# Patient Record
Sex: FEMALE | Race: WHITE | NOT HISPANIC OR LATINO | Employment: OTHER | URBAN - METROPOLITAN AREA
[De-identification: names, ages, dates, MRNs, and addresses within clinical notes are randomized per-mention and may not be internally consistent; named-entity substitution may affect disease eponyms.]

---

## 2019-11-16 ENCOUNTER — HOSPITAL ENCOUNTER (INPATIENT)
Facility: HOSPITAL | Age: 84
LOS: 9 days | Discharge: NURSING, REHAB OR LONG-TERM CARE | DRG: 871 | End: 2019-11-25
Attending: EMERGENCY MEDICINE | Admitting: INTERNAL MEDICINE
Payer: MEDICARE

## 2019-11-16 DIAGNOSIS — I21.4 NSTEMI (NON-ST ELEVATED MYOCARDIAL INFARCTION): ICD-10-CM

## 2019-11-16 DIAGNOSIS — I48.0 PAF (PAROXYSMAL ATRIAL FIBRILLATION): ICD-10-CM

## 2019-11-16 DIAGNOSIS — J96.01 ACUTE RESPIRATORY FAILURE WITH HYPOXIA AND HYPERCAPNIA: Primary | ICD-10-CM

## 2019-11-16 DIAGNOSIS — J96.02 ACUTE RESPIRATORY FAILURE WITH HYPOXIA AND HYPERCAPNIA: Primary | ICD-10-CM

## 2019-11-16 DIAGNOSIS — J45.41 MODERATE PERSISTENT ASTHMA WITH EXACERBATION: ICD-10-CM

## 2019-11-16 DIAGNOSIS — I48.91 A-FIB: ICD-10-CM

## 2019-11-16 DIAGNOSIS — J18.9 PNEUMONIA OF RIGHT LOWER LOBE DUE TO INFECTIOUS ORGANISM: ICD-10-CM

## 2019-11-16 DIAGNOSIS — R65.20 SEVERE SEPSIS: ICD-10-CM

## 2019-11-16 DIAGNOSIS — A41.9 SEVERE SEPSIS: ICD-10-CM

## 2019-11-16 DIAGNOSIS — N17.9 AKI (ACUTE KIDNEY INJURY): ICD-10-CM

## 2019-11-16 DIAGNOSIS — E66.01 MORBID OBESITY: ICD-10-CM

## 2019-11-16 DIAGNOSIS — R00.0 TACHYCARDIA: ICD-10-CM

## 2019-11-16 DIAGNOSIS — I10 ESSENTIAL HYPERTENSION: Chronic | ICD-10-CM

## 2019-11-16 DIAGNOSIS — E87.8 ELECTROLYTE IMBALANCE: ICD-10-CM

## 2019-11-16 DIAGNOSIS — J96.01 ACUTE RESPIRATORY FAILURE WITH HYPOXIA: ICD-10-CM

## 2019-11-16 PROBLEM — E87.20 LACTIC ACIDOSIS: Status: ACTIVE | Noted: 2019-11-16

## 2019-11-16 PROBLEM — J20.9 BRONCHITIS, ACUTE: Status: ACTIVE | Noted: 2019-11-13

## 2019-11-16 PROBLEM — J45.909 ASTHMA: Status: ACTIVE | Noted: 2019-11-16

## 2019-11-16 PROBLEM — F32.A DEPRESSION: Status: ACTIVE | Noted: 2019-11-16

## 2019-11-16 PROBLEM — R79.89 ELEVATED TROPONIN: Status: ACTIVE | Noted: 2019-11-16

## 2019-11-16 PROBLEM — G62.9 POLYNEUROPATHY: Status: ACTIVE | Noted: 2019-11-16

## 2019-11-16 PROBLEM — I63.9 CEREBROVASCULAR ACCIDENT (CVA): Status: ACTIVE | Noted: 2019-11-16

## 2019-11-16 PROBLEM — F41.9 ANXIETY: Status: ACTIVE | Noted: 2019-11-16

## 2019-11-16 PROBLEM — E78.5 HYPERLIPIDEMIA: Status: ACTIVE | Noted: 2019-11-16

## 2019-11-16 PROBLEM — M10.9 GOUT: Status: ACTIVE | Noted: 2019-11-16

## 2019-11-16 PROBLEM — E03.1 CONGENITAL HYPOTHYROIDISM: Status: ACTIVE | Noted: 2019-11-16

## 2019-11-16 LAB
ALBUMIN SERPL BCP-MCNC: 3.1 G/DL (ref 3.5–5.2)
ALBUMIN SERPL BCP-MCNC: 3.3 G/DL (ref 3.5–5.2)
ALLENS TEST: ABNORMAL
ALLENS TEST: ABNORMAL
ALP SERPL-CCNC: 104 U/L (ref 55–135)
ALP SERPL-CCNC: 122 U/L (ref 55–135)
ALT SERPL W/O P-5'-P-CCNC: 15 U/L (ref 10–44)
ALT SERPL W/O P-5'-P-CCNC: 19 U/L (ref 10–44)
ANION GAP SERPL CALC-SCNC: 11 MMOL/L (ref 8–16)
ANION GAP SERPL CALC-SCNC: 13 MMOL/L (ref 8–16)
APTT BLDCRRT: 35.8 SEC (ref 21–32)
AST SERPL-CCNC: 47 U/L (ref 10–40)
AST SERPL-CCNC: 48 U/L (ref 10–40)
BACTERIA #/AREA URNS HPF: ABNORMAL /HPF
BASO STIPL BLD QL SMEAR: ABNORMAL
BASOPHILS # BLD AUTO: 0.02 K/UL (ref 0–0.2)
BASOPHILS # BLD AUTO: 0.07 K/UL (ref 0–0.2)
BASOPHILS NFR BLD: 0.2 % (ref 0–1.9)
BASOPHILS NFR BLD: 0.5 % (ref 0–1.9)
BILIRUB SERPL-MCNC: 0.6 MG/DL (ref 0.1–1)
BILIRUB SERPL-MCNC: 0.7 MG/DL (ref 0.1–1)
BILIRUB UR QL STRIP: NEGATIVE
BNP SERPL-MCNC: 121 PG/ML (ref 0–99)
BUN SERPL-MCNC: 25 MG/DL (ref 8–23)
BUN SERPL-MCNC: 28 MG/DL (ref 8–23)
CALCIUM SERPL-MCNC: 8.3 MG/DL (ref 8.7–10.5)
CALCIUM SERPL-MCNC: 8.7 MG/DL (ref 8.7–10.5)
CHLORIDE SERPL-SCNC: 102 MMOL/L (ref 95–110)
CHLORIDE SERPL-SCNC: 104 MMOL/L (ref 95–110)
CK MB SERPL-MCNC: 5.6 NG/ML (ref 0.1–6.5)
CK MB SERPL-RTO: 2.4 % (ref 0–5)
CK SERPL-CCNC: 238 U/L (ref 20–180)
CLARITY UR: CLEAR
CO2 SERPL-SCNC: 22 MMOL/L (ref 23–29)
CO2 SERPL-SCNC: 23 MMOL/L (ref 23–29)
COLOR UR: YELLOW
CREAT SERPL-MCNC: 1.4 MG/DL (ref 0.5–1.4)
CREAT SERPL-MCNC: 1.4 MG/DL (ref 0.5–1.4)
DELSYS: ABNORMAL
DELSYS: ABNORMAL
DIFFERENTIAL METHOD: ABNORMAL
DIFFERENTIAL METHOD: ABNORMAL
EOSINOPHIL # BLD AUTO: 0 K/UL (ref 0–0.5)
EOSINOPHIL # BLD AUTO: 0 K/UL (ref 0–0.5)
EOSINOPHIL NFR BLD: 0 % (ref 0–8)
EOSINOPHIL NFR BLD: 0.3 % (ref 0–8)
EP: 6
ERYTHROCYTE [DISTWIDTH] IN BLOOD BY AUTOMATED COUNT: 14.6 % (ref 11.5–14.5)
ERYTHROCYTE [DISTWIDTH] IN BLOOD BY AUTOMATED COUNT: 14.7 % (ref 11.5–14.5)
ERYTHROCYTE [SEDIMENTATION RATE] IN BLOOD BY WESTERGREN METHOD: 14 MM/H
EST. GFR  (AFRICAN AMERICAN): 40 ML/MIN/1.73 M^2
EST. GFR  (AFRICAN AMERICAN): 40 ML/MIN/1.73 M^2
EST. GFR  (NON AFRICAN AMERICAN): 35 ML/MIN/1.73 M^2
EST. GFR  (NON AFRICAN AMERICAN): 35 ML/MIN/1.73 M^2
FIO2: 28
FIO2: 50
FLOW: 2
GLUCOSE SERPL-MCNC: 124 MG/DL (ref 70–110)
GLUCOSE SERPL-MCNC: 286 MG/DL (ref 70–110)
GLUCOSE UR QL STRIP: NEGATIVE
HCO3 UR-SCNC: 22.1 MMOL/L (ref 24–28)
HCO3 UR-SCNC: 27.2 MMOL/L (ref 24–28)
HCT VFR BLD AUTO: 34.7 % (ref 37–48.5)
HCT VFR BLD AUTO: 37.2 % (ref 37–48.5)
HGB BLD-MCNC: 10.6 G/DL (ref 12–16)
HGB BLD-MCNC: 11.6 G/DL (ref 12–16)
HGB UR QL STRIP: ABNORMAL
HYALINE CASTS #/AREA URNS LPF: 15 /LPF
HYPOCHROMIA BLD QL SMEAR: ABNORMAL
IMM GRANULOCYTES # BLD AUTO: 0.05 K/UL (ref 0–0.04)
IMM GRANULOCYTES # BLD AUTO: 0.08 K/UL (ref 0–0.04)
IMM GRANULOCYTES NFR BLD AUTO: 0.5 % (ref 0–0.5)
IMM GRANULOCYTES NFR BLD AUTO: 0.6 % (ref 0–0.5)
INFLUENZA A, MOLECULAR: NEGATIVE
INFLUENZA B, MOLECULAR: NEGATIVE
INR PPP: 1.2 (ref 0.8–1.2)
IP: 12
KETONES UR QL STRIP: NEGATIVE
LACTATE SERPL-SCNC: 2.5 MMOL/L (ref 0.5–2.2)
LACTATE SERPL-SCNC: 3.1 MMOL/L (ref 0.5–2.2)
LEUKOCYTE ESTERASE UR QL STRIP: NEGATIVE
LYMPHOCYTES # BLD AUTO: 1 K/UL (ref 1–4.8)
LYMPHOCYTES # BLD AUTO: 4.4 K/UL (ref 1–4.8)
LYMPHOCYTES NFR BLD: 10 % (ref 18–48)
LYMPHOCYTES NFR BLD: 33.1 % (ref 18–48)
MAGNESIUM SERPL-MCNC: 1.8 MG/DL (ref 1.6–2.6)
MAGNESIUM SERPL-MCNC: 2.1 MG/DL (ref 1.6–2.6)
MCH RBC QN AUTO: 31.9 PG (ref 27–31)
MCH RBC QN AUTO: 32.6 PG (ref 27–31)
MCHC RBC AUTO-ENTMCNC: 30.5 G/DL (ref 32–36)
MCHC RBC AUTO-ENTMCNC: 31.2 G/DL (ref 32–36)
MCV RBC AUTO: 105 FL (ref 82–98)
MCV RBC AUTO: 105 FL (ref 82–98)
MICROSCOPIC COMMENT: ABNORMAL
MIN VOL: 12.5
MODE: ABNORMAL
MODE: ABNORMAL
MONOCYTES # BLD AUTO: 0.6 K/UL (ref 0.3–1)
MONOCYTES # BLD AUTO: 1.4 K/UL (ref 0.3–1)
MONOCYTES NFR BLD: 10.4 % (ref 4–15)
MONOCYTES NFR BLD: 6.4 % (ref 4–15)
NEUTROPHILS # BLD AUTO: 7.2 K/UL (ref 1.8–7.7)
NEUTROPHILS # BLD AUTO: 7.8 K/UL (ref 1.8–7.7)
NEUTROPHILS NFR BLD: 55.1 % (ref 38–73)
NEUTROPHILS NFR BLD: 82.9 % (ref 38–73)
NITRITE UR QL STRIP: NEGATIVE
NRBC BLD-RTO: 0 /100 WBC
NRBC BLD-RTO: 0 /100 WBC
PCO2 BLDA: 34.4 MMHG (ref 35–45)
PCO2 BLDA: 66 MMHG (ref 35–45)
PH SMN: 7.22 [PH] (ref 7.35–7.45)
PH SMN: 7.42 [PH] (ref 7.35–7.45)
PH UR STRIP: 6 [PH] (ref 5–8)
PHOSPHATE SERPL-MCNC: 3.5 MG/DL (ref 2.7–4.5)
PLATELET # BLD AUTO: 256 K/UL (ref 150–350)
PLATELET # BLD AUTO: 316 K/UL (ref 150–350)
PLATELET BLD QL SMEAR: ABNORMAL
PMV BLD AUTO: 10 FL (ref 9.2–12.9)
PMV BLD AUTO: 9.6 FL (ref 9.2–12.9)
PO2 BLDA: 241 MMHG (ref 80–100)
PO2 BLDA: 80 MMHG (ref 80–100)
POC BE: -2 MMOL/L
POC BE: 0 MMOL/L
POC SATURATED O2: 100 % (ref 95–100)
POC SATURATED O2: 96 % (ref 95–100)
POCT GLUCOSE: 145 MG/DL (ref 70–110)
POCT GLUCOSE: 167 MG/DL (ref 70–110)
POCT GLUCOSE: 216 MG/DL (ref 70–110)
POCT GLUCOSE: 220 MG/DL (ref 70–110)
POLYCHROMASIA BLD QL SMEAR: ABNORMAL
POTASSIUM SERPL-SCNC: 4.2 MMOL/L (ref 3.5–5.1)
POTASSIUM SERPL-SCNC: 4.6 MMOL/L (ref 3.5–5.1)
PROCALCITONIN SERPL IA-MCNC: 0.15 NG/ML
PROT SERPL-MCNC: 6.8 G/DL (ref 6–8.4)
PROT SERPL-MCNC: 7.4 G/DL (ref 6–8.4)
PROT UR QL STRIP: ABNORMAL
PROTHROMBIN TIME: 13.3 SEC (ref 9–12.5)
RBC # BLD AUTO: 3.32 M/UL (ref 4–5.4)
RBC # BLD AUTO: 3.56 M/UL (ref 4–5.4)
RBC #/AREA URNS HPF: 6 /HPF (ref 0–4)
SAMPLE: ABNORMAL
SAMPLE: ABNORMAL
SITE: ABNORMAL
SITE: ABNORMAL
SODIUM SERPL-SCNC: 137 MMOL/L (ref 136–145)
SODIUM SERPL-SCNC: 138 MMOL/L (ref 136–145)
SP GR UR STRIP: >=1.03 (ref 1–1.03)
SP02: 98
SPECIMEN SOURCE: NORMAL
SPONT RATE: 33
STOMATOCYTES BLD QL SMEAR: PRESENT
TROPONIN I SERPL DL<=0.01 NG/ML-MCNC: 0.1 NG/ML (ref 0–0.03)
TROPONIN I SERPL DL<=0.01 NG/ML-MCNC: 0.99 NG/ML (ref 0–0.03)
TROPONIN I SERPL DL<=0.01 NG/ML-MCNC: 1.98 NG/ML (ref 0–0.03)
TSH SERPL DL<=0.005 MIU/L-ACNC: 0.82 UIU/ML (ref 0.4–4)
URN SPEC COLLECT METH UR: ABNORMAL
UROBILINOGEN UR STRIP-ACNC: NEGATIVE EU/DL
WBC # BLD AUTO: 13.14 K/UL (ref 3.9–12.7)
WBC # BLD AUTO: 9.47 K/UL (ref 3.9–12.7)
WBC #/AREA URNS HPF: 1 /HPF (ref 0–5)

## 2019-11-16 PROCEDURE — 84484 ASSAY OF TROPONIN QUANT: CPT | Mod: 91

## 2019-11-16 PROCEDURE — 84443 ASSAY THYROID STIM HORMONE: CPT

## 2019-11-16 PROCEDURE — 94660 CPAP INITIATION&MGMT: CPT

## 2019-11-16 PROCEDURE — 83880 ASSAY OF NATRIURETIC PEPTIDE: CPT

## 2019-11-16 PROCEDURE — 85610 PROTHROMBIN TIME: CPT

## 2019-11-16 PROCEDURE — 94640 AIRWAY INHALATION TREATMENT: CPT

## 2019-11-16 PROCEDURE — 80053 COMPREHEN METABOLIC PANEL: CPT

## 2019-11-16 PROCEDURE — 96374 THER/PROPH/DIAG INJ IV PUSH: CPT

## 2019-11-16 PROCEDURE — 25000003 PHARM REV CODE 250: Performed by: INTERNAL MEDICINE

## 2019-11-16 PROCEDURE — 20000000 HC ICU ROOM

## 2019-11-16 PROCEDURE — 63600175 PHARM REV CODE 636 W HCPCS: Performed by: NURSE PRACTITIONER

## 2019-11-16 PROCEDURE — 36600 WITHDRAWAL OF ARTERIAL BLOOD: CPT

## 2019-11-16 PROCEDURE — 27000221 HC OXYGEN, UP TO 24 HOURS

## 2019-11-16 PROCEDURE — 25000242 PHARM REV CODE 250 ALT 637 W/ HCPCS: Performed by: EMERGENCY MEDICINE

## 2019-11-16 PROCEDURE — 84145 PROCALCITONIN (PCT): CPT

## 2019-11-16 PROCEDURE — 82550 ASSAY OF CK (CPK): CPT

## 2019-11-16 PROCEDURE — 25000242 PHARM REV CODE 250 ALT 637 W/ HCPCS: Performed by: NURSE PRACTITIONER

## 2019-11-16 PROCEDURE — 99291 CRITICAL CARE FIRST HOUR: CPT | Mod: 25

## 2019-11-16 PROCEDURE — 93005 ELECTROCARDIOGRAM TRACING: CPT

## 2019-11-16 PROCEDURE — 63600175 PHARM REV CODE 636 W HCPCS: Performed by: INTERNAL MEDICINE

## 2019-11-16 PROCEDURE — 25000003 PHARM REV CODE 250: Performed by: NURSE PRACTITIONER

## 2019-11-16 PROCEDURE — 84484 ASSAY OF TROPONIN QUANT: CPT

## 2019-11-16 PROCEDURE — 25000003 PHARM REV CODE 250: Performed by: EMERGENCY MEDICINE

## 2019-11-16 PROCEDURE — 96365 THER/PROPH/DIAG IV INF INIT: CPT | Mod: 59

## 2019-11-16 PROCEDURE — 36415 COLL VENOUS BLD VENIPUNCTURE: CPT

## 2019-11-16 PROCEDURE — 85730 THROMBOPLASTIN TIME PARTIAL: CPT

## 2019-11-16 PROCEDURE — 82553 CREATINE MB FRACTION: CPT

## 2019-11-16 PROCEDURE — 93010 ELECTROCARDIOGRAM REPORT: CPT | Mod: ,,, | Performed by: INTERNAL MEDICINE

## 2019-11-16 PROCEDURE — 99223 PR INITIAL HOSPITAL CARE,LEVL III: ICD-10-PCS | Mod: ,,, | Performed by: INTERNAL MEDICINE

## 2019-11-16 PROCEDURE — 83605 ASSAY OF LACTIC ACID: CPT

## 2019-11-16 PROCEDURE — 84100 ASSAY OF PHOSPHORUS: CPT

## 2019-11-16 PROCEDURE — 80053 COMPREHEN METABOLIC PANEL: CPT | Mod: 91

## 2019-11-16 PROCEDURE — 93010 EKG 12-LEAD: ICD-10-PCS | Mod: ,,, | Performed by: INTERNAL MEDICINE

## 2019-11-16 PROCEDURE — 99900035 HC TECH TIME PER 15 MIN (STAT)

## 2019-11-16 PROCEDURE — 96375 TX/PRO/DX INJ NEW DRUG ADDON: CPT

## 2019-11-16 PROCEDURE — 63600175 PHARM REV CODE 636 W HCPCS: Performed by: EMERGENCY MEDICINE

## 2019-11-16 PROCEDURE — 87040 BLOOD CULTURE FOR BACTERIA: CPT

## 2019-11-16 PROCEDURE — 85025 COMPLETE CBC W/AUTO DIFF WBC: CPT | Mod: 91

## 2019-11-16 PROCEDURE — 81000 URINALYSIS NONAUTO W/SCOPE: CPT

## 2019-11-16 PROCEDURE — 87502 INFLUENZA DNA AMP PROBE: CPT

## 2019-11-16 PROCEDURE — 83735 ASSAY OF MAGNESIUM: CPT | Mod: 91

## 2019-11-16 PROCEDURE — 27000190 HC CPAP FULL FACE MASK W/VALVE

## 2019-11-16 PROCEDURE — 99223 1ST HOSP IP/OBS HIGH 75: CPT | Mod: ,,, | Performed by: INTERNAL MEDICINE

## 2019-11-16 PROCEDURE — 82803 BLOOD GASES ANY COMBINATION: CPT

## 2019-11-16 PROCEDURE — 83735 ASSAY OF MAGNESIUM: CPT

## 2019-11-16 RX ORDER — IPRATROPIUM BROMIDE AND ALBUTEROL SULFATE 2.5; .5 MG/3ML; MG/3ML
3 SOLUTION RESPIRATORY (INHALATION) EVERY 4 HOURS PRN
Status: DISCONTINUED | OUTPATIENT
Start: 2019-11-16 | End: 2019-11-22

## 2019-11-16 RX ORDER — ASPIRIN 325 MG
325 TABLET ORAL ONCE
Status: COMPLETED | OUTPATIENT
Start: 2019-11-16 | End: 2019-11-16

## 2019-11-16 RX ORDER — ASPIRIN 81 MG/1
81 TABLET ORAL DAILY
Status: ON HOLD | COMMUNITY
End: 2019-11-25 | Stop reason: HOSPADM

## 2019-11-16 RX ORDER — GLUCAGON 1 MG
1 KIT INJECTION
Status: DISCONTINUED | OUTPATIENT
Start: 2019-11-16 | End: 2019-11-22

## 2019-11-16 RX ORDER — ESCITALOPRAM OXALATE 10 MG/1
10 TABLET ORAL DAILY
Status: DISCONTINUED | OUTPATIENT
Start: 2019-11-16 | End: 2019-11-18

## 2019-11-16 RX ORDER — FUROSEMIDE 10 MG/ML
40 INJECTION INTRAMUSCULAR; INTRAVENOUS
Status: COMPLETED | OUTPATIENT
Start: 2019-11-16 | End: 2019-11-16

## 2019-11-16 RX ORDER — ONDANSETRON 2 MG/ML
4 INJECTION INTRAMUSCULAR; INTRAVENOUS EVERY 6 HOURS PRN
Status: DISCONTINUED | OUTPATIENT
Start: 2019-11-16 | End: 2019-11-25 | Stop reason: HOSPADM

## 2019-11-16 RX ORDER — INSULIN ASPART 100 [IU]/ML
1-10 INJECTION, SOLUTION INTRAVENOUS; SUBCUTANEOUS EVERY 4 HOURS PRN
Status: DISCONTINUED | OUTPATIENT
Start: 2019-11-16 | End: 2019-11-18

## 2019-11-16 RX ORDER — CEFDINIR 300 MG/1
2 CAPSULE ORAL
Status: ON HOLD | COMMUNITY
Start: 2019-11-13 | End: 2019-11-25 | Stop reason: HOSPADM

## 2019-11-16 RX ORDER — METOPROLOL TARTRATE 1 MG/ML
5 INJECTION, SOLUTION INTRAVENOUS
Status: COMPLETED | OUTPATIENT
Start: 2019-11-16 | End: 2019-11-16

## 2019-11-16 RX ORDER — OSELTAMIVIR PHOSPHATE 30 MG/1
30 CAPSULE ORAL 2 TIMES DAILY
Status: DISCONTINUED | OUTPATIENT
Start: 2019-11-16 | End: 2019-11-16

## 2019-11-16 RX ORDER — METHYLPREDNISOLONE SOD SUCC 125 MG
125 VIAL (EA) INJECTION
Status: COMPLETED | OUTPATIENT
Start: 2019-11-16 | End: 2019-11-16

## 2019-11-16 RX ORDER — PHENYLEPHRINE HCL IN 0.9% NACL 20MG/250ML
0.5 PLASTIC BAG, INJECTION (ML) INTRAVENOUS CONTINUOUS
Status: DISCONTINUED | OUTPATIENT
Start: 2019-11-16 | End: 2019-11-16

## 2019-11-16 RX ORDER — ACETAMINOPHEN 650 MG/1
650 SUPPOSITORY RECTAL EVERY 6 HOURS PRN
Status: DISCONTINUED | OUTPATIENT
Start: 2019-11-16 | End: 2019-11-25 | Stop reason: HOSPADM

## 2019-11-16 RX ORDER — OSELTAMIVIR PHOSPHATE 75 MG/1
75 CAPSULE ORAL 2 TIMES DAILY
Status: DISCONTINUED | OUTPATIENT
Start: 2019-11-16 | End: 2019-11-16 | Stop reason: DRUGHIGH

## 2019-11-16 RX ORDER — GABAPENTIN 100 MG/1
100 CAPSULE ORAL 3 TIMES DAILY
Status: ON HOLD | COMMUNITY
End: 2022-04-06 | Stop reason: HOSPADM

## 2019-11-16 RX ORDER — ATORVASTATIN CALCIUM 10 MG/1
20 TABLET, FILM COATED ORAL DAILY
Status: DISCONTINUED | OUTPATIENT
Start: 2019-11-16 | End: 2019-11-25 | Stop reason: HOSPADM

## 2019-11-16 RX ORDER — METHYLPREDNISOLONE SOD SUCC 125 MG
60 VIAL (EA) INJECTION EVERY 8 HOURS
Status: DISCONTINUED | OUTPATIENT
Start: 2019-11-16 | End: 2019-11-17

## 2019-11-16 RX ORDER — SODIUM CHLORIDE 9 MG/ML
INJECTION, SOLUTION INTRAVENOUS CONTINUOUS
Status: DISCONTINUED | OUTPATIENT
Start: 2019-11-16 | End: 2019-11-17

## 2019-11-16 RX ORDER — DILTIAZEM HYDROCHLORIDE 60 MG/1
60 TABLET, FILM COATED ORAL EVERY 8 HOURS
Status: DISCONTINUED | OUTPATIENT
Start: 2019-11-16 | End: 2019-11-17

## 2019-11-16 RX ORDER — ATORVASTATIN CALCIUM 20 MG/1
20 TABLET, FILM COATED ORAL DAILY
COMMUNITY
End: 2022-03-31

## 2019-11-16 RX ORDER — ASPIRIN 81 MG/1
81 TABLET ORAL DAILY
Status: DISCONTINUED | OUTPATIENT
Start: 2019-11-16 | End: 2019-11-25 | Stop reason: HOSPADM

## 2019-11-16 RX ORDER — ESCITALOPRAM OXALATE 10 MG/1
10 TABLET ORAL DAILY
Status: ON HOLD | COMMUNITY
End: 2019-11-25 | Stop reason: HOSPADM

## 2019-11-16 RX ORDER — PANTOPRAZOLE SODIUM 40 MG/1
40 TABLET, DELAYED RELEASE ORAL DAILY
Status: ON HOLD | COMMUNITY
End: 2019-11-25 | Stop reason: HOSPADM

## 2019-11-16 RX ORDER — CODEINE PHOSPHATE AND GUAIFENESIN 10; 100 MG/5ML; MG/5ML
10 SOLUTION ORAL
Status: ON HOLD | COMMUNITY
Start: 2019-11-13 | End: 2019-11-25 | Stop reason: HOSPADM

## 2019-11-16 RX ORDER — DILTIAZEM HYDROCHLORIDE 5 MG/ML
10 INJECTION INTRAVENOUS ONCE
Status: COMPLETED | OUTPATIENT
Start: 2019-11-16 | End: 2019-11-16

## 2019-11-16 RX ORDER — PANTOPRAZOLE SODIUM 40 MG/1
40 TABLET, DELAYED RELEASE ORAL DAILY
Status: DISCONTINUED | OUTPATIENT
Start: 2019-11-16 | End: 2019-11-25 | Stop reason: HOSPADM

## 2019-11-16 RX ORDER — METOPROLOL TARTRATE 1 MG/ML
5 INJECTION, SOLUTION INTRAVENOUS ONCE
Status: COMPLETED | OUTPATIENT
Start: 2019-11-16 | End: 2019-11-16

## 2019-11-16 RX ORDER — IPRATROPIUM BROMIDE AND ALBUTEROL SULFATE 2.5; .5 MG/3ML; MG/3ML
3 SOLUTION RESPIRATORY (INHALATION) EVERY 6 HOURS
Status: DISCONTINUED | OUTPATIENT
Start: 2019-11-16 | End: 2019-11-18

## 2019-11-16 RX ORDER — LEVOTHYROXINE SODIUM 112 UG/1
112 TABLET ORAL DAILY
Status: DISCONTINUED | OUTPATIENT
Start: 2019-11-16 | End: 2019-11-25 | Stop reason: HOSPADM

## 2019-11-16 RX ORDER — METOPROLOL TARTRATE 25 MG/1
12.5 TABLET, FILM COATED ORAL 2 TIMES DAILY
Status: ON HOLD | COMMUNITY
End: 2019-11-25 | Stop reason: HOSPADM

## 2019-11-16 RX ORDER — IPRATROPIUM BROMIDE AND ALBUTEROL SULFATE 2.5; .5 MG/3ML; MG/3ML
3 SOLUTION RESPIRATORY (INHALATION)
Status: COMPLETED | OUTPATIENT
Start: 2019-11-16 | End: 2019-11-16

## 2019-11-16 RX ADMIN — ACETAMINOPHEN 650 MG: 650 SUPPOSITORY RECTAL at 03:11

## 2019-11-16 RX ADMIN — Medication 0.5 MCG/KG/MIN: at 04:11

## 2019-11-16 RX ADMIN — DILTIAZEM HYDROCHLORIDE 10 MG: 5 INJECTION INTRAVENOUS at 09:11

## 2019-11-16 RX ADMIN — SODIUM CHLORIDE 1500 ML: 0.9 INJECTION, SOLUTION INTRAVENOUS at 02:11

## 2019-11-16 RX ADMIN — DILTIAZEM HYDROCHLORIDE 60 MG: 60 TABLET, FILM COATED ORAL at 09:11

## 2019-11-16 RX ADMIN — FUROSEMIDE 40 MG: 10 INJECTION, SOLUTION INTRAMUSCULAR; INTRAVENOUS at 01:11

## 2019-11-16 RX ADMIN — IPRATROPIUM BROMIDE AND ALBUTEROL SULFATE 3 ML: .5; 3 SOLUTION RESPIRATORY (INHALATION) at 01:11

## 2019-11-16 RX ADMIN — METHYLPREDNISOLONE SODIUM SUCCINATE 60 MG: 125 INJECTION, POWDER, FOR SOLUTION INTRAMUSCULAR; INTRAVENOUS at 03:11

## 2019-11-16 RX ADMIN — METHYLPREDNISOLONE SODIUM SUCCINATE 125 MG: 125 INJECTION, POWDER, FOR SOLUTION INTRAMUSCULAR; INTRAVENOUS at 01:11

## 2019-11-16 RX ADMIN — DILTIAZEM HYDROCHLORIDE 60 MG: 60 TABLET, FILM COATED ORAL at 10:11

## 2019-11-16 RX ADMIN — LEVOTHYROXINE SODIUM 112 MCG: 112 TABLET ORAL at 05:11

## 2019-11-16 RX ADMIN — SODIUM CHLORIDE 1000 ML: 0.9 INJECTION, SOLUTION INTRAVENOUS at 02:11

## 2019-11-16 RX ADMIN — METHYLPREDNISOLONE SODIUM SUCCINATE 80 MG: 40 INJECTION, POWDER, FOR SOLUTION INTRAMUSCULAR; INTRAVENOUS at 05:11

## 2019-11-16 RX ADMIN — INSULIN ASPART 4 UNITS: 100 INJECTION, SOLUTION INTRAVENOUS; SUBCUTANEOUS at 08:11

## 2019-11-16 RX ADMIN — ASPIRIN 81 MG: 81 TABLET, COATED ORAL at 10:11

## 2019-11-16 RX ADMIN — PANTOPRAZOLE SODIUM 40 MG: 40 TABLET, DELAYED RELEASE ORAL at 10:11

## 2019-11-16 RX ADMIN — METOPROLOL TARTRATE 5 MG: 5 INJECTION INTRAVENOUS at 05:11

## 2019-11-16 RX ADMIN — PIPERACILLIN AND TAZOBACTAM 4.5 G: 4; .5 INJECTION, POWDER, FOR SOLUTION INTRAVENOUS at 10:11

## 2019-11-16 RX ADMIN — METHYLPREDNISOLONE SODIUM SUCCINATE 60 MG: 125 INJECTION, POWDER, FOR SOLUTION INTRAMUSCULAR; INTRAVENOUS at 09:11

## 2019-11-16 RX ADMIN — ASPIRIN 325 MG ORAL TABLET 325 MG: 325 PILL ORAL at 05:11

## 2019-11-16 RX ADMIN — PIPERACILLIN AND TAZOBACTAM 4.5 G: 4; .5 INJECTION, POWDER, FOR SOLUTION INTRAVENOUS at 01:11

## 2019-11-16 RX ADMIN — IPRATROPIUM BROMIDE AND ALBUTEROL SULFATE 3 ML: .5; 3 SOLUTION RESPIRATORY (INHALATION) at 07:11

## 2019-11-16 RX ADMIN — INSULIN ASPART 4 UNITS: 100 INJECTION, SOLUTION INTRAVENOUS; SUBCUTANEOUS at 07:11

## 2019-11-16 RX ADMIN — METOPROLOL TARTRATE 5 MG: 5 INJECTION INTRAVENOUS at 07:11

## 2019-11-16 RX ADMIN — PIPERACILLIN AND TAZOBACTAM 4.5 G: 4; .5 INJECTION, POWDER, FOR SOLUTION INTRAVENOUS at 06:11

## 2019-11-16 RX ADMIN — ESCITALOPRAM OXALATE 10 MG: 10 TABLET, FILM COATED ORAL at 10:11

## 2019-11-16 RX ADMIN — METOPROLOL TARTRATE 5 MG: 5 INJECTION INTRAVENOUS at 01:11

## 2019-11-16 RX ADMIN — RIVAROXABAN 20 MG: 20 TABLET, FILM COATED ORAL at 05:11

## 2019-11-16 RX ADMIN — SODIUM CHLORIDE 1000 ML: 0.9 INJECTION, SOLUTION INTRAVENOUS at 06:11

## 2019-11-16 RX ADMIN — ATORVASTATIN CALCIUM 20 MG: 10 TABLET, FILM COATED ORAL at 10:11

## 2019-11-16 RX ADMIN — SODIUM CHLORIDE: 0.9 INJECTION, SOLUTION INTRAVENOUS at 04:11

## 2019-11-16 RX ADMIN — VANCOMYCIN HYDROCHLORIDE 2500 MG: 1 INJECTION, POWDER, LYOPHILIZED, FOR SOLUTION INTRAVENOUS at 02:11

## 2019-11-16 NOTE — PROGRESS NOTES
"Patient arrived to ICU room 6 via stretcher from ER accompanied by nurse Emily, GONZALO. Transferred to bed, heart monitor in place, vital signs taken.   Pt on BiPAP, abd mus use noted, bbs w/exp wheezes, but pt in no acute distress. Assessment as charted. Patient orientated to room, call light, fall precautions, and board.    Pt  brought to bs to update on POC. Pt converted at this time from SR to afib 120s-130s. Noted from chart pt was prescribed xaralto by cardiologist. Pt's  unable to recall why pt was prescribed xaralto, if she had ever been dx w/an arrhythmia, or the reason why she saw a cardiologist, but stated that "flutter sounds familiar."  eICU Dr Coker made aware.  "

## 2019-11-16 NOTE — ASSESSMENT & PLAN NOTE
Resolving:    Microbiology: Panculture for temp > 101.   Oxygenation: Supplemental oxygen by nasal canula. Keep SAO2 > = 92%. BIPAP PRN.  Hemodynamics: Weaned off pressors.  IV crystalloids. Keep MAP > = 65mmHg  Source control: IV Zosyn, IV Vancomycin.

## 2019-11-16 NOTE — ASSESSMENT & PLAN NOTE
- Secondary to asthma exacerbation + PNA.    - O2 sat stable on 50% FiO2 BiPAP, wean as tolerated.  Repeat ABG in AM.  - DuoNebs Q 6.  - IV Solu-Medrol 80 mg Q8.  - Empiric antibiotics.  - Will obtain V/Q scan to r/o PE (unable to perform CTA due to TARA).  - Pulmonology consult.    - Improving, came off BIPAP- Oxygenation improving

## 2019-11-16 NOTE — ASSESSMENT & PLAN NOTE
Likely demand ischemia. Cardiology consulted for assistance.   Monitor and trend serial enzymes.       Continue aspirin and statin.         Beta-blocker on hold due to hypotension

## 2019-11-16 NOTE — SIGNIFICANT EVENT
Tissue Perfusion Assessment        Vital signs reviewed. A focused perfusion assessment was completed.      Isa Mahoney

## 2019-11-16 NOTE — ED NOTES
Pt changed and placed on clean, dry pad. Secure chat sent to hospital medicine regarding 101.2 rectal temperature. Awaiting response

## 2019-11-16 NOTE — H&P
"[facility] Hospital Medicine  History & Physical    Patient Name: Ana Castañeda  MRN: [id number]  Admission Date: 11/16/2019  Attending Physician: Rivas Hester MD   Primary Care Provider: Stephen Tarango MD         Patient information was obtained from patient, spouse/SO, relative(s), EMS personnel, past medical records and ER records.     Subjective:     Principal Problem:Acute respiratory failure with hypoxia and hypercapnia    Chief Complaint:   Chief Complaint   Patient presents with    Shortness of Breath     hx of chf and recently dx with bronchitis and on cefdinir        HPI: Ana Castañeda is a 84 y.o. female patient with a PMHx asthma, HTN, h/o CVA with residual right-sided weakness, hypothyroidism, gout, debility (wheelchair bound), and chronic anticoagulation therapy (reason unknown).  She presented to the emergency department with complaints of shortness of breath that progressively worsened shortly before arrival.  No aggravating or alleviating factors.  Associated cough producing mucus, wheezing and congestion x 3 days.  Denies any chest pain, palpitations, orthopnea, PND, edema, abdominal pain, nausea, vomiting, diarrhea, dysuria, hematuria, back pain, AMS, lightheadedness or dizziness, syncope, rhinorrhea, sore throat, weakness, fever or chills.  Family at bedside reports patient was seen at [facility] on 11/13 and diagnosed with acute bronchitis.  She was given a "steroid shot" and prescribed Cefdinir, which she has taken x 1 dose on 11/15.  Patient is on chronic Xarelto, but unsure of reason.  She denies any history of AF/FL, DVT or PE.  Initial workup in the ED resulted WBC of 13.14, creatinine 1.4, BUN 25, troponin 0.095, , lactic 3.1, procalcitonin 0.15, UA unremarkable, EKG unrevealing.  ABG with pH of 7.224, pCO2 66, pO2 241, SaO2 100 on BiPAP.  CXR showed right lower lobe infiltrate.  Patient's O2 sat remained stable in the ED, but BiPAP placed due to " increased respiratory distress. Upon arrival to the ED, patient was tachycardic with heart rate of 136 bpm, /78.  She was given IV Lopressor 5 mg x 2 doses and 40 mg IV Lasix, which improved her HR into the 80s but also decreased blood pressure to 98/70.  ED also administered 125 mg IV Solu-Medrol, DuoNeb treatments x 3, 2.5 L IV fluid resuscitation, IV vanc and Zosyn.  Patient was admitted to ICU under Hospital Medicine services.  Currently, patient appears comfortable in no acute distress.  BiPAP remains in place with stable O2 sat.  Patient is a full code.   is surrogate decision maker.      Past Medical History:   Diagnosis Date    Anticoagulant long-term use     Asthma     Debility     Gait apraxia     Gout     Hypertension     NPH (normal pressure hydrocephalus)     Stroke     Thyroid disease        Past Surgical History:   Procedure Laterality Date    CAROTID ENDARTERECTOMY         Review of patient's allergies indicates:   Allergen Reactions    Food color red [red food color (bulk)] Nausea And Vomiting    Pcn [penicillins] Rash       No current facility-administered medications on file prior to encounter.      Current Outpatient Medications on File Prior to Encounter   Medication Sig    allopurinol (ZYLOPRIM) 300 MG tablet Take 300 mg by mouth once daily.    aspirin (ECOTRIN) 81 MG EC tablet Take 81 mg by mouth once daily.    atorvastatin (LIPITOR) 20 MG tablet Take 20 mg by mouth once daily.    calcium carbonate (CALCIUM 600 ORAL) Take 1,200 mg by mouth once daily.    cefdinir (OMNICEF) 300 MG capsule 2 capsules.    escitalopram oxalate (LEXAPRO) 10 MG tablet Take 10 mg by mouth once daily.    gabapentin (NEURONTIN) 100 MG capsule Take 100 mg by mouth 3 (three) times daily.    guaifenesin-codeine 100-10 mg/5 ml (TUSSI-ORGANIDIN NR)  mg/5 mL syrup 10 mLs.    levothyroxine (SYNTHROID) 100 MCG tablet Take 112 mcg by mouth once daily.     metoprolol tartrate (LOPRESSOR)  25 MG tablet Take 12.5 mg by mouth 2 (two) times daily.    multivit,calc,mins/iron/folic (ONE DAILY WOMEN'S HEALTH ORAL) Take by mouth.    pantoprazole (PROTONIX) 40 MG tablet Take 40 mg by mouth once daily.    rivaroxaban (XARELTO) 20 mg Tab Take 20 mg by mouth daily with dinner or evening meal.    [DISCONTINUED] estradiol (ESTRACE) 1 MG tablet Take 1 mg by mouth once daily.     Family History     Reviewed and not pertinent.        Tobacco Use    Smoking status: Never Smoker   Substance and Sexual Activity    Alcohol use: No    Drug use: No    Sexual activity: Not on file     Review of Systems   Unable to perform ROS: Acuity of condition   Constitutional: Negative for chills and fever.   HENT: Positive for congestion. Negative for postnasal drip, rhinorrhea, sinus pressure and sore throat.    Respiratory: Positive for cough, shortness of breath and wheezing.    Cardiovascular: Negative for chest pain, palpitations and leg swelling.   Gastrointestinal: Negative for abdominal distention, abdominal pain, blood in stool, constipation, diarrhea, nausea and vomiting.   Genitourinary: Negative for difficulty urinating, dysuria, hematuria and urgency.   Musculoskeletal: Positive for gait problem (chronic). Negative for arthralgias, back pain, myalgias, neck pain and neck stiffness.   Skin: Negative for pallor, rash and wound.   Neurological: Negative for dizziness, syncope, facial asymmetry, speech difficulty, light-headedness and headaches.   Psychiatric/Behavioral: Negative for confusion. The patient is not nervous/anxious.    All other systems reviewed and are negative.    Objective:     Vital Signs (Most Recent):  Temp: (!) 100.4 °F (38 °C) (11/16/19 0400)  Pulse: 81 (11/16/19 0400)  Resp: (!) 22 (11/16/19 0400)  BP: 98/70 (11/16/19 0400)  SpO2: 98 % (11/16/19 0400) Vital Signs (24h Range):  Temp:  [98.7 °F (37.1 °C)-101.2 °F (38.4 °C)] 100.4 °F (38 °C)  Pulse:  [] 81  Resp:  [22-35] 22  SpO2:  [92 %-98  %] 98 %  BP: ()/(57-78) 98/70     Weight: 94 kg (207 lb 4.8 oz)  Body mass index is 32.47 kg/m².    Physical Exam   Constitutional: She is oriented to person, place, and time. She appears well-developed and well-nourished. No distress.   Elderly.  Obese.   HENT:   Head: Normocephalic and atraumatic.   Eyes: Conjunctivae are normal.   PERRL; EOM intact.   Neck: Normal range of motion. Neck supple.   Cardiovascular: Normal rate, regular rhythm, S1 normal, S2 normal and intact distal pulses.  No extrasystoles are present. Exam reveals no gallop and no friction rub.   No murmur heard.  Pulses:       Radial pulses are 2+ on the right side, and 2+ on the left side.        Dorsalis pedis pulses are 2+ on the right side, and 2+ on the left side.        Posterior tibial pulses are 2+ on the right side, and 2+ on the left side.   Pulmonary/Chest: No accessory muscle usage. Tachypnea noted. She is in respiratory distress. She has no decreased breath sounds. She has wheezes (diffuse inspiratory and expiratory). She has no rhonchi. She has rales in the right lower field and the left lower field.   BiPAP in place.   Abdominal: Soft. Bowel sounds are normal. She exhibits no distension. There is no tenderness. There is no rebound, no guarding and no CVA tenderness.   Musculoskeletal: Normal range of motion. She exhibits no edema, tenderness or deformity.   Neurological: She is alert and oriented to person, place, and time. No sensory deficit. GCS eye subscore is 4. GCS verbal subscore is 5. GCS motor subscore is 6.   No acute focal deficits appreciated.   Skin: Skin is warm, dry and intact. Capillary refill takes less than 2 seconds. No rash noted. She is not diaphoretic. No cyanosis or erythema.   Psychiatric: She has a normal mood and affect. Her speech is normal and behavior is normal. Cognition and memory are normal.   Nursing note and vitals reviewed.          Significant Labs:  Results for orders placed or performed  during the hospital encounter of 11/16/19   CBC auto differential   Result Value Ref Range    WBC 13.14 (H) 3.90 - 12.70 K/uL    RBC 3.56 (L) 4.00 - 5.40 M/uL    Hemoglobin 11.6 (L) 12.0 - 16.0 g/dL    Hematocrit 37.2 37.0 - 48.5 %    Mean Corpuscular Volume 105 (H) 82 - 98 fL    Mean Corpuscular Hemoglobin 32.6 (H) 27.0 - 31.0 pg    Mean Corpuscular Hemoglobin Conc 31.2 (L) 32.0 - 36.0 g/dL    RDW 14.6 (H) 11.5 - 14.5 %    Platelets 316 150 - 350 K/uL    MPV 10.0 9.2 - 12.9 fL    Immature Granulocytes 0.6 (H) 0.0 - 0.5 %    Gran # (ANC) 7.2 1.8 - 7.7 K/uL    Immature Grans (Abs) 0.08 (H) 0.00 - 0.04 K/uL    Lymph # 4.4 1.0 - 4.8 K/uL    Mono # 1.4 (H) 0.3 - 1.0 K/uL    Eos # 0.0 0.0 - 0.5 K/uL    Baso # 0.07 0.00 - 0.20 K/uL    nRBC 0 0 /100 WBC    Gran% 55.1 38.0 - 73.0 %    Lymph% 33.1 18.0 - 48.0 %    Mono% 10.4 4.0 - 15.0 %    Eosinophil% 0.3 0.0 - 8.0 %    Basophil% 0.5 0.0 - 1.9 %    Differential Method Automated    Comprehensive metabolic panel   Result Value Ref Range    Sodium 138 136 - 145 mmol/L    Potassium 4.6 3.5 - 5.1 mmol/L    Chloride 102 95 - 110 mmol/L    CO2 23 23 - 29 mmol/L    Glucose 286 (H) 70 - 110 mg/dL    BUN, Bld 25 (H) 8 - 23 mg/dL    Creatinine 1.4 0.5 - 1.4 mg/dL    Calcium 8.7 8.7 - 10.5 mg/dL    Total Protein 7.4 6.0 - 8.4 g/dL    Albumin 3.3 (L) 3.5 - 5.2 g/dL    Total Bilirubin 0.6 0.1 - 1.0 mg/dL    Alkaline Phosphatase 122 55 - 135 U/L    AST 48 (H) 10 - 40 U/L    ALT 15 10 - 44 U/L    Anion Gap 13 8 - 16 mmol/L    eGFR if African American 40 (A) >60 mL/min/1.73 m^2    eGFR if non African American 35 (A) >60 mL/min/1.73 m^2   Lactic acid, plasma #1   Result Value Ref Range    Lactate (Lactic Acid) 3.1 (H) 0.5 - 2.2 mmol/L   Magnesium   Result Value Ref Range    Magnesium 2.1 1.6 - 2.6 mg/dL   Troponin I   Result Value Ref Range    Troponin I 0.095 (H) 0.000 - 0.026 ng/mL   Procalcitonin   Result Value Ref Range    Procalcitonin 0.15 <0.25 ng/mL   Brain natriuretic peptide    Result Value Ref Range     (H) 0 - 99 pg/mL   Protime-INR   Result Value Ref Range    Prothrombin Time 13.3 (H) 9.0 - 12.5 sec    INR 1.2 0.8 - 1.2   APTT   Result Value Ref Range    aPTT 35.8 (H) 21.0 - 32.0 sec   Urinalysis, Reflex to Urine Culture Urine, Catheterized   Result Value Ref Range    Specimen UA Urine, Catheterized     Color, UA Yellow Yellow, Straw, Jennifer    Appearance, UA Clear Clear    pH, UA 6.0 5.0 - 8.0    Specific Gravity, UA >=1.030 (A) 1.005 - 1.030    Protein, UA 2+ (A) Negative    Glucose, UA Negative Negative    Ketones, UA Negative Negative    Bilirubin (UA) Negative Negative    Occult Blood UA 2+ (A) Negative    Nitrite, UA Negative Negative    Urobilinogen, UA Negative <2.0 EU/dL    Leukocytes, UA Negative Negative   Urinalysis Microscopic   Result Value Ref Range    RBC, UA 6 (H) 0 - 4 /hpf    WBC, UA 1 0 - 5 /hpf    Bacteria None None-Occ /hpf    Hyaline Casts, UA 15 (A) 0-1/lpf /lpf    Microscopic Comment SEE COMMENT    ISTAT PROCEDURE   Result Value Ref Range    POC PH 7.224 (LL) 7.35 - 7.45    POC PCO2 66.0 (HH) 35 - 45 mmHg    POC PO2 241 (H) 80 - 100 mmHg    POC HCO3 27.2 24 - 28 mmol/L    POC BE 0 -2 to 2 mmol/L    POC SATURATED O2 100 95 - 100 %    Rate 14     Sample ARTERIAL     Site LR     Allens Test Pass     DelSys CPAP/BiPAP     Mode BiPAP     FiO2 50     Spont Rate 33     Min Vol 12.5     Sp02 98     IP 12     EP 6       All pertinent labs within the past 24 hours have been reviewed.    Significant Imaging:  Imaging Results          X-Ray Chest AP Portable (In process)    Right lower lobe infiltrate.            I have reviewed all pertinent imaging results/findings within the past 24 hours.     EKG: (personally reviewed)  Sinus tachycardia, possible LAE, no acute ischemic ST-T changes.  No significant change from previous tracings.            Assessment/Plan:     * Acute respiratory failure with hypoxia and hypercapnia  - Secondary to asthma exacerbation + PNA.     - O2 sat stable on 50% FiO2 BiPAP, wean as tolerated.  Repeat ABG in AM.  - DuoNebs Q 6.  - IV Solu-Medrol 80 mg Q8.  - Empiric antibiotics.  - Will obtain V/Q scan to r/o PE (unable to perform CTA due to TARA).  - Pulmonology consult.    Severe sepsis secondary to pneumonia of right lower lobe due to infectious organism  - WBC 13, TMax 101.2°F, HR >90, RR >20, lactic 3.1.  Patient became hypotensive in the ED after receiving 5 mg IV Lopressor for tachycardia.  Blood pressure responded well to IV fluid resuscitation, and remains stable.  - Blood cultures obtained in the ED.  Continue empiric IV vanc and Zosyn.  - 30 mL/kg IV fluids given in the ED.  Continue IV hydration.  - Continue bronchodilators.  - Wean BiPAP as tolerated.  - Check for influenza.  - Follow labs.    Moderate persistent asthma with exacerbation  - Wean BiPAP as tolerated.  - Continue bronchodilators.  - Continue IV steroids.  - Empiric antibiotics as above.    TARA (acute kidney injury)  - Initial creatinine of 1.4, likely secondary to above.  - Avoid nephrotoxic agents.  - Follow labs.    Elevated troponin  - Initial troponin of 0.095, likely demand secondary to above.  EKG unrevealing.  Chest pain-free.  - Trend serial cardiac enzymes.  - Continue aspirin and statin.  Beta-blocker on hold to prevent hypotension, resume as BP tolerates.  - Further recs pending clinical course.      VTE Risk Mitigation (From admission, onward)         Ordered     rivaroxaban tablet 20 mg  With dinner      11/16/19 0436     IP VTE HIGH RISK PATIENT  Once      11/16/19 0430     Reason for No Pharmacological VTE Prophylaxis  Once     Question:  Reasons:  Answer:  Already adequately anticoagulated on oral Anticoagulants    11/16/19 0430     Place sequential compression device  Until discontinued      11/16/19 0430              Critical care time spent on the evaluation and treatment of severe organ dysfunction, review of pertinent labs and imaging studies,  discussions with consulting providers and discussions with patient/family: 45 minutes.     Isa Mahoney NP  Department of Hospital Medicine   [facility]

## 2019-11-16 NOTE — ASSESSMENT & PLAN NOTE
- Secondary to asthma exacerbation + PNA.    - O2 sat stable on 50% FiO2 BiPAP, wean as tolerated.  Repeat ABG in AM.  - DuoNebs Q 6.  - IV Solu-Medrol 80 mg Q8.  - Empiric antibiotics.  - Will obtain V/Q scan to r/o PE (unable to perform CTA due to TARA).  - Pulmonology consult.

## 2019-11-16 NOTE — ASSESSMENT & PLAN NOTE
- WBC 13, TMax 101.2°F, HR >90, RR >20, lactic 3.1.  Patient became hypotensive in the ED after receiving 5 mg IV Lopressor for tachycardia.  Blood pressure responded well to IV fluid resuscitation, and remains stable.  - Blood cultures obtained in the ED.  Continue empiric IV vanc and Zosyn.  - 30 mL/kg IV fluids given in the ED.  Continue IV hydration.  - Continue bronchodilators.  - Wean BiPAP as tolerated.  - Follow labs.

## 2019-11-16 NOTE — PROGRESS NOTES
Placed a call to lab to see why lactic acid and morning labs have not resulted yet.   states phlebotomist is on way to draw labs.

## 2019-11-16 NOTE — PLAN OF CARE
Pt admitted to ICU from ER on continuous BiPAP. Pt went into afib shortly after admit, on xarelto at home. 5mg metoprolol IVP given per eICU for rates >140s; rates now 110s-120s. Pt w/ some abd mus use, exp wheezes and crackles noted on auscultation. Charbel gtt started peripherally per eICU for hypotension; only requiring 0.5mg/kg/min. 2.5L of fluid b/t ER and admit given, as well as vancomycin and zosyn. Pt tested negative for flu. POC discussed w/patient & , verbalized understanding. Fall precautions in place, bed alarm on.

## 2019-11-16 NOTE — ASSESSMENT & PLAN NOTE
- Initial creatinine of 1.4, likely secondary to above.  - Avoid nephrotoxic agents.  - Follow labs.

## 2019-11-16 NOTE — SUBJECTIVE & OBJECTIVE
Interval History: This am she was given a dose of Cardizem 10 mg IVP which slowed down her HR and also converted to NSR and her BP improved. She was started on Cardizem oral tabs and was able to come off Charbel gtt as well as Bipap to NC and feels much better.  Urine output is good. She is AAOx3, speech is clear, family at bedside.       Review of Systems   Unable to perform ROS: Acuity of condition   Constitutional: Negative for chills and fever.   HENT: Negative for postnasal drip, rhinorrhea, sinus pressure and sore throat.    Respiratory: Positive for cough and wheezing. Negative for shortness of breath.    Cardiovascular: Negative for chest pain, palpitations and leg swelling.   Gastrointestinal: Negative for abdominal distention, abdominal pain, blood in stool, constipation, diarrhea, nausea and vomiting.   Genitourinary: Negative for difficulty urinating, dysuria, hematuria and urgency.   Musculoskeletal: Positive for gait problem (chronic). Negative for arthralgias, back pain, myalgias, neck pain and neck stiffness.   Skin: Negative for pallor, rash and wound.   Neurological: Negative for dizziness, syncope, facial asymmetry, speech difficulty, light-headedness and headaches.   Psychiatric/Behavioral: Negative for confusion. The patient is not nervous/anxious.    All other systems reviewed and are negative.    Objective:     Vital Signs (Most Recent):  Temp: 99.9 °F (37.7 °C) (11/16/19 1115)  Pulse: 93 (11/16/19 1200)  Resp: 19 (11/16/19 1200)  BP: (!) 94/57 (11/16/19 1200)  SpO2: 95 % (11/16/19 1200) Vital Signs (24h Range):  Temp:  [98.7 °F (37.1 °C)-101.2 °F (38.4 °C)] 99.9 °F (37.7 °C)  Pulse:  [] 93  Resp:  [16-35] 19  SpO2:  [91 %-100 %] 95 %  BP: ()/() 94/57     Weight: 92.8 kg (204 lb 9.4 oz)  Body mass index is 32.04 kg/m².    Intake/Output Summary (Last 24 hours) at 11/16/2019 1225  Last data filed at 11/16/2019 1200  Gross per 24 hour   Intake 4408.08 ml   Output 1695 ml   Net  2713.08 ml      Physical Exam   Constitutional: She is oriented to person, place, and time. She appears well-developed and well-nourished. No distress.   Elderly.  Obese.   HENT:   Head: Normocephalic and atraumatic.   Eyes: Conjunctivae are normal.   PERRL; EOM intact.   Neck: Normal range of motion. Neck supple.   Cardiovascular: Normal rate, regular rhythm, S1 normal, S2 normal and intact distal pulses.  No extrasystoles are present. Exam reveals no gallop and no friction rub.   No murmur heard.  Pulses:       Radial pulses are 2+ on the right side, and 2+ on the left side.        Dorsalis pedis pulses are 2+ on the right side, and 2+ on the left side.        Posterior tibial pulses are 2+ on the right side, and 2+ on the left side.   Pulmonary/Chest: No accessory muscle usage. Tachypnea noted. No respiratory distress. She has no decreased breath sounds. She has no wheezes (diffuse inspiratory and expiratory). She has no rhonchi. She has rales in the right lower field and the left lower field.   BiPAP in place.   Abdominal: Soft. Bowel sounds are normal. She exhibits no distension. There is no tenderness. There is no rebound, no guarding and no CVA tenderness.   Musculoskeletal: Normal range of motion. She exhibits no edema, tenderness or deformity.   Neurological: She is alert and oriented to person, place, and time. No sensory deficit. GCS eye subscore is 4. GCS verbal subscore is 5. GCS motor subscore is 6.   No acute focal deficits appreciated.   Skin: Skin is warm, dry and intact. Capillary refill takes less than 2 seconds. No rash noted. She is not diaphoretic. No cyanosis or erythema.   Psychiatric: She has a normal mood and affect. Her speech is normal and behavior is normal. Cognition and memory are normal.   Nursing note and vitals reviewed.      Significant Labs:   ABGs:   Recent Labs   Lab 11/16/19  0826   PH 7.416   PCO2 34.4*   HCO3 22.1*   POCSATURATED 96   BE -2     BMP:   Recent Labs   Lab  11/16/19  0739   *      K 4.2      CO2 22*   BUN 28*   CREATININE 1.4   CALCIUM 8.3*   MG 1.8     CBC:   Recent Labs   Lab 11/16/19 0103 11/16/19  0739   WBC 13.14* 9.47   HGB 11.6* 10.6*   HCT 37.2 34.7*    256     CMP:   Recent Labs   Lab 11/16/19 0103 11/16/19  0739    137   K 4.6 4.2    104   CO2 23 22*   * 124*   BUN 25* 28*   CREATININE 1.4 1.4   CALCIUM 8.7 8.3*   PROT 7.4 6.8   ALBUMIN 3.3* 3.1*   BILITOT 0.6 0.7   ALKPHOS 122 104   AST 48* 47*   ALT 15 19   ANIONGAP 13 11   EGFRNONAA 35* 35*     Coagulation:   Recent Labs   Lab 11/16/19 0103   INR 1.2   APTT 35.8*     Lactic Acid:   Recent Labs   Lab 11/16/19 0103 11/16/19  0739   LACTATE 3.1* 2.5*     Magnesium:   Recent Labs   Lab 11/16/19 0103 11/16/19  0739   MG 2.1 1.8     POCT Glucose:   Recent Labs   Lab 11/16/19  0918   POCTGLUCOSE 145*     Troponin:   Recent Labs   Lab 11/16/19 0103 11/16/19  0739   TROPONINI 0.095* 1.983*     TSH:   Recent Labs   Lab 11/16/19  0738   TSH 0.818     All pertinent labs within the past 24 hours have been reviewed.    Significant Imaging: I have reviewed all pertinent imaging results/findings within the past 24 hours.

## 2019-11-16 NOTE — SUBJECTIVE & OBJECTIVE
Past Medical History:   Diagnosis Date    Anticoagulant long-term use     Asthma     Debility     Gait apraxia     Gout     Hypertension     NPH (normal pressure hydrocephalus)     Stroke     Thyroid disease        Past Surgical History:   Procedure Laterality Date    CAROTID ENDARTERECTOMY         Review of patient's allergies indicates:   Allergen Reactions    Food color red [red food color (bulk)] Nausea And Vomiting    Pcn [penicillins] Rash       No current facility-administered medications on file prior to encounter.      Current Outpatient Medications on File Prior to Encounter   Medication Sig    allopurinol (ZYLOPRIM) 300 MG tablet Take 300 mg by mouth once daily.    aspirin (ECOTRIN) 81 MG EC tablet Take 81 mg by mouth once daily.    atorvastatin (LIPITOR) 20 MG tablet Take 20 mg by mouth once daily.    calcium carbonate (CALCIUM 600 ORAL) Take 1,200 mg by mouth once daily.    cefdinir (OMNICEF) 300 MG capsule 2 capsules.    escitalopram oxalate (LEXAPRO) 10 MG tablet Take 10 mg by mouth once daily.    gabapentin (NEURONTIN) 100 MG capsule Take 100 mg by mouth 3 (three) times daily.    guaifenesin-codeine 100-10 mg/5 ml (TUSSI-ORGANIDIN NR)  mg/5 mL syrup 10 mLs.    levothyroxine (SYNTHROID) 100 MCG tablet Take 112 mcg by mouth once daily.     metoprolol tartrate (LOPRESSOR) 25 MG tablet Take 12.5 mg by mouth 2 (two) times daily.    multivit,calc,mins/iron/folic (ONE DAILY WOMEN'S HEALTH ORAL) Take by mouth.    pantoprazole (PROTONIX) 40 MG tablet Take 40 mg by mouth once daily.    rivaroxaban (XARELTO) 20 mg Tab Take 20 mg by mouth daily with dinner or evening meal.    [DISCONTINUED] estradiol (ESTRACE) 1 MG tablet Take 1 mg by mouth once daily.     Family History     Reviewed and not pertinent.        Tobacco Use    Smoking status: Never Smoker   Substance and Sexual Activity    Alcohol use: No    Drug use: No    Sexual activity: Not on file     Review of Systems    Unable to perform ROS: Acuity of condition   Constitutional: Negative for chills and fever.   HENT: Positive for congestion. Negative for postnasal drip, rhinorrhea, sinus pressure and sore throat.    Respiratory: Positive for cough, shortness of breath and wheezing.    Cardiovascular: Negative for chest pain, palpitations and leg swelling.   Gastrointestinal: Negative for abdominal distention, abdominal pain, blood in stool, constipation, diarrhea, nausea and vomiting.   Genitourinary: Negative for difficulty urinating, dysuria, hematuria and urgency.   Musculoskeletal: Positive for gait problem (chronic). Negative for arthralgias, back pain, myalgias, neck pain and neck stiffness.   Skin: Negative for pallor, rash and wound.   Neurological: Negative for dizziness, syncope, facial asymmetry, speech difficulty, light-headedness and headaches.   Psychiatric/Behavioral: Negative for confusion. The patient is not nervous/anxious.    All other systems reviewed and are negative.    Objective:     Vital Signs (Most Recent):  Temp: (!) 100.4 °F (38 °C) (11/16/19 0400)  Pulse: 81 (11/16/19 0400)  Resp: (!) 22 (11/16/19 0400)  BP: 98/70 (11/16/19 0400)  SpO2: 98 % (11/16/19 0400) Vital Signs (24h Range):  Temp:  [98.7 °F (37.1 °C)-101.2 °F (38.4 °C)] 100.4 °F (38 °C)  Pulse:  [] 81  Resp:  [22-35] 22  SpO2:  [92 %-98 %] 98 %  BP: ()/(57-78) 98/70     Weight: 94 kg (207 lb 4.8 oz)  Body mass index is 32.47 kg/m².    Physical Exam   Constitutional: She is oriented to person, place, and time. She appears well-developed and well-nourished. No distress.   Elderly.  Obese.   HENT:   Head: Normocephalic and atraumatic.   Eyes: Conjunctivae are normal.   PERRL; EOM intact.   Neck: Normal range of motion. Neck supple.   Cardiovascular: Normal rate, regular rhythm, S1 normal, S2 normal and intact distal pulses.  No extrasystoles are present. Exam reveals no gallop and no friction rub.   No murmur heard.  Pulses:        Radial pulses are 2+ on the right side, and 2+ on the left side.        Dorsalis pedis pulses are 2+ on the right side, and 2+ on the left side.        Posterior tibial pulses are 2+ on the right side, and 2+ on the left side.   Pulmonary/Chest: No accessory muscle usage. Tachypnea noted. She is in respiratory distress. She has no decreased breath sounds. She has wheezes (diffuse inspiratory and expiratory). She has no rhonchi. She has rales in the right lower field and the left lower field.   BiPAP in place.   Abdominal: Soft. Bowel sounds are normal. She exhibits no distension. There is no tenderness. There is no rebound, no guarding and no CVA tenderness.   Musculoskeletal: Normal range of motion. She exhibits no edema, tenderness or deformity.   Neurological: She is alert and oriented to person, place, and time. No sensory deficit. GCS eye subscore is 4. GCS verbal subscore is 5. GCS motor subscore is 6.   No acute focal deficits appreciated.   Skin: Skin is warm, dry and intact. Capillary refill takes less than 2 seconds. No rash noted. She is not diaphoretic. No cyanosis or erythema.   Psychiatric: She has a normal mood and affect. Her speech is normal and behavior is normal. Cognition and memory are normal.   Nursing note and vitals reviewed.          Significant Labs:  Results for orders placed or performed during the hospital encounter of 11/16/19   CBC auto differential   Result Value Ref Range    WBC 13.14 (H) 3.90 - 12.70 K/uL    RBC 3.56 (L) 4.00 - 5.40 M/uL    Hemoglobin 11.6 (L) 12.0 - 16.0 g/dL    Hematocrit 37.2 37.0 - 48.5 %    Mean Corpuscular Volume 105 (H) 82 - 98 fL    Mean Corpuscular Hemoglobin 32.6 (H) 27.0 - 31.0 pg    Mean Corpuscular Hemoglobin Conc 31.2 (L) 32.0 - 36.0 g/dL    RDW 14.6 (H) 11.5 - 14.5 %    Platelets 316 150 - 350 K/uL    MPV 10.0 9.2 - 12.9 fL    Immature Granulocytes 0.6 (H) 0.0 - 0.5 %    Gran # (ANC) 7.2 1.8 - 7.7 K/uL    Immature Grans (Abs) 0.08 (H) 0.00 - 0.04 K/uL     Lymph # 4.4 1.0 - 4.8 K/uL    Mono # 1.4 (H) 0.3 - 1.0 K/uL    Eos # 0.0 0.0 - 0.5 K/uL    Baso # 0.07 0.00 - 0.20 K/uL    nRBC 0 0 /100 WBC    Gran% 55.1 38.0 - 73.0 %    Lymph% 33.1 18.0 - 48.0 %    Mono% 10.4 4.0 - 15.0 %    Eosinophil% 0.3 0.0 - 8.0 %    Basophil% 0.5 0.0 - 1.9 %    Differential Method Automated    Comprehensive metabolic panel   Result Value Ref Range    Sodium 138 136 - 145 mmol/L    Potassium 4.6 3.5 - 5.1 mmol/L    Chloride 102 95 - 110 mmol/L    CO2 23 23 - 29 mmol/L    Glucose 286 (H) 70 - 110 mg/dL    BUN, Bld 25 (H) 8 - 23 mg/dL    Creatinine 1.4 0.5 - 1.4 mg/dL    Calcium 8.7 8.7 - 10.5 mg/dL    Total Protein 7.4 6.0 - 8.4 g/dL    Albumin 3.3 (L) 3.5 - 5.2 g/dL    Total Bilirubin 0.6 0.1 - 1.0 mg/dL    Alkaline Phosphatase 122 55 - 135 U/L    AST 48 (H) 10 - 40 U/L    ALT 15 10 - 44 U/L    Anion Gap 13 8 - 16 mmol/L    eGFR if African American 40 (A) >60 mL/min/1.73 m^2    eGFR if non African American 35 (A) >60 mL/min/1.73 m^2   Lactic acid, plasma #1   Result Value Ref Range    Lactate (Lactic Acid) 3.1 (H) 0.5 - 2.2 mmol/L   Magnesium   Result Value Ref Range    Magnesium 2.1 1.6 - 2.6 mg/dL   Troponin I   Result Value Ref Range    Troponin I 0.095 (H) 0.000 - 0.026 ng/mL   Procalcitonin   Result Value Ref Range    Procalcitonin 0.15 <0.25 ng/mL   Brain natriuretic peptide   Result Value Ref Range     (H) 0 - 99 pg/mL   Protime-INR   Result Value Ref Range    Prothrombin Time 13.3 (H) 9.0 - 12.5 sec    INR 1.2 0.8 - 1.2   APTT   Result Value Ref Range    aPTT 35.8 (H) 21.0 - 32.0 sec   Urinalysis, Reflex to Urine Culture Urine, Catheterized   Result Value Ref Range    Specimen UA Urine, Catheterized     Color, UA Yellow Yellow, Straw, Jennifer    Appearance, UA Clear Clear    pH, UA 6.0 5.0 - 8.0    Specific Gravity, UA >=1.030 (A) 1.005 - 1.030    Protein, UA 2+ (A) Negative    Glucose, UA Negative Negative    Ketones, UA Negative Negative    Bilirubin (UA) Negative Negative     Occult Blood UA 2+ (A) Negative    Nitrite, UA Negative Negative    Urobilinogen, UA Negative <2.0 EU/dL    Leukocytes, UA Negative Negative   Urinalysis Microscopic   Result Value Ref Range    RBC, UA 6 (H) 0 - 4 /hpf    WBC, UA 1 0 - 5 /hpf    Bacteria None None-Occ /hpf    Hyaline Casts, UA 15 (A) 0-1/lpf /lpf    Microscopic Comment SEE COMMENT    ISTAT PROCEDURE   Result Value Ref Range    POC PH 7.224 (LL) 7.35 - 7.45    POC PCO2 66.0 (HH) 35 - 45 mmHg    POC PO2 241 (H) 80 - 100 mmHg    POC HCO3 27.2 24 - 28 mmol/L    POC BE 0 -2 to 2 mmol/L    POC SATURATED O2 100 95 - 100 %    Rate 14     Sample ARTERIAL     Site LR     Allens Test Pass     DelSys CPAP/BiPAP     Mode BiPAP     FiO2 50     Spont Rate 33     Min Vol 12.5     Sp02 98     IP 12     EP 6       All pertinent labs within the past 24 hours have been reviewed.    Significant Imaging:  Imaging Results          X-Ray Chest AP Portable (In process)    Right lower lobe infiltrate.            I have reviewed all pertinent imaging results/findings within the past 24 hours.     EKG: (personally reviewed)  Sinus tachycardia, possible LAE, no acute ischemic ST-T changes.  No significant change from previous tracings.

## 2019-11-16 NOTE — ASSESSMENT & PLAN NOTE
- Wean BiPAP as tolerated.  - Continue bronchodilators.  - Continue IV steroids.  - Empiric antibiotics as above.

## 2019-11-16 NOTE — HPI
"Ana Castañeda is a 84 y.o. female patient with a PMHx asthma, HTN, h/o CVA with residual right-sided weakness, hypothyroidism, gout, debility (wheelchair bound), and chronic anticoagulation therapy (reason unknown).  She presented to the emergency department with complaints of shortness of breath that progressively worsened shortly before arrival.  No aggravating or alleviating factors.  Associated cough producing mucus, wheezing and congestion x 3 days.  Denies any chest pain, palpitations, orthopnea, PND, edema, abdominal pain, nausea, vomiting, diarrhea, dysuria, hematuria, back pain, AMS, lightheadedness or dizziness, syncope, rhinorrhea, sore throat, weakness, fever or chills.  Family at bedside reports patient was seen at [facility] on 11/13 and diagnosed with acute bronchitis.  She was given a "steroid shot" and prescribed Cefdinir, which she has taken x 1 dose on 11/15.  Patient is on chronic Xarelto, but unsure of reason.  She denies any history of AF/FL, DVT or PE.  Initial workup in the ED resulted WBC of 13.14, creatinine 1.4, BUN 25, troponin 0.095, , lactic 3.1, procalcitonin 0.15, UA unremarkable, EKG unrevealing.  ABG with pH of 7.224, pCO2 66, pO2 241, SaO2 100 on BiPAP.  CXR showed right lower lobe infiltrate.  Patient's O2 sat remained stable in the ED, but BiPAP placed due to increased respiratory distress. Upon arrival to the ED, patient was tachycardic with heart rate of 136 bpm, /78.  She was given IV Lopressor 5 mg x 2 doses and 40 mg IV Lasix, which improved her HR into the 80s but also decreased blood pressure to 98/70.  ED also administered 125 mg IV Solu-Medrol, DuoNeb treatments x 3, 2.5 L IV fluid resuscitation, IV vanc and Zosyn.  Patient was admitted to ICU under Hospital Medicine services.  Currently, patient appears comfortable in no acute distress.  BiPAP remains in place with stable O2 sat.  Patient is a full code.   is surrogate decision maker.  "

## 2019-11-16 NOTE — PROGRESS NOTES
Bedside report received from GONZALO Urena.  Skin assessment performed with GONZALO Urena.  Pt does have a small amount of non blanching redness to perianal region.  Pt states the skin does not hurt when assessed.  Skin cleansed with soap, barrier cream applied.  Bipap noted, fi02 50%, sats 98%.  pts , appears to be AFIB.  IV metoprolol ordered.  Neosynephrine running to R wrist peripheral IV stopped due to titration orders.  Will continue to monitor b/p.  NS bolus infusing, almost complete.

## 2019-11-16 NOTE — ASSESSMENT & PLAN NOTE
Volume resuscitation.  Optimize hemodynamics.  Monitor BUN / Cr. Montor urine output.  Avoid Nephrotoxic Medications.

## 2019-11-16 NOTE — PROGRESS NOTES
Pharmacokinetic Initial Assessment: IV Vancomycin    Assessment/Plan:    We will make adjustments in the plan based on SCr improvement or decline.    Initiate intravenous vancomycin with loading dose of 2500 mg once followed by a maintenance dose of vancomycin 1250 mg IV every 24 hours  Desired empiric serum trough concentration is 15 to 20 mcg/mL  Draw vancomycin trough level 30 min prior to third dose on 11/18/19 at approximately 0230   Pharmacy will continue to follow and monitor vancomycin.      Please contact pharmacy at extension [phone] with any questions regarding this assessment.     Thank you for the consult,   Meng Vernon       Patient brief summary:  Ana Castañeda is a 84 y.o. female initiated on antimicrobial therapy with IV Vancomycin for treatment of suspected lower respiratory infection    Drug Allergies:   Review of patient's allergies indicates:   Allergen Reactions    Food color red [red food color (bulk)] Nausea And Vomiting    Pcn [penicillins] Rash       Actual Body Weight:   94 kg    Renal Function:   Estimated Creatinine Clearance: 36.5 mL/min (based on SCr of 1.4 mg/dL).,     Dialysis Method (if applicable):  N/A    CBC (last 72 hours):  Recent Labs   Lab Result Units 11/16/19  0103   WBC K/uL 13.14*   Hemoglobin g/dL 11.6*   Hematocrit % 37.2   Platelets K/uL 316   Gran% % 55.1   Lymph% % 33.1   Mono% % 10.4   Eosinophil% % 0.3   Basophil% % 0.5   Differential Method  Automated       Metabolic Panel (last 72 hours):  Recent Labs   Lab Result Units 11/16/19  0103 11/16/19  0130   Sodium mmol/L 138  --    Potassium mmol/L 4.6  --    Chloride mmol/L 102  --    CO2 mmol/L 23  --    Glucose mg/dL 286*  --    Glucose, UA   --  Negative   BUN, Bld mg/dL 25*  --    Creatinine mg/dL 1.4  --    Albumin g/dL 3.3*  --    Total Bilirubin mg/dL 0.6  --    Alkaline Phosphatase U/L 122  --    AST U/L 48*  --    ALT U/L 15  --    Magnesium mg/dL 2.1  --        Drug levels (last 3 results):  No results for  input(s): VANCOMYCINRA, VANCOMYCINPE, VANCOMYCINTR in the last 72 hours.    Microbiologic Results:  Microbiology Results (last 7 days)       Procedure Component Value Units Date/Time    Blood culture x two cultures. Draw prior to antibiotics. [[id number]] Collected:  11/16/19 0155    Order Status:  Sent Specimen:  Blood from Peripheral, Hand, Right Updated:  11/16/19 0207    Culture, Respiratory with Gram Stain [[id number]]     Order Status:  No result Specimen:  Respiratory     Blood culture x two cultures. Draw prior to antibiotics. [[id number]] Collected:  11/16/19 0102    Order Status:  Sent Specimen:  Blood from Peripheral, Lower Arm, Right Updated:  11/16/19 0124    Blood culture x two cultures. Draw prior to antibiotics [[id number]]     Order Status:  Canceled Specimen:  Blood

## 2019-11-16 NOTE — HOSPITAL COURSE
Ana Castañeda is a 84 y.o. female patient with a PMHx asthma, HTN, h/o CVA with residual right-sided weakness, hypothyroidism, gout, debility (wheelchair bound), and chronic anticoagulation therapy (reason unknown) admitted to ICU on Bipap, with acute hypoxemic resp failure sec to LLL Pneumonia and Afib w RVR and Lactic Acidosis. She was aggressively rehydrated with NS and started on IV Vanc and Zosyn as well as IV Solumedrol. Her Afib RVR was treated initially with IV Lopressor 5 mg IVP x 2 which  which did not affect her HR but dropped BP to 98/70 -- necessitating Charbel-senephrine gtt by eICU this am. This am she was given a dose of Cardizem 10 mg IVP which slowed down her HR and also converted to NSR and her BP improved. She was started on Cardizem oral tabs and was able to come off Charbel gtt as well as Bipap to NC and feels much better.  Urine output is good. She is AAOx3, speech is clear, family at bedside. Troponin increased to 1.98-- cardiology consulted.  11/17- doing better, comfortable, pleasant. Appears mildly fluid overloaded, she is about 5 L fluid positive. Given IV lasix, all Cx NGTD. Cardiology thinks its demand ischemia from sepsis and recommended out pt NMP scan when stable. Await transfer to tele. PT/OT ordered. NSR remains.  Ana Castañeda is a 84 y.o. female patient with a PMHx asthma, HTN, h/o CVA with residual right-sided weakness, hypothyroidism, gout, debility (wheelchair bound), and chronic anticoagulation therapy (reason unknown) admitted to ICU on Bipap, with acute hypoxemic resp failure sec to LLL Pneumonia and Afib w RVR and Lactic Acidosis. She was aggressively rehydrated with NS and started on IV Vanc and Zosyn as well as IV Solumedrol. Her Afib RVR was treated initially with IV Lopressor 5 mg IVP x 2 which  which did not affect her HR but dropped BP to 98/70 -- necessitating Charbel-senephrine gtt by eICU this am. This am she was given a dose of Cardizem 10 mg IVP which slowed down her HR and  also converted to NSR and her BP improved. She was started on Cardizem oral tabs and was able to come off Charbel gtt as well as Bipap to NC and feels much better.  Urine output is good. She is AAOx3, speech is clear, family at bedside. Troponin increased to 1.98-- cardiology consulted.  11/17- doing better, comfortable, pleasant. Appears mildly fluid overloaded, she is about 5 L fluid positive. Given IV lasix, all Cx NGTD. Cardiology thinks its demand ischemia from sepsis and recommended out pt NMP scan when stable. Await transfer to tele. PT/OT ordered. NSR remains.  11/18- went into Afib RVR when PT evaluated her yesterday- has remained in it since- no response to Dig, Cardizem. Eventually cardiology had to start her on Cardizem and Amiodarone gtt with some slowing. Otherwise feels good. Repeat CXR shows clear lungs, no Pneumonia. Bun/Cr elevated at 34/1.5- hence Lasix d/hira and started on gentle rehydration. Zosyn also d/hira.  11/19- looks much better, sitting up in chair, eating lunch well, appears in good spirits. No dysphagia, doing IS. Bun improved to 34. Doing a little PT. Has great family support.  11/20- looks better, sitting up in chair, but needed lot more support from PT/OT then at home-- hence family requests SNF at [facility]. Continue present care.  11/21- looks better, went into Afib w RVR last night-- had to resume Amiodarone and Cardizem gtt again this am, also had received a dose of IV Dig but no improvement-- this am received IV Lopressor 5 mg and she converted to NSR &2, also had some wheezing-- likely was in fluid overload-- so IVF d/hira and given a dose of IV Lasix, she improved, CXR clear, RLL infiltrate from admit resolved.  11/22- looks better, relaxed, eating lunch. Remained in NSR since yesterday, had bradycardia last night-- hence Cardizem gtt d/hira. Now on oral Amiodarone and Cardizem. Doing PT/OT gently. She keeps going into afib RVR with minimal exertion, any PT etc. She  has been accepted at [facility] on Monday. Will continue present care.  Performed ambulatory oxygen evaluation and did not meet criteria for supplemental oxygen at rest or with activity.  Accepted for skilled nursing placement at [facility].  Discharge plan to transfer to SNF.

## 2019-11-16 NOTE — ASSESSMENT & PLAN NOTE
Rate Control: Converted to NSR with IV Cardiazem.    Anticoagulation: XARELTO      Bleeding signs/symptoms: None

## 2019-11-16 NOTE — EICU
Called to evaluate Mrs. Castañeda, hospitalized with respiratory failure, now on NIV (50%, 12/6, total rate 22, O2 sat 98%.  CXR with right lower lobe infiltrate.   Currently receiving broad spectrum abx and initial fluid resuscitation.  Paroxysmal atrial fibrillation with ventricular rate of 130-140 has developed.  BP is labile, currently MAP 57.  Lactic acidosis on admission (3.1).    Assessment:  Clinical course and CXR most consistent with influenza, likely complicated by bacterial pneumonia. Will add tamiflu  Hypotension - will add phenylephrine peripherally at this time and re-assess  Atrial fibrillation with modest RVR - observe with phenylephrine.    Partha Coker MD    Metoprolol IV added for RVR > 120

## 2019-11-16 NOTE — ASSESSMENT & PLAN NOTE
Continue with LABA, SEAN and ICS.        Continue IV glucocorticoids        Continue nighttime NIPPV - BiPAP PRN

## 2019-11-16 NOTE — PROGRESS NOTES
Pharmacist Renal Dose Adjustment Note    Ana Castañeda is a 84 y.o. female being treated with the medication Oseltamivir    Patient Data:    Vital Signs (Most Recent):  Temp: (!) 100.4 °F (38 °C) (11/16/19 0400)  Pulse: 81 (11/16/19 0400)  Resp: (!) 22 (11/16/19 0400)  BP: 98/70 (11/16/19 0400)  SpO2: 98 % (11/16/19 0400)   Vital Signs (72h Range):  Temp:  [98.7 °F (37.1 °C)-101.2 °F (38.4 °C)]   Pulse:  []   Resp:  [22-35]   BP: ()/(57-78)   SpO2:  [92 %-98 %]      Recent Labs   Lab 11/16/19 0103   CREATININE 1.4     Serum creatinine: 1.4 mg/dL 11/16/19 0103  Estimated creatinine clearance: 36.5 mL/min    Medication:Oseltamivir dose: 75 mg frequency q 12 hr will be changed to medication:Oseltamivir dose:30 mg frequency:q 12 hr    Pharmacist's Name: Meng Vernon  Pharmacist's Extension: [phone]

## 2019-11-16 NOTE — PLAN OF CARE
11/16/19 1019   Discharge Assessment   Assessment Type Discharge Planning Assessment   Confirmed/corrected address and phone number on facesheet? Yes   Assessment information obtained from? Patient;Caregiver   Prior to hospitilization cognitive status: Alert/Oriented   Prior to hospitalization functional status: Independent;Assistive Equipment   Current cognitive status: Alert/Oriented   Current Functional Status: Needs Assistance   Lives With spouse   Able to Return to Prior Arrangements yes   Is patient able to care for self after discharge? Yes   Who are your caregiver(s) and their phone number(s)? Pancho Castañeda () [phone], Ruddy Castañeda (son) [phone]   Patient's perception of discharge disposition home health   Readmission Within the Last 30 Days no previous admission in last 30 days   Patient currently being followed by outpatient case management? No   Patient currently receives any other outside agency services? No   Equipment Currently Used at Home walker, rolling;wheelchair;grab bar;raised toilet;bedside commode   Do you have any problems affording any of your prescribed medications? No   Is the patient taking medications as prescribed? yes   Does the patient have transportation home? Yes   Transportation Anticipated family or friend will provide   Does the patient receive services at the [facility]? No   Discharge Plan A Home Health   DME Needed Upon Discharge    ( TBD)   Patient/Family in Agreement with Plan yes     Met with pt, her son, and her  at bedside for DC assessment. Pt lives at home with her  and uses the DME listed above. Pt reported that she was recently being seen by a HH agency but ran out of days. Given her current hospitalization and her home bound status, pt requested to restart HH with that same agency. Neither pt nor her  recall the name of the HH agency that they used so pt's  has returned home to find information on their  previous  agency. Obtained signed preference form from pt's son. Will FU PRN to complete referral. No other CM needs identified at this time. SWer provided a transitional care folder, information on advanced directives, information on pharmacy bedside delivery, and discharge planning begins on admission with contact information for any needs/questions. Pt opted for bedside medication delivery. Pt's typical pharmacy is [facility] [address] [facility]. Information below.    [address] , [place] [address]  [phone]  Romeo Chavira LMSW 11/16/2019 10:28 AM

## 2019-11-16 NOTE — ASSESSMENT & PLAN NOTE
- Initial creatinine of 1.4, likely secondary to above.  - Avoid nephrotoxic agents.  - Follow labs.    Improving with IVF

## 2019-11-16 NOTE — ASSESSMENT & PLAN NOTE
- WBC 13, TMax 101.2°F, HR >90, RR >20, lactic 3.1.  Patient became hypotensive in the ED after receiving 5 mg IV Lopressor for tachycardia.  Blood pressure responded well to IV fluid resuscitation, and remains stable.  - Blood cultures obtained in the ED.  Continue empiric IV vanc and Zosyn.  - 30 mL/kg IV fluids given in the ED.  Continue IV hydration.  - Continue bronchodilators.  - Wean BiPAP as tolerated.  - Check for influenza.  - Follow labs.    Improving,  Leukocytosis better  Continue IV abx, steroids

## 2019-11-16 NOTE — ASSESSMENT & PLAN NOTE
- Wean BiPAP as tolerated.  - Continue bronchodilators.  - Continue IV steroids.  - Empiric antibiotics as above.    Sec to Pneumonia- improving

## 2019-11-16 NOTE — ED PROVIDER NOTES
SCRIBE #1 NOTE: I, Mary Ellen Hill, am scribing for, and in the presence of, Zeina Emerson MD. I have scribed the entire note.       History     Chief Complaint   Patient presents with    Shortness of Breath     hx of chf and recently dx with bronchitis and on cefdinir     Review of patient's allergies indicates:   Allergen Reactions    Food color red [red food color (bulk)] Nausea And Vomiting    Pcn [penicillins] Rash         History of Present Illness     HPI    11/16/2019, 2:16 AM  History obtained from the  and daughter   HPI & ROS limited secondary to CPAP      History of Present Illness: Ana Castaeñda is a 84 y.o. female patient with a PMHx of asthma,stroke, chronic anticoagulation, HTN, thyroid disease, and gout who presents to the Emergency Department for evaluation of SOB which onset gradually PTA. Pt's  and daughter reports finding her lying flat on her back and assumed she was dehydrated so tried to force her to intake fluids. EMS put her on CPAP and brought her to ED. Symptoms are constant and moderate in severity. No mitigating or exacerbating factors reported. Pt's family is at bedside with pt and reports cough and congestion for the past three days. Patient is unable to deny any additional sxs at this time. Prior Tx includes a steroid shot(administered 11/13/19) and Cefdinir that she received after visiting [place] [facility] two days ago. Pt's family reports that she began taking the Cefdinir today. No further complaints or concerns at this time.       Arrival mode: EMS     PCP: Stephen Tarango MD        Past Medical History:  Past Medical History:   Diagnosis Date    Anticoagulant long-term use     Asthma     Debility     Gait apraxia     Gout     Hypertension     NPH (normal pressure hydrocephalus)     Stroke     Thyroid disease        Past Surgical History:  Past Surgical History:   Procedure Laterality Date    CAROTID ENDARTERECTOMY           Family  History:  History reviewed. No pertinent family history.       Social History:  Social History     Tobacco Use    Smoking status: Never Smoker   Substance and Sexual Activity    Alcohol use: No    Drug use: Unknown    Sexual activity: Unknown        Review of Systems     Review of Systems   Unable to perform ROS: Severe respiratory distress   HENT: Positive for congestion.    Respiratory: Positive for cough and shortness of breath.       Physical Exam     Initial Vitals [11/16/19 0050]   BP Pulse Resp Temp SpO2   (!) 180/78 (!) 136 (!) 28 98.7 °F (37.1 °C) 96 %      MAP       --          Physical Exam  Nursing Notes and Vital Signs Reviewed.  Constitutional: Patient is in moderate distress. Obese.  Head: Atraumatic. Normocephalic.  Eyes: PERRL. EOM intact. Conjunctivae are not pale. No scleral icterus.  ENT: Mucous membranes are moist. Oropharynx is clear and symmetric.    Neck: Supple. Full ROM. No lymphadenopathy.  Cardiovascular: Tachycardic. Regular rhythm. No murmurs, rubs, or gallops. Distal pulses are 2+ and symmetric.  Pulmonary/Chest: Tachypneic. Crackles bilaterally. Inspiratory and expiratory wheezing.  Abdominal: Soft and non-distended.  There is no tenderness.  No rebound, guarding, or rigidity. Good bowel sounds.  Musculoskeletal: Moves all extremities. No obvious deformities. No edema.  Skin: Warm and dry.  Neurological:  Alert, awake, and appropriate.  Normal speech.  No acute focal neurological deficits are appreciated.  Psychiatric: Normal affect. Good eye contact. Appropriate in content.     ED Course   Critical Care  Date/Time: 11/16/2019 1:58 AM  Performed by: Zeina Emerson MD  Authorized by: Zeina Emerson MD   Direct patient critical care time: 20 minutes  Additional history critical care time: 10 minutes  Ordering / reviewing critical care time: 10 minutes  Documentation critical care time: 10 minutes  Consulting other physicians critical care time: 10 minutes  Total  critical care time (exclusive of procedural time) : 60 minutes  Critical care time was exclusive of separately billable procedures and treating other patients and teaching time.  Critical care was necessary to treat or prevent imminent or life-threatening deterioration of the following conditions: sepsis (hypoxemic and hypercapnic respitatory failure).  Critical care was time spent personally by me on the following activities: blood draw for specimens, development of treatment plan with patient or surrogate, discussions with consultants, discussions with primary provider, interpretation of cardiac output measurements, evaluation of patient's response to treatment, examination of patient, obtaining history from patient or surrogate, ordering and performing treatments and interventions, ordering and review of laboratory studies, ordering and review of radiographic studies, pulse oximetry, re-evaluation of patient's condition, review of old charts and ventilator management.        ED Vital Signs:  Vitals:    11/16/19 1130 11/16/19 1145 11/16/19 1200 11/16/19 1230   BP: (!) 105/56 (!) 82/59 (!) 94/57 (!) 110/48   Pulse: 103 (!) 111 93 93   Resp: 18 18 19 (!) 21   Temp:       TempSrc:       SpO2: 96% 96% 95% 96%   Weight:       Height:        11/16/19 1300 11/16/19 1400 11/16/19 1500 11/16/19 1515   BP: (!) 117/90 (!) 110/53 125/77    Pulse: 72 80 88 98   Resp: (!) 21 (!) 25 (!) 23 (!) 22   Temp:    98.9 °F (37.2 °C)   TempSrc:    Oral   SpO2: 98% 97% 96% 97%   Weight:       Height:        11/16/19 1530 11/16/19 1600 11/16/19 1630 11/16/19 1700   BP: 123/72 (!) 119/93 (!) 115/23 (!) 152/58   Pulse: 99 72 84 73   Resp: (!) 22 (!) 22 (!) 23 (!) 23   Temp:       TempSrc:       SpO2: 97% 97% 97% 97%   Weight:       Height:        11/16/19 1730 11/16/19 1800 11/16/19 1900   BP: (!) 140/55 121/60 (!) 130/37   Pulse: 78 75 74   Resp: (!) 24 (!) 21 (!) 22   Temp:      TempSrc:      SpO2: 98% 98% 97%   Weight:      Height:           Abnormal Lab Results:  Labs Reviewed   CBC W/ AUTO DIFFERENTIAL - Abnormal; Notable for the following components:       Result Value    WBC 13.14 (*)     RBC 3.56 (*)     Hemoglobin 11.6 (*)     Mean Corpuscular Volume 105 (*)     Mean Corpuscular Hemoglobin 32.6 (*)     Mean Corpuscular Hemoglobin Conc 31.2 (*)     RDW 14.6 (*)     Immature Granulocytes 0.6 (*)     Immature Grans (Abs) 0.08 (*)     Mono # 1.4 (*)     All other components within normal limits   COMPREHENSIVE METABOLIC PANEL - Abnormal; Notable for the following components:    Glucose 286 (*)     BUN, Bld 25 (*)     Albumin 3.3 (*)     AST 48 (*)     eGFR if  40 (*)     eGFR if non  35 (*)     All other components within normal limits   LACTIC ACID, PLASMA - Abnormal; Notable for the following components:    Lactate (Lactic Acid) 3.1 (*)     All other components within normal limits   TROPONIN I - Abnormal; Notable for the following components:    Troponin I 0.095 (*)     All other components within normal limits   B-TYPE NATRIURETIC PEPTIDE - Abnormal; Notable for the following components:     (*)     All other components within normal limits   PROTIME-INR - Abnormal; Notable for the following components:    Prothrombin Time 13.3 (*)     All other components within normal limits   APTT - Abnormal; Notable for the following components:    aPTT 35.8 (*)     All other components within normal limits   URINALYSIS, REFLEX TO URINE CULTURE - Abnormal; Notable for the following components:    Specific Gravity, UA >=1.030 (*)     Protein, UA 2+ (*)     Occult Blood UA 2+ (*)     All other components within normal limits    Narrative:     Preferred Collection Type->Urine, Catheterized   URINALYSIS MICROSCOPIC - Abnormal; Notable for the following components:    RBC, UA 6 (*)     Hyaline Casts, UA 15 (*)     All other components within normal limits    Narrative:     Preferred Collection Type->Urine, Catheterized    ISTAT PROCEDURE - Abnormal; Notable for the following components:    POC PH 7.224 (*)     POC PCO2 66.0 (*)     POC PO2 241 (*)     All other components within normal limits   CULTURE, RESPIRATORY   MAGNESIUM   PROCALCITONIN        All Lab Results:  Results for orders placed or performed during the hospital encounter of 11/16/19   Blood culture x two cultures. Draw prior to antibiotics.   Result Value Ref Range    Blood Culture, Routine No Growth to date    Blood culture x two cultures. Draw prior to antibiotics.   Result Value Ref Range    Blood Culture, Routine No Growth to date    Influenza A & B by Molecular   Result Value Ref Range    Influenza A, Molecular Negative Negative    Influenza B, Molecular Negative Negative    Flu A & B Source Nasal swab    CBC auto differential   Result Value Ref Range    WBC 13.14 (H) 3.90 - 12.70 K/uL    RBC 3.56 (L) 4.00 - 5.40 M/uL    Hemoglobin 11.6 (L) 12.0 - 16.0 g/dL    Hematocrit 37.2 37.0 - 48.5 %    Mean Corpuscular Volume 105 (H) 82 - 98 fL    Mean Corpuscular Hemoglobin 32.6 (H) 27.0 - 31.0 pg    Mean Corpuscular Hemoglobin Conc 31.2 (L) 32.0 - 36.0 g/dL    RDW 14.6 (H) 11.5 - 14.5 %    Platelets 316 150 - 350 K/uL    MPV 10.0 9.2 - 12.9 fL    Immature Granulocytes 0.6 (H) 0.0 - 0.5 %    Gran # (ANC) 7.2 1.8 - 7.7 K/uL    Immature Grans (Abs) 0.08 (H) 0.00 - 0.04 K/uL    Lymph # 4.4 1.0 - 4.8 K/uL    Mono # 1.4 (H) 0.3 - 1.0 K/uL    Eos # 0.0 0.0 - 0.5 K/uL    Baso # 0.07 0.00 - 0.20 K/uL    nRBC 0 0 /100 WBC    Gran% 55.1 38.0 - 73.0 %    Lymph% 33.1 18.0 - 48.0 %    Mono% 10.4 4.0 - 15.0 %    Eosinophil% 0.3 0.0 - 8.0 %    Basophil% 0.5 0.0 - 1.9 %    Differential Method Automated    Comprehensive metabolic panel   Result Value Ref Range    Sodium 138 136 - 145 mmol/L    Potassium 4.6 3.5 - 5.1 mmol/L    Chloride 102 95 - 110 mmol/L    CO2 23 23 - 29 mmol/L    Glucose 286 (H) 70 - 110 mg/dL    BUN, Bld 25 (H) 8 - 23 mg/dL    Creatinine 1.4 0.5 - 1.4 mg/dL    Calcium  8.7 8.7 - 10.5 mg/dL    Total Protein 7.4 6.0 - 8.4 g/dL    Albumin 3.3 (L) 3.5 - 5.2 g/dL    Total Bilirubin 0.6 0.1 - 1.0 mg/dL    Alkaline Phosphatase 122 55 - 135 U/L    AST 48 (H) 10 - 40 U/L    ALT 15 10 - 44 U/L    Anion Gap 13 8 - 16 mmol/L    eGFR if African American 40 (A) >60 mL/min/1.73 m^2    eGFR if non African American 35 (A) >60 mL/min/1.73 m^2   Lactic acid, plasma #1   Result Value Ref Range    Lactate (Lactic Acid) 3.1 (H) 0.5 - 2.2 mmol/L   Magnesium   Result Value Ref Range    Magnesium 2.1 1.6 - 2.6 mg/dL   Troponin I   Result Value Ref Range    Troponin I 0.095 (H) 0.000 - 0.026 ng/mL   Procalcitonin   Result Value Ref Range    Procalcitonin 0.15 <0.25 ng/mL   Brain natriuretic peptide   Result Value Ref Range     (H) 0 - 99 pg/mL   Protime-INR   Result Value Ref Range    Prothrombin Time 13.3 (H) 9.0 - 12.5 sec    INR 1.2 0.8 - 1.2   APTT   Result Value Ref Range    aPTT 35.8 (H) 21.0 - 32.0 sec   Urinalysis, Reflex to Urine Culture Urine, Catheterized   Result Value Ref Range    Specimen UA Urine, Catheterized     Color, UA Yellow Yellow, Straw, Jennifer    Appearance, UA Clear Clear    pH, UA 6.0 5.0 - 8.0    Specific Gravity, UA >=1.030 (A) 1.005 - 1.030    Protein, UA 2+ (A) Negative    Glucose, UA Negative Negative    Ketones, UA Negative Negative    Bilirubin (UA) Negative Negative    Occult Blood UA 2+ (A) Negative    Nitrite, UA Negative Negative    Urobilinogen, UA Negative <2.0 EU/dL    Leukocytes, UA Negative Negative   Lactic acid, plasma #2   Result Value Ref Range    Lactate (Lactic Acid) 2.5 (H) 0.5 - 2.2 mmol/L   Urinalysis Microscopic   Result Value Ref Range    RBC, UA 6 (H) 0 - 4 /hpf    WBC, UA 1 0 - 5 /hpf    Bacteria None None-Occ /hpf    Hyaline Casts, UA 15 (A) 0-1/lpf /lpf    Microscopic Comment SEE COMMENT    Troponin I   Result Value Ref Range    Troponin I 1.983 (H) 0.000 - 0.026 ng/mL   CBC auto differential   Result Value Ref Range    WBC 9.47 3.90 - 12.70  K/uL    RBC 3.32 (L) 4.00 - 5.40 M/uL    Hemoglobin 10.6 (L) 12.0 - 16.0 g/dL    Hematocrit 34.7 (L) 37.0 - 48.5 %    Mean Corpuscular Volume 105 (H) 82 - 98 fL    Mean Corpuscular Hemoglobin 31.9 (H) 27.0 - 31.0 pg    Mean Corpuscular Hemoglobin Conc 30.5 (L) 32.0 - 36.0 g/dL    RDW 14.7 (H) 11.5 - 14.5 %    Platelets 256 150 - 350 K/uL    MPV 9.6 9.2 - 12.9 fL    Immature Granulocytes 0.5 0.0 - 0.5 %    Gran # (ANC) 7.8 (H) 1.8 - 7.7 K/uL    Immature Grans (Abs) 0.05 (H) 0.00 - 0.04 K/uL    Lymph # 1.0 1.0 - 4.8 K/uL    Mono # 0.6 0.3 - 1.0 K/uL    Eos # 0.0 0.0 - 0.5 K/uL    Baso # 0.02 0.00 - 0.20 K/uL    nRBC 0 0 /100 WBC    Gran% 82.9 (H) 38.0 - 73.0 %    Lymph% 10.0 (L) 18.0 - 48.0 %    Mono% 6.4 4.0 - 15.0 %    Eosinophil% 0.0 0.0 - 8.0 %    Basophil% 0.2 0.0 - 1.9 %    Platelet Estimate Appears normal     Poly Occasional     Hypo Occasional     Stomatocytes Present     Basophilic Stippling Occasional     Differential Method Automated    Comprehensive metabolic panel   Result Value Ref Range    Sodium 137 136 - 145 mmol/L    Potassium 4.2 3.5 - 5.1 mmol/L    Chloride 104 95 - 110 mmol/L    CO2 22 (L) 23 - 29 mmol/L    Glucose 124 (H) 70 - 110 mg/dL    BUN, Bld 28 (H) 8 - 23 mg/dL    Creatinine 1.4 0.5 - 1.4 mg/dL    Calcium 8.3 (L) 8.7 - 10.5 mg/dL    Total Protein 6.8 6.0 - 8.4 g/dL    Albumin 3.1 (L) 3.5 - 5.2 g/dL    Total Bilirubin 0.7 0.1 - 1.0 mg/dL    Alkaline Phosphatase 104 55 - 135 U/L    AST 47 (H) 10 - 40 U/L    ALT 19 10 - 44 U/L    Anion Gap 11 8 - 16 mmol/L    eGFR if African American 40 (A) >60 mL/min/1.73 m^2    eGFR if non African American 35 (A) >60 mL/min/1.73 m^2   Magnesium   Result Value Ref Range    Magnesium 1.8 1.6 - 2.6 mg/dL   Phosphorus   Result Value Ref Range    Phosphorus 3.5 2.7 - 4.5 mg/dL   TSH   Result Value Ref Range    TSH 0.818 0.400 - 4.000 uIU/mL   CK-MB   Result Value Ref Range     (H) 20 - 180 U/L    CPK MB 5.6 0.1 - 6.5 ng/mL    MB% 2.4 0.0 - 5.0 %    Troponin I   Result Value Ref Range    Troponin I 0.992 (H) 0.000 - 0.026 ng/mL   ISTAT PROCEDURE   Result Value Ref Range    POC PH 7.224 (LL) 7.35 - 7.45    POC PCO2 66.0 (HH) 35 - 45 mmHg    POC PO2 241 (H) 80 - 100 mmHg    POC HCO3 27.2 24 - 28 mmol/L    POC BE 0 -2 to 2 mmol/L    POC SATURATED O2 100 95 - 100 %    Rate 14     Sample ARTERIAL     Site LR     Allens Test Pass     DelSys CPAP/BiPAP     Mode BiPAP     FiO2 50     Spont Rate 33     Min Vol 12.5     Sp02 98     IP 12     EP 6    ISTAT PROCEDURE   Result Value Ref Range    POC PH 7.416 7.35 - 7.45    POC PCO2 34.4 (L) 35 - 45 mmHg    POC PO2 80 80 - 100 mmHg    POC HCO3 22.1 (L) 24 - 28 mmol/L    POC BE -2 -2 to 2 mmol/L    POC SATURATED O2 96 95 - 100 %    Sample ARTERIAL     Site RB     Allens Test N/A     DelSys Nasal Can     Mode SPONT     Flow 2     FiO2 28    POCT glucose   Result Value Ref Range    POCT Glucose 145 (H) 70 - 110 mg/dL   POCT glucose   Result Value Ref Range    POCT Glucose 167 (H) 70 - 110 mg/dL   POCT glucose   Result Value Ref Range    POCT Glucose 216 (H) 70 - 110 mg/dL         Imaging Results:  Imaging Results          X-Ray Chest AP Portable (Final result)  Result time 11/16/19 08:02:01    Final result by Sebas Le MD (11/16/19 08:02:01)                 Impression:      See above.      Electronically signed by: Sebas Le MD  Date:    11/16/2019  Time:    08:02             Narrative:    EXAMINATION:  XR CHEST AP PORTABLE    CLINICAL HISTORY:  Sepsis;    FINDINGS:  No prior study.  Patchy right perihilar and more notable right lower chest opacities characteristic of multifocal bronchopneumonia with history of sepsis.  Left lung clear.  Normal size heart.  No pleural effusion.                            1:50 AM: Per ED physician, pt's CXR results: Right lower lobe infiltrate.        The EKG was ordered, reviewed, and independently interpreted by the ED provider.  Interpretation time: 00:52  Rate: 132 BPM  Rhythm:  sinus tachycardia  Interpretation: possible left atrial enlargement. Septal infarct, age undetermined. No STEMI.             The Emergency Provider reviewed the vital signs and test results, which are outlined above.     ED Discussion     2:00 AM: Discussed case with Isa Mahoney NP ([facility]). Dr. Mario agrees with current care and management of pt and accepts admission.  Needs admission for sepsis, right Lower lobe pneumonia, respiratory failure on BiPap.   Admitting Service: [facility]  Admitting Physician: Dr. Mario  Admit to: ICU    2:00 AM: Re-evaluated pt. I have discussed test results, shared treatment plan, and the need for admission with patient and family at bedside. Pt and family express understanding at this time and agree with all information. All questions answered. Pt and family have no further questions or concerns at this time. Pt is ready for admit.       Medical Decision Making:   Clinical Tests:   Lab Tests: Reviewed and Ordered  Radiological Study: Ordered and Reviewed  Medical Tests: Ordered and Reviewed  Sepsis Perfusion Assessment: I attest, a sepsis perfusion exam was performed within 6 hours of Septic Shock presentation, following fluid resuscitation.     Additional MDM:   Sepsis - SIRS Criteria: Heart Rate: HR > 90. Tachypnea: RR > 20. White Count: WBC > 12,000. .       ED Medication(s):  Medications   piperacillin-tazobactam 4.5 g in dextrose 5 % 100 mL IVPB (ready to mix system) (4.5 g Intravenous New Bag 11/16/19 1822)   vancomycin (VANCOCIN) 1,250 mg in dextrose 5 % 250 mL IVPB ( Intravenous Trough Due 30 minutes Before Dose 11/18/19 0230)   acetaminophen suppository 650 mg (650 mg Rectal Given 11/16/19 0324)   0.9%  NaCl infusion ( Intravenous Verify Only 11/16/19 1700)   oseltamivir capsule 30 mg (30 mg Oral Not Given 11/16/19 0900)   albuterol-ipratropium 2.5 mg-0.5 mg/3 mL nebulizer solution 3 mL (3 mLs Nebulization Not Given 11/16/19 1300)    albuterol-ipratropium 2.5 mg-0.5 mg/3 mL nebulizer solution 3 mL (has no administration in time range)   ondansetron injection 4 mg (has no administration in time range)   pantoprazole EC tablet 40 mg (40 mg Oral Given 11/16/19 1000)   aspirin EC tablet 81 mg (81 mg Oral Given 11/16/19 1000)   atorvastatin tablet 20 mg (20 mg Oral Given 11/16/19 1000)   escitalopram oxalate tablet 10 mg (10 mg Oral Given 11/16/19 1000)   levothyroxine tablet 112 mcg (112 mcg Oral Given 11/16/19 0526)   rivaroxaban tablet 20 mg (20 mg Oral Given 11/16/19 1720)   glucagon (human recombinant) injection 1 mg (has no administration in time range)   insulin aspart U-100 pen 1-10 Units (has no administration in time range)   dextrose 10% (D10W) Bolus (has no administration in time range)   diltiaZEM tablet 60 mg (60 mg Oral Given 11/16/19 1008)   methylPREDNISolone sodium succinate injection 60 mg (60 mg Intravenous Given 11/16/19 1500)   albuterol-ipratropium 2.5 mg-0.5 mg/3 mL nebulizer solution 3 mL (3 mLs Nebulization Given 11/16/19 0110)   furosemide injection 40 mg (40 mg Intravenous Given 11/16/19 0130)   methylPREDNISolone sodium succinate injection 125 mg (125 mg Intravenous Given 11/16/19 0129)   metoprolol injection 5 mg (5 mg Intravenous Given 11/16/19 0136)   sodium chloride 0.9% bolus 1,000 mL (0 mLs Intravenous Stopped 11/16/19 0245)   vancomycin (VANCOCIN) 2,500 mg in dextrose 5 % 500 mL IVPB (2,500 mg Intravenous New Bag 11/16/19 0248)   sodium chloride 0.9% bolus 1,500 mL (1,500 mLs Intravenous New Bag 11/16/19 0246)   sodium chloride 0.9% bolus 1,000 mL (1,000 mLs Intravenous New Bag 11/16/19 0630)   aspirin tablet 325 mg (325 mg Oral Given 11/16/19 0526)   metoprolol injection 5 mg (5 mg Intravenous Given 11/16/19 0531)   metoprolol injection 5 mg (5 mg Intravenous Given 11/16/19 2640)   diltiaZEM injection 10 mg (10 mg Intravenous Given 11/16/19 0371)       Current Discharge Medication List                  Scribe  Attestation:   Scribe #1: I performed the above scribed service and the documentation accurately describes the services I performed. I attest to the accuracy of the note.     Attending:   Physician Attestation Statement for Scribe #1: I, Zeina Emerson MD, personally performed the services described in this documentation, as scribed by Mary Ellen Hill, in my presence, and it is both accurate and complete.           Clinical Impression       ICD-10-CM ICD-9-CM   1. Acute respiratory failure with hypoxia and hypercapnia J96.01 518.81    J96.02    2. Tachycardia R00.0 785.0   3. Pneumonia of right lower lobe due to infectious organism J18.1 486   4. Severe sepsis A41.9 038.9    R65.20 995.92   5. Moderate persistent asthma with exacerbation J45.41 493.92   6. Morbid obesity E66.01 278.01       Disposition:   Disposition: Admitted  Condition: Critical         Zeina Emerson MD  11/16/19 0586

## 2019-11-16 NOTE — CONSULTS
[facility] -   Pulmonology  Consult Note    Patient Name: Ana Castañeda  MRN: [id number]  Admission Date: 11/16/2019  Hospital Length of Stay: 0 days  Code Status: Full Code  Attending Physician: Tiffanie Jones MD  Primary Care Provider: Stephen Tarango MD   Principal Problem: Pneumonia of right lower lobe due to infectious organism      Subjective:     HPI:  84 year old female who  has a past medical history of Anticoagulant long-term use, Asthma, Debility, Gait apraxia, Gout, Hypertension, NPH (normal pressure hydrocephalus), Stroke, and Thyroid disease presenting with progressive dyspnea, productive cough, wheezing and chest congestion for 3 day PTA. ED evaluation revealed right lower lobe pneumonia.  Tachycardia on arrival resolves with LOPRESSOR IVP. Also received IV  FUROSEMIDE. Additional ED treatment included  125 mg IV Solu-Medrol, DuoNeb treatments x 3, 2.5 L IV fluid resuscitation, IV vanc and Zosyn. NIPPV - BIPAP initiated in the ED. Patient was admitted to ICU under Hospital Medicine services.       Past Medical History:   Diagnosis Date    Anticoagulant long-term use     Asthma     Debility     Gait apraxia     Gout     Hypertension     NPH (normal pressure hydrocephalus)     Stroke     Thyroid disease        Past Surgical History:   Procedure Laterality Date    CAROTID ENDARTERECTOMY         Review of patient's allergies indicates:   Allergen Reactions    Food color red [red food color (bulk)] Nausea And Vomiting    Pcn [penicillins] Rash           Medications:  Continuous Infusions:   sodium chloride 0.9% 75 mL/hr at 11/16/19 1500     Scheduled Meds:   albuterol-ipratropium  3 mL Nebulization Q6H    aspirin  81 mg Oral Daily    atorvastatin  20 mg Oral Daily    diltiaZEM  60 mg Oral Q8H    escitalopram oxalate  10 mg Oral Daily    levothyroxine  112 mcg Oral Daily    methylPREDNISolone sodium succinate  60 mg Intravenous Q8H    oseltamivir  30 mg Oral BID     pantoprazole  40 mg Oral Daily    piperacillin-tazobactam (ZOSYN) IVPB  4.5 g Intravenous Q8H    rivaroxaban  20 mg Oral Daily with dinner    [START ON 11/17/2019] vancomycin (VANCOCIN) IVPB  1,250 mg Intravenous Q24H     PRN Meds:acetaminophen, albuterol-ipratropium, Dextrose 10% Bolus, glucagon (human recombinant), insulin aspart U-100, ondansetron       Family History     None        Tobacco Use    Smoking status: Former Smoker    Smokeless tobacco: Never Used   Substance and Sexual Activity    Alcohol use: No    Drug use: Never    Sexual activity: Not on file         Review of Systems   Constitutional: Negative for chills and fever.   HENT: Negative for postnasal drip, rhinorrhea, sinus pressure and sore throat.    Respiratory: Positive for cough and wheezing. Negative for shortness of breath.    Cardiovascular: Negative for chest pain, palpitations and leg swelling.   Gastrointestinal: Negative for abdominal distention, abdominal pain, blood in stool, constipation, diarrhea, nausea and vomiting.   Genitourinary: Negative for difficulty urinating, dysuria, hematuria and urgency.   Musculoskeletal: Positive for gait problem (chronic). Negative for arthralgias, back pain, myalgias, neck pain and neck stiffness.   Skin: Negative for pallor, rash and wound.   Neurological: Negative for dizziness, syncope, facial asymmetry, speech difficulty, light-headedness and headaches.   Psychiatric/Behavioral: Negative for confusion. The patient is not nervous/anxious.    All other systems reviewed and are negative.    Objective:     Vital Signs (Most Recent):  Temp: 99.9 °F (37.7 °C) (11/16/19 1115)  Pulse: 80 (11/16/19 1400)  Resp: (!) 25 (11/16/19 1400)  BP: (!) 110/53 (11/16/19 1400)  SpO2: 97 % (11/16/19 1400) Vital Signs (24h Range):  Temp:  [98.7 °F (37.1 °C)-101.2 °F (38.4 °C)] 99.9 °F (37.7 °C)  Pulse:  [] 80  Resp:  [16-35] 25  SpO2:  [91 %-100 %] 97 %  BP: ()/() 110/53     Weight: 92.8 kg  (204 lb 9.4 oz)  Body mass index is 32.04 kg/m².      Intake/Output Summary (Last 24 hours) at 11/16/2019 1656  Last data filed at 11/16/2019 1400  Gross per 24 hour   Intake 4443.38 ml   Output 1880 ml   Net 2563.38 ml       Physical Exam   Constitutional: She is oriented to person, place, and time. She appears well-developed and well-nourished. No distress.   Elderly.  Obese.   HENT:   Head: Normocephalic and atraumatic.   Eyes: Conjunctivae are normal.   PERRL; EOM intact.   Neck: Normal range of motion. Neck supple.   Cardiovascular: Normal rate, regular rhythm, S1 normal, S2 normal and intact distal pulses.  No extrasystoles are present. Exam reveals no gallop and no friction rub.   No murmur heard.  Pulmonary/Chest: No accessory muscle usage. No tachypnea. No respiratory distress. She has no decreased breath sounds. She has no wheezes (diffuse inspiratory and expiratory). She has no rhonchi. She has rales in the right lower field and the left lower field.   Abdominal: Soft. Bowel sounds are normal. She exhibits no distension. There is no tenderness. There is no rebound, no guarding and no CVA tenderness.   Musculoskeletal: Normal range of motion. She exhibits no edema, tenderness or deformity.   Neurological: She is alert and oriented to person, place, and time. No sensory deficit. GCS eye subscore is 4. GCS verbal subscore is 5. GCS motor subscore is 6.   Skin: Skin is warm, dry and intact. Capillary refill takes less than 2 seconds. No rash noted. She is not diaphoretic. No cyanosis or erythema.   Psychiatric: She has a normal mood and affect. Her speech is normal and behavior is normal. Cognition and memory are normal.   Nursing note and vitals reviewed.      Vents:  Oxygen Concentration (%): 28 (11/16/19 0744)    Lines/Drains/Airways     Drain                 Urethral Catheter 11/16/19 0130 Straight-tip 16 Fr. less than 1 day          Peripheral Intravenous Line                 Peripheral IV - Single Lumen  11/16/19 0058 20 G Right Wrist less than 1 day         Peripheral IV - Single Lumen 11/16/19 20 G Left Forearm less than 1 day                Significant Labs:    CBC/Anemia Profile:  Recent Labs   Lab 11/16/19  0103 11/16/19  0739   WBC 13.14* 9.47   HGB 11.6* 10.6*   HCT 37.2 34.7*    256   * 105*   RDW 14.6* 14.7*        Chemistries:  Recent Labs   Lab 11/16/19  0103 11/16/19  0739    137   K 4.6 4.2    104   CO2 23 22*   BUN 25* 28*   CREATININE 1.4 1.4   CALCIUM 8.7 8.3*   ALBUMIN 3.3* 3.1*   PROT 7.4 6.8   BILITOT 0.6 0.7   ALKPHOS 122 104   ALT 15 19   AST 48* 47*   MG 2.1 1.8   PHOS  --  3.5       ABGs:   Recent Labs   Lab 11/16/19  0826   PH 7.416   PCO2 34.4*   HCO3 22.1*   POCSATURATED 96   BE -2     Bilirubin:   Recent Labs   Lab 11/16/19  0103 11/16/19  0739   BILITOT 0.6 0.7     Coagulation:   Recent Labs   Lab 11/16/19 0103   INR 1.2   APTT 35.8*     Lactic Acid:   Recent Labs   Lab 11/16/19  0103 11/16/19  0739   LACTATE 3.1* 2.5*     POCT Glucose:   Recent Labs   Lab 11/16/19  0918 11/16/19  1330   POCTGLUCOSE 145* 167*     Troponin:   Recent Labs   Lab 11/16/19  0103 11/16/19  0739 11/16/19  1259   TROPONINI 0.095* 1.983* 0.992*     Urine Studies:   Recent Labs   Lab 11/16/19  0130   COLORU Yellow   APPEARANCEUA Clear   PHUR 6.0   SPECGRAV >=1.030*   PROTEINUA 2+*   GLUCUA Negative   KETONESU Negative   BILIRUBINUA Negative   OCCULTUA 2+*   NITRITE Negative   UROBILINOGEN Negative   LEUKOCYTESUR Negative   RBCUA 6*   WBCUA 1   BACTERIA None   HYALINECASTS 15*       Significant Imaging:     X-Ray Chest AP Portable : 11/16/19  No prior study.  Patchy right perihilar and more notable right lower chest opacities characteristic of multifocal bronchopneumonia with history of sepsis.  Left lung clear.  Normal size heart.  No pleural effusion.              ABG  Recent Labs   Lab 11/16/19  0826   PH 7.416   PO2 80   PCO2 34.4*   HCO3 22.1*   BE -2     Assessment/Plan:     * Septic  Shock secondary to RLL Pneumonia due to infectious organism  Resolving:    Microbiology: Panculture for temp > 101.   Oxygenation: Supplemental oxygen by nasal canula. Keep SAO2 > = 92%. BIPAP PRN.  Hemodynamics: Weaned off pressors.  IV crystalloids. Keep MAP > = 65mmHg  Source control: IV Zosyn, IV Vancomycin.    Paroxysmal A-fib with RVR  Rate Control: Converted to NSR with IV Cardiazem.    Anticoagulation: XARELTO      Bleeding signs/symptoms: None      Lactic acidosis  Improving. Monitor and trend.     TARA (acute kidney injury)  Volume resuscitation.  Optimize hemodynamics.  Monitor BUN / Cr. Montor urine output.  Avoid Nephrotoxic Medications.     NSTEMI  Likely demand ischemia. Cardiology consulted for assistance.   Monitor and trend serial enzymes.       Continue aspirin and statin.         Beta-blocker on hold due to hypotension    Moderate persistent asthma with exacerbation  Continue with LABA, SEAN and ICS.        Continue IV glucocorticoids        Continue nighttime NIPPV - BiPAP PRN                                               Acute respiratory failure with hypoxia and hypercapnia on Bipap  Supplement oxygenation. Keep SAO2 >= 92%. BIPAP weaned off.  Bronchodilators per orders     Continue current ongoing medical management. OK to transfer patient out of the ICU. Will sign off upon ICU transfer. Please re consult if further pulmonary issues arise.     Thank you for your consult. I will follow-up with patient. Please contact us if you have any additional questions.     Partha Hinds MD  Pulmonology  [facility] -

## 2019-11-16 NOTE — HOSPITAL COURSE
11/16: Seen and examined at bedside. Hospital chart reviewed. Overnight events noted. Off BIPAP. Spontaneous unassisted ventilation. She reports that she  has moderately improved.  11/17 - Upright in bed fully awake and alert in no distress with VSS except mild to mod HTN.  Family X 4 at bedside and update given.  Tolerated breakfast.  Restless night.   11/22C/o cough and chest congestion , developed rapid heart rate with albuterol  11/23 Improved

## 2019-11-16 NOTE — ASSESSMENT & PLAN NOTE
- Initial troponin of 0.095, likely demand secondary to above.  EKG unrevealing.  Chest pain-free.  - Trend serial cardiac enzymes.  - Continue aspirin and statin.  Beta-blocker on hold to prevent hypotension, resume as BP tolerates.  - Further recs pending clinical course.    Repeat elevated to 1.98  Pt has no CP, likely sec to demand Ischemia from Sepsis, resp failure  Will consult Cards

## 2019-11-16 NOTE — ASSESSMENT & PLAN NOTE
- Initial troponin of 0.095, likely demand secondary to above.  EKG unrevealing.  Chest pain-free.  - Trend serial cardiac enzymes.  - Continue aspirin and statin.  Beta-blocker on hold to prevent hypotension, resume as BP tolerates.  - Further recs pending clinical course.

## 2019-11-16 NOTE — ASSESSMENT & PLAN NOTE
- WBC 13, TMax 101.2°F, HR >90, RR >20, lactic 3.1.  Patient became hypotensive in the ED after receiving 5 mg IV Lopressor for tachycardia.  Blood pressure responded well to IV fluid resuscitation, and remains stable.  - Blood cultures obtained in the ED.  Continue empiric IV vanc and Zosyn.  - 30 mL/kg IV fluids given in the ED.  Continue IV hydration.  - Continue bronchodilators.  - Wean BiPAP as tolerated.  - Check for influenza.  - Follow labs.    Improving, off Charbel gtt  Leukocytosis better  Continue IV abx, steroids    Doing better  All cx ngtd  Cont same abx  Decrease steroids    Repeat CXR clear-- will d/c abc to augmentin

## 2019-11-16 NOTE — SUBJECTIVE & OBJECTIVE
Past Medical History:   Diagnosis Date    Anticoagulant long-term use     Asthma     Debility     Gait apraxia     Gout     Hypertension     NPH (normal pressure hydrocephalus)     Stroke     Thyroid disease        Past Surgical History:   Procedure Laterality Date    CAROTID ENDARTERECTOMY         Review of patient's allergies indicates:   Allergen Reactions    Food color red [red food color (bulk)] Nausea And Vomiting    Pcn [penicillins] Rash           Medications:  Continuous Infusions:   sodium chloride 0.9% 75 mL/hr at 11/16/19 1500     Scheduled Meds:   albuterol-ipratropium  3 mL Nebulization Q6H    aspirin  81 mg Oral Daily    atorvastatin  20 mg Oral Daily    diltiaZEM  60 mg Oral Q8H    escitalopram oxalate  10 mg Oral Daily    levothyroxine  112 mcg Oral Daily    methylPREDNISolone sodium succinate  60 mg Intravenous Q8H    oseltamivir  30 mg Oral BID    pantoprazole  40 mg Oral Daily    piperacillin-tazobactam (ZOSYN) IVPB  4.5 g Intravenous Q8H    rivaroxaban  20 mg Oral Daily with dinner    [START ON 11/17/2019] vancomycin (VANCOCIN) IVPB  1,250 mg Intravenous Q24H     PRN Meds:acetaminophen, albuterol-ipratropium, Dextrose 10% Bolus, glucagon (human recombinant), insulin aspart U-100, ondansetron       Family History     None        Tobacco Use    Smoking status: Former Smoker    Smokeless tobacco: Never Used   Substance and Sexual Activity    Alcohol use: No    Drug use: Never    Sexual activity: Not on file         Review of Systems   Constitutional: Negative for chills and fever.   HENT: Negative for postnasal drip, rhinorrhea, sinus pressure and sore throat.    Respiratory: Positive for cough and wheezing. Negative for shortness of breath.    Cardiovascular: Negative for chest pain, palpitations and leg swelling.   Gastrointestinal: Negative for abdominal distention, abdominal pain, blood in stool, constipation, diarrhea, nausea and vomiting.   Genitourinary:  Negative for difficulty urinating, dysuria, hematuria and urgency.   Musculoskeletal: Positive for gait problem (chronic). Negative for arthralgias, back pain, myalgias, neck pain and neck stiffness.   Skin: Negative for pallor, rash and wound.   Neurological: Negative for dizziness, syncope, facial asymmetry, speech difficulty, light-headedness and headaches.   Psychiatric/Behavioral: Negative for confusion. The patient is not nervous/anxious.    All other systems reviewed and are negative.    Objective:     Vital Signs (Most Recent):  Temp: 99.9 °F (37.7 °C) (11/16/19 1115)  Pulse: 80 (11/16/19 1400)  Resp: (!) 25 (11/16/19 1400)  BP: (!) 110/53 (11/16/19 1400)  SpO2: 97 % (11/16/19 1400) Vital Signs (24h Range):  Temp:  [98.7 °F (37.1 °C)-101.2 °F (38.4 °C)] 99.9 °F (37.7 °C)  Pulse:  [] 80  Resp:  [16-35] 25  SpO2:  [91 %-100 %] 97 %  BP: ()/() 110/53     Weight: 92.8 kg (204 lb 9.4 oz)  Body mass index is 32.04 kg/m².      Intake/Output Summary (Last 24 hours) at 11/16/2019 1656  Last data filed at 11/16/2019 1400  Gross per 24 hour   Intake 4443.38 ml   Output 1880 ml   Net 2563.38 ml       Physical Exam   Constitutional: She is oriented to person, place, and time. She appears well-developed and well-nourished. No distress.   Elderly.  Obese.   HENT:   Head: Normocephalic and atraumatic.   Eyes: Conjunctivae are normal.   PERRL; EOM intact.   Neck: Normal range of motion. Neck supple.   Cardiovascular: Normal rate, regular rhythm, S1 normal, S2 normal and intact distal pulses.  No extrasystoles are present. Exam reveals no gallop and no friction rub.   No murmur heard.  Pulmonary/Chest: No accessory muscle usage. No tachypnea. No respiratory distress. She has no decreased breath sounds. She has no wheezes (diffuse inspiratory and expiratory). She has no rhonchi. She has rales in the right lower field and the left lower field.   Abdominal: Soft. Bowel sounds are normal. She exhibits no  distension. There is no tenderness. There is no rebound, no guarding and no CVA tenderness.   Musculoskeletal: Normal range of motion. She exhibits no edema, tenderness or deformity.   Neurological: She is alert and oriented to person, place, and time. No sensory deficit. GCS eye subscore is 4. GCS verbal subscore is 5. GCS motor subscore is 6.   Skin: Skin is warm, dry and intact. Capillary refill takes less than 2 seconds. No rash noted. She is not diaphoretic. No cyanosis or erythema.   Psychiatric: She has a normal mood and affect. Her speech is normal and behavior is normal. Cognition and memory are normal.   Nursing note and vitals reviewed.      Vents:  Oxygen Concentration (%): 28 (11/16/19 0744)    Lines/Drains/Airways     Drain                 Urethral Catheter 11/16/19 0130 Straight-tip 16 Fr. less than 1 day          Peripheral Intravenous Line                 Peripheral IV - Single Lumen 11/16/19 0058 20 G Right Wrist less than 1 day         Peripheral IV - Single Lumen 11/16/19 20 G Left Forearm less than 1 day                Significant Labs:    CBC/Anemia Profile:  Recent Labs   Lab 11/16/19 0103 11/16/19  0739   WBC 13.14* 9.47   HGB 11.6* 10.6*   HCT 37.2 34.7*    256   * 105*   RDW 14.6* 14.7*        Chemistries:  Recent Labs   Lab 11/16/19 0103 11/16/19  0739    137   K 4.6 4.2    104   CO2 23 22*   BUN 25* 28*   CREATININE 1.4 1.4   CALCIUM 8.7 8.3*   ALBUMIN 3.3* 3.1*   PROT 7.4 6.8   BILITOT 0.6 0.7   ALKPHOS 122 104   ALT 15 19   AST 48* 47*   MG 2.1 1.8   PHOS  --  3.5       ABGs:   Recent Labs   Lab 11/16/19  0826   PH 7.416   PCO2 34.4*   HCO3 22.1*   POCSATURATED 96   BE -2     Bilirubin:   Recent Labs   Lab 11/16/19 0103 11/16/19  0739   BILITOT 0.6 0.7     Coagulation:   Recent Labs   Lab 11/16/19 0103   INR 1.2   APTT 35.8*     Lactic Acid:   Recent Labs   Lab 11/16/19 0103 11/16/19  0739   LACTATE 3.1* 2.5*     POCT Glucose:   Recent Labs   Lab  11/16/19  0918 11/16/19  1330   POCTGLUCOSE 145* 167*     Troponin:   Recent Labs   Lab 11/16/19  0103 11/16/19  0739 11/16/19  1259   TROPONINI 0.095* 1.983* 0.992*     Urine Studies:   Recent Labs   Lab 11/16/19  0130   COLORU Yellow   APPEARANCEUA Clear   PHUR 6.0   SPECGRAV >=1.030*   PROTEINUA 2+*   GLUCUA Negative   KETONESU Negative   BILIRUBINUA Negative   OCCULTUA 2+*   NITRITE Negative   UROBILINOGEN Negative   LEUKOCYTESUR Negative   RBCUA 6*   WBCUA 1   BACTERIA None   HYALINECASTS 15*       Significant Imaging:     X-Ray Chest AP Portable : 11/16/19  No prior study.  Patchy right perihilar and more notable right lower chest opacities characteristic of multifocal bronchopneumonia with history of sepsis.  Left lung clear.  Normal size heart.  No pleural effusion.

## 2019-11-16 NOTE — PLAN OF CARE
Pt's  returned with HH packet from [facility]. SWer requested HH order from attending MD.  Romeo Chavira LMSW 11/16/2019 3:32 PM

## 2019-11-16 NOTE — PROGRESS NOTES
"[facility] Hospital Medicine  Progress Note    Patient Name: Ana Castañeda  MRN: [id number]  Patient Class: IP- Inpatient   Admission Date: 11/16/2019  Length of Stay: 0 days  Attending Physician: Tiffanie Jones MD  Primary Care Provider: Stephen Tarango MD        Subjective:     Principal Problem:Pneumonia of right lower lobe due to infectious organism        HPI:  Ana Castañeda is a 84 y.o. female patient with a PMHx asthma, HTN, h/o CVA with residual right-sided weakness, hypothyroidism, gout, debility (wheelchair bound), and chronic anticoagulation therapy (reason unknown).  She presented to the emergency department with complaints of shortness of breath that progressively worsened shortly before arrival.  No aggravating or alleviating factors.  Associated cough producing mucus, wheezing and congestion x 3 days.  Denies any chest pain, palpitations, orthopnea, PND, edema, abdominal pain, nausea, vomiting, diarrhea, dysuria, hematuria, back pain, AMS, lightheadedness or dizziness, syncope, rhinorrhea, sore throat, weakness, fever or chills.  Family at bedside reports patient was seen at [facility] on 11/13 and diagnosed with acute bronchitis.  She was given a "steroid shot" and prescribed Cefdinir, which she has taken x 1 dose on 11/15.  Patient is on chronic Xarelto, but unsure of reason.  She denies any history of AF/FL, DVT or PE.  Initial workup in the ED resulted WBC of 13.14, creatinine 1.4, BUN 25, troponin 0.095, , lactic 3.1, procalcitonin 0.15, UA unremarkable, EKG unrevealing.  ABG with pH of 7.224, pCO2 66, pO2 241, SaO2 100 on BiPAP.  CXR showed right lower lobe infiltrate.  Patient's O2 sat remained stable in the ED, but BiPAP placed due to increased respiratory distress. Upon arrival to the ED, patient was tachycardic with heart rate of 136 bpm, /78.  She was given IV Lopressor 5 mg x 2 doses and 40 mg IV Lasix, which improved her HR into the 80s but also decreased " blood pressure to 98/70.  ED also administered 125 mg IV Solu-Medrol, DuoNeb treatments x 3, 2.5 L IV fluid resuscitation, IV vanc and Zosyn.  Patient was admitted to ICU under Hospital Medicine services.  Currently, patient appears comfortable in no acute distress.  BiPAP remains in place with stable O2 sat.  Patient is a full code.   is surrogate decision maker.      Overview/Hospital Course:  Ana Castañeda is a 84 y.o. female patient with a PMHx asthma, HTN, h/o CVA with residual right-sided weakness, hypothyroidism, gout, debility (wheelchair bound), and chronic anticoagulation therapy (reason unknown) admitted to ICU on Bipap, with acute hypoxemic resp failure sec to LLL Pneumonia and Afib w RVR and Lactic Acidosis. She was aggressively rehydrated with NS and started on IV Vanc and Zosyn as well as IV Solumedrol. Her Afib RVR was treated initially with IV Lopressor 5 mg IVP x 2 which  which did not affect her HR but dropped BP to 98/70 -- necessitating Charbel-senephrine gtt by eICU this am. This am she was given a dose of Cardizem 10 mg IVP which slowed down her HR and also converted to NSR and her BP improved. She was started on Cardizem oral tabs and was able to come off Charbel gtt as well as Bipap to NC and feels much better.  Urine output is good. She is AAOx3, speech is clear, family at bedside. Troponin increased to 1.98-- cardiology consulted.        Interval History: This am she was given a dose of Cardizem 10 mg IVP which slowed down her HR and also converted to NSR and her BP improved. She was started on Cardizem oral tabs and was able to come off Charbel gtt as well as Bipap to NC and feels much better.  Urine output is good. She is AAOx3, speech is clear, family at bedside.       Review of Systems   Unable to perform ROS: Acuity of condition   Constitutional: Negative for chills and fever.   HENT: Negative for postnasal drip, rhinorrhea, sinus pressure and sore throat.    Respiratory: Positive for cough  and wheezing. Negative for shortness of breath.    Cardiovascular: Negative for chest pain, palpitations and leg swelling.   Gastrointestinal: Negative for abdominal distention, abdominal pain, blood in stool, constipation, diarrhea, nausea and vomiting.   Genitourinary: Negative for difficulty urinating, dysuria, hematuria and urgency.   Musculoskeletal: Positive for gait problem (chronic). Negative for arthralgias, back pain, myalgias, neck pain and neck stiffness.   Skin: Negative for pallor, rash and wound.   Neurological: Negative for dizziness, syncope, facial asymmetry, speech difficulty, light-headedness and headaches.   Psychiatric/Behavioral: Negative for confusion. The patient is not nervous/anxious.    All other systems reviewed and are negative.    Objective:     Vital Signs (Most Recent):  Temp: 99.9 °F (37.7 °C) (11/16/19 1115)  Pulse: 93 (11/16/19 1200)  Resp: 19 (11/16/19 1200)  BP: (!) 94/57 (11/16/19 1200)  SpO2: 95 % (11/16/19 1200) Vital Signs (24h Range):  Temp:  [98.7 °F (37.1 °C)-101.2 °F (38.4 °C)] 99.9 °F (37.7 °C)  Pulse:  [] 93  Resp:  [16-35] 19  SpO2:  [91 %-100 %] 95 %  BP: ()/() 94/57     Weight: 92.8 kg (204 lb 9.4 oz)  Body mass index is 32.04 kg/m².    Intake/Output Summary (Last 24 hours) at 11/16/2019 1225  Last data filed at 11/16/2019 1200  Gross per 24 hour   Intake 4408.08 ml   Output 1695 ml   Net 2713.08 ml      Physical Exam   Constitutional: She is oriented to person, place, and time. She appears well-developed and well-nourished. No distress.   Elderly.  Obese.   HENT:   Head: Normocephalic and atraumatic.   Eyes: Conjunctivae are normal.   PERRL; EOM intact.   Neck: Normal range of motion. Neck supple.   Cardiovascular: Normal rate, regular rhythm, S1 normal, S2 normal and intact distal pulses.  No extrasystoles are present. Exam reveals no gallop and no friction rub.   No murmur heard.  Pulses:       Radial pulses are 2+ on the right side, and 2+ on  the left side.        Dorsalis pedis pulses are 2+ on the right side, and 2+ on the left side.        Posterior tibial pulses are 2+ on the right side, and 2+ on the left side.   Pulmonary/Chest: No accessory muscle usage. Tachypnea noted. No respiratory distress. She has no decreased breath sounds. She has no wheezes (diffuse inspiratory and expiratory). She has no rhonchi. She has rales in the right lower field and the left lower field.   BiPAP in place.   Abdominal: Soft. Bowel sounds are normal. She exhibits no distension. There is no tenderness. There is no rebound, no guarding and no CVA tenderness.   Musculoskeletal: Normal range of motion. She exhibits no edema, tenderness or deformity.   Neurological: She is alert and oriented to person, place, and time. No sensory deficit. GCS eye subscore is 4. GCS verbal subscore is 5. GCS motor subscore is 6.   No acute focal deficits appreciated.   Skin: Skin is warm, dry and intact. Capillary refill takes less than 2 seconds. No rash noted. She is not diaphoretic. No cyanosis or erythema.   Psychiatric: She has a normal mood and affect. Her speech is normal and behavior is normal. Cognition and memory are normal.   Nursing note and vitals reviewed.      Significant Labs:   ABGs:   Recent Labs   Lab 11/16/19  0826   PH 7.416   PCO2 34.4*   HCO3 22.1*   POCSATURATED 96   BE -2     BMP:   Recent Labs   Lab 11/16/19  0739   *      K 4.2      CO2 22*   BUN 28*   CREATININE 1.4   CALCIUM 8.3*   MG 1.8     CBC:   Recent Labs   Lab 11/16/19  0103 11/16/19  0739   WBC 13.14* 9.47   HGB 11.6* 10.6*   HCT 37.2 34.7*    256     CMP:   Recent Labs   Lab 11/16/19  0103 11/16/19  0739    137   K 4.6 4.2    104   CO2 23 22*   * 124*   BUN 25* 28*   CREATININE 1.4 1.4   CALCIUM 8.7 8.3*   PROT 7.4 6.8   ALBUMIN 3.3* 3.1*   BILITOT 0.6 0.7   ALKPHOS 122 104   AST 48* 47*   ALT 15 19   ANIONGAP 13 11   EGFRNONAA 35* 35*     Coagulation:    Recent Labs   Lab 11/16/19  0103   INR 1.2   APTT 35.8*     Lactic Acid:   Recent Labs   Lab 11/16/19 0103 11/16/19  0739   LACTATE 3.1* 2.5*     Magnesium:   Recent Labs   Lab 11/16/19 0103 11/16/19  0739   MG 2.1 1.8     POCT Glucose:   Recent Labs   Lab 11/16/19  0918   POCTGLUCOSE 145*     Troponin:   Recent Labs   Lab 11/16/19 0103 11/16/19  0739   TROPONINI 0.095* 1.983*     TSH:   Recent Labs   Lab 11/16/19  0738   TSH 0.818     All pertinent labs within the past 24 hours have been reviewed.    Significant Imaging: I have reviewed all pertinent imaging results/findings within the past 24 hours.      Assessment/Plan:      * Septic Shock secondary to RLL Pneumonia due to infectious organism  - WBC 13, TMax 101.2°F, HR >90, RR >20, lactic 3.1.  Patient became hypotensive in the ED after receiving 5 mg IV Lopressor for tachycardia.  Blood pressure responded well to IV fluid resuscitation, and remains stable.  - Blood cultures obtained in the ED.  Continue empiric IV vanc and Zosyn.  - 30 mL/kg IV fluids given in the ED.  Continue IV hydration.  - Continue bronchodilators.  - Wean BiPAP as tolerated.  - Check for influenza.  - Follow labs.    Improving,  Leukocytosis better  Continue IV abx, steroids    Acute respiratory failure with hypoxia and hypercapnia on Bipap  - Secondary to asthma exacerbation + PNA.    - O2 sat stable on 50% FiO2 BiPAP, wean as tolerated.  Repeat ABG in AM.  - DuoNebs Q 6.  - IV Solu-Medrol 80 mg Q8.  - Empiric antibiotics.  - Will obtain V/Q scan to r/o PE (unable to perform CTA due to TARA).  - Pulmonology consult.    - Improving, came off BIPAP- Oxygenation improving    Lactic acidosis  Improved with vigorous rehydration  Cont IVF  May transfer out later      NSTEMI  - Initial troponin of 0.095, likely demand secondary to above.  EKG unrevealing.  Chest pain-free.  - Trend serial cardiac enzymes.  - Continue aspirin and statin.  Beta-blocker on hold to prevent hypotension, resume  as BP tolerates.  - Further recs pending clinical course.    Repeat elevated to 1.98  Pt has no CP, likely sec to demand Ischemia from Sepsis, resp failure  Will consult Cards    Paroxysmal A-fib with RVR  Converted to NSR with IV Cardizem  Troponin elevated  Cards consulted, pt already on Xarelto    TARA (acute kidney injury)  - Initial creatinine of 1.4, likely secondary to above.  - Avoid nephrotoxic agents.  - Follow labs.    Improving with IVF    Moderate persistent asthma with exacerbation  - Wean BiPAP as tolerated.  - Continue bronchodilators.  - Continue IV steroids.  - Empiric antibiotics as above.    Sec to Pneumonia- improving      VTE Risk Mitigation (From admission, onward)         Ordered     rivaroxaban tablet 20 mg  With dinner      11/16/19 0436     IP VTE HIGH RISK PATIENT  Once      11/16/19 0430     Reason for No Pharmacological VTE Prophylaxis  Once     Question:  Reasons:  Answer:  Already adequately anticoagulated on oral Anticoagulants    11/16/19 0430     Place sequential compression device  Until discontinued      11/16/19 0430            Seen and discussed with Dr. Hinds and the ICU team  Condition: Critical  Prognosis: Guarded       Critical care time spent on the evaluation and treatment of severe organ dysfunction, review of pertinent labs and imaging studies, discussions with consulting providers and discussions with patient/family: 47 minutes.      Tiffanie Jones MD  Department of Hospital Medicine   [facility]

## 2019-11-16 NOTE — ED PROVIDER NOTES
Encounter Date: 11/16/2019       History     Chief Complaint   Patient presents with    Shortness of Breath     hx of chf and recently dx with bronchitis and on cefdinir     HPI  Review of patient's allergies indicates:   Allergen Reactions    Food color red [red food color (bulk)] Nausea And Vomiting    Pcn [penicillins] Rash     Past Medical History:   Diagnosis Date    Anticoagulant long-term use     Asthma     CHF (congestive heart failure)     Gout     Hypertension     Stroke     Thyroid disease      History reviewed. No pertinent surgical history.  No family history on file.  Social History     Tobacco Use    Smoking status: Never Smoker   Substance Use Topics    Alcohol use: No    Drug use: Not on file     Review of Systems    Physical Exam     Initial Vitals [11/16/19 0050]   BP Pulse Resp Temp SpO2   (!) 180/78 (!) 136 (!) 28 98.7 °F (37.1 °C) 96 %      MAP       --         Physical Exam    ED Course   Procedures  Labs Reviewed   CBC W/ AUTO DIFFERENTIAL - Abnormal; Notable for the following components:       Result Value    WBC 13.14 (*)     RBC 3.56 (*)     Hemoglobin 11.6 (*)     Mean Corpuscular Volume 105 (*)     Mean Corpuscular Hemoglobin 32.6 (*)     Mean Corpuscular Hemoglobin Conc 31.2 (*)     RDW 14.6 (*)     Immature Granulocytes 0.6 (*)     Immature Grans (Abs) 0.08 (*)     Mono # 1.4 (*)     All other components within normal limits   COMPREHENSIVE METABOLIC PANEL - Abnormal; Notable for the following components:    Glucose 286 (*)     BUN, Bld 25 (*)     Albumin 3.3 (*)     AST 48 (*)     eGFR if  40 (*)     eGFR if non  35 (*)     All other components within normal limits   LACTIC ACID, PLASMA - Abnormal; Notable for the following components:    Lactate (Lactic Acid) 3.1 (*)     All other components within normal limits   TROPONIN I - Abnormal; Notable for the following components:    Troponin I 0.095 (*)     All other components within normal  limits   PROTIME-INR - Abnormal; Notable for the following components:    Prothrombin Time 13.3 (*)     All other components within normal limits   APTT - Abnormal; Notable for the following components:    aPTT 35.8 (*)     All other components within normal limits   URINALYSIS, REFLEX TO URINE CULTURE - Abnormal; Notable for the following components:    Specific Gravity, UA >=1.030 (*)     Protein, UA 2+ (*)     Occult Blood UA 2+ (*)     All other components within normal limits    Narrative:     Preferred Collection Type->Urine, Catheterized   URINALYSIS MICROSCOPIC - Abnormal; Notable for the following components:    RBC, UA 6 (*)     Hyaline Casts, UA 15 (*)     All other components within normal limits    Narrative:     Preferred Collection Type->Urine, Catheterized   ISTAT PROCEDURE - Abnormal; Notable for the following components:    POC PH 7.224 (*)     POC PCO2 66.0 (*)     POC PO2 241 (*)     All other components within normal limits   CULTURE, BLOOD   CULTURE, BLOOD   CULTURE, RESPIRATORY   MAGNESIUM   PROCALCITONIN   B-TYPE NATRIURETIC PEPTIDE   LACTIC ACID, PLASMA          Imaging Results          X-Ray Chest AP Portable (In process)                                                  Clinical Impression:   {Add your Clinical Impression here. If you haven't documented one yet, please pend the note, finalize a Clinical Impression, and refresh your note before signing.:[id number]}

## 2019-11-16 NOTE — HPI
84 year old female who  has a past medical history of Anticoagulant long-term use, Asthma, Debility, Gait apraxia, Gout, Hypertension, NPH (normal pressure hydrocephalus), Stroke, and Thyroid disease presenting with progressive dyspnea, productive cough, wheezing and chest congestion for 3 day PTA. ED evaluation revealed right lower lobe pneumonia.  Tachycardia on arrival resolves with LOPRESSOR IVP. Also received IV  FUROSEMIDE. Additional ED treatment included  125 mg IV Solu-Medrol, DuoNeb treatments x 3, 2.5 L IV fluid resuscitation, IV vanc and Zosyn. NIPPV - BIPAP initiated in the ED. Patient was admitted to ICU under Hospital Medicine services.

## 2019-11-17 PROBLEM — N17.9 AKI (ACUTE KIDNEY INJURY): Status: RESOLVED | Noted: 2019-11-16 | Resolved: 2019-11-17

## 2019-11-17 PROBLEM — E87.20 LACTIC ACIDOSIS: Status: RESOLVED | Noted: 2019-11-16 | Resolved: 2019-11-17

## 2019-11-17 PROBLEM — I10 ESSENTIAL HYPERTENSION: Chronic | Status: ACTIVE | Noted: 2019-11-17

## 2019-11-17 PROBLEM — E87.8 ELECTROLYTE IMBALANCE: Status: ACTIVE | Noted: 2019-11-17

## 2019-11-17 LAB
ALBUMIN SERPL BCP-MCNC: 2.9 G/DL (ref 3.5–5.2)
ALP SERPL-CCNC: 88 U/L (ref 55–135)
ALT SERPL W/O P-5'-P-CCNC: 14 U/L (ref 10–44)
ANION GAP SERPL CALC-SCNC: 12 MMOL/L (ref 8–16)
AST SERPL-CCNC: 37 U/L (ref 10–40)
BASOPHILS # BLD AUTO: 0.01 K/UL (ref 0–0.2)
BASOPHILS NFR BLD: 0.1 % (ref 0–1.9)
BILIRUB SERPL-MCNC: 0.6 MG/DL (ref 0.1–1)
BUN SERPL-MCNC: 28 MG/DL (ref 8–23)
CALCIUM SERPL-MCNC: 8.4 MG/DL (ref 8.7–10.5)
CHLORIDE SERPL-SCNC: 109 MMOL/L (ref 95–110)
CO2 SERPL-SCNC: 20 MMOL/L (ref 23–29)
CREAT SERPL-MCNC: 1.4 MG/DL (ref 0.5–1.4)
DIFFERENTIAL METHOD: ABNORMAL
EOSINOPHIL # BLD AUTO: 0 K/UL (ref 0–0.5)
EOSINOPHIL NFR BLD: 0 % (ref 0–8)
ERYTHROCYTE [DISTWIDTH] IN BLOOD BY AUTOMATED COUNT: 14.6 % (ref 11.5–14.5)
EST. GFR  (AFRICAN AMERICAN): 40 ML/MIN/1.73 M^2
EST. GFR  (NON AFRICAN AMERICAN): 35 ML/MIN/1.73 M^2
GLUCOSE SERPL-MCNC: 184 MG/DL (ref 70–110)
HCT VFR BLD AUTO: 30.7 % (ref 37–48.5)
HGB BLD-MCNC: 9.5 G/DL (ref 12–16)
IMM GRANULOCYTES # BLD AUTO: 0.04 K/UL (ref 0–0.04)
IMM GRANULOCYTES NFR BLD AUTO: 0.6 % (ref 0–0.5)
LYMPHOCYTES # BLD AUTO: 0.7 K/UL (ref 1–4.8)
LYMPHOCYTES NFR BLD: 10.4 % (ref 18–48)
MAGNESIUM SERPL-MCNC: 2 MG/DL (ref 1.6–2.6)
MCH RBC QN AUTO: 32.1 PG (ref 27–31)
MCHC RBC AUTO-ENTMCNC: 30.9 G/DL (ref 32–36)
MCV RBC AUTO: 104 FL (ref 82–98)
MONOCYTES # BLD AUTO: 0.4 K/UL (ref 0.3–1)
MONOCYTES NFR BLD: 6.2 % (ref 4–15)
NEUTROPHILS # BLD AUTO: 5.9 K/UL (ref 1.8–7.7)
NEUTROPHILS NFR BLD: 82.7 % (ref 38–73)
NRBC BLD-RTO: 0 /100 WBC
PLATELET # BLD AUTO: 214 K/UL (ref 150–350)
PMV BLD AUTO: 10 FL (ref 9.2–12.9)
POCT GLUCOSE: 139 MG/DL (ref 70–110)
POCT GLUCOSE: 140 MG/DL (ref 70–110)
POCT GLUCOSE: 144 MG/DL (ref 70–110)
POCT GLUCOSE: 153 MG/DL (ref 70–110)
POCT GLUCOSE: 169 MG/DL (ref 70–110)
POCT GLUCOSE: 183 MG/DL (ref 70–110)
POTASSIUM SERPL-SCNC: 3.3 MMOL/L (ref 3.5–5.1)
PROT SERPL-MCNC: 6.5 G/DL (ref 6–8.4)
RBC # BLD AUTO: 2.96 M/UL (ref 4–5.4)
SODIUM SERPL-SCNC: 141 MMOL/L (ref 136–145)
TROPONIN I SERPL DL<=0.01 NG/ML-MCNC: 0.37 NG/ML (ref 0–0.03)
WBC # BLD AUTO: 7.13 K/UL (ref 3.9–12.7)

## 2019-11-17 PROCEDURE — 99900035 HC TECH TIME PER 15 MIN (STAT)

## 2019-11-17 PROCEDURE — 99223 1ST HOSP IP/OBS HIGH 75: CPT | Mod: ,,, | Performed by: INTERNAL MEDICINE

## 2019-11-17 PROCEDURE — 63600175 PHARM REV CODE 636 W HCPCS: Performed by: NURSE PRACTITIONER

## 2019-11-17 PROCEDURE — 93306 2D ECHO WITH COLOR FLOW DOPPLER: ICD-10-PCS | Mod: 26,,, | Performed by: INTERNAL MEDICINE

## 2019-11-17 PROCEDURE — 25000003 PHARM REV CODE 250: Performed by: NURSE PRACTITIONER

## 2019-11-17 PROCEDURE — 80053 COMPREHEN METABOLIC PANEL: CPT

## 2019-11-17 PROCEDURE — 99900038 HC OT GENERIC THERAPY SCREENING (STAT)

## 2019-11-17 PROCEDURE — 93010 ELECTROCARDIOGRAM REPORT: CPT | Mod: ,,, | Performed by: INTERNAL MEDICINE

## 2019-11-17 PROCEDURE — 25000242 PHARM REV CODE 250 ALT 637 W/ HCPCS: Performed by: NURSE PRACTITIONER

## 2019-11-17 PROCEDURE — 94660 CPAP INITIATION&MGMT: CPT

## 2019-11-17 PROCEDURE — 20000000 HC ICU ROOM

## 2019-11-17 PROCEDURE — 83735 ASSAY OF MAGNESIUM: CPT

## 2019-11-17 PROCEDURE — 63600175 PHARM REV CODE 636 W HCPCS: Performed by: EMERGENCY MEDICINE

## 2019-11-17 PROCEDURE — 36415 COLL VENOUS BLD VENIPUNCTURE: CPT

## 2019-11-17 PROCEDURE — 25000003 PHARM REV CODE 250: Performed by: STUDENT IN AN ORGANIZED HEALTH CARE EDUCATION/TRAINING PROGRAM

## 2019-11-17 PROCEDURE — C8929 TTE W OR WO FOL WCON,DOPPLER: HCPCS

## 2019-11-17 PROCEDURE — 99223 PR INITIAL HOSPITAL CARE,LEVL III: ICD-10-PCS | Mod: ,,, | Performed by: INTERNAL MEDICINE

## 2019-11-17 PROCEDURE — 85025 COMPLETE CBC W/AUTO DIFF WBC: CPT

## 2019-11-17 PROCEDURE — 93005 ELECTROCARDIOGRAM TRACING: CPT

## 2019-11-17 PROCEDURE — 63600175 PHARM REV CODE 636 W HCPCS: Performed by: STUDENT IN AN ORGANIZED HEALTH CARE EDUCATION/TRAINING PROGRAM

## 2019-11-17 PROCEDURE — 84484 ASSAY OF TROPONIN QUANT: CPT

## 2019-11-17 PROCEDURE — 93306 TTE W/DOPPLER COMPLETE: CPT | Mod: 26,,, | Performed by: INTERNAL MEDICINE

## 2019-11-17 PROCEDURE — 99900037 HC PT THERAPY SCREENING (STAT)

## 2019-11-17 PROCEDURE — 94640 AIRWAY INHALATION TREATMENT: CPT

## 2019-11-17 PROCEDURE — 99233 SBSQ HOSP IP/OBS HIGH 50: CPT | Mod: ,,, | Performed by: INTERNAL MEDICINE

## 2019-11-17 PROCEDURE — 99233 PR SUBSEQUENT HOSPITAL CARE,LEVL III: ICD-10-PCS | Mod: ,,, | Performed by: INTERNAL MEDICINE

## 2019-11-17 PROCEDURE — 93010 EKG 12-LEAD: ICD-10-PCS | Mod: ,,, | Performed by: INTERNAL MEDICINE

## 2019-11-17 RX ORDER — LOSARTAN POTASSIUM 25 MG/1
25 TABLET ORAL DAILY
Status: DISCONTINUED | OUTPATIENT
Start: 2019-11-17 | End: 2019-11-18

## 2019-11-17 RX ORDER — FUROSEMIDE 10 MG/ML
40 INJECTION INTRAMUSCULAR; INTRAVENOUS ONCE
Status: COMPLETED | OUTPATIENT
Start: 2019-11-17 | End: 2019-11-17

## 2019-11-17 RX ORDER — DILTIAZEM HYDROCHLORIDE 5 MG/ML
10 INJECTION INTRAVENOUS ONCE
Status: COMPLETED | OUTPATIENT
Start: 2019-11-17 | End: 2019-11-17

## 2019-11-17 RX ORDER — POTASSIUM CHLORIDE 20 MEQ/1
20 TABLET, EXTENDED RELEASE ORAL EVERY 4 HOURS
Status: COMPLETED | OUTPATIENT
Start: 2019-11-17 | End: 2019-11-17

## 2019-11-17 RX ORDER — DIGOXIN 0.25 MG/ML
250 INJECTION INTRAMUSCULAR; INTRAVENOUS ONCE
Status: COMPLETED | OUTPATIENT
Start: 2019-11-17 | End: 2019-11-17

## 2019-11-17 RX ORDER — METOPROLOL TARTRATE 1 MG/ML
5 INJECTION, SOLUTION INTRAVENOUS EVERY 5 MIN PRN
Status: COMPLETED | OUTPATIENT
Start: 2019-11-17 | End: 2019-11-17

## 2019-11-17 RX ORDER — DILTIAZEM HYDROCHLORIDE 5 MG/ML
INJECTION INTRAVENOUS
Status: DISPENSED
Start: 2019-11-17 | End: 2019-11-18

## 2019-11-17 RX ORDER — POTASSIUM CHLORIDE 20 MEQ/1
20 TABLET, EXTENDED RELEASE ORAL ONCE
Status: COMPLETED | OUTPATIENT
Start: 2019-11-17 | End: 2019-11-17

## 2019-11-17 RX ADMIN — IPRATROPIUM BROMIDE AND ALBUTEROL SULFATE 3 ML: .5; 3 SOLUTION RESPIRATORY (INHALATION) at 12:11

## 2019-11-17 RX ADMIN — INSULIN ASPART 2 UNITS: 100 INJECTION, SOLUTION INTRAVENOUS; SUBCUTANEOUS at 07:11

## 2019-11-17 RX ADMIN — PIPERACILLIN AND TAZOBACTAM 4.5 G: 4; .5 INJECTION, POWDER, FOR SOLUTION INTRAVENOUS at 10:11

## 2019-11-17 RX ADMIN — METHYLPREDNISOLONE SODIUM SUCCINATE 60 MG: 125 INJECTION, POWDER, FOR SOLUTION INTRAMUSCULAR; INTRAVENOUS at 05:11

## 2019-11-17 RX ADMIN — PIPERACILLIN AND TAZOBACTAM 4.5 G: 4; .5 INJECTION, POWDER, FOR SOLUTION INTRAVENOUS at 01:11

## 2019-11-17 RX ADMIN — INSULIN ASPART 2 UNITS: 100 INJECTION, SOLUTION INTRAVENOUS; SUBCUTANEOUS at 11:11

## 2019-11-17 RX ADMIN — DIGOXIN 250 MCG: 0.25 INJECTION INTRAMUSCULAR; INTRAVENOUS at 07:11

## 2019-11-17 RX ADMIN — LOSARTAN POTASSIUM 25 MG: 25 TABLET ORAL at 10:11

## 2019-11-17 RX ADMIN — DILTIAZEM HYDROCHLORIDE 90 MG: 60 TABLET, FILM COATED ORAL at 09:11

## 2019-11-17 RX ADMIN — IPRATROPIUM BROMIDE AND ALBUTEROL SULFATE 3 ML: .5; 3 SOLUTION RESPIRATORY (INHALATION) at 07:11

## 2019-11-17 RX ADMIN — ATORVASTATIN CALCIUM 20 MG: 10 TABLET, FILM COATED ORAL at 08:11

## 2019-11-17 RX ADMIN — POTASSIUM CHLORIDE 20 MEQ: 1500 TABLET, EXTENDED RELEASE ORAL at 09:11

## 2019-11-17 RX ADMIN — RIVAROXABAN 15 MG: 15 TABLET, FILM COATED ORAL at 03:11

## 2019-11-17 RX ADMIN — FUROSEMIDE 40 MG: 10 INJECTION, SOLUTION INTRAMUSCULAR; INTRAVENOUS at 11:11

## 2019-11-17 RX ADMIN — POTASSIUM CHLORIDE 20 MEQ: 1500 TABLET, EXTENDED RELEASE ORAL at 10:11

## 2019-11-17 RX ADMIN — METHYLPREDNISOLONE SODIUM SUCCINATE 40 MG: 40 INJECTION, POWDER, FOR SOLUTION INTRAMUSCULAR; INTRAVENOUS at 08:11

## 2019-11-17 RX ADMIN — DILTIAZEM HYDROCHLORIDE 60 MG: 60 TABLET, FILM COATED ORAL at 05:11

## 2019-11-17 RX ADMIN — LEVOTHYROXINE SODIUM 112 MCG: 112 TABLET ORAL at 05:11

## 2019-11-17 RX ADMIN — METOPROLOL TARTRATE 5 MG: 5 INJECTION INTRAVENOUS at 07:11

## 2019-11-17 RX ADMIN — VANCOMYCIN HYDROCHLORIDE 1250 MG: 100 INJECTION, POWDER, LYOPHILIZED, FOR SOLUTION INTRAVENOUS at 04:11

## 2019-11-17 RX ADMIN — PANTOPRAZOLE SODIUM 40 MG: 40 TABLET, DELAYED RELEASE ORAL at 08:11

## 2019-11-17 RX ADMIN — DILTIAZEM HYDROCHLORIDE 90 MG: 60 TABLET, FILM COATED ORAL at 01:11

## 2019-11-17 RX ADMIN — IPRATROPIUM BROMIDE AND ALBUTEROL SULFATE 3 ML: .5; 3 SOLUTION RESPIRATORY (INHALATION) at 08:11

## 2019-11-17 RX ADMIN — ESCITALOPRAM OXALATE 10 MG: 10 TABLET, FILM COATED ORAL at 09:11

## 2019-11-17 RX ADMIN — PIPERACILLIN AND TAZOBACTAM 4.5 G: 4; .5 INJECTION, POWDER, FOR SOLUTION INTRAVENOUS at 06:11

## 2019-11-17 RX ADMIN — FUROSEMIDE 40 MG: 10 INJECTION, SOLUTION INTRAMUSCULAR; INTRAVENOUS at 09:11

## 2019-11-17 RX ADMIN — POTASSIUM CHLORIDE 20 MEQ: 1500 TABLET, EXTENDED RELEASE ORAL at 08:11

## 2019-11-17 RX ADMIN — INSULIN ASPART 2 UNITS: 100 INJECTION, SOLUTION INTRAVENOUS; SUBCUTANEOUS at 05:11

## 2019-11-17 RX ADMIN — ASPIRIN 81 MG: 81 TABLET, COATED ORAL at 08:11

## 2019-11-17 RX ADMIN — METOPROLOL TARTRATE 5 MG: 5 INJECTION INTRAVENOUS at 02:11

## 2019-11-17 RX ADMIN — DILTIAZEM HYDROCHLORIDE 10 MG: 5 INJECTION INTRAVENOUS at 02:11

## 2019-11-17 NOTE — PROGRESS NOTES
[facility]   Pulmonology  Progress Note    Patient Name: Ana Castañeda  MRN: [id number]  Admission Date: 11/16/2019  Hospital Length of Stay: 1 days  Code Status: Full Code  Attending Provider: Tiffanie Jones MD  Primary Care Provider: Stephen Tarango MD   Principal Problem: Pneumonia of right lower lobe due to infectious organism    Subjective:     Review of Systems   Constitutional: Positive for malaise/fatigue. Negative for chills and fever.   HENT: Negative for congestion.    Eyes: Negative for blurred vision.   Respiratory: Positive for cough and wheezing. Negative for sputum production and shortness of breath.    Cardiovascular: Negative for chest pain and leg swelling.   Gastrointestinal: Negative for abdominal pain, nausea and vomiting.   Genitourinary: Negative for dysuria.   Musculoskeletal: Negative for myalgias.   Skin: Negative for rash.   Neurological: Negative for dizziness, weakness and headaches.   Endo/Heme/Allergies: Does not bruise/bleed easily.   Psychiatric/Behavioral: The patient is not nervous/anxious.          Objective:     Vital Signs (Most Recent):  Temp: 98.1 °F (36.7 °C) (11/17/19 0330)  Pulse: 72 (11/17/19 0735)  Resp: 20 (11/17/19 0735)  BP: (!) 149/81 (11/17/19 0630)  SpO2: 95 % (11/17/19 0735) Vital Signs (24h Range):  Temp:  [97.9 °F (36.6 °C)-99.9 °F (37.7 °C)] 98.1 °F (36.7 °C)  Pulse:  [] 72  Resp:  [18-27] 20  SpO2:  [95 %-98 %] 95 %  BP: ()/() 149/81     Weight: 94.8 kg (208 lb 15.9 oz)  Body mass index is 32.73 kg/m².      Intake/Output Summary (Last 24 hours) at 11/17/2019 1101  Last data filed at 11/17/2019 0530  Gross per 24 hour   Intake 2899.17 ml   Output 960 ml   Net 1939.17 ml       Physical Exam   Constitutional: She is oriented to person, place, and time. She appears well-developed and well-nourished. She is cooperative.  Non-toxic appearance. She does not have a sickly appearance. She appears ill. No distress. She is not intubated.  Nasal cannula in place.   HENT:   Head: Normocephalic and atraumatic.   Mouth/Throat: Oropharynx is clear and moist and mucous membranes are normal.   Eyes: Pupils are equal, round, and reactive to light. EOM and lids are normal.   Neck: Trachea normal and full passive range of motion without pain. Carotid bruit is not present.   Cardiovascular: Normal rate and normal heart sounds. An irregular rhythm present.   Pulses:       Radial pulses are 2+ on the right side, and 2+ on the left side.        Dorsalis pedis pulses are 1+ on the right side, and 1+ on the left side.   Pulmonary/Chest: Effort normal. No accessory muscle usage. No tachypnea. She is not intubated. No respiratory distress. She has decreased breath sounds in the right lower field and the left lower field. She has wheezes.   Abdominal: Soft. She exhibits no distension. Bowel sounds are decreased. There is no tenderness.   Genitourinary:   Genitourinary Comments: Ratliff in place   Musculoskeletal: Normal range of motion.        Right foot: There is no deformity.        Left foot: There is no deformity.   No edema   Lymphadenopathy:     She has no cervical adenopathy.   Neurological: She is alert and oriented to person, place, and time.   Skin: Skin is warm, dry and intact. Capillary refill takes less than 2 seconds. No rash noted. No cyanosis.   Psychiatric: She has a normal mood and affect. Her speech is normal and behavior is normal. Judgment and thought content normal. Cognition and memory are normal.       Vents:  Oxygen Concentration (%): 28 (11/16/19 0744)    Lines/Drains/Airways     Drain                 Urethral Catheter 11/16/19 0130 Straight-tip 16 Fr. 1 day          Peripheral Intravenous Line                 Peripheral IV - Single Lumen 11/16/19 0058 20 G Right Wrist 1 day         Peripheral IV - Single Lumen 11/16/19 20 G Left Forearm 1 day                Significant Labs:    CBC/Anemia Profile:  Recent Labs   Lab 11/16/19  0103  11/16/19  0739 11/17/19  0340   WBC 13.14* 9.47 7.13   HGB 11.6* 10.6* 9.5*   HCT 37.2 34.7* 30.7*    256 214   * 105* 104*   RDW 14.6* 14.7* 14.6*        Chemistries:  Recent Labs   Lab 11/16/19  0103 11/16/19  0739 11/17/19  0340 11/17/19  0810    137 141  --    K 4.6 4.2 3.3*  --     104 109  --    CO2 23 22* 20*  --    BUN 25* 28* 28*  --    CREATININE 1.4 1.4 1.4  --    CALCIUM 8.7 8.3* 8.4*  --    ALBUMIN 3.3* 3.1* 2.9*  --    PROT 7.4 6.8 6.5  --    BILITOT 0.6 0.7 0.6  --    ALKPHOS 122 104 88  --    ALT 15 19 14  --    AST 48* 47* 37  --    MG 2.1 1.8  --  2.0   PHOS  --  3.5  --   --        POCT Glucose:   Recent Labs   Lab 11/16/19  2032 11/17/19  0158 11/17/19  0514   POCTGLUCOSE 220* 140* 183*     All pertinent labs within the past 24 hours have been reviewed.        ABG  Recent Labs   Lab 11/16/19  0826   PH 7.416   PO2 80   PCO2 34.4*   HCO3 22.1*   BE -2     Assessment/Plan:     * Pneumonia of right lower lobe due to infectious organism  Repeat CXR in AM  Cont Zosyn and Vanc  If blood cultures remain no growth tomorrow recommend stopping Vanc  Cont Duonebs and encourage OOB    Acute respiratory failure with hypoxia  Supplement oxygenation. Keep SAO2 >= 92%. BIPAP weaned off.  Bronchodilators per orders    Moderate persistent asthma with exacerbation  Continue with LABA, SEAN and ICS.        Continue IV glucocorticoids        Continue nighttime NIPPV - BiPAP PRN                                               Paroxysmal A-fib with RVR  Cards following.  On Xarelto and rate controlled with Cardizem   Cont cardiac monitoring      Essential hypertension  Starting Losartan  IV Lasix X 1    Electrolyte imbalance  Replace K+  BMP in AM    NSTEMI  Likely demand ischemia. Cardiology following  Stress test once more stable       Continue aspirin and statin.         Beta-blocker on hold due to asthma and wheezing    TARA (acute kidney injury)  Improved w/ Volume resuscitation.  Optimize  hemodynamics.  Monitor BUN / Cr. Montor urine output.  Avoid Nephrotoxic Medications.            Pablito Rodriguez NP  Pulmonology  [facility]

## 2019-11-17 NOTE — ASSESSMENT & PLAN NOTE
-Admitted due to shortness of breath, AFIB/RVR  -Troponin 0.95>1.983>.992  -Repeat Troponin 0.371  -Likely due to demand ischemia from AFIB/RVR and sepsis  -ECHO pending  -Increase CCB to 90 mg TID for better rate control today  -NO BB given wheezing on exam today  -Continue ASA, Statin, CCB, Xarelto  -Start Losartan for better BP control.  -Would benefit from OP MPI stress test once pulmonary status optimized after discharge.   -Reassess in AM

## 2019-11-17 NOTE — ASSESSMENT & PLAN NOTE
- Initial creatinine of 1.4, likely secondary to above.  - Avoid nephrotoxic agents.  - Follow labs.    Improving with IVF    D/C IVF, Given lasix.

## 2019-11-17 NOTE — ASSESSMENT & PLAN NOTE
Repeat CXR in AM  Cont Zosyn and Vanc  If blood cultures remain no growth tomorrow recommend stopping Vanc  Cont Duonebs and encourage OOB

## 2019-11-17 NOTE — PROGRESS NOTES
Pt continues to have high HR, in the 150s even after metoprolol.  LAN Rodriguez NP notified as well as Dr. Jones.

## 2019-11-17 NOTE — ASSESSMENT & PLAN NOTE
- Secondary to asthma exacerbation + PNA.    - O2 sat stable on 50% FiO2 BiPAP, wean as tolerated.  Repeat ABG in AM.  - DuoNebs Q 6.  - IV Solu-Medrol 80 mg Q8.  - Empiric antibiotics.  - Will obtain V/Q scan to r/o PE (unable to perform CTA due to TARA).  - Pulmonology consult.    - Improving, came off BIPAP- Oxygenation improving  Improving

## 2019-11-17 NOTE — PLAN OF CARE
Pt converted to NSR at around 1400 after PO cardizem given.  Pt hemodynamically stable.  Pt has been on NC all day and denies shortness of breath.  Pt denied pain all shift.  Pt started on a diet and tolerated well.  Barrier cream applied to perianal redness.  Pt and family updated on POC.  Pt has telemetry orders.

## 2019-11-17 NOTE — PT/OT/SLP PROGRESS
Occupational Therapy      Patient Name:  Ana Castañeda   MRN:  [id number]    Eval initiated via chart review. Will complete eval on later date  Carla Jordan OT  11/17/2019   [id number]

## 2019-11-17 NOTE — ASSESSMENT & PLAN NOTE
Improved w/ Volume resuscitation.  Optimize hemodynamics.  Monitor BUN / Cr. Montor urine output.  Avoid Nephrotoxic Medications.

## 2019-11-17 NOTE — ASSESSMENT & PLAN NOTE
Likely demand ischemia. Cardiology following  Stress test once more stable       Continue aspirin and statin.         Beta-blocker on hold due to asthma and wheezing

## 2019-11-17 NOTE — ASSESSMENT & PLAN NOTE
-No known history of AFIB/AFL but on Xarelto as OP  -Received IV Lopressor x 2 dose in ED and IV CCB with conversion to SR yesterday  -Increase Cardizem to 90 mg TID for better rate control  -NO BB for now given wheezing on exam today  -ECHO pending

## 2019-11-17 NOTE — CONSULTS
[facility] -   Cardiology  Consult Note    Patient Name: Ana Castañeda  MRN: [id number]  Admission Date: 11/16/2019  Hospital Length of Stay: 1 days  Code Status: Full Code   Attending Provider: Tiffanie Jones MD   Consulting Provider: SAGRARIO Calderón  Primary Care Physician: Stephen Tarango MD  Principal Problem:Pneumonia of right lower lobe due to infectious organism    Patient information was obtained from patient, spouse/SO, past medical records and ER records.     Inpatient consult to Cardiology  Consult performed by: SAGRARIO Lundberg  Consult ordered by: Pablito Rodriguez NP        Subjective:     Chief Complaint:  Shortness of breath     HPI:   Ana Castañeda is a 84 year old female who presented to [facility] due to shortness of breath which progressively worsened prior to arrival. She reported associated cough with mucus, wheezing and congestion for 3 days. Per H&P, patient was seen at [facility] on 11/13 and diagnosed with acute bronchitis and received IM Steroid shot and PO ABX. Her chronic medical conditions include Asthma, HTN, H/O CVA with right sided weakness, H/O CEA, Normal pressure hydrocephalus, hypothyroidism, gout, debility (wheelchair bound), and chronic AC on Xarelto (unknown reason). ED workup revealed Cr 1.4, BUN 25, Troponin 0.095>1.983>.992,, LA 3.1, Procal 0.15. She was placed on Bipap in ED for shortness of breath and increased work of breathing. CXR revealed RLL infiltrate. She received IV lopressor 5 mg x 2 dose in ED and IV lasix 40 mg x 1 dose with some improvement in HR control to 80s. She received IV Solumedrol, Duonebs, and IVF resuscitation in ED along with IV ABX. She was admitted to ICU under the care of hospital medicine. Cardiology consulted to assist with management of AFIB/RVR, NSTEMI. Chart reviewed, Patient seen and examined in ICU. Repeat troponin, EKG, and ECHO pending. She is followed by Dr. Christiansen as OP Cardiology but has not seen him in  quite some time. She does report a vague history of AFIB and was started on Xarelto at that time in 2017. Denies chest pain or anginal equivalents. She continues to have some shortness of breath and wheezing on exam today. She is wheelchair bound at home but can walk very short distances. NO leg swelling on exam today. Will optimize her medical therapy today. ECHO pending. Would benefit from OP MPI stress test once pulmonary status optimized after discharge. Further recs in AM    Past Medical History:   Diagnosis Date    Anticoagulant long-term use     Asthma     Debility     Gait apraxia     Gout     Hypertension     NPH (normal pressure hydrocephalus)     Stroke     Thyroid disease        Past Surgical History:   Procedure Laterality Date    CAROTID ENDARTERECTOMY         Review of patient's allergies indicates:   Allergen Reactions    Food color red [red food color (bulk)] Nausea And Vomiting    Pcn [penicillins] Rash       No current facility-administered medications on file prior to encounter.      Current Outpatient Medications on File Prior to Encounter   Medication Sig    allopurinol (ZYLOPRIM) 300 MG tablet Take 300 mg by mouth once daily.    aspirin (ECOTRIN) 81 MG EC tablet Take 81 mg by mouth once daily.    atorvastatin (LIPITOR) 20 MG tablet Take 20 mg by mouth once daily.    calcium carbonate (CALCIUM 600 ORAL) Take 1,200 mg by mouth once daily.    cefdinir (OMNICEF) 300 MG capsule 2 capsules.    escitalopram oxalate (LEXAPRO) 10 MG tablet Take 10 mg by mouth once daily.    gabapentin (NEURONTIN) 100 MG capsule Take 100 mg by mouth 3 (three) times daily.    guaifenesin-codeine 100-10 mg/5 ml (TUSSI-ORGANIDIN NR)  mg/5 mL syrup 10 mLs.    levothyroxine (SYNTHROID) 100 MCG tablet Take 112 mcg by mouth once daily.     metoprolol tartrate (LOPRESSOR) 25 MG tablet Take 12.5 mg by mouth 2 (two) times daily.    multivit,calc,mins/iron/folic (ONE DAILY WOMEN'S HEALTH ORAL) Take by  mouth.    pantoprazole (PROTONIX) 40 MG tablet Take 40 mg by mouth once daily.    rivaroxaban (XARELTO) 20 mg Tab Take 20 mg by mouth daily with dinner or evening meal.     Family History     None        Tobacco Use    Smoking status: Former Smoker    Smokeless tobacco: Never Used   Substance and Sexual Activity    Alcohol use: No    Drug use: Never    Sexual activity: Not on file     Review of Systems   Constitution: Positive for malaise/fatigue.   HENT: Negative for hearing loss and hoarse voice.    Eyes: Negative for blurred vision and visual disturbance.   Cardiovascular: Positive for dyspnea on exertion. Negative for chest pain, claudication, irregular heartbeat, leg swelling, near-syncope, orthopnea, palpitations, paroxysmal nocturnal dyspnea and syncope.   Respiratory: Positive for cough and shortness of breath. Negative for hemoptysis, sleep disturbances due to breathing, snoring and wheezing.    Endocrine: Negative for cold intolerance and heat intolerance.   Hematologic/Lymphatic: Bruises/bleeds easily (on xarelto).   Skin: Negative for color change, dry skin and nail changes.   Musculoskeletal: Positive for arthritis, back pain and joint pain. Negative for myalgias.        +wheelchair bound   Gastrointestinal: Negative for bloating, abdominal pain, constipation, nausea and vomiting.   Genitourinary: Negative for dysuria, flank pain, hematuria and hesitancy.   Neurological: Positive for weakness. Negative for headaches, light-headedness, loss of balance, numbness and paresthesias.   Psychiatric/Behavioral: Negative for altered mental status.   Allergic/Immunologic: Negative for environmental allergies.     Objective:     Vital Signs (Most Recent):  Temp: 98.1 °F (36.7 °C) (11/17/19 0330)  Pulse: 72 (11/17/19 0735)  Resp: 20 (11/17/19 0735)  BP: (!) 149/81 (11/17/19 0630)  SpO2: 95 % (11/17/19 0735) Vital Signs (24h Range):  Temp:  [97.9 °F (36.6 °C)-99.9 °F (37.7 °C)] 98.1 °F (36.7 °C)  Pulse:   [] 72  Resp:  [18-27] 20  SpO2:  [95 %-98 %] 95 %  BP: ()/() 149/81     Weight: 94.8 kg (208 lb 15.9 oz)  Body mass index is 32.73 kg/m².    SpO2: 95 %  O2 Device (Oxygen Therapy): (S) room air      Intake/Output Summary (Last 24 hours) at 11/17/2019 0957  Last data filed at 11/17/2019 0530  Gross per 24 hour   Intake 2999.17 ml   Output 1120 ml   Net 1879.17 ml       Lines/Drains/Airways     Drain                 Urethral Catheter 11/16/19 0130 Straight-tip 16 Fr. 1 day          Peripheral Intravenous Line                 Peripheral IV - Single Lumen 11/16/19 0058 20 G Right Wrist 1 day         Peripheral IV - Single Lumen 11/16/19 20 G Left Forearm 1 day                Physical Exam   Constitutional: She is oriented to person, place, and time. She appears well-developed and well-nourished. No distress.   HENT:   Head: Normocephalic and atraumatic.   Eyes: Pupils are equal, round, and reactive to light.   Neck: Normal range of motion and full passive range of motion without pain. Neck supple. No JVD present.   Cardiovascular: Normal rate, regular rhythm, S1 normal, S2 normal and intact distal pulses.  Occasional extrasystoles are present. PMI is not displaced. Exam reveals no distant heart sounds.   No murmur heard.  Pulses:       Radial pulses are 2+ on the right side, and 2+ on the left side.        Dorsalis pedis pulses are 2+ on the right side, and 2+ on the left side.   Remains in SR today   Pulmonary/Chest: Effort normal. No accessory muscle usage. No respiratory distress. She has decreased breath sounds in the left lower field. She has wheezes in the right upper field, the right middle field, the right lower field, the left upper field, the left middle field and the left lower field. She has no rales.   +SOB   Abdominal: Soft. Bowel sounds are normal. She exhibits no distension. There is no tenderness.   +obese abdomen   Musculoskeletal: Normal range of motion. She exhibits no edema.         Right ankle: She exhibits no swelling.        Left ankle: She exhibits no swelling.   Neurological: She is alert and oriented to person, place, and time.   Skin: Skin is warm and dry. She is not diaphoretic. No cyanosis. Nails show no clubbing.   Psychiatric: She has a normal mood and affect. Her speech is normal and behavior is normal. Judgment and thought content normal. Cognition and memory are normal.   Nursing note and vitals reviewed.      Significant Labs:   ABG:   Recent Labs   Lab 11/16/19  0111 11/16/19  0826   PH 7.224* 7.416   PCO2 66.0* 34.4*   HCO3 27.2 22.1*   POCSATURATED 100 96   BE 0 -2   , BMP:   Recent Labs   Lab 11/16/19  0103 11/16/19  0739 11/17/19  0340   * 124* 184*    137 141   K 4.6 4.2 3.3*    104 109   CO2 23 22* 20*   BUN 25* 28* 28*   CREATININE 1.4 1.4 1.4   CALCIUM 8.7 8.3* 8.4*   MG 2.1 1.8  --    , CBC   Recent Labs   Lab 11/16/19  0103 11/16/19  0739 11/17/19  0340   WBC 13.14* 9.47 7.13   HGB 11.6* 10.6* 9.5*   HCT 37.2 34.7* 30.7*    256 214   , Lipid Panel No results for input(s): CHOL, HDL, LDLCALC, TRIG, CHOLHDL in the last 48 hours., Troponin   Recent Labs   Lab 11/16/19  0739 11/16/19  1259 11/17/19  0810   TROPONINI 1.983* 0.992* 0.371*    and All pertinent lab results from the last 24 hours have been reviewed.    Significant Imaging: Echocardiogram: 2D echo with color flow doppler: No results found for this or any previous visit. and X-Ray: CXR: X-Ray Chest 1 View (CXR): No results found for this visit on 11/16/19. and X-Ray Chest PA and Lateral (CXR): No results found for this visit on 11/16/19.    Assessment and Plan:     * Septic Shock secondary to RLL Pneumonia due to infectious organism  -On ABX, per primary team    Paroxysmal A-fib with RVR  -No known history of AFIB/AFL but on Xarelto as OP  -Received IV Lopressor x 2 dose in ED and IV CCB with conversion to SR yesterday  -Increase Cardizem to 90 mg TID for better rate control  -NO BB for  now given wheezing on exam today  -ECHO pending      Lactic acidosis  -mgmt per primary team    TARA (acute kidney injury)  -Cr 1.7 today  -Monitor closely with daily BMP    NSTEMI  -Admitted due to shortness of breath, AFIB/RVR  -Troponin 0.95>1.983>.992  -Repeat Troponin 0.371  -Likely due to demand ischemia from AFIB/RVR and sepsis  -ECHO pending  -Increase CCB to 90 mg TID for better rate control today  -NO BB given wheezing on exam today  -Continue ASA, Statin, CCB, Xarelto  -Start Losartan for better BP control.  -Would benefit from OP MPI stress test once pulmonary status optimized after discharge.   -Reassess in AM    Moderate persistent asthma with exacerbation  -mgmt per primary team    Acute respiratory failure with hypoxia and hypercapnia on Bipap  -continue mgmt per primary team        VTE Risk Mitigation (From admission, onward)         Ordered     rivaroxaban tablet 20 mg  With dinner      11/16/19 0436     IP VTE HIGH RISK PATIENT  Once      11/16/19 0430     Reason for No Pharmacological VTE Prophylaxis  Once     Question:  Reasons:  Answer:  Already adequately anticoagulated on oral Anticoagulants    11/16/19 0430     Place sequential compression device  Until discontinued      11/16/19 0430                Thank you for your consult. I will follow-up with patient. Please contact us if you have any additional questions.    Shellie Green, MARIAJOSE-NAVJOT  Cardiology   [facility]

## 2019-11-17 NOTE — SUBJECTIVE & OBJECTIVE
Past Medical History:   Diagnosis Date    Anticoagulant long-term use     Asthma     Debility     Gait apraxia     Gout     Hypertension     NPH (normal pressure hydrocephalus)     Stroke     Thyroid disease        Past Surgical History:   Procedure Laterality Date    CAROTID ENDARTERECTOMY         Review of patient's allergies indicates:   Allergen Reactions    Food color red [red food color (bulk)] Nausea And Vomiting    Pcn [penicillins] Rash       No current facility-administered medications on file prior to encounter.      Current Outpatient Medications on File Prior to Encounter   Medication Sig    allopurinol (ZYLOPRIM) 300 MG tablet Take 300 mg by mouth once daily.    aspirin (ECOTRIN) 81 MG EC tablet Take 81 mg by mouth once daily.    atorvastatin (LIPITOR) 20 MG tablet Take 20 mg by mouth once daily.    calcium carbonate (CALCIUM 600 ORAL) Take 1,200 mg by mouth once daily.    cefdinir (OMNICEF) 300 MG capsule 2 capsules.    escitalopram oxalate (LEXAPRO) 10 MG tablet Take 10 mg by mouth once daily.    gabapentin (NEURONTIN) 100 MG capsule Take 100 mg by mouth 3 (three) times daily.    guaifenesin-codeine 100-10 mg/5 ml (TUSSI-ORGANIDIN NR)  mg/5 mL syrup 10 mLs.    levothyroxine (SYNTHROID) 100 MCG tablet Take 112 mcg by mouth once daily.     metoprolol tartrate (LOPRESSOR) 25 MG tablet Take 12.5 mg by mouth 2 (two) times daily.    multivit,calc,mins/iron/folic (ONE DAILY WOMEN'S HEALTH ORAL) Take by mouth.    pantoprazole (PROTONIX) 40 MG tablet Take 40 mg by mouth once daily.    rivaroxaban (XARELTO) 20 mg Tab Take 20 mg by mouth daily with dinner or evening meal.     Family History     None        Tobacco Use    Smoking status: Former Smoker    Smokeless tobacco: Never Used   Substance and Sexual Activity    Alcohol use: No    Drug use: Never    Sexual activity: Not on file     Review of Systems   Constitution: Positive for malaise/fatigue.   HENT: Negative for  hearing loss and hoarse voice.    Eyes: Negative for blurred vision and visual disturbance.   Cardiovascular: Positive for dyspnea on exertion. Negative for chest pain, claudication, irregular heartbeat, leg swelling, near-syncope, orthopnea, palpitations, paroxysmal nocturnal dyspnea and syncope.   Respiratory: Positive for cough and shortness of breath. Negative for hemoptysis, sleep disturbances due to breathing, snoring and wheezing.    Endocrine: Negative for cold intolerance and heat intolerance.   Hematologic/Lymphatic: Bruises/bleeds easily (on xarelto).   Skin: Negative for color change, dry skin and nail changes.   Musculoskeletal: Positive for arthritis, back pain and joint pain. Negative for myalgias.        +wheelchair bound   Gastrointestinal: Negative for bloating, abdominal pain, constipation, nausea and vomiting.   Genitourinary: Negative for dysuria, flank pain, hematuria and hesitancy.   Neurological: Positive for weakness. Negative for headaches, light-headedness, loss of balance, numbness and paresthesias.   Psychiatric/Behavioral: Negative for altered mental status.   Allergic/Immunologic: Negative for environmental allergies.     Objective:     Vital Signs (Most Recent):  Temp: 98.1 °F (36.7 °C) (11/17/19 0330)  Pulse: 72 (11/17/19 0735)  Resp: 20 (11/17/19 0735)  BP: (!) 149/81 (11/17/19 0630)  SpO2: 95 % (11/17/19 0735) Vital Signs (24h Range):  Temp:  [97.9 °F (36.6 °C)-99.9 °F (37.7 °C)] 98.1 °F (36.7 °C)  Pulse:  [] 72  Resp:  [18-27] 20  SpO2:  [95 %-98 %] 95 %  BP: ()/() 149/81     Weight: 94.8 kg (208 lb 15.9 oz)  Body mass index is 32.73 kg/m².    SpO2: 95 %  O2 Device (Oxygen Therapy): (S) room air      Intake/Output Summary (Last 24 hours) at 11/17/2019 0957  Last data filed at 11/17/2019 0530  Gross per 24 hour   Intake 2999.17 ml   Output 1120 ml   Net 1879.17 ml       Lines/Drains/Airways     Drain                 Urethral Catheter 11/16/19 0130 Straight-tip 16  Fr. 1 day          Peripheral Intravenous Line                 Peripheral IV - Single Lumen 11/16/19 0058 20 G Right Wrist 1 day         Peripheral IV - Single Lumen 11/16/19 20 G Left Forearm 1 day                Physical Exam   Constitutional: She is oriented to person, place, and time. She appears well-developed and well-nourished. No distress.   HENT:   Head: Normocephalic and atraumatic.   Eyes: Pupils are equal, round, and reactive to light.   Neck: Normal range of motion and full passive range of motion without pain. Neck supple. No JVD present.   Cardiovascular: Normal rate, regular rhythm, S1 normal, S2 normal and intact distal pulses.  Occasional extrasystoles are present. PMI is not displaced. Exam reveals no distant heart sounds.   No murmur heard.  Pulses:       Radial pulses are 2+ on the right side, and 2+ on the left side.        Dorsalis pedis pulses are 2+ on the right side, and 2+ on the left side.   Remains in SR today   Pulmonary/Chest: Effort normal. No accessory muscle usage. No respiratory distress. She has decreased breath sounds in the left lower field. She has wheezes in the right upper field, the right middle field, the right lower field, the left upper field, the left middle field and the left lower field. She has no rales.   +SOB   Abdominal: Soft. Bowel sounds are normal. She exhibits no distension. There is no tenderness.   +obese abdomen   Musculoskeletal: Normal range of motion. She exhibits no edema.        Right ankle: She exhibits no swelling.        Left ankle: She exhibits no swelling.   Neurological: She is alert and oriented to person, place, and time.   Skin: Skin is warm and dry. She is not diaphoretic. No cyanosis. Nails show no clubbing.   Psychiatric: She has a normal mood and affect. Her speech is normal and behavior is normal. Judgment and thought content normal. Cognition and memory are normal.   Nursing note and vitals reviewed.      Significant Labs:   ABG:   Recent  Labs   Lab 11/16/19  0111 11/16/19  0826   PH 7.224* 7.416   PCO2 66.0* 34.4*   HCO3 27.2 22.1*   POCSATURATED 100 96   BE 0 -2   , BMP:   Recent Labs   Lab 11/16/19  0103 11/16/19  0739 11/17/19  0340   * 124* 184*    137 141   K 4.6 4.2 3.3*    104 109   CO2 23 22* 20*   BUN 25* 28* 28*   CREATININE 1.4 1.4 1.4   CALCIUM 8.7 8.3* 8.4*   MG 2.1 1.8  --    , CBC   Recent Labs   Lab 11/16/19  0103 11/16/19  0739 11/17/19  0340   WBC 13.14* 9.47 7.13   HGB 11.6* 10.6* 9.5*   HCT 37.2 34.7* 30.7*    256 214   , Lipid Panel No results for input(s): CHOL, HDL, LDLCALC, TRIG, CHOLHDL in the last 48 hours., Troponin   Recent Labs   Lab 11/16/19  0739 11/16/19  1259 11/17/19  0810   TROPONINI 1.983* 0.992* 0.371*    and All pertinent lab results from the last 24 hours have been reviewed.    Significant Imaging: Echocardiogram: 2D echo with color flow doppler: No results found for this or any previous visit. and X-Ray: CXR: X-Ray Chest 1 View (CXR): No results found for this visit on 11/16/19. and X-Ray Chest PA and Lateral (CXR): No results found for this visit on 11/16/19.

## 2019-11-17 NOTE — HPI
Ana Castañeda is a 84 year old female who presented to [facility] due to shortness of breath which progressively worsened prior to arrival. She reported associated cough with mucus, wheezing and congestion for 3 days. Per H&P, patient was seen at [facility] on 11/13 and diagnosed with acute bronchitis and received IM Steroid shot and PO ABX. Her chronic medical conditions include Asthma, HTN, H/O CVA with right sided weakness, H/O CEA, Normal pressure hydrocephalus, hypothyroidism, gout, debility (wheelchair bound), and chronic AC on Xarelto (unknown reason). ED workup revealed Cr 1.4, BUN 25, Troponin 0.095>1.983>.992,, LA 3.1, Procal 0.15. She was placed on Bipap in ED for shortness of breath and increased work of breathing. CXR revealed RLL infiltrate. She received IV lopressor 5 mg x 2 dose in ED and IV lasix 40 mg x 1 dose with some improvement in HR control to 80s. She received IV Solumedrol, Duonebs, and IVF resuscitation in ED along with IV ABX. She was admitted to ICU under the care of \[facility]\"" medicine. Cardiology consulted to assist with management of AFIB/RVR, NSTEMI. Chart reviewed, Patient seen and examined in ICU. Repeat troponin, EKG, and ECHO pending. She is followed by Dr. Christiansen as OP Cardiology but has not seen him in quite some time. She does report a vague history of AFIB and was started on Xarelto at that time in 2017. Denies chest pain or anginal equivalents. She continues to have some shortness of breath and wheezing on exam today. She is wheelchair bound at home but can walk very short distances. NO leg swelling on exam today. Will optimize her medical therapy today. ECHO pending. Would benefit from OP MPI stress test once pulmonary status optimized after discharge. Further recs in AM

## 2019-11-17 NOTE — ASSESSMENT & PLAN NOTE
- Initial troponin of 0.095, likely demand secondary to above.  EKG unrevealing.  Chest pain-free.  - Trend serial cardiac enzymes.  - Continue aspirin and statin.  Beta-blocker on hold to prevent hypotension, resume as BP tolerates.  - Further recs pending clinical course.    Repeat elevated to 1.98  Pt has no CP, likely sec to demand Ischemia from Sepsis, resp failure  Will consult Cards    Demand ischemia- no ACS    Needs NMP scan as out pt

## 2019-11-17 NOTE — PT/OT/SLP PROGRESS
Physical Therapy      Patient Name:  Ana Castañeda   MRN:  [id number]    Eval initiated with Chart review in epic and discussion with nurse. RN requested to start PT tomorrow  Sebas Argueta PT

## 2019-11-17 NOTE — SUBJECTIVE & OBJECTIVE
Interval History: doing better, comfortable, pleasant. Appears mildly fluid overloaded, she is about 5 L fluid positive. Given IV lasix, all Cx NGTD. Cardiology thinks its demand ischemia from sepsis and recommended out pt NMP scan when stable. Await transfer to tele. PT/OT ordered. NSR remains.     Review of Systems   Unable to perform ROS: Acuity of condition   Constitutional: Negative for chills and fever.   HENT: Negative for postnasal drip, rhinorrhea, sinus pressure and sore throat.    Respiratory: Negative for cough, shortness of breath and wheezing.    Cardiovascular: Negative for chest pain, palpitations and leg swelling.   Gastrointestinal: Negative for abdominal distention, abdominal pain, blood in stool, constipation, diarrhea, nausea and vomiting.   Genitourinary: Negative for difficulty urinating, dysuria, hematuria and urgency.   Musculoskeletal: Positive for gait problem (chronic). Negative for arthralgias, back pain, myalgias, neck pain and neck stiffness.   Skin: Negative for pallor, rash and wound.   Neurological: Negative for dizziness, syncope, facial asymmetry, speech difficulty, light-headedness and headaches.   Psychiatric/Behavioral: Negative for confusion. The patient is not nervous/anxious.    All other systems reviewed and are negative.    Objective:     Vital Signs (Most Recent):  Temp: 98.9 °F (37.2 °C) (11/17/19 0730)  Pulse: 91 (11/17/19 1100)  Resp: (!) 22 (11/17/19 1100)  BP: (!) 202/59 (11/17/19 1100)  SpO2: (!) 93 % (11/17/19 1100) Vital Signs (24h Range):  Temp:  [97.9 °F (36.6 °C)-98.9 °F (37.2 °C)] 98.9 °F (37.2 °C)  Pulse:  [] 91  Resp:  [18-27] 22  SpO2:  [93 %-98 %] 93 %  BP: ()/() 202/59     Weight: 94.8 kg (208 lb 15.9 oz)  Body mass index is 32.73 kg/m².    Intake/Output Summary (Last 24 hours) at 11/17/2019 1222  Last data filed at 11/17/2019 1035  Gross per 24 hour   Intake 2849.17 ml   Output 885 ml   Net 1964.17 ml      Physical Exam    Constitutional: She is oriented to person, place, and time. She appears well-developed and well-nourished. She is cooperative.  Non-toxic appearance. She does not have a sickly appearance. She appears ill. No distress. She is not intubated. Nasal cannula in place.   HENT:   Head: Normocephalic and atraumatic.   Mouth/Throat: Oropharynx is clear and moist and mucous membranes are normal.   Eyes: Pupils are equal, round, and reactive to light. EOM and lids are normal.   Neck: Trachea normal and full passive range of motion without pain. Carotid bruit is not present.   Cardiovascular: Normal rate and normal heart sounds. An irregular rhythm present.   Pulses:       Radial pulses are 2+ on the right side, and 2+ on the left side.        Dorsalis pedis pulses are 1+ on the right side, and 1+ on the left side.   Pulmonary/Chest: Effort normal. No accessory muscle usage. No tachypnea. She is not intubated. No respiratory distress. She has decreased breath sounds in the right lower field and the left lower field. She has wheezes.   Abdominal: Soft. She exhibits no distension. Bowel sounds are decreased. There is no tenderness.   Genitourinary:   Genitourinary Comments: Ratliff in place   Musculoskeletal: Normal range of motion.        Right foot: There is no deformity.        Left foot: There is no deformity.   No edema   Lymphadenopathy:     She has no cervical adenopathy.   Neurological: She is alert and oriented to person, place, and time.   Skin: Skin is warm, dry and intact. Capillary refill takes less than 2 seconds. No rash noted. No cyanosis.   Psychiatric: She has a normal mood and affect. Her speech is normal and behavior is normal. Judgment and thought content normal. Cognition and memory are normal.   Nursing note and vitals reviewed.      Significant Labs:   BMP:   Recent Labs   Lab 11/17/19  0340 11/17/19  0810   *  --      --    K 3.3*  --      --    CO2 20*  --    BUN 28*  --    CREATININE  1.4  --    CALCIUM 8.4*  --    MG  --  2.0     CBC:   Recent Labs   Lab 11/16/19  0103 11/16/19  0739 11/17/19  0340   WBC 13.14* 9.47 7.13   HGB 11.6* 10.6* 9.5*   HCT 37.2 34.7* 30.7*    256 214     CMP:   Recent Labs   Lab 11/16/19 0103 11/16/19  0739 11/17/19  0340    137 141   K 4.6 4.2 3.3*    104 109   CO2 23 22* 20*   * 124* 184*   BUN 25* 28* 28*   CREATININE 1.4 1.4 1.4   CALCIUM 8.7 8.3* 8.4*   PROT 7.4 6.8 6.5   ALBUMIN 3.3* 3.1* 2.9*   BILITOT 0.6 0.7 0.6   ALKPHOS 122 104 88   AST 48* 47* 37   ALT 15 19 14   ANIONGAP 13 11 12   EGFRNONAA 35* 35* 35*     Magnesium:   Recent Labs   Lab 11/16/19 0103 11/16/19  0739 11/17/19  0810   MG 2.1 1.8 2.0     All pertinent labs within the past 24 hours have been reviewed.    Significant Imaging: I have reviewed all pertinent imaging results/findings within the past 24 hours.

## 2019-11-17 NOTE — PLAN OF CARE
Pt aaox4, but very forgetful. Pt reports feeling much better today. POC discussed w/patient, aware of telemetry orders, verbalized understanding. Pt maintained in NSR; scheduled PO Cardizem given. VSS. Pt occasionally c/o SOB; O2 sats >97% on 2L NC. Pt needs encouragement and assistance to turn Q2 in bed. Heels floated, pillows in use. Fall precautions in place, bed alarm on. Pt anxiously states she will be unable to care for herself once she returns home; noted SW already met w/pt and spouse to discuss HH.

## 2019-11-17 NOTE — ASSESSMENT & PLAN NOTE
Converted to NSR with IV Cardizem  Troponin elevated  Cards consulted, pt already on Xarelto    Remains in NSR    Back in afib w RVR-- now on Cardizem and Amiodarone gtt

## 2019-11-18 PROBLEM — R32 INCONTINENCE ASSOCIATED DERMATITIS: Status: ACTIVE | Noted: 2019-11-18

## 2019-11-18 PROBLEM — L25.8 INCONTINENCE ASSOCIATED DERMATITIS: Status: ACTIVE | Noted: 2019-11-18

## 2019-11-18 LAB
ANION GAP SERPL CALC-SCNC: 13 MMOL/L (ref 8–16)
BASOPHILS # BLD AUTO: 0.01 K/UL (ref 0–0.2)
BASOPHILS NFR BLD: 0.1 % (ref 0–1.9)
BUN SERPL-MCNC: 34 MG/DL (ref 8–23)
CALCIUM SERPL-MCNC: 9 MG/DL (ref 8.7–10.5)
CHLORIDE SERPL-SCNC: 109 MMOL/L (ref 95–110)
CO2 SERPL-SCNC: 23 MMOL/L (ref 23–29)
CREAT SERPL-MCNC: 1.8 MG/DL (ref 0.5–1.4)
DIASTOLIC DYSFUNCTION: NO
DIFFERENTIAL METHOD: ABNORMAL
EOSINOPHIL # BLD AUTO: 0 K/UL (ref 0–0.5)
EOSINOPHIL NFR BLD: 0 % (ref 0–8)
ERYTHROCYTE [DISTWIDTH] IN BLOOD BY AUTOMATED COUNT: 14.9 % (ref 11.5–14.5)
EST. GFR  (AFRICAN AMERICAN): 30 ML/MIN/1.73 M^2
EST. GFR  (NON AFRICAN AMERICAN): 26 ML/MIN/1.73 M^2
ESTIMATED PA SYSTOLIC PRESSURE: 34.57
GLUCOSE SERPL-MCNC: 157 MG/DL (ref 70–110)
HCT VFR BLD AUTO: 34.4 % (ref 37–48.5)
HGB BLD-MCNC: 10.6 G/DL (ref 12–16)
IMM GRANULOCYTES # BLD AUTO: 0.06 K/UL (ref 0–0.04)
IMM GRANULOCYTES NFR BLD AUTO: 0.5 % (ref 0–0.5)
LYMPHOCYTES # BLD AUTO: 1 K/UL (ref 1–4.8)
LYMPHOCYTES NFR BLD: 8 % (ref 18–48)
MAGNESIUM SERPL-MCNC: 2.2 MG/DL (ref 1.6–2.6)
MCH RBC QN AUTO: 31.6 PG (ref 27–31)
MCHC RBC AUTO-ENTMCNC: 30.8 G/DL (ref 32–36)
MCV RBC AUTO: 103 FL (ref 82–98)
MONOCYTES # BLD AUTO: 0.7 K/UL (ref 0.3–1)
MONOCYTES NFR BLD: 6 % (ref 4–15)
NEUTROPHILS # BLD AUTO: 10.4 K/UL (ref 1.8–7.7)
NEUTROPHILS NFR BLD: 85.4 % (ref 38–73)
NRBC BLD-RTO: 0 /100 WBC
PLATELET # BLD AUTO: 263 K/UL (ref 150–350)
PMV BLD AUTO: 10 FL (ref 9.2–12.9)
POCT GLUCOSE: 113 MG/DL (ref 70–110)
POCT GLUCOSE: 150 MG/DL (ref 70–110)
POCT GLUCOSE: 154 MG/DL (ref 70–110)
POCT GLUCOSE: 216 MG/DL (ref 70–110)
POTASSIUM SERPL-SCNC: 3.8 MMOL/L (ref 3.5–5.1)
RBC # BLD AUTO: 3.35 M/UL (ref 4–5.4)
RETIRED EF AND QEF - SEE NOTES: 60 (ref 55–65)
SODIUM SERPL-SCNC: 145 MMOL/L (ref 136–145)
VANCOMYCIN TROUGH SERPL-MCNC: 18.2 UG/ML (ref 10–22)
VANCOMYCIN TROUGH SERPL-MCNC: 68.3 UG/ML (ref 10–22)
WBC # BLD AUTO: 12.14 K/UL (ref 3.9–12.7)

## 2019-11-18 PROCEDURE — 36415 COLL VENOUS BLD VENIPUNCTURE: CPT

## 2019-11-18 PROCEDURE — 93005 ELECTROCARDIOGRAM TRACING: CPT

## 2019-11-18 PROCEDURE — 25000003 PHARM REV CODE 250: Performed by: NURSE PRACTITIONER

## 2019-11-18 PROCEDURE — 97167 OT EVAL HIGH COMPLEX 60 MIN: CPT | Performed by: PHYSICAL THERAPIST

## 2019-11-18 PROCEDURE — 21400001 HC TELEMETRY ROOM

## 2019-11-18 PROCEDURE — 83735 ASSAY OF MAGNESIUM: CPT

## 2019-11-18 PROCEDURE — 99232 PR SUBSEQUENT HOSPITAL CARE,LEVL II: ICD-10-PCS | Mod: ,,, | Performed by: INTERNAL MEDICINE

## 2019-11-18 PROCEDURE — 63600175 PHARM REV CODE 636 W HCPCS: Performed by: PHYSICIAN ASSISTANT

## 2019-11-18 PROCEDURE — 27000221 HC OXYGEN, UP TO 24 HOURS

## 2019-11-18 PROCEDURE — 80048 BASIC METABOLIC PNL TOTAL CA: CPT

## 2019-11-18 PROCEDURE — 99900035 HC TECH TIME PER 15 MIN (STAT)

## 2019-11-18 PROCEDURE — 85025 COMPLETE CBC W/AUTO DIFF WBC: CPT

## 2019-11-18 PROCEDURE — 93010 ELECTROCARDIOGRAM REPORT: CPT | Mod: ,,, | Performed by: INTERNAL MEDICINE

## 2019-11-18 PROCEDURE — 25000003 PHARM REV CODE 250: Performed by: EMERGENCY MEDICINE

## 2019-11-18 PROCEDURE — 80202 ASSAY OF VANCOMYCIN: CPT | Mod: 91

## 2019-11-18 PROCEDURE — 99232 SBSQ HOSP IP/OBS MODERATE 35: CPT | Mod: ,,, | Performed by: INTERNAL MEDICINE

## 2019-11-18 PROCEDURE — 63600175 PHARM REV CODE 636 W HCPCS: Performed by: NURSE PRACTITIONER

## 2019-11-18 PROCEDURE — 25000003 PHARM REV CODE 250: Performed by: PHYSICIAN ASSISTANT

## 2019-11-18 PROCEDURE — 94761 N-INVAS EAR/PLS OXIMETRY MLT: CPT

## 2019-11-18 PROCEDURE — 93010 EKG 12-LEAD: ICD-10-PCS | Mod: ,,, | Performed by: INTERNAL MEDICINE

## 2019-11-18 PROCEDURE — 25000242 PHARM REV CODE 250 ALT 637 W/ HCPCS: Performed by: NURSE PRACTITIONER

## 2019-11-18 PROCEDURE — 80202 ASSAY OF VANCOMYCIN: CPT

## 2019-11-18 PROCEDURE — 97163 PT EVAL HIGH COMPLEX 45 MIN: CPT | Performed by: PHYSICAL THERAPIST

## 2019-11-18 PROCEDURE — 94640 AIRWAY INHALATION TREATMENT: CPT

## 2019-11-18 PROCEDURE — 63600175 PHARM REV CODE 636 W HCPCS: Performed by: EMERGENCY MEDICINE

## 2019-11-18 RX ORDER — PREDNISONE 10 MG/1
10 TABLET ORAL 2 TIMES DAILY
Status: DISCONTINUED | OUTPATIENT
Start: 2019-11-18 | End: 2019-11-24

## 2019-11-18 RX ORDER — AMOXICILLIN AND CLAVULANATE POTASSIUM 500; 125 MG/1; MG/1
1 TABLET, FILM COATED ORAL 2 TIMES DAILY
Status: DISCONTINUED | OUTPATIENT
Start: 2019-11-18 | End: 2019-11-25 | Stop reason: HOSPADM

## 2019-11-18 RX ORDER — DILTIAZEM HCL/D5W 125 MG/125
5 PLASTIC BAG, INJECTION (ML) INTRAVENOUS CONTINUOUS
Status: DISCONTINUED | OUTPATIENT
Start: 2019-11-18 | End: 2019-11-19

## 2019-11-18 RX ORDER — CEFEPIME HYDROCHLORIDE 2 G/50ML
2 INJECTION, SOLUTION INTRAVENOUS
Status: DISCONTINUED | OUTPATIENT
Start: 2019-11-18 | End: 2019-11-18

## 2019-11-18 RX ORDER — LEVALBUTEROL INHALATION SOLUTION 0.63 MG/3ML
0.63 SOLUTION RESPIRATORY (INHALATION) 3 TIMES DAILY PRN
Status: DISCONTINUED | OUTPATIENT
Start: 2019-11-18 | End: 2019-11-25 | Stop reason: HOSPADM

## 2019-11-18 RX ORDER — SODIUM CHLORIDE 9 MG/ML
INJECTION, SOLUTION INTRAVENOUS CONTINUOUS
Status: DISCONTINUED | OUTPATIENT
Start: 2019-11-18 | End: 2019-11-19

## 2019-11-18 RX ORDER — INSULIN ASPART 100 [IU]/ML
1-10 INJECTION, SOLUTION INTRAVENOUS; SUBCUTANEOUS
Status: DISCONTINUED | OUTPATIENT
Start: 2019-11-18 | End: 2019-11-22

## 2019-11-18 RX ORDER — VANCOMYCIN HCL IN 5 % DEXTROSE 1G/250ML
1000 PLASTIC BAG, INJECTION (ML) INTRAVENOUS
Status: DISCONTINUED | OUTPATIENT
Start: 2019-11-30 | End: 2019-11-18

## 2019-11-18 RX ADMIN — IPRATROPIUM BROMIDE AND ALBUTEROL SULFATE 3 ML: .5; 3 SOLUTION RESPIRATORY (INHALATION) at 01:11

## 2019-11-18 RX ADMIN — VANCOMYCIN HYDROCHLORIDE 1250 MG: 100 INJECTION, POWDER, LYOPHILIZED, FOR SOLUTION INTRAVENOUS at 05:11

## 2019-11-18 RX ADMIN — LOSARTAN POTASSIUM 25 MG: 25 TABLET ORAL at 09:11

## 2019-11-18 RX ADMIN — PANTOPRAZOLE SODIUM 40 MG: 40 TABLET, DELAYED RELEASE ORAL at 09:11

## 2019-11-18 RX ADMIN — LEVOTHYROXINE SODIUM 112 MCG: 112 TABLET ORAL at 05:11

## 2019-11-18 RX ADMIN — PIPERACILLIN AND TAZOBACTAM 4.5 G: 4; .5 INJECTION, POWDER, FOR SOLUTION INTRAVENOUS at 12:11

## 2019-11-18 RX ADMIN — Medication 15 MG/HR: at 08:11

## 2019-11-18 RX ADMIN — SODIUM CHLORIDE: 0.9 INJECTION, SOLUTION INTRAVENOUS at 12:11

## 2019-11-18 RX ADMIN — AMIODARONE HYDROCHLORIDE 1 MG/MIN: 1.8 INJECTION, SOLUTION INTRAVENOUS at 04:11

## 2019-11-18 RX ADMIN — PREDNISONE 10 MG: 10 TABLET ORAL at 09:11

## 2019-11-18 RX ADMIN — Medication 5 MG/HR: at 10:11

## 2019-11-18 RX ADMIN — DILTIAZEM HYDROCHLORIDE 90 MG: 60 TABLET, FILM COATED ORAL at 05:11

## 2019-11-18 RX ADMIN — INSULIN ASPART 2 UNITS: 100 INJECTION, SOLUTION INTRAVENOUS; SUBCUTANEOUS at 11:11

## 2019-11-18 RX ADMIN — INSULIN ASPART 4 UNITS: 100 INJECTION, SOLUTION INTRAVENOUS; SUBCUTANEOUS at 04:11

## 2019-11-18 RX ADMIN — ESCITALOPRAM OXALATE 10 MG: 10 TABLET, FILM COATED ORAL at 09:11

## 2019-11-18 RX ADMIN — ATORVASTATIN CALCIUM 20 MG: 10 TABLET, FILM COATED ORAL at 09:11

## 2019-11-18 RX ADMIN — ASPIRIN 81 MG: 81 TABLET, COATED ORAL at 09:11

## 2019-11-18 RX ADMIN — CEFEPIME HYDROCHLORIDE 2 G: 2 INJECTION, SOLUTION INTRAVENOUS at 03:11

## 2019-11-18 RX ADMIN — PREDNISONE 10 MG: 10 TABLET ORAL at 08:11

## 2019-11-18 RX ADMIN — AMOXICILLIN AND CLAVULANATE POTASSIUM 500 MG: 500; 125 TABLET, FILM COATED ORAL at 08:11

## 2019-11-18 RX ADMIN — IPRATROPIUM BROMIDE AND ALBUTEROL SULFATE 3 ML: .5; 3 SOLUTION RESPIRATORY (INHALATION) at 12:11

## 2019-11-18 RX ADMIN — IPRATROPIUM BROMIDE AND ALBUTEROL SULFATE 3 ML: .5; 3 SOLUTION RESPIRATORY (INHALATION) at 07:11

## 2019-11-18 RX ADMIN — AMIODARONE HYDROCHLORIDE 150 MG: 1.5 INJECTION, SOLUTION INTRAVENOUS at 04:11

## 2019-11-18 RX ADMIN — RIVAROXABAN 15 MG: 15 TABLET, FILM COATED ORAL at 04:11

## 2019-11-18 RX ADMIN — AMIODARONE HYDROCHLORIDE 0.5 MG/MIN: 1.8 INJECTION, SOLUTION INTRAVENOUS at 09:11

## 2019-11-18 RX ADMIN — PIPERACILLIN AND TAZOBACTAM 4.5 G: 4; .5 INJECTION, POWDER, FOR SOLUTION INTRAVENOUS at 09:11

## 2019-11-18 NOTE — ASSESSMENT & PLAN NOTE
-Admitted due to shortness of breath, AFIB/RVR  -Troponin 0.95>1.983>.992  -Repeat Troponin 0.371  -Likely due to demand ischemia from AFIB/RVR and sepsis  -ECHO pending  -Increase CCB to 90 mg TID for better rate control today  -NO BB given wheezing on exam today  -Continue ASA, Statin, CCB, Xarelto  -Start Losartan for better BP control.  -Would benefit from OP MPI stress test once pulmonary status optimized after discharge.   -Reassess in AM    11/18/19  -Stable overnight, remains chest pain free  -2D echo showed normal EF  -Continue ASA, statin, CCB, ARB, Xarelto  -No BB given wheezing  -OP MPI stress test

## 2019-11-18 NOTE — PROGRESS NOTES
Therapy with vancomycin complete and/or consult discontinued by provider.  Pharmacy will sign off, please re-consult as needed.    Thank you for allowing us to participate in this patient's care.    Magui Zaragoza, PharmD 11/18/2019 4:46 PM

## 2019-11-18 NOTE — PROGRESS NOTES
Pt placed on tele bed and tele monitor.  Pt wheeled via bed and portable oxygen to room 205.  Pt change, stainless steel cup, cinnamon rolls and throat spray at bedside.  Insulin pen given to nurse and skin assessment done.

## 2019-11-18 NOTE — NURSING
Pt to telemetry from ICU.  Placed on continuous cardiac monitor and verified with monitor tech.  Pt oriented to room and procedures.  Call bell in reach.  Primary nurse notified of pt's arrival.

## 2019-11-18 NOTE — PROGRESS NOTES
Pharmacokinetic Assessment Follow Up: IV Vancomycin    Vancomycin serum concentration assessment(s):    A vancomycin trough was collected and can be used to guide therapy. The trough of 18.2 mcg/mL is therapeutic and within the target range of 15-20 mcg/mL.     PLEASE  NOTE:  By mistake, another vancomycin level was collected @ 0624 while the vancomycin was still infusing (68.3 mcg/mL). This second collection is an ERROR & does not represent a trough or a peak because the vancomycin dose was actually still infusing into the patient at the time of collection.    Vancomycin Regimen Plan:    To err on the side of caution, this patient will changed to pulse dosing based off of vancomycin random levels. Re-dose when the random level is less than 20 mcg/mL, next level to be drawn at 0430 on 11/19.    Drug levels (last 3 results):  Recent Labs   Lab Result Units 11/18/19  0417 11/18/19  0624   Vancomycin-Trough ug/mL 18.2 68.3*       Pharmacy will continue to follow and monitor vancomycin.    Please contact pharmacy at extension [phone] for questions regarding this assessment.    Thank you for the consult,   Jeanmarie Rutledge, Adarsh 11/18/2019 10:39 AM       Patient brief summary:  Ana Castañeda is a 84 y.o. female initiated on antimicrobial therapy with IV Vancomycin for treatment of lower respiratory infection      Drug Allergies:   Review of patient's allergies indicates:   Allergen Reactions    Food color red [red food color (bulk)] Nausea And Vomiting    Pcn [penicillins] Rash       Actual Body Weight:   94.3 kg    Renal Function:   Estimated Creatinine Clearance: 28.4 mL/min (A) (based on SCr of 1.8 mg/dL (H)).,       CBC (last 72 hours):  Recent Labs   Lab Result Units 11/16/19  0103 11/16/19  0739 11/17/19  0340 11/18/19  0417   WBC K/uL 13.14* 9.47 7.13 12.14   Hemoglobin g/dL 11.6* 10.6* 9.5* 10.6*   Hematocrit % 37.2 34.7* 30.7* 34.4*   Platelets K/uL 316 256 214 263   Gran% % 55.1 82.9* 82.7*  --    Lymph% % 33.1  10.0* 10.4*  --    Mono% % 10.4 6.4 6.2  --    Eosinophil% % 0.3 0.0 0.0  --    Basophil% % 0.5 0.2 0.1  --    Differential Method  Automated Automated Automated  --        Metabolic Panel (last 72 hours):  Recent Labs   Lab Result Units 11/16/19  0103 11/16/19  0130 11/16/19  0739 11/17/19  0340 11/17/19  0810 11/18/19  0417   Sodium mmol/L 138  --  137 141  --  145   Potassium mmol/L 4.6  --  4.2 3.3*  --  3.8   Chloride mmol/L 102  --  104 109  --  109   CO2 mmol/L 23  --  22* 20*  --  23   Glucose mg/dL 286*  --  124* 184*  --  157*   Glucose, UA   --  Negative  --   --   --   --    BUN, Bld mg/dL 25*  --  28* 28*  --  34*   Creatinine mg/dL 1.4  --  1.4 1.4  --  1.8*   Albumin g/dL 3.3*  --  3.1* 2.9*  --   --    Total Bilirubin mg/dL 0.6  --  0.7 0.6  --   --    Alkaline Phosphatase U/L 122  --  104 88  --   --    AST U/L 48*  --  47* 37  --   --    ALT U/L 15  --  19 14  --   --    Magnesium mg/dL 2.1  --  1.8  --  2.0 2.2   Phosphorus mg/dL  --   --  3.5  --   --   --        Vancomycin Administrations:  vancomycin given in the last 96 hours                   vancomycin (VANCOCIN) 1,250 mg in dextrose 5 % 250 mL IVPB (mg) 1,250 mg New Bag 11/18/19 0505     1,250 mg New Bag 11/17/19 0400                Microbiologic Results:  Microbiology Results (last 7 days)     Procedure Component Value Units Date/Time    Blood culture x two cultures. Draw prior to antibiotics. [[id number]] Collected:  11/16/19 0102    Order Status:  Completed Specimen:  Blood from Peripheral, Lower Arm, Right Updated:  11/17/19 1212     Blood Culture, Routine No Growth to date      No Growth to date    Narrative:       Aerobic and anaerobic    Blood culture x two cultures. Draw prior to antibiotics. [[id number]] Collected:  11/16/19 0155    Order Status:  Completed Specimen:  Blood from Peripheral, Hand, Right Updated:  11/17/19 1212     Blood Culture, Routine No Growth to date      No Growth to date    Narrative:       Aerobic and anaerobic     Influenza A & B by Molecular [[id number]] Collected:  11/16/19 0523    Order Status:  Completed Specimen:  Nasopharyngeal Swab Updated:  11/16/19 0617     Influenza A, Molecular Negative     Influenza B, Molecular Negative     Flu A & B Source Nasal swab    Culture, Respiratory with Gram Stain [[id number]]     Order Status:  No result Specimen:  Respiratory     Blood culture x two cultures. Draw prior to antibiotics [[id number]]     Order Status:  Canceled Specimen:  Blood

## 2019-11-18 NOTE — PROGRESS NOTES
"[facility] Hospital Medicine  Progress Note    Patient Name: Ana Castañeda  MRN: [id number]  Patient Class: IP- Inpatient   Admission Date: 11/16/2019  Length of Stay: 2 days  Attending Physician: Tiffanie Jones MD  Primary Care Provider: Stephen Tarango MD        Subjective:     Principal Problem:Pneumonia of right lower lobe due to infectious organism        HPI:  Ana Castañeda is a 84 y.o. female patient with a PMHx asthma, HTN, h/o CVA with residual right-sided weakness, hypothyroidism, gout, debility (wheelchair bound), and chronic anticoagulation therapy (reason unknown).  She presented to the emergency department with complaints of shortness of breath that progressively worsened shortly before arrival.  No aggravating or alleviating factors.  Associated cough producing mucus, wheezing and congestion x 3 days.  Denies any chest pain, palpitations, orthopnea, PND, edema, abdominal pain, nausea, vomiting, diarrhea, dysuria, hematuria, back pain, AMS, lightheadedness or dizziness, syncope, rhinorrhea, sore throat, weakness, fever or chills.  Family at bedside reports patient was seen at [facility] on 11/13 and diagnosed with acute bronchitis.  She was given a "steroid shot" and prescribed Cefdinir, which she has taken x 1 dose on 11/15.  Patient is on chronic Xarelto, but unsure of reason.  She denies any history of AF/FL, DVT or PE.  Initial workup in the ED resulted WBC of 13.14, creatinine 1.4, BUN 25, troponin 0.095, , lactic 3.1, procalcitonin 0.15, UA unremarkable, EKG unrevealing.  ABG with pH of 7.224, pCO2 66, pO2 241, SaO2 100 on BiPAP.  CXR showed right lower lobe infiltrate.  Patient's O2 sat remained stable in the ED, but BiPAP placed due to increased respiratory distress. Upon arrival to the ED, patient was tachycardic with heart rate of 136 bpm, /78.  She was given IV Lopressor 5 mg x 2 doses and 40 mg IV Lasix, which improved her HR into the 80s but also decreased " blood pressure to 98/70.  ED also administered 125 mg IV Solu-Medrol, DuoNeb treatments x 3, 2.5 L IV fluid resuscitation, IV vanc and Zosyn.  Patient was admitted to ICU under Hospital Medicine services.  Currently, patient appears comfortable in no acute distress.  BiPAP remains in place with stable O2 sat.  Patient is a full code.   is surrogate decision maker.      Overview/Hospital Course:  Ana Castañeda is a 84 y.o. female patient with a PMHx asthma, HTN, h/o CVA with residual right-sided weakness, hypothyroidism, gout, debility (wheelchair bound), and chronic anticoagulation therapy (reason unknown) admitted to ICU on Bipap, with acute hypoxemic resp failure sec to LLL Pneumonia and Afib w RVR and Lactic Acidosis. She was aggressively rehydrated with NS and started on IV Vanc and Zosyn as well as IV Solumedrol. Her Afib RVR was treated initially with IV Lopressor 5 mg IVP x 2 which  which did not affect her HR but dropped BP to 98/70 -- necessitating Charbel-senephrine gtt by eICU this am. This am she was given a dose of Cardizem 10 mg IVP which slowed down her HR and also converted to NSR and her BP improved. She was started on Cardizem oral tabs and was able to come off Charbel gtt as well as Bipap to NC and feels much better.  Urine output is good. She is AAOx3, speech is clear, family at bedside. Troponin increased to 1.98-- cardiology consulted.    11/17- doing better, comfortable, pleasant. Appears mildly fluid overloaded, she is about 5 L fluid positive. Given IV lasix, all Cx NGTD. Cardiology thinks its demand ischemia from sepsis and recommended out pt NMP scan when stable. Await transfer to tele. PT/OT ordered. NSR remains.   11/18- went into Afib RVR when PT evaluated her yesterday- has remained in it since- no response to Dig, Cardizem. Eventually cardiology had to start her on Cardizem and Amiodarone gtt with some slowing. Otherwise feels good. Repeat CXR shows clear lungs, no Pneumonia. Bun/Cr  elevated at 34/1.5- hence Lasix d/hira and started on gentle rehydration. Zosyn also d/hira.     Interval History: doing better, comfortable, pleasant. Appears mildly fluid overloaded, she is about 5 L fluid positive. Given IV lasix, all Cx NGTD. Cardiology thinks its demand ischemia from sepsis and recommended out pt NMP scan when stable. Await transfer to tele. PT/OT ordered. NSR remains.     Review of Systems   Unable to perform ROS: Acuity of condition   Constitutional: Negative for chills and fever.   HENT: Negative for postnasal drip, rhinorrhea, sinus pressure and sore throat.    Respiratory: Negative for cough, shortness of breath and wheezing.    Cardiovascular: Negative for chest pain, palpitations and leg swelling.   Gastrointestinal: Negative for abdominal distention, abdominal pain, blood in stool, constipation, diarrhea, nausea and vomiting.   Genitourinary: Negative for difficulty urinating, dysuria, hematuria and urgency.   Musculoskeletal: Positive for gait problem (chronic). Negative for arthralgias, back pain, myalgias, neck pain and neck stiffness.   Skin: Negative for pallor, rash and wound.   Neurological: Negative for dizziness, syncope, facial asymmetry, speech difficulty, light-headedness and headaches.   Psychiatric/Behavioral: Negative for confusion. The patient is not nervous/anxious.    All other systems reviewed and are negative.    Objective:     Vital Signs (Most Recent):  Temp: 98.9 °F (37.2 °C) (11/17/19 0730)  Pulse: 91 (11/17/19 1100)  Resp: (!) 22 (11/17/19 1100)  BP: (!) 202/59 (11/17/19 1100)  SpO2: (!) 93 % (11/17/19 1100) Vital Signs (24h Range):  Temp:  [97.9 °F (36.6 °C)-98.9 °F (37.2 °C)] 98.9 °F (37.2 °C)  Pulse:  [] 91  Resp:  [18-27] 22  SpO2:  [93 %-98 %] 93 %  BP: ()/() 202/59     Weight: 94.8 kg (208 lb 15.9 oz)  Body mass index is 32.73 kg/m².    Intake/Output Summary (Last 24 hours) at 11/17/2019 1222  Last data filed at 11/17/2019 1035  Gross per  24 hour   Intake 2849.17 ml   Output 885 ml   Net 1964.17 ml      Physical Exam   Constitutional: She is oriented to person, place, and time. She appears well-developed and well-nourished. She is cooperative.  Non-toxic appearance. She does not have a sickly appearance. She appears ill. No distress. She is not intubated. Nasal cannula in place.   HENT:   Head: Normocephalic and atraumatic.   Mouth/Throat: Oropharynx is clear and moist and mucous membranes are normal.   Eyes: Pupils are equal, round, and reactive to light. EOM and lids are normal.   Neck: Trachea normal and full passive range of motion without pain. Carotid bruit is not present.   Cardiovascular: Normal rate and normal heart sounds. An irregular rhythm present.   Pulses:       Radial pulses are 2+ on the right side, and 2+ on the left side.        Dorsalis pedis pulses are 1+ on the right side, and 1+ on the left side.   Pulmonary/Chest: Effort normal. No accessory muscle usage. No tachypnea. She is not intubated. No respiratory distress. She has decreased breath sounds in the right lower field and the left lower field. She has wheezes.   Abdominal: Soft. She exhibits no distension. Bowel sounds are decreased. There is no tenderness.   Genitourinary:   Genitourinary Comments: Ratliff in place   Musculoskeletal: Normal range of motion.        Right foot: There is no deformity.        Left foot: There is no deformity.   No edema   Lymphadenopathy:     She has no cervical adenopathy.   Neurological: She is alert and oriented to person, place, and time.   Skin: Skin is warm, dry and intact. Capillary refill takes less than 2 seconds. No rash noted. No cyanosis.   Psychiatric: She has a normal mood and affect. Her speech is normal and behavior is normal. Judgment and thought content normal. Cognition and memory are normal.   Nursing note and vitals reviewed.      Significant Labs:   BMP:   Recent Labs   Lab 11/17/19  0340 11/17/19  0810   *  --    NA  141  --    K 3.3*  --      --    CO2 20*  --    BUN 28*  --    CREATININE 1.4  --    CALCIUM 8.4*  --    MG  --  2.0     CBC:   Recent Labs   Lab 11/16/19  0103 11/16/19  0739 11/17/19  0340   WBC 13.14* 9.47 7.13   HGB 11.6* 10.6* 9.5*   HCT 37.2 34.7* 30.7*    256 214     CMP:   Recent Labs   Lab 11/16/19  0103 11/16/19  0739 11/17/19  0340    137 141   K 4.6 4.2 3.3*    104 109   CO2 23 22* 20*   * 124* 184*   BUN 25* 28* 28*   CREATININE 1.4 1.4 1.4   CALCIUM 8.7 8.3* 8.4*   PROT 7.4 6.8 6.5   ALBUMIN 3.3* 3.1* 2.9*   BILITOT 0.6 0.7 0.6   ALKPHOS 122 104 88   AST 48* 47* 37   ALT 15 19 14   ANIONGAP 13 11 12   EGFRNONAA 35* 35* 35*     Magnesium:   Recent Labs   Lab 11/16/19  0103 11/16/19  0739 11/17/19  0810   MG 2.1 1.8 2.0     All pertinent labs within the past 24 hours have been reviewed.    Significant Imaging: I have reviewed all pertinent imaging results/findings within the past 24 hours.      Assessment/Plan:      * Pneumonia of right lower lobe due to infectious organism  - WBC 13, TMax 101.2°F, HR >90, RR >20, lactic 3.1.  Patient became hypotensive in the ED after receiving 5 mg IV Lopressor for tachycardia.  Blood pressure responded well to IV fluid resuscitation, and remains stable.  - Blood cultures obtained in the ED.  Continue empiric IV vanc and Zosyn.  - 30 mL/kg IV fluids given in the ED.  Continue IV hydration.  - Continue bronchodilators.  - Wean BiPAP as tolerated.  - Check for influenza.  - Follow labs.    Improving, off Charbel gtt  Leukocytosis better  Continue IV abx, steroids    Doing better  All cx ngtd  Cont same abx  Decrease steroids    Repeat CXR clear-- will d/c abc to augmentin    Acute respiratory failure with hypoxia  - Secondary to asthma exacerbation + PNA.    - O2 sat stable on 50% FiO2 BiPAP, wean as tolerated.  Repeat ABG in AM.  - DuoNebs Q 6.  - IV Solu-Medrol 80 mg Q8.  - Empiric antibiotics.  - Will obtain V/Q scan to r/o PE (unable to  perform CTA due to TARA).  - Pulmonology consult.    - Improving, came off BIPAP- Oxygenation improving  Improving        NSTEMI  - Initial troponin of 0.095, likely demand secondary to above.  EKG unrevealing.  Chest pain-free.  - Trend serial cardiac enzymes.  - Continue aspirin and statin.  Beta-blocker on hold to prevent hypotension, resume as BP tolerates.  - Further recs pending clinical course.    Repeat elevated to 1.98  Pt has no CP, likely sec to demand Ischemia from Sepsis, resp failure  Will consult Cards    Demand ischemia- no ACS    Needs NMP scan as out pt    Paroxysmal A-fib with RVR  Converted to NSR with IV Cardizem  Troponin elevated  Cards consulted, pt already on Xarelto    Remains in NSR    Back in afib w RVR-- now on Cardizem and Amiodarone gtt    Electrolyte imbalance        Moderate persistent asthma with exacerbation  - Wean BiPAP as tolerated.  - Continue bronchodilators.  - Continue IV steroids.  - Empiric antibiotics as above.    Sec to Pneumonia- improving      VTE Risk Mitigation (From admission, onward)         Ordered     rivaroxaban tablet 15 mg  With dinner      11/17/19 1054     IP VTE HIGH RISK PATIENT  Once      11/16/19 0430     Reason for No Pharmacological VTE Prophylaxis  Once     Question:  Reasons:  Answer:  Already adequately anticoagulated on oral Anticoagulants    11/16/19 0430     Place sequential compression device  Until discontinued      11/16/19 0430                      Tiffanie Jones MD  Department of Hospital Medicine   [facility]

## 2019-11-18 NOTE — CARE UPDATE
Pt does have red blanchable spot on bridge of nose from Bipap mask, RN aware. Pt off mask at this time.

## 2019-11-18 NOTE — PROGRESS NOTES
"Deterioration Alert: Patient seen and examined. Heart rate bouncing between 110-140's atrial fibrillation. . She states, "I feel great." She is on IV Diltiazem drip at 15 mg/hour. Cardiology stated IV amiodarone drip about 1 hour ago. She is on IV Abx for pneumonia. Dr. Jones notified. ppt    "

## 2019-11-18 NOTE — SUBJECTIVE & OBJECTIVE
Review of Systems   Constitution: Positive for malaise/fatigue.   HENT: Negative.    Eyes: Negative.    Cardiovascular: Positive for dyspnea on exertion.   Respiratory: Positive for shortness of breath and wheezing.    Hematologic/Lymphatic: Negative.    Skin: Negative.    Musculoskeletal: Positive for arthritis and joint pain.   Gastrointestinal: Negative.    Genitourinary: Negative.    Neurological: Positive for weakness.   Psychiatric/Behavioral: Negative.    Allergic/Immunologic: Negative.      Objective:     Vital Signs (Most Recent):  Temp: 97.8 °F (36.6 °C) (11/18/19 0812)  Pulse: (!) 118 (11/18/19 0812)  Resp: (!) 24 (11/18/19 0812)  BP: (!) 149/68 (11/18/19 0812)  SpO2: 98 % (11/18/19 0812) Vital Signs (24h Range):  Temp:  [97.1 °F (36.2 °C)-98.6 °F (37 °C)] 97.8 °F (36.6 °C)  Pulse:  [] 118  Resp:  [14-28] 24  SpO2:  [81 %-99 %] 98 %  BP: (124-202)/() 149/68     Weight: 94.3 kg (207 lb 14.3 oz)  Body mass index is 32.56 kg/m².     SpO2: 98 %  O2 Device (Oxygen Therapy): nasal cannula      Intake/Output Summary (Last 24 hours) at 11/18/2019 1033  Last data filed at 11/18/2019 0700  Gross per 24 hour   Intake 1050 ml   Output 2675 ml   Net -1625 ml       Lines/Drains/Airways     Drain                 Urethral Catheter 11/16/19 0130 Straight-tip 16 Fr. 2 days          Peripheral Intravenous Line                 Peripheral IV - Single Lumen 11/16/19 0058 20 G Right Wrist 2 days         Peripheral IV - Single Lumen 11/16/19 20 G Left Forearm 2 days                Physical Exam   Constitutional: She is oriented to person, place, and time. She appears well-developed and well-nourished. No distress.   Ill appearing  Supplemental O2 in place   HENT:   Head: Normocephalic and atraumatic.   Eyes: Pupils are equal, round, and reactive to light. Right eye exhibits no discharge. Left eye exhibits no discharge.   Neck: Neck supple. No JVD present.   Cardiovascular: S1 normal, S2 normal and normal heart  sounds. An irregularly irregular rhythm present. Tachycardia present.   No murmur heard.  Pulmonary/Chest: Effort normal. No respiratory distress. She has wheezes. She has no rales.   Abdominal: Soft. She exhibits no distension.   Musculoskeletal: She exhibits no edema.   Neurological: She is alert and oriented to person, place, and time.   Skin: Skin is warm and dry. She is not diaphoretic. No erythema.   Psychiatric: She has a normal mood and affect. Her behavior is normal. Thought content normal.   Nursing note and vitals reviewed.      Significant Labs:   CMP   Recent Labs   Lab 11/17/19  0340 11/18/19  0417    145   K 3.3* 3.8    109   CO2 20* 23   * 157*   BUN 28* 34*   CREATININE 1.4 1.8*   CALCIUM 8.4* 9.0   PROT 6.5  --    ALBUMIN 2.9*  --    BILITOT 0.6  --    ALKPHOS 88  --    AST 37  --    ALT 14  --    ANIONGAP 12 13   ESTGFRAFRICA 40* 30*   EGFRNONAA 35* 26*   , CBC   Recent Labs   Lab 11/17/19  0340 11/18/19  0417   WBC 7.13 12.14   HGB 9.5* 10.6*   HCT 30.7* 34.4*    263   , Troponin   Recent Labs   Lab 11/16/19  1259 11/17/19  0810   TROPONINI 0.992* 0.371*    and All pertinent lab results from the last 24 hours have been reviewed.    Significant Imaging: Echocardiogram:   2D echo with color flow doppler:   Results for orders placed or performed during the hospital encounter of 11/16/19   2D echo with color flow doppler   Result Value Ref Range    QEF 60 55 - 65    Diastolic Dysfunction No     Est. PA Systolic Pressure 34.57     Narrative    Date of Procedure: 11/17/2019        TEST DESCRIPTION   Technical Quality: This is a portable study performed at the patient's bedside. This is a technically challenging study. There is poor endocardial definition. This study was performed in conjunction with a 3ml intravenous injection of Optison contrast   agent.     Aorta: The aortic root is normal in size, measuring 2.4 cm at sinotubular junction and 2.7 cm at Sinuses of Valsalva.  The proximal ascending aorta is normal in size, measuring 2.4 cm across.     Left Atrium: The left atrial volume index is normal, measuring 15.17 cc/m2.     Left Ventricle: The left ventricle is normal in size, with an end-diastolic diameter of 3.7 cm, and an end-systolic diameter of 2.0 cm. Wall thickness is mildly increased, with the septum measuring 1.4 cm and the posterior wall measuring 1.3 cm across.   Relative wall thickness was increased at 0.70, and the LV mass index was 100.2 g/m2 consistent with concentric remodeling. There are no regional wall motion abnormalities. Left ventricular systolic function appears normal. Visually estimated ejection   fraction is 60-65%. The LV Doppler derived stroke volume equals 109.0 ccs.     Diastolic indices: E wave velocity 0.9 m/s, E/A ratio 0.8,  msec., E/e' ratio(avg) 9. Diastolic function is normal.     Right Atrium: The right atrium is normal in size.     Right Ventricle: The right ventricle is normal in size. Global right ventricular systolic function appears normal. The estimated PA systolic pressure is greater than 35 mmHg.     Aortic Valve:  Aortic valve is normal in structure with normal leaflet mobility. The peak gradient obtained across the aortic valve is 7 mmHg, with a mean gradient of 5 mmHg. Using a left ventricular outflow tract diameter of 2.1 cm, a left ventricular   outflow tract velocity time integral of 31 cm, and a peak instantaneous transvalvular velocity time integral of 32 cm, the calculated aortic valve area is 3.41 cm2(AVAi is 1.66 cm2/m2).     Mitral Valve:  Mitral valve is normal in structure with normal leaflet mobility. The pressure half time is 54 msec. The calculated mitral valve area is 4.07 cm2.     Tricuspid Valve:  Tricuspid valve is normal in structure with normal leaflet mobility.     Pulmonary Valve:  Pulmonary valve is normal in structure with normal leaflet mobility.     Intracavitary: There is no evidence of pericardial  effusion, intracavity mass, thrombi, or vegetation.         CONCLUSIONS     1 - Concentric remodeling.     2 - No wall motion abnormalities.     3 - Normal left ventricular systolic function (EF 60-65%).     4 - Normal left ventricular diastolic function.     5 - Normal right ventricular systolic function .     6 - The estimated PA systolic pressure is greater than 35 mmHg.             This document has been electronically    SIGNED BY: Rodrigo Yi MD On: 11/17/2019 12:56   , EKG: Reviewed and X-Ray: CXR: X-Ray Chest 1 View (CXR):   Results for orders placed or performed during the hospital encounter of 11/16/19   X-Ray Chest 1 View    Narrative    EXAMINATION:  XR CHEST 1 VIEW    CLINICAL HISTORY:  PNA;    FINDINGS:  Single view of the chest.   Aorta demonstrates atherosclerotic disease.    Cardiac silhouette is normal.  Significant improvement in patchy airspace opacities within the right mid lower lung zone.  No evidence of pleural effusion or pneumothorax.  Bones appear intact.      Impression    Significant improvement in patchy airspace opacities within the right mid lower lung zone.      Electronically signed by: Meng Sharp MD  Date:    11/18/2019  Time:    08:48    and X-Ray Chest PA and Lateral (CXR): No results found for this visit on 11/16/19.

## 2019-11-18 NOTE — PROGRESS NOTES
"No changes in assessment. Son at bedside and pt is total assist with meals at this time due to "shakes " and cant hold on to silverware.   "

## 2019-11-18 NOTE — PROGRESS NOTES
Pt updated on moving to room 205.  Report called at this time.  Pt receiving bath before moving on tele bed.  Pt  Cassandra [phone] called and updated on pt night and moving to a new room.  Pt also missing bottom dentures, pt  stated he will look at the house for them.  Pt has top dentures in but not bottom teeth.  No bottom dentures in ICU room 6.

## 2019-11-18 NOTE — PROGRESS NOTES
EKG done, Given to Ernestine SÁNCHEZ.      Started breathing Tx, Due to tachycardia (HR Reached 170) Treatment stopped. Will Speak to physician about a change in medicine.   HR returned to 97 After Tx was stopped.

## 2019-11-18 NOTE — EICU
Notified of hypoxemia    85 y/o F managed as pneumonia and asthma, afib RVR, now still requiring BiPap.  Still fuid positive since admit despite diuresis. K 3.3 given correction 40 meqs but diuresed 1 liter.    · Ordered another 20 meqs PO K and 1 dose furosemide 40 mg IV

## 2019-11-18 NOTE — PLAN OF CARE
Patient remained free of falls/injury/trauma throughout shift. Patient AAOx3 with some confusion and aphasia. Neuro status unchanged. VSS except HR and Rhythm. Patient remains in A-fib with HR's as high as 150's. Digoxin, Lasix, and Potassium administered per MD orders. PRN Metoprolol also administered. Patient placed on Bipap and remain on Bipap throughout night. No complaints of chest pain or SOB.Patient continues to void per Ratliff catheter. Fall risk precautions reviewed and maintained with patient. POC reviewed with patient. Patient verbalized understanding. Will continue to monitor.

## 2019-11-18 NOTE — ASSESSMENT & PLAN NOTE
-No known history of AFIB/AFL but on Xarelto as OP  -Received IV Lopressor x 2 dose in ED and IV CCB with conversion to SR yesterday  -Increase Cardizem to 90 mg TID for better rate control  -NO BB for now given wheezing on exam today  -ECHO pending    11/18/19  -Converted back to afib with RVR this AM  -Start cardizem gtt, titrate as needed  -Continue Xarelto for CVA prophylaxis

## 2019-11-18 NOTE — SIGNIFICANT EVENT
Patient seen and examined in response to a deterioration alert with Dr. Jones. She was noted to be in atrial fibrillation with mild RVR, but reports overall improvement in symptoms. Plan to monitor closely at current level of care.

## 2019-11-18 NOTE — PT/OT/SLP EVAL
Occupational Therapy   Evaluation    Name: Ana Castañeda  MRN: [id number]  Admitting Diagnosis:  Pneumonia of right lower lobe due to infectious organism      Recommendations:     Discharge Recommendations: home with home health  Discharge Equipment Recommendations:  none  Barriers to discharge:       Assessment:     Ana Castañeda is a 84 y.o. female with a medical diagnosis of Pneumonia of right lower lobe due to infectious organism.  She presents with impaired functional mobility and ADLs. Performance deficits affecting function: weakness, impaired endurance, impaired self care skills, impaired functional mobilty, decreased coordination, edema, decreased safety awareness, decreased lower extremity function, impaired balance, gait instability, decreased upper extremity function.      Rehab Prognosis: Fair; patient would benefit from acute skilled OT services to address these deficits and reach maximum level of function.       Plan:     Patient to be seen 3 x/week to address the above listed problems via self-care/home management, therapeutic activities, therapeutic exercises  · Plan of Care Expires: 11/25/19  · Plan of Care Reviewed with: patient, spouse    Subjective     Chief Complaint: weakness, SOB  Patient/Family Comments/goals: to get stronger    Occupational Profile:  Living Environment: lives with  in 1 story house with no steps  Previous level of function: Assist with ADLs and Assist with WC t/f using RW  Equipment Used at Home:  walker, rolling, wheelchair, bedside commode, grab bar  Assistance upon Discharge: family    Pain/Comfort:  · Pain Rating 1: 0/10    Patients cultural, spiritual, Christian conflicts given the current situation: no    Objective:     Communicated with: Nurse Sinha and epic chart review prior to session.  Patient found supine with peripheral IV, telemetry, stinson catheter, oxygen upon OT entry to room.    General Precautions: Standard, fall   Orthopedic Precautions:N/A   Braces:  N/A     Occupational Performance:    Bed Mobility:    · Patient completed Rolling/Turning to Left with  maximal assistance and 2 persons  · Patient completed Rolling/Turning to Right with maximal assistance and 2 persons  · Patient completed Scooting/Bridging with maximal assistance  · Patient completed Supine to Sit with maximal assistance and 2 persons  · Patient completed Sit to Supine with maximal assistance and 2 persons    Functional Mobility/Transfers:  · Patient completed Sit <> Stand Transfer with total assistance and unable to unweight bottom  with  rolling walker    · Pt very fearful of falling and refused to t/f to chair  · Functional Mobility: Max A x 2    Activities of Daily Living:  · Upper Body Dressing: moderate assistance .  · Lower Body Dressing: total assistance .  · Toileting: total assistance shantell    Cognitive/Visual Perceptual:  Cognitive/Psychosocial Skills:     -       Oriented to: Person, Place, Time and Situation   -       Follows Commands/attention:Follows two-step commands  -       Communication: clear/fluent  -       Memory: No Deficits noted  -       Safety awareness/insight to disability: impaired   Visual/Perceptual:      -Intact .    Physical Exam:  Postural examination/scapula alignment:    -       Rounded shoulders  -       Forward head  Dominant hand:    -       right  Upper Extremity Range of Motion:     -       Right Upper Extremity: Deficits: decreased shoulders  -       Left Upper Extremity: Deficits: decreased at shoulders  Upper Extremity Strength:    -       Right Upper Extremity: 2+/5 grossly  -       Left Upper Extremity: 2+/5 grossly   Strength:    -       Right Upper Extremity: WFL  -       Left Upper Extremity: WFL    AMPAC 6 Click ADL:  AMPAC Total Score: 12    Treatment & Education:  OT eval completed as listed above. Pt educated in role of OT and POC.   Education:    Patient left with bed in chair position with all lines intact, call button in reach, nurse  Ernestine notified and  and family present    GOALS:   Multidisciplinary Problems     Occupational Therapy Goals        Problem: Occupational Therapy Goal    Goal Priority Disciplines Outcome Interventions   Occupational Therapy Goal     OT, PT/OT     Description:  LTGs to be met by 11/25/19   1. Pt will perform LE dressing with Max A  2. Pt will perform BSC t/f with Max A  3. Pt will perform (B) UE ROM exercises 1 x 20 reps                    History:     Past Medical History:   Diagnosis Date    Anticoagulant long-term use     Asthma     Debility     Gait apraxia     Gout     Hypertension     NPH (normal pressure hydrocephalus)     Stroke     Thyroid disease        Past Surgical History:   Procedure Laterality Date    CAROTID ENDARTERECTOMY         Time Tracking:     OT Date of Treatment: 11/18/19  OT Start Time: 0935  OT Stop Time: 0950  OT Total Time (min): 15 min    Billable Minutes:Evaluation 15 min    Karen Sutherland, PT/OT  11/18/2019

## 2019-11-18 NOTE — PROGRESS NOTES
Vancomycin Brief Progress Note    Vancomycin TRUE trough was collected @ 0417 this AM = 18.2 mcg/ml (therapeutic level)  Vancomycin dose was then administered @ 0505    By mistake, another vancomycin level was collected @ 0624 while the vancomycin was still infusing = 68.3 mcg/ml --> this is an ERROR & does not represent a trough or a peak because the vancomycin dose was actually still infusing into the patient    Thank you for allowing us to participate in this patient's care.   Katherine McArdle, Pharm.D. 11/18/2019 7:54 AM

## 2019-11-18 NOTE — PROGRESS NOTES
Care assumed. Chart and lab reviewed. Skin assessment done. POC with Oleg and family at bedside. Denies pain. VSS. Pt with wheezes and SOB noted with any exertion.

## 2019-11-18 NOTE — HOSPITAL COURSE
11/18/19-Patient seen and examined today, resting in bed. Still SOB with wheezing but states she has improved. No chest pain. Converted back to afib with RVR this AM during PT/OT. Labs reviewed, creatinine 1.8.    11/19/19-Patient seen and examined today, lying in bed. Feels more SOB and congested this AM. Denies chest pain. Converted to SR overnight on amio and cardizem drips. Labs reviewed. Creatinine stable.    11/20/19-Patient seen and examined today, sitting up in bed. Feeling better today. SOB slowly improving. No cardiac complaints. Remains in SR.     11/21/19-Patient seen and examined today. More SOB this AM with wheezing. Placed on Bipap.     11/22/19-Patient seen and examined today, sitting up in bed. Feeling better. Still SOB with intermittent wheezing. No chest pain. Converted to SR on amio gtt, will switch to po. Labs reviewed, stable. EKG reviewed, marked ST depression noted, QTc prolonged.

## 2019-11-18 NOTE — PLAN OF CARE
Patient's HR remains uncontrolled on maximum cardizem gtt.     Discussed with Dr. Mott. Will start amiodarone gtt and assess response. QTc interval normal on this AM's EKG. Will d/c escitalopram, discussed finding an alternative with Dr. Jones, [facility] medicine.

## 2019-11-18 NOTE — PROGRESS NOTES
Sat pt on side of bed with assist with another nurse. Pt very weak and shaky. RR increased.. Pt sat upright for about 1min before she had to lie back down pt HR '180's. cardizem and lopressor IV given before controlled rate in 70's achieved. Pt stated she doesn't stand much at home. Oleg Rodriguez at bedside.

## 2019-11-18 NOTE — CONSULTS
Consulted on this 85 y/o F patient due to present on admission IAD to perineum. Patient is awake and alert, denies pain. Turned to left side with assistance. Mild IAD is noted to perineum and gluteal regions with moist shiny blanchable redness. Cleansed with bath wipes and thin layer critic aid clear moisture barrier applied. Patient positioned on left side with wedge. Please see below for wound care recommendations:    IAD:  1. Cleanse with bath wipes gently  2. Pat dry  3. Apply thin layer critic aid clear moisture barrier BID  4. Limit the amount of linen/underpad between patient and mattress surface to ONE fitted sheet and ONE covidien incontinence pad - NO DIAPERS  5. Please ensure that patient is repositioned at least every 2 hours with the actual position of the patient documented in EPIC flow sheet.

## 2019-11-18 NOTE — PT/OT/SLP EVAL
Physical Therapy Evaluation    Patient Name:  Ana Castañeda   MRN:  [id number]    Recommendations:     Discharge Recommendations:  home with home health   Discharge Equipment Recommendations: none   Barriers to discharge: None    Assessment:     Ana Castañeda is a 84 y.o. female admitted with a medical diagnosis of Pneumonia of right lower lobe due to infectious organism.  She presents with the following impairments/functional limitations:  weakness, impaired endurance, impaired self care skills, impaired functional mobilty, decreased coordination, impaired balance, gait instability, decreased upper extremity function, decreased lower extremity function, decreased safety awareness, edema.    Rehab Prognosis: Fair; patient would benefit from acute skilled PT services to address these deficits and reach maximum level of function.    Recent Surgery: * No surgery found *      Plan:     During this hospitalization, patient to be seen 5 x/week to address the identified rehab impairments via gait training, therapeutic activities, therapeutic exercises and progress toward the following goals:    · Plan of Care Expires:  11/25/19    Subjective     Chief Complaint: weakness/ SOB  Patient/Family Comments/goals: to get stronger  Pain/Comfort:  · Pain Rating 1: 0/10    Patients cultural, spiritual, Yazidism conflicts given the current situation: no    Living Environment:  Lives with  in 1 story house with no steps. Pt's  and daughter assist pt with all needs.  Prior to admission, patients level of function was Assist with all ADLs and t/fs to WC; does not drive.  Equipment used at home: walker, rolling, wheelchair, bedside commode, grab bar.  DME owned (not currently used): none.  Upon discharge, patient will have assistance from  and daughter.    Objective:     Communicated with Nurse Sinha and epic chart review prior to session.  Patient found supine with peripheral IV, telemetry, oxygen, stinson catheter   upon PT entry to room.    General Precautions: Standard, fall   Orthopedic Precautions:N/A   Braces: N/A     Exams:  · Cognitive Exam:  Patient is oriented to Person, Place, Time and Situation  · Postural Exam:  Patient presented with the following abnormalities:    · -       Rounded shoulders  · -       Forward head  · RLE ROM: WFL  · RLE Strength: 2+/5 grossly  · LLE ROM: WFL  · LLE Strength: 2+/5 grossly    Functional Mobility:  · Bed Mobility:     · Rolling Left:  maximal assistance and of 2 persons  · Rolling Right: maximal assistance and of 2 persons  · Scooting: maximal assistance  · Supine to Sit: maximal assistance and of 2 persons  · Sit to Supine: maximal assistance and of 2 persons  · Transfers:     · Sit to Stand:  total assistance, of 2 persons and unable to unweight bottom with rolling walker  · Gait: unable to assess  · Balance: Fair sitting balance      Therapeutic Activities and Exercises:   PT eval completed as listed above. Pt educated in role of PT and POC.     AM-PAC 6 CLICK MOBILITY  Total Score:8     Patient left with bed in chair position with all lines intact, call button in reach, Nurse Ernestine notified and family present.    GOALS:   Multidisciplinary Problems     Physical Therapy Goals        Problem: Physical Therapy Goal    Goal Priority Disciplines Outcome Goal Variances Interventions   Physical Therapy Goal     PT, PT/OT      Description:  LTGs to be met by 11/25/19  1. Pt will perform bed mobility with Mod A  2. Pt will perform sit<>stand functional t/fs with Max A  3. Pt will ambulate ~5ft using RW with Max A  4. Pt will perform (B) LE therapeutic exercises 1 x 15 reps                    History:     Past Medical History:   Diagnosis Date    Anticoagulant long-term use     Asthma     Debility     Gait apraxia     Gout     Hypertension     NPH (normal pressure hydrocephalus)     Stroke     Thyroid disease        Past Surgical History:   Procedure Laterality Date    CAROTID  ENDARTERECTOMY         Time Tracking:     PT Received On: 11/18/19  PT Start Time: 0950     PT Stop Time: 1005  PT Total Time (min): 15 min     Billable Minutes: Evaluation 15 min      Karen Sutherland, PT/OT  11/18/2019

## 2019-11-18 NOTE — PROGRESS NOTES
[facility]  Cardiology  Progress Note    Patient Name: Ana Castañeda  MRN: [id number]  Admission Date: 11/16/2019  Hospital Length of Stay: 2 days  Code Status: Full Code   Attending Physician: Tiffanie Jones MD   Primary Care Physician: Stephen Tarango MD  Expected Discharge Date:   Principal Problem:Pneumonia of right lower lobe due to infectious organism    Subjective:   HPI:  Ana Castañeda is a 84 year old female who presented to [facility] due to shortness of breath which progressively worsened prior to arrival. She reported associated cough with mucus, wheezing and congestion for 3 days. Per H&P, patient was seen at [facility] on 11/13 and diagnosed with acute bronchitis and received IM Steroid shot and PO ABX. Her chronic medical conditions include Asthma, HTN, H/O CVA with right sided weakness, H/O CEA, Normal pressure hydrocephalus, hypothyroidism, gout, debility (wheelchair bound), and chronic AC on Xarelto (unknown reason). ED workup revealed Cr 1.4, BUN 25, Troponin 0.095>1.983>.992,, LA 3.1, Procal 0.15. She was placed on Bipap in ED for shortness of breath and increased work of breathing. CXR revealed RLL infiltrate. She received IV lopressor 5 mg x 2 dose in ED and IV lasix 40 mg x 1 dose with some improvement in HR control to 80s. She received IV Solumedrol, Duonebs, and IVF resuscitation in ED along with IV ABX. She was admitted to ICU under the care of hospital medicine. Cardiology consulted to assist with management of AFIB/RVR, NSTEMI. Chart reviewed, Patient seen and examined in ICU. Repeat troponin, EKG, and ECHO pending. She is followed by Dr. Christiansen as OP Cardiology but has not seen him in quite some time. She does report a vague history of AFIB and was started on Xarelto at that time in 2017. Denies chest pain or anginal equivalents. She continues to have some shortness of breath and wheezing on exam today. She is wheelchair bound at home but can walk very short  distances. NO leg swelling on exam today. Will optimize her medical therapy today. ECHO pending. Would benefit from OP MPI stress test once pulmonary status optimized after discharge. Further recs in AM    Hospital Course:   11/18/19-Patient seen and examined today, resting in bed. Still SOB with wheezing but states she has improved. No chest pain. Converted back to afib with RVR this AM during PT/OT. Labs reviewed, creatinine 1.8. 2D echo showed normal EF.        Review of Systems   Constitution: Positive for malaise/fatigue.   HENT: Negative.    Eyes: Negative.    Cardiovascular: Positive for dyspnea on exertion.   Respiratory: Positive for shortness of breath and wheezing.    Hematologic/Lymphatic: Negative.    Skin: Negative.    Musculoskeletal: Positive for arthritis and joint pain.   Gastrointestinal: Negative.    Genitourinary: Negative.    Neurological: Positive for weakness.   Psychiatric/Behavioral: Negative.    Allergic/Immunologic: Negative.      Objective:     Vital Signs (Most Recent):  Temp: 97.8 °F (36.6 °C) (11/18/19 0812)  Pulse: (!) 118 (11/18/19 0812)  Resp: (!) 24 (11/18/19 0812)  BP: (!) 149/68 (11/18/19 0812)  SpO2: 98 % (11/18/19 0812) Vital Signs (24h Range):  Temp:  [97.1 °F (36.2 °C)-98.6 °F (37 °C)] 97.8 °F (36.6 °C)  Pulse:  [] 118  Resp:  [14-28] 24  SpO2:  [81 %-99 %] 98 %  BP: (124-202)/() 149/68     Weight: 94.3 kg (207 lb 14.3 oz)  Body mass index is 32.56 kg/m².     SpO2: 98 %  O2 Device (Oxygen Therapy): nasal cannula      Intake/Output Summary (Last 24 hours) at 11/18/2019 1033  Last data filed at 11/18/2019 0700  Gross per 24 hour   Intake 1050 ml   Output 2675 ml   Net -1625 ml       Lines/Drains/Airways     Drain                 Urethral Catheter 11/16/19 0130 Straight-tip 16 Fr. 2 days          Peripheral Intravenous Line                 Peripheral IV - Single Lumen 11/16/19 0058 20 G Right Wrist 2 days         Peripheral IV - Single Lumen 11/16/19 20 G Left  Forearm 2 days                Physical Exam   Constitutional: She is oriented to person, place, and time. She appears well-developed and well-nourished. No distress.   Ill appearing  Supplemental O2 in place   HENT:   Head: Normocephalic and atraumatic.   Eyes: Pupils are equal, round, and reactive to light. Right eye exhibits no discharge. Left eye exhibits no discharge.   Neck: Neck supple. No JVD present.   Cardiovascular: S1 normal, S2 normal and normal heart sounds. An irregularly irregular rhythm present. Tachycardia present.   No murmur heard.  Pulmonary/Chest: Effort normal. No respiratory distress. She has wheezes. She has no rales.   Abdominal: Soft. She exhibits no distension.   Musculoskeletal: She exhibits no edema.   Neurological: She is alert and oriented to person, place, and time.   Skin: Skin is warm and dry. She is not diaphoretic. No erythema.   Psychiatric: She has a normal mood and affect. Her behavior is normal. Thought content normal.   Nursing note and vitals reviewed.      Significant Labs:   CMP   Recent Labs   Lab 11/17/19  0340 11/18/19  0417    145   K 3.3* 3.8    109   CO2 20* 23   * 157*   BUN 28* 34*   CREATININE 1.4 1.8*   CALCIUM 8.4* 9.0   PROT 6.5  --    ALBUMIN 2.9*  --    BILITOT 0.6  --    ALKPHOS 88  --    AST 37  --    ALT 14  --    ANIONGAP 12 13   ESTGFRAFRICA 40* 30*   EGFRNONAA 35* 26*   , CBC   Recent Labs   Lab 11/17/19  0340 11/18/19  0417   WBC 7.13 12.14   HGB 9.5* 10.6*   HCT 30.7* 34.4*    263   , Troponin   Recent Labs   Lab 11/16/19  1259 11/17/19  0810   TROPONINI 0.992* 0.371*    and All pertinent lab results from the last 24 hours have been reviewed.    Significant Imaging: Echocardiogram:   2D echo with color flow doppler:   Results for orders placed or performed during the hospital encounter of 11/16/19   2D echo with color flow doppler   Result Value Ref Range    QEF 60 55 - 65    Diastolic Dysfunction No     Est. PA Systolic  Pressure 34.57     Narrative    Date of Procedure: 11/17/2019        TEST DESCRIPTION   Technical Quality: This is a portable study performed at the patient's bedside. This is a technically challenging study. There is poor endocardial definition. This study was performed in conjunction with a 3ml intravenous injection of Optison contrast   agent.     Aorta: The aortic root is normal in size, measuring 2.4 cm at sinotubular junction and 2.7 cm at Sinuses of Valsalva. The proximal ascending aorta is normal in size, measuring 2.4 cm across.     Left Atrium: The left atrial volume index is normal, measuring 15.17 cc/m2.     Left Ventricle: The left ventricle is normal in size, with an end-diastolic diameter of 3.7 cm, and an end-systolic diameter of 2.0 cm. Wall thickness is mildly increased, with the septum measuring 1.4 cm and the posterior wall measuring 1.3 cm across.   Relative wall thickness was increased at 0.70, and the LV mass index was 100.2 g/m2 consistent with concentric remodeling. There are no regional wall motion abnormalities. Left ventricular systolic function appears normal. Visually estimated ejection   fraction is 60-65%. The LV Doppler derived stroke volume equals 109.0 ccs.     Diastolic indices: E wave velocity 0.9 m/s, E/A ratio 0.8,  msec., E/e' ratio(avg) 9. Diastolic function is normal.     Right Atrium: The right atrium is normal in size.     Right Ventricle: The right ventricle is normal in size. Global right ventricular systolic function appears normal. The estimated PA systolic pressure is greater than 35 mmHg.     Aortic Valve:  Aortic valve is normal in structure with normal leaflet mobility. The peak gradient obtained across the aortic valve is 7 mmHg, with a mean gradient of 5 mmHg. Using a left ventricular outflow tract diameter of 2.1 cm, a left ventricular   outflow tract velocity time integral of 31 cm, and a peak instantaneous transvalvular velocity time integral of 32 cm,  the calculated aortic valve area is 3.41 cm2(AVAi is 1.66 cm2/m2).     Mitral Valve:  Mitral valve is normal in structure with normal leaflet mobility. The pressure half time is 54 msec. The calculated mitral valve area is 4.07 cm2.     Tricuspid Valve:  Tricuspid valve is normal in structure with normal leaflet mobility.     Pulmonary Valve:  Pulmonary valve is normal in structure with normal leaflet mobility.     Intracavitary: There is no evidence of pericardial effusion, intracavity mass, thrombi, or vegetation.         CONCLUSIONS     1 - Concentric remodeling.     2 - No wall motion abnormalities.     3 - Normal left ventricular systolic function (EF 60-65%).     4 - Normal left ventricular diastolic function.     5 - Normal right ventricular systolic function .     6 - The estimated PA systolic pressure is greater than 35 mmHg.             This document has been electronically    SIGNED BY: Rodrigo Yi MD On: 11/17/2019 12:56   , EKG: Reviewed and X-Ray: CXR: X-Ray Chest 1 View (CXR):   Results for orders placed or performed during the hospital encounter of 11/16/19   X-Ray Chest 1 View    Narrative    EXAMINATION:  XR CHEST 1 VIEW    CLINICAL HISTORY:  PNA;    FINDINGS:  Single view of the chest.   Aorta demonstrates atherosclerotic disease.    Cardiac silhouette is normal.  Significant improvement in patchy airspace opacities within the right mid lower lung zone.  No evidence of pleural effusion or pneumothorax.  Bones appear intact.      Impression    Significant improvement in patchy airspace opacities within the right mid lower lung zone.      Electronically signed by: Meng Sharp MD  Date:    11/18/2019  Time:    08:48    and X-Ray Chest PA and Lateral (CXR): No results found for this visit on 11/16/19.    Assessment and Plan:   Patient who presents with afib with RVR and elevated troponin in setting of PNA. HR uncontrolled this AM. Cardizem gtt started, assess response. Continue other meds/mgmt/abx.  Will reassess in AM.    * Pneumonia of right lower lobe due to infectious organism  -On ABX, per primary team    Essential hypertension  -Continue same meds  -Monitor    Paroxysmal A-fib with RVR  -No known history of AFIB/AFL but on Xarelto as OP  -Received IV Lopressor x 2 dose in ED and IV CCB with conversion to SR yesterday  -Increase Cardizem to 90 mg TID for better rate control  -NO BB for now given wheezing on exam today  -ECHO pending    11/18/19  -Converted back to afib with RVR this AM  -Start cardizem gtt, titrate as needed  -Continue Xarelto for CVA prophylaxis       NSTEMI  -Admitted due to shortness of breath, AFIB/RVR  -Troponin 0.95>1.983>.992  -Repeat Troponin 0.371  -Likely due to demand ischemia from AFIB/RVR and sepsis  -ECHO pending  -Increase CCB to 90 mg TID for better rate control today  -NO BB given wheezing on exam today  -Continue ASA, Statin, CCB, Xarelto  -Start Losartan for better BP control.  -Would benefit from OP MPI stress test once pulmonary status optimized after discharge.   -Reassess in AM    11/18/19  -Stable overnight, remains chest pain free  -2D echo showed normal EF  -Continue ASA, statin, CCB, ARB, Xarelto  -No BB given wheezing  -OP MPI stress test    Moderate persistent asthma with exacerbation  -Mgmt per primary team    Acute respiratory failure with hypoxia  -Secondary to PNA  -Continue mgmt per primary team, on abx        VTE Risk Mitigation (From admission, onward)         Ordered     rivaroxaban tablet 15 mg  With dinner      11/17/19 1054     IP VTE HIGH RISK PATIENT  Once      11/16/19 0430     Reason for No Pharmacological VTE Prophylaxis  Once     Question:  Reasons:  Answer:  Already adequately anticoagulated on oral Anticoagulants    11/16/19 0430     Place sequential compression device  Until discontinued      11/16/19 0430                Susan Alcocer PA-C  Cardiology  [facility]

## 2019-11-19 LAB
ANION GAP SERPL CALC-SCNC: 12 MMOL/L (ref 8–16)
BUN SERPL-MCNC: 32 MG/DL (ref 8–23)
CALCIUM SERPL-MCNC: 8.9 MG/DL (ref 8.7–10.5)
CHLORIDE SERPL-SCNC: 109 MMOL/L (ref 95–110)
CO2 SERPL-SCNC: 23 MMOL/L (ref 23–29)
CREAT SERPL-MCNC: 1.2 MG/DL (ref 0.5–1.4)
EST. GFR  (AFRICAN AMERICAN): 49 ML/MIN/1.73 M^2
EST. GFR  (NON AFRICAN AMERICAN): 42 ML/MIN/1.73 M^2
GLUCOSE SERPL-MCNC: 142 MG/DL (ref 70–110)
POCT GLUCOSE: 103 MG/DL (ref 70–110)
POCT GLUCOSE: 125 MG/DL (ref 70–110)
POCT GLUCOSE: 125 MG/DL (ref 70–110)
POCT GLUCOSE: 141 MG/DL (ref 70–110)
POTASSIUM SERPL-SCNC: 4 MMOL/L (ref 3.5–5.1)
SODIUM SERPL-SCNC: 144 MMOL/L (ref 136–145)
VANCOMYCIN SERPL-MCNC: 17.4 UG/ML

## 2019-11-19 PROCEDURE — 93010 ELECTROCARDIOGRAM REPORT: CPT | Mod: ,,, | Performed by: INTERNAL MEDICINE

## 2019-11-19 PROCEDURE — 99231 SBSQ HOSP IP/OBS SF/LOW 25: CPT | Mod: ,,, | Performed by: INTERNAL MEDICINE

## 2019-11-19 PROCEDURE — 93005 ELECTROCARDIOGRAM TRACING: CPT

## 2019-11-19 PROCEDURE — 36415 COLL VENOUS BLD VENIPUNCTURE: CPT

## 2019-11-19 PROCEDURE — 25000003 PHARM REV CODE 250: Performed by: NURSE PRACTITIONER

## 2019-11-19 PROCEDURE — 63600175 PHARM REV CODE 636 W HCPCS: Performed by: PHYSICIAN ASSISTANT

## 2019-11-19 PROCEDURE — 80202 ASSAY OF VANCOMYCIN: CPT

## 2019-11-19 PROCEDURE — 25000003 PHARM REV CODE 250: Performed by: EMERGENCY MEDICINE

## 2019-11-19 PROCEDURE — 97530 THERAPEUTIC ACTIVITIES: CPT

## 2019-11-19 PROCEDURE — 27000221 HC OXYGEN, UP TO 24 HOURS

## 2019-11-19 PROCEDURE — 97530 THERAPEUTIC ACTIVITIES: CPT | Performed by: PHYSICAL THERAPIST

## 2019-11-19 PROCEDURE — 21400001 HC TELEMETRY ROOM

## 2019-11-19 PROCEDURE — 93010 EKG 12-LEAD: ICD-10-PCS | Mod: ,,, | Performed by: INTERNAL MEDICINE

## 2019-11-19 PROCEDURE — 99232 PR SUBSEQUENT HOSPITAL CARE,LEVL II: ICD-10-PCS | Mod: ,,, | Performed by: INTERNAL MEDICINE

## 2019-11-19 PROCEDURE — 99232 SBSQ HOSP IP/OBS MODERATE 35: CPT | Mod: ,,, | Performed by: INTERNAL MEDICINE

## 2019-11-19 PROCEDURE — 99231 PR SUBSEQUENT HOSPITAL CARE,LEVL I: ICD-10-PCS | Mod: ,,, | Performed by: INTERNAL MEDICINE

## 2019-11-19 PROCEDURE — 80048 BASIC METABOLIC PNL TOTAL CA: CPT

## 2019-11-19 PROCEDURE — 94761 N-INVAS EAR/PLS OXIMETRY MLT: CPT

## 2019-11-19 PROCEDURE — 63600175 PHARM REV CODE 636 W HCPCS: Performed by: EMERGENCY MEDICINE

## 2019-11-19 PROCEDURE — 97535 SELF CARE MNGMENT TRAINING: CPT

## 2019-11-19 RX ORDER — HYDRALAZINE HYDROCHLORIDE 20 MG/ML
10 INJECTION INTRAMUSCULAR; INTRAVENOUS EVERY 8 HOURS PRN
Status: DISCONTINUED | OUTPATIENT
Start: 2019-11-20 | End: 2019-11-25 | Stop reason: HOSPADM

## 2019-11-19 RX ORDER — DILTIAZEM HYDROCHLORIDE 180 MG/1
180 CAPSULE, COATED, EXTENDED RELEASE ORAL DAILY
Status: DISCONTINUED | OUTPATIENT
Start: 2019-11-19 | End: 2019-11-21

## 2019-11-19 RX ORDER — AMIODARONE HYDROCHLORIDE 200 MG/1
200 TABLET ORAL 2 TIMES DAILY
Status: DISCONTINUED | OUTPATIENT
Start: 2019-11-19 | End: 2019-11-21

## 2019-11-19 RX ADMIN — RIVAROXABAN 15 MG: 15 TABLET, FILM COATED ORAL at 06:11

## 2019-11-19 RX ADMIN — AMOXICILLIN AND CLAVULANATE POTASSIUM 500 MG: 500; 125 TABLET, FILM COATED ORAL at 09:11

## 2019-11-19 RX ADMIN — AMIODARONE HYDROCHLORIDE 200 MG: 200 TABLET ORAL at 08:11

## 2019-11-19 RX ADMIN — AMOXICILLIN AND CLAVULANATE POTASSIUM 500 MG: 500; 125 TABLET, FILM COATED ORAL at 08:11

## 2019-11-19 RX ADMIN — ASPIRIN 81 MG: 81 TABLET, COATED ORAL at 09:11

## 2019-11-19 RX ADMIN — AMIODARONE HYDROCHLORIDE 200 MG: 200 TABLET ORAL at 01:11

## 2019-11-19 RX ADMIN — PREDNISONE 10 MG: 10 TABLET ORAL at 08:11

## 2019-11-19 RX ADMIN — HYDRALAZINE HYDROCHLORIDE 10 MG: 20 INJECTION INTRAMUSCULAR; INTRAVENOUS at 11:11

## 2019-11-19 RX ADMIN — AMIODARONE HYDROCHLORIDE 0.5 MG/MIN: 1.8 INJECTION, SOLUTION INTRAVENOUS at 01:11

## 2019-11-19 RX ADMIN — PREDNISONE 10 MG: 10 TABLET ORAL at 09:11

## 2019-11-19 RX ADMIN — ATORVASTATIN CALCIUM 20 MG: 10 TABLET, FILM COATED ORAL at 09:11

## 2019-11-19 RX ADMIN — PANTOPRAZOLE SODIUM 40 MG: 40 TABLET, DELAYED RELEASE ORAL at 09:11

## 2019-11-19 RX ADMIN — DILTIAZEM HYDROCHLORIDE 180 MG: 180 CAPSULE, COATED, EXTENDED RELEASE ORAL at 01:11

## 2019-11-19 RX ADMIN — LEVOTHYROXINE SODIUM 112 MCG: 112 TABLET ORAL at 06:11

## 2019-11-19 NOTE — PT/OT/SLP PROGRESS
"Occupational Therapy  Treatment    Ana Castañeda   MRN: [id number]   Admitting Diagnosis: Pneumonia of right lower lobe due to infectious organism    OT Date of Treatment: 11/19/19   OT Start Time: 0950  OT Stop Time: 1015  OT Total Time (min): 25 min    Billable Minutes:  Therapeutic Activity 25 minutes    General Precautions: Standard, fall  Orthopedic Precautions: N/A  Braces: N/A         Subjective:  Communicated with nurse and epic chart review prior to session.    Pain/Comfort  Pain Rating 1: 0/10    Objective:  Patient found with: telemetry, peripheral IV, oxygen, stinson catheter     Functional Mobility:  Bed Mobility:  Max a     Transfers:   max a x2 with bed> bed side chair t/f's    Functional Ambulation: na    Activities of Daily Living:     Feeding adaptive equipment: na     UE adaptive equipment: na     LE adaptive equipment: na                    Bathing adaptive equipment:na  Balance:   Static Sit: FAIR+: Able to take MINIMAL challenges from all directions  Dynamic Sit: FAIR: Cannot move trunk without losing balance  Static Stand: poor-  Dynamic stand: poor    Therapeutic Activities and Exercises:      AM-PAC 6 CLICK ADL   How much help from another person does this patient currently need?   1 = Unable, Total/Dependent Assistance  2 = A lot, Maximum/Moderate Assistance  3 = A little, Minimum/Contact Guard/Supervision  4 = None, Modified [place]/Independent    Putting on and taking off regular lower body clothing? : 2  Bathing (including washing, rinsing, drying)?: 2  Toileting, which includes using toilet, bedpan, or urinal? : 2  Putting on and taking off regular upper body clothing?: 2  Taking care of personal grooming such as brushing teeth?: 3  Eating meals?: 3  Daily Activity Total Score: 14     AM-PAC Raw Score CMS "G-Code Modifier Level of Impairment Assistance   6 % Total / Unable   7 - 8 CM 80 - 100% Maximal Assist   9-13 CL 60 - 80% Moderate Assist   14 - 19 CK 40 - 60% Moderate Assist "   20 - 22 CJ 20 - 40% Minimal Assist   23 CI 1-20% SBA / CGA   24 CH 0% Independent/ Mod I       Patient left up in chair with all lines intact, call button in reach, chair alarm on, nurse notified and family present    ASSESSMENT:  Ana Castañeda is a 84 y.o. female with a medical diagnosis of Pneumonia of right lower lobe due to infectious organism and presents with debility and generalized weakness. Pt will continue to benefit from skilled OT.    Rehab identified problem list/impairments: Rehab identified problem list/impairments: weakness, impaired self care skills, impaired balance, decreased safety awareness, impaired endurance, impaired functional mobilty, decreased upper extremity function, gait instability, decreased lower extremity function    Rehab potential is good.    Activity tolerance: Good    Discharge recommendations: Discharge Facility/Level of Care Needs: home health OT(with 24 hour care with  ot)     Barriers to discharge: Barriers to Discharge: None    Equipment recommendations: none     GOALS:   Multidisciplinary Problems     Occupational Therapy Goals        Problem: Occupational Therapy Goal    Goal Priority Disciplines Outcome Interventions   Occupational Therapy Goal     OT, PT/OT Ongoing, Progressing    Description:  LTGs to be met by 11/25/19   1. Pt will perform LE dressing with Max A  2. Pt will perform BSC t/f with Max A  3. Pt will perform (B) UE ROM exercises 1 x 20 reps                    Plan:  Patient to be seen 3 x/week to address the above listed problems via self-care/home management, therapeutic activities, therapeutic exercises  Plan of Care expires:    Plan of Care reviewed with: patient         Carla Rojaszier, OT  11/19/2019

## 2019-11-19 NOTE — PT/OT/SLP PROGRESS
Physical Therapy  Treatment    Ana Castañeda   MRN: [id number]   Admitting Diagnosis: Pneumonia of right lower lobe due to infectious organism    PT Received On: 11/19/19  PT Start Time: 1023     PT Stop Time: 1050    PT Total Time (min): 27 min       Billable Minutes:  Therapeutic Activity 27 min    Treatment Type: Treatment  PT/PTA: PT     PTA Visit Number: 0       General Precautions: Standard, fall  Orthopedic Precautions: N/A   Braces: N/A    Spiritual, Cultural Beliefs, Church Practices, Values that Affect Care: no    Subjective:  Communicated with Nurse Floyd and [facility] chart review prior to session.  Pt found supine in bed and agreeable to tx at this time.     Pain/Comfort  Pain Rating 1: 0/10    Objective:   Patient found with: telemetry, peripheral IV, oxygen    Functional Mobility:  Therapeutic Activities and Exercises:  Pt performed (L)<>(R) Rolling with Max A x 2 in order to be cleaned and changed with Total A. Pt performed supine>sit with Max A x 2 and extended time to complete, scooted to EOB with CGA, sit>stand using RW with Mod A x2, ambulated 2 steps toward chair with Mod A and verbal cues, t/f to with Max A x 2 using RW.      AM-PAC 6 CLICK MOBILITY  How much help from another person does this patient currently need?   1 = Unable, Total/Dependent Assistance  2 = A lot, Maximum/Moderate Assistance  3 = A little, Minimum/Contact Guard/Supervision  4 = None, Modified [place]/Independent    Turning over in bed (including adjusting bedclothes, sheets and blankets)?: 2  Sitting down on and standing up from a chair with arms (e.g., wheelchair, bedside commode, etc.): 2  Moving from lying on back to sitting on the side of the bed?: 2  Moving to and from a bed to a chair (including a wheelchair)?: 2  Need to walk in hospital room?: 2  Climbing 3-5 steps with a railing?: 1  Basic Mobility Total Score: 11    AM-PAC Raw Score CMS G-Code Modifier Level of Impairment Assistance   6 % Total / Unable    7 - 9 CM 80 - 100% Maximal Assist   10 - 14 CL 60 - 80% Moderate Assist   15 - 19 CK 40 - 60% Moderate Assist   20 - 22 CJ 20 - 40% Minimal Assist   23 CI 1-20% SBA / CGA   24 CH 0% Independent/ Mod I     Patient left up in chair with all lines intact, call button in reach, chair alarm on, Nurse Mariel notified and  present.    Assessment:  Ana Castañeda is a 84 y.o. female with a medical diagnosis of Pneumonia of right lower lobe due to infectious organism and presents withimpaired functional mobility. Pt will benefit from continued skilled PT in order to address impairments.       Rehab identified problem list/impairments: Rehab identified problem list/impairments: weakness, impaired endurance, gait instability, impaired functional mobilty, impaired self care skills, impaired balance, decreased lower extremity function, decreased upper extremity function, decreased coordination, decreased safety awareness, pain    Rehab potential is fair.    Activity tolerance: Fair    Discharge recommendations: Discharge Facility/Level of Care Needs: home with home health(24hr care)     Barriers to discharge:      Equipment recommendations: Equipment Needed After Discharge: none     GOALS:   Multidisciplinary Problems     Physical Therapy Goals        Problem: Physical Therapy Goal    Goal Priority Disciplines Outcome Goal Variances Interventions   Physical Therapy Goal     PT, PT/OT Ongoing, Progressing     Description:  LTGs to be met by 11/25/19  1. Pt will perform bed mobility with Mod A  2. Pt will perform sit<>stand functional t/fs with Max A  3. Pt will ambulate ~5ft using RW with Max A  4. Pt will perform (B) LE therapeutic exercises 1 x 15 reps                    PLAN:    Patient to be seen 5 x/week  to address the above listed problems via gait training, therapeutic activities, therapeutic exercises  Plan of Care expires: 11/25/19  Plan of Care reviewed with: patient    PT G-Codes  Functional Assessment Tool  Used: [facility]  Score: 11    Karen Sutherland, PT/OT  11/19/2019

## 2019-11-19 NOTE — ASSESSMENT & PLAN NOTE
- WBC 13, TMax 101.2°F, HR >90, RR >20, lactic 3.1.  Patient became hypotensive in the ED after receiving 5 mg IV Lopressor for tachycardia.  Blood pressure responded well to IV fluid resuscitation, and remains stable.  - Blood cultures obtained in the ED.  Continue empiric IV vanc and Zosyn.  - 30 mL/kg IV fluids given in the ED.  Continue IV hydration.  - Continue bronchodilators.  - Wean BiPAP as tolerated.  - Check for influenza.  - Follow labs.    Improving, off Charbel gtt  Leukocytosis better  Continue IV abx, steroids    Doing better  All cx ngtd  Cont same abx  Decrease steroids    Doing better, repeat cxr in am

## 2019-11-19 NOTE — PLAN OF CARE
Pt was in A-fib with RVR, and on Cardizem at 15 ml/hr and Amiodarone 33.3 at the start of shift. Rate was reduced on Amio down to 16.7 per order. Patients HR stayed A-fib but the rate went down into 50's and 60's. NP was notified and Cardizem Rate was reduced to 5 ml/hr. Care plan was reviewed with pt and she verbalized understanding. Will continue to monitor.

## 2019-11-19 NOTE — SUBJECTIVE & OBJECTIVE
Review of Systems   Constitution: Positive for malaise/fatigue.   HENT: Positive for congestion.    Eyes: Negative.    Cardiovascular: Positive for dyspnea on exertion.   Respiratory: Positive for wheezing.    Endocrine: Negative.    Skin: Negative.    Musculoskeletal: Positive for arthritis and joint pain.   Genitourinary: Negative.    Neurological: Positive for weakness.   Psychiatric/Behavioral: Negative.    Allergic/Immunologic: Negative.      Objective:     Vital Signs (Most Recent):  Temp: 97.4 °F (36.3 °C) (11/19/19 0740)  Pulse: 74 (11/19/19 0740)  Resp: 18 (11/19/19 0740)  BP: (!) 168/94 (11/19/19 0740)  SpO2: 98 % (11/19/19 0755) Vital Signs (24h Range):  Temp:  [97.4 °F (36.3 °C)-98.2 °F (36.8 °C)] 97.4 °F (36.3 °C)  Pulse:  [] 74  Resp:  [18-24] 18  SpO2:  [94 %-99 %] 98 %  BP: (111-184)/(63-94) 168/94     Weight: 88.5 kg (195 lb 1.7 oz)  Body mass index is 30.56 kg/m².     SpO2: 98 %  O2 Device (Oxygen Therapy): nasal cannula      Intake/Output Summary (Last 24 hours) at 11/19/2019 0945  Last data filed at 11/19/2019 0500  Gross per 24 hour   Intake 834.47 ml   Output 500 ml   Net 334.47 ml       Lines/Drains/Airways     Peripheral Intravenous Line                 Peripheral IV - Single Lumen 11/16/19 0058 20 G Right Wrist 3 days         Peripheral IV - Single Lumen 11/18/19 1800 24 G Left;Posterior Hand less than 1 day                Physical Exam   Constitutional: She is oriented to person, place, and time. She appears well-developed and well-nourished. No distress.   +conversational dyspnea     HENT:   Head: Normocephalic and atraumatic.   Eyes: Pupils are equal, round, and reactive to light. Right eye exhibits no discharge. Left eye exhibits no discharge.   Neck: Neck supple. No JVD present.   Cardiovascular: Normal rate, regular rhythm, S1 normal, S2 normal and normal heart sounds.   No murmur heard.  Pulmonary/Chest: Effort normal. No respiratory distress. She has wheezes. She has no  rales.   Diminished BS at bases   Abdominal: Soft. She exhibits no distension.   Musculoskeletal: She exhibits no edema.   Neurological: She is alert and oriented to person, place, and time.   Skin: Skin is warm and dry. She is not diaphoretic. No erythema.   Psychiatric: She has a normal mood and affect. Her behavior is normal. Thought content normal.   Nursing note and vitals reviewed.      Significant Labs:   CMP   Recent Labs   Lab 11/18/19 0417 11/19/19  0820    144   K 3.8 4.0    109   CO2 23 23   * 142*   BUN 34* 32*   CREATININE 1.8* 1.2   CALCIUM 9.0 8.9   ANIONGAP 13 12   ESTGFRAFRICA 30* 49*   EGFRNONAA 26* 42*   , CBC   Recent Labs   Lab 11/18/19 0417   WBC 12.14   HGB 10.6*   HCT 34.4*      , Troponin No results for input(s): TROPONINI in the last 48 hours. and All pertinent lab results from the last 24 hours have been reviewed.    Significant Imaging: Echocardiogram:   2D echo with color flow doppler:   Results for orders placed or performed during the hospital encounter of 11/16/19   2D echo with color flow doppler   Result Value Ref Range    QEF 60 55 - 65    Diastolic Dysfunction No     Est. PA Systolic Pressure 34.57     Narrative    Date of Procedure: 11/17/2019        TEST DESCRIPTION   Technical Quality: This is a portable study performed at the patient's bedside. This is a technically challenging study. There is poor endocardial definition. This study was performed in conjunction with a 3ml intravenous injection of Optison contrast   agent.     Aorta: The aortic root is normal in size, measuring 2.4 cm at sinotubular junction and 2.7 cm at Sinuses of Valsalva. The proximal ascending aorta is normal in size, measuring 2.4 cm across.     Left Atrium: The left atrial volume index is normal, measuring 15.17 cc/m2.     Left Ventricle: The left ventricle is normal in size, with an end-diastolic diameter of 3.7 cm, and an end-systolic diameter of 2.0 cm. Wall thickness is  mildly increased, with the septum measuring 1.4 cm and the posterior wall measuring 1.3 cm across.   Relative wall thickness was increased at 0.70, and the LV mass index was 100.2 g/m2 consistent with concentric remodeling. There are no regional wall motion abnormalities. Left ventricular systolic function appears normal. Visually estimated ejection   fraction is 60-65%. The LV Doppler derived stroke volume equals 109.0 ccs.     Diastolic indices: E wave velocity 0.9 m/s, E/A ratio 0.8,  msec., E/e' ratio(avg) 9. Diastolic function is normal.     Right Atrium: The right atrium is normal in size.     Right Ventricle: The right ventricle is normal in size. Global right ventricular systolic function appears normal. The estimated PA systolic pressure is greater than 35 mmHg.     Aortic Valve:  Aortic valve is normal in structure with normal leaflet mobility. The peak gradient obtained across the aortic valve is 7 mmHg, with a mean gradient of 5 mmHg. Using a left ventricular outflow tract diameter of 2.1 cm, a left ventricular   outflow tract velocity time integral of 31 cm, and a peak instantaneous transvalvular velocity time integral of 32 cm, the calculated aortic valve area is 3.41 cm2(AVAi is 1.66 cm2/m2).     Mitral Valve:  Mitral valve is normal in structure with normal leaflet mobility. The pressure half time is 54 msec. The calculated mitral valve area is 4.07 cm2.     Tricuspid Valve:  Tricuspid valve is normal in structure with normal leaflet mobility.     Pulmonary Valve:  Pulmonary valve is normal in structure with normal leaflet mobility.     Intracavitary: There is no evidence of pericardial effusion, intracavity mass, thrombi, or vegetation.         CONCLUSIONS     1 - Concentric remodeling.     2 - No wall motion abnormalities.     3 - Normal left ventricular systolic function (EF 60-65%).     4 - Normal left ventricular diastolic function.     5 - Normal right ventricular systolic function .      6 - The estimated PA systolic pressure is greater than 35 mmHg.             This document has been electronically    SIGNED BY: Rodrigo Yi MD On: 11/17/2019 12:56   , EKG: Reviewed and X-Ray: CXR: X-Ray Chest 1 View (CXR):   Results for orders placed or performed during the hospital encounter of 11/16/19   X-Ray Chest 1 View    Narrative    EXAMINATION:  XR CHEST 1 VIEW    CLINICAL HISTORY:  PNA;    FINDINGS:  Single view of the chest.   Aorta demonstrates atherosclerotic disease.    Cardiac silhouette is normal.  Significant improvement in patchy airspace opacities within the right mid lower lung zone.  No evidence of pleural effusion or pneumothorax.  Bones appear intact.      Impression    Significant improvement in patchy airspace opacities within the right mid lower lung zone.      Electronically signed by: Meng Sharp MD  Date:    11/18/2019  Time:    08:48    and X-Ray Chest PA and Lateral (CXR): No results found for this visit on 11/16/19.

## 2019-11-19 NOTE — SUBJECTIVE & OBJECTIVE
Interval History: looks much better, sitting up in chair, eating lunch well, appears in good spirits. No dysphagia, doing IS. Bun improved to 34. Doing a little PT. Has great family support.     Review of Systems   Unable to perform ROS: Acuity of condition   Constitutional: Negative for chills and fever.   HENT: Negative for postnasal drip, rhinorrhea, sinus pressure and sore throat.    Respiratory: Negative for cough, shortness of breath and wheezing.    Cardiovascular: Negative for chest pain, palpitations and leg swelling.   Gastrointestinal: Negative for abdominal distention, abdominal pain, blood in stool, constipation, diarrhea, nausea and vomiting.   Genitourinary: Negative for difficulty urinating, dysuria, hematuria and urgency.   Musculoskeletal: Positive for gait problem (chronic). Negative for arthralgias, back pain, myalgias, neck pain and neck stiffness.   Skin: Negative for pallor, rash and wound.   Neurological: Negative for dizziness, syncope, facial asymmetry, speech difficulty, light-headedness and headaches.   Psychiatric/Behavioral: Negative for confusion. The patient is not nervous/anxious.    All other systems reviewed and are negative.    Objective:     Vital Signs (Most Recent):  Temp: 97.8 °F (36.6 °C) (11/19/19 1135)  Pulse: 93 (11/19/19 1135)  Resp: 20 (11/19/19 1135)  BP: (!) 146/68 (11/19/19 1135)  SpO2: 98 % (11/19/19 1135) Vital Signs (24h Range):  Temp:  [97.4 °F (36.3 °C)-98.2 °F (36.8 °C)] 97.8 °F (36.6 °C)  Pulse:  [] 93  Resp:  [18-24] 20  SpO2:  [94 %-98 %] 98 %  BP: (116-184)/(63-94) 146/68     Weight: 88.5 kg (195 lb 1.7 oz)  Body mass index is 30.56 kg/m².    Intake/Output Summary (Last 24 hours) at 11/19/2019 1245  Last data filed at 11/19/2019 0500  Gross per 24 hour   Intake 834.47 ml   Output 500 ml   Net 334.47 ml      Physical Exam   Constitutional: She is oriented to person, place, and time. She appears well-developed and well-nourished. She is cooperative.   Non-toxic appearance. She does not have a sickly appearance. She does not appear ill. No distress. She is not intubated. Nasal cannula in place.   HENT:   Head: Normocephalic and atraumatic.   Mouth/Throat: Oropharynx is clear and moist and mucous membranes are normal.   Eyes: Pupils are equal, round, and reactive to light. EOM and lids are normal.   Neck: Trachea normal and full passive range of motion without pain. Carotid bruit is not present.   Cardiovascular: Normal rate and normal heart sounds. An irregular rhythm present.   Pulses:       Radial pulses are 2+ on the right side, and 2+ on the left side.        Dorsalis pedis pulses are 1+ on the right side, and 1+ on the left side.   Pulmonary/Chest: Effort normal. No accessory muscle usage. No tachypnea. She is not intubated. No respiratory distress. She has decreased breath sounds in the right lower field and the left lower field. She has no wheezes.   Abdominal: Soft. She exhibits no distension. Bowel sounds are decreased. There is no tenderness.   Genitourinary:   Genitourinary Comments: Ratliff in place   Musculoskeletal: Normal range of motion.        Right foot: There is no deformity.        Left foot: There is no deformity.   No edema   Lymphadenopathy:     She has no cervical adenopathy.   Neurological: She is alert and oriented to person, place, and time.   Skin: Skin is warm, dry and intact. Capillary refill takes less than 2 seconds. No rash noted. No cyanosis.   Psychiatric: She has a normal mood and affect. Her speech is normal and behavior is normal. Judgment and thought content normal. Cognition and memory are normal.   Nursing note and vitals reviewed.      Significant Labs:   BMP:   Recent Labs   Lab 11/18/19 0417 11/19/19  0820   * 142*    144   K 3.8 4.0    109   CO2 23 23   BUN 34* 32*   CREATININE 1.8* 1.2   CALCIUM 9.0 8.9   MG 2.2  --      CBC:   Recent Labs   Lab 11/18/19 0417   WBC 12.14   HGB 10.6*   HCT 34.4*   PLT  263     All pertinent labs within the past 24 hours have been reviewed.    Significant Imaging: I have reviewed all pertinent imaging results/findings within the past 24 hours.

## 2019-11-19 NOTE — ASSESSMENT & PLAN NOTE
- Secondary to asthma exacerbation + PNA.    - O2 sat stable on 50% FiO2 BiPAP, wean as tolerated.  Repeat ABG in AM.  - DuoNebs Q 6.  - IV Solu-Medrol 80 mg Q8.  - Empiric antibiotics.  - Will obtain V/Q scan to r/o PE (unable to perform CTA due to TARA).  - Pulmonology consult.    - Improving, came off BIPAP- Oxygenation improving  Improving    Doing well on 2 L NC  IS started

## 2019-11-19 NOTE — PLAN OF CARE
Pt still in Afib with RVR.  Rate progressively decreasing with Cardizem and Amiodarone drips.  Will continue to monitor.

## 2019-11-19 NOTE — ASSESSMENT & PLAN NOTE
Converted to NSR with IV Cardizem  Troponin elevated  Cards consulted, pt already on Xarelto    Remains in NSR    Back in afib w RVR-- will try Dig and oral cardizem

## 2019-11-19 NOTE — ASSESSMENT & PLAN NOTE
- WBC 13, TMax 101.2°F, HR >90, RR >20, lactic 3.1.  Patient became hypotensive in the ED after receiving 5 mg IV Lopressor for tachycardia.  Blood pressure responded well to IV fluid resuscitation, and remains stable.  - Blood cultures obtained in the ED.  Continue empiric IV vanc and Zosyn.  - 30 mL/kg IV fluids given in the ED.  Continue IV hydration.  - Continue bronchodilators.  - Wean BiPAP as tolerated.  - Check for influenza.  - Follow labs.    Improving, off Charbel gtt  Leukocytosis better  Continue IV abx, steroids    Doing better  All cx ngtd  Cont same abx  Decrease steroids    Doing better, repeat cxr in am    CXR clear, pneumonia resolved

## 2019-11-19 NOTE — PLAN OF CARE
Patient would like to resume services with [facility]).  Preference signed.  Referral sent via [facility].       11/19/19 0557   Post-Acute Status   Post-Acute Authorization Placement   Post-Acute Placement Status Referrals Sent

## 2019-11-19 NOTE — SIGNIFICANT EVENT
HR is ranging from 55-60 bpm. She remains atrial fibrillation on monitor. Will decrease Cardizem infusion to 5 mg/hr. Continue Amiodarone infusion for now.

## 2019-11-19 NOTE — PROGRESS NOTES
"[facility] Hospital Medicine  Progress Note    Patient Name: Ana Castañeda  MRN: [id number]  Patient Class: IP- Inpatient   Admission Date: 11/16/2019  Length of Stay: 2 days  Attending Physician: Tiffanie Jones MD  Primary Care Provider: Stephne Tarango MD        Subjective:     Principal Problem:Pneumonia of right lower lobe due to infectious organism        HPI:  Ana Castañeda is a 84 y.o. female patient with a PMHx asthma, HTN, h/o CVA with residual right-sided weakness, hypothyroidism, gout, debility (wheelchair bound), and chronic anticoagulation therapy (reason unknown).  She presented to the emergency department with complaints of shortness of breath that progressively worsened shortly before arrival.  No aggravating or alleviating factors.  Associated cough producing mucus, wheezing and congestion x 3 days.  Denies any chest pain, palpitations, orthopnea, PND, edema, abdominal pain, nausea, vomiting, diarrhea, dysuria, hematuria, back pain, AMS, lightheadedness or dizziness, syncope, rhinorrhea, sore throat, weakness, fever or chills.  Family at bedside reports patient was seen at [facility] on 11/13 and diagnosed with acute bronchitis.  She was given a "steroid shot" and prescribed Cefdinir, which she has taken x 1 dose on 11/15.  Patient is on chronic Xarelto, but unsure of reason.  She denies any history of AF/FL, DVT or PE.  Initial workup in the ED resulted WBC of 13.14, creatinine 1.4, BUN 25, troponin 0.095, , lactic 3.1, procalcitonin 0.15, UA unremarkable, EKG unrevealing.  ABG with pH of 7.224, pCO2 66, pO2 241, SaO2 100 on BiPAP.  CXR showed right lower lobe infiltrate.  Patient's O2 sat remained stable in the ED, but BiPAP placed due to increased respiratory distress. Upon arrival to the ED, patient was tachycardic with heart rate of 136 bpm, /78.  She was given IV Lopressor 5 mg x 2 doses and 40 mg IV Lasix, which improved her HR into the 80s but also decreased " blood pressure to 98/70.  ED also administered 125 mg IV Solu-Medrol, DuoNeb treatments x 3, 2.5 L IV fluid resuscitation, IV vanc and Zosyn.  Patient was admitted to ICU under Hospital Medicine services.  Currently, patient appears comfortable in no acute distress.  BiPAP remains in place with stable O2 sat.  Patient is a full code.   is surrogate decision maker.      Overview/Hospital Course:  Ana Castañeda is a 84 y.o. female patient with a PMHx asthma, HTN, h/o CVA with residual right-sided weakness, hypothyroidism, gout, debility (wheelchair bound), and chronic anticoagulation therapy (reason unknown) admitted to ICU on Bipap, with acute hypoxemic resp failure sec to LLL Pneumonia and Afib w RVR and Lactic Acidosis. She was aggressively rehydrated with NS and started on IV Vanc and Zosyn as well as IV Solumedrol. Her Afib RVR was treated initially with IV Lopressor 5 mg IVP x 2 which  which did not affect her HR but dropped BP to 98/70 -- necessitating Charbel-senephrine gtt by eICU this am. This am she was given a dose of Cardizem 10 mg IVP which slowed down her HR and also converted to NSR and her BP improved. She was started on Cardizem oral tabs and was able to come off Charbel gtt as well as Bipap to NC and feels much better.  Urine output is good. She is AAOx3, speech is clear, family at bedside. Troponin increased to 1.98-- cardiology consulted.    11/17- doing better, comfortable, pleasant. Appears mildly fluid overloaded, she is about 5 L fluid positive. Given IV lasix, all Cx NGTD. Cardiology thinks its demand ischemia from sepsis and recommended out pt NMP scan when stable. Await transfer to tele. PT/OT ordered. NSR remains.       Interval History: 11/17- doing better, comfortable, pleasant. Appears mildly fluid overloaded, she is about 5 L fluid positive. Given IV lasix, all Cx NGTD. Cardiology thinks its demand ischemia from sepsis and recommended out pt NMP scan when stable. Await transfer to tele.  PT/OT ordered. NSR remains.     Review of Systems   Unable to perform ROS: Acuity of condition   Constitutional: Negative for chills and fever.   HENT: Negative for postnasal drip, rhinorrhea, sinus pressure and sore throat.    Respiratory: Negative for cough, shortness of breath and wheezing.    Cardiovascular: Negative for chest pain, palpitations and leg swelling.   Gastrointestinal: Negative for abdominal distention, abdominal pain, blood in stool, constipation, diarrhea, nausea and vomiting.   Genitourinary: Negative for difficulty urinating, dysuria, hematuria and urgency.   Musculoskeletal: Positive for gait problem (chronic). Negative for arthralgias, back pain, myalgias, neck pain and neck stiffness.   Skin: Negative for pallor, rash and wound.   Neurological: Negative for dizziness, syncope, facial asymmetry, speech difficulty, light-headedness and headaches.   Psychiatric/Behavioral: Negative for confusion. The patient is not nervous/anxious.    All other systems reviewed and are negative.    Objective:     Vital Signs (Most Recent):  Temp: 98.2 °F (36.8 °C) (11/18/19 1523)  Pulse: 90 (11/18/19 1804)  Resp: (!) 24 (11/18/19 1523)  BP: (!) 141/90 (11/18/19 1804)  SpO2: 98 % (11/18/19 1523) Vital Signs (24h Range):  Temp:  [97.1 °F (36.2 °C)-98.2 °F (36.8 °C)] 98.2 °F (36.8 °C)  Pulse:  [] 90  Resp:  [14-28] 24  SpO2:  [81 %-99 %] 98 %  BP: (111-167)/() 141/90     Weight: 94.3 kg (207 lb 14.3 oz)  Body mass index is 32.56 kg/m².    Intake/Output Summary (Last 24 hours) at 11/18/2019 1819  Last data filed at 11/18/2019 1807  Gross per 24 hour   Intake 1060 ml   Output 2135 ml   Net -1075 ml      Physical Exam   Constitutional: She is oriented to person, place, and time. She appears well-developed and well-nourished. She is cooperative.  Non-toxic appearance. She does not have a sickly appearance. She appears ill. No distress. She is not intubated. Nasal cannula in place.   HENT:   Head:  Normocephalic and atraumatic.   Mouth/Throat: Oropharynx is clear and moist and mucous membranes are normal.   Eyes: Pupils are equal, round, and reactive to light. EOM and lids are normal.   Neck: Trachea normal and full passive range of motion without pain. Carotid bruit is not present.   Cardiovascular: Normal rate and normal heart sounds. An irregular rhythm present.   Pulses:       Radial pulses are 2+ on the right side, and 2+ on the left side.        Dorsalis pedis pulses are 1+ on the right side, and 1+ on the left side.   Pulmonary/Chest: Effort normal. No accessory muscle usage. No tachypnea. She is not intubated. No respiratory distress. She has decreased breath sounds in the right lower field and the left lower field. She has wheezes.   Abdominal: Soft. She exhibits no distension. Bowel sounds are decreased. There is no tenderness.   Genitourinary:   Genitourinary Comments: Ratliff in place   Musculoskeletal: Normal range of motion.        Right foot: There is no deformity.        Left foot: There is no deformity.   No edema   Lymphadenopathy:     She has no cervical adenopathy.   Neurological: She is alert and oriented to person, place, and time.   Skin: Skin is warm, dry and intact. Capillary refill takes less than 2 seconds. No rash noted. No cyanosis.   Psychiatric: She has a normal mood and affect. Her speech is normal and behavior is normal. Judgment and thought content normal. Cognition and memory are normal.   Nursing note and vitals reviewed.      Significant Labs: All pertinent labs within the past 24 hours have been reviewed.    Significant Imaging: I have reviewed all pertinent imaging results/findings within the past 24 hours.      Assessment/Plan:      * Pneumonia of right lower lobe due to infectious organism  - WBC 13, TMax 101.2°F, HR >90, RR >20, lactic 3.1.  Patient became hypotensive in the ED after receiving 5 mg IV Lopressor for tachycardia.  Blood pressure responded well to IV fluid  resuscitation, and remains stable.  - Blood cultures obtained in the ED.  Continue empiric IV vanc and Zosyn.  - 30 mL/kg IV fluids given in the ED.  Continue IV hydration.  - Continue bronchodilators.  - Wean BiPAP as tolerated.  - Check for influenza.  - Follow labs.    Improving, off Charbel gtt  Leukocytosis better  Continue IV abx, steroids    Doing better  All cx ngtd  Cont same abx  Decrease steroids    Doing better, repeat cxr in am    Acute respiratory failure with hypoxia  - Secondary to asthma exacerbation + PNA.    - O2 sat stable on 50% FiO2 BiPAP, wean as tolerated.  Repeat ABG in AM.  - DuoNebs Q 6.  - IV Solu-Medrol 80 mg Q8.  - Empiric antibiotics.  - Will obtain V/Q scan to r/o PE (unable to perform CTA due to TARA).  - Pulmonology consult.    - Improving, came off BIPAP- Oxygenation improving  Improving        NSTEMI  - Initial troponin of 0.095, likely demand secondary to above.  EKG unrevealing.  Chest pain-free.  - Trend serial cardiac enzymes.  - Continue aspirin and statin.  Beta-blocker on hold to prevent hypotension, resume as BP tolerates.  - Further recs pending clinical course.    Repeat elevated to 1.98  Pt has no CP, likely sec to demand Ischemia from Sepsis, resp failure  Will consult Cards    Demand ischemia- no ACS    Needs NMP scan as out pt    Paroxysmal A-fib with RVR  Converted to NSR with IV Cardizem  Troponin elevated  Cards consulted, pt already on Xarelto    Remains in NSR    Back in afib w RVR-- will try Dig and oral cardizem    Moderate persistent asthma with exacerbation  - Wean BiPAP as tolerated.  - Continue bronchodilators.  - Continue IV steroids.  - Empiric antibiotics as above.    Sec to Pneumonia- improving      VTE Risk Mitigation (From admission, onward)         Ordered     rivaroxaban tablet 15 mg  With dinner      11/17/19 1054     IP VTE HIGH RISK PATIENT  Once      11/16/19 0430     Reason for No Pharmacological VTE Prophylaxis  Once     Question:  Reasons:   Answer:  Already adequately anticoagulated on oral Anticoagulants    11/16/19 0430     Place sequential compression device  Until discontinued      11/16/19 0430                      Tiffanie Jones MD  Department of Hospital Medicine   [facility]

## 2019-11-19 NOTE — ASSESSMENT & PLAN NOTE
-No known history of AFIB/AFL but on Xarelto as OP  -Received IV Lopressor x 2 dose in ED and IV CCB with conversion to SR yesterday  -Increase Cardizem to 90 mg TID for better rate control  -NO BB for now given wheezing on exam today  -ECHO pending    11/18/19  -Converted back to afib with RVR this AM  -Start cardizem gtt, titrate as needed  -Continue Xarelto for CVA prophylaxis     11/19/19  -Converted to SR this AM  -Discussed switching cardizem and amiodarone to po, patient unsure if she can swallow pills at this time  -Will further discuss with hospital medicine  -Continue Xarelto for CVA prophylaxis

## 2019-11-19 NOTE — ASSESSMENT & PLAN NOTE
-Admitted due to shortness of breath, AFIB/RVR  -Troponin 0.95>1.983>.992  -Repeat Troponin 0.371  -Likely due to demand ischemia from AFIB/RVR and sepsis  -ECHO pending  -Increase CCB to 90 mg TID for better rate control today  -NO BB given wheezing on exam today  -Continue ASA, Statin, CCB, Xarelto  -Start Losartan for better BP control.  -Would benefit from OP MPI stress test once pulmonary status optimized after discharge.   -Reassess in AM    11/18/19  -Stable overnight, remains chest pain free  -2D echo showed normal EF  -Continue ASA, statin, CCB, ARB, Xarelto  -No BB given wheezing  -OP MPI stress test    11/19/19  -Stable overnight  -No chest pain  -Continue ASA, statin, CCB, ARB, Xarelto  -Plan on OP MPI stress test

## 2019-11-19 NOTE — PROGRESS NOTES
"[facility] Hospital Medicine  Progress Note    Patient Name: Ana Castañeda  MRN: [id number]  Patient Class: IP- Inpatient   Admission Date: 11/16/2019  Length of Stay: 3 days  Attending Physician: Tiffanie Jones MD  Primary Care Provider: Stephen Tarango MD        Subjective:     Principal Problem:Pneumonia of right lower lobe due to infectious organism        HPI:  Ana Castañeda is a 84 y.o. female patient with a PMHx asthma, HTN, h/o CVA with residual right-sided weakness, hypothyroidism, gout, debility (wheelchair bound), and chronic anticoagulation therapy (reason unknown).  She presented to the emergency department with complaints of shortness of breath that progressively worsened shortly before arrival.  No aggravating or alleviating factors.  Associated cough producing mucus, wheezing and congestion x 3 days.  Denies any chest pain, palpitations, orthopnea, PND, edema, abdominal pain, nausea, vomiting, diarrhea, dysuria, hematuria, back pain, AMS, lightheadedness or dizziness, syncope, rhinorrhea, sore throat, weakness, fever or chills.  Family at bedside reports patient was seen at [facility] on 11/13 and diagnosed with acute bronchitis.  She was given a "steroid shot" and prescribed Cefdinir, which she has taken x 1 dose on 11/15.  Patient is on chronic Xarelto, but unsure of reason.  She denies any history of AF/FL, DVT or PE.  Initial workup in the ED resulted WBC of 13.14, creatinine 1.4, BUN 25, troponin 0.095, , lactic 3.1, procalcitonin 0.15, UA unremarkable, EKG unrevealing.  ABG with pH of 7.224, pCO2 66, pO2 241, SaO2 100 on BiPAP.  CXR showed right lower lobe infiltrate.  Patient's O2 sat remained stable in the ED, but BiPAP placed due to increased respiratory distress. Upon arrival to the ED, patient was tachycardic with heart rate of 136 bpm, /78.  She was given IV Lopressor 5 mg x 2 doses and 40 mg IV Lasix, which improved her HR into the 80s but also decreased " blood pressure to 98/70.  ED also administered 125 mg IV Solu-Medrol, DuoNeb treatments x 3, 2.5 L IV fluid resuscitation, IV vanc and Zosyn.  Patient was admitted to ICU under Hospital Medicine services.  Currently, patient appears comfortable in no acute distress.  BiPAP remains in place with stable O2 sat.  Patient is a full code.   is surrogate decision maker.      Overview/Hospital Course:  Ana Castañeda is a 84 y.o. female patient with a PMHx asthma, HTN, h/o CVA with residual right-sided weakness, hypothyroidism, gout, debility (wheelchair bound), and chronic anticoagulation therapy (reason unknown) admitted to ICU on Bipap, with acute hypoxemic resp failure sec to LLL Pneumonia and Afib w RVR and Lactic Acidosis. She was aggressively rehydrated with NS and started on IV Vanc and Zosyn as well as IV Solumedrol. Her Afib RVR was treated initially with IV Lopressor 5 mg IVP x 2 which  which did not affect her HR but dropped BP to 98/70 -- necessitating Charbel-senephrine gtt by eICU this am. This am she was given a dose of Cardizem 10 mg IVP which slowed down her HR and also converted to NSR and her BP improved. She was started on Cardizem oral tabs and was able to come off Charbel gtt as well as Bipap to NC and feels much better.  Urine output is good. She is AAOx3, speech is clear, family at bedside. Troponin increased to 1.98-- cardiology consulted.    11/17- doing better, comfortable, pleasant. Appears mildly fluid overloaded, she is about 5 L fluid positive. Given IV lasix, all Cx NGTD. Cardiology thinks its demand ischemia from sepsis and recommended out pt NMP scan when stable. Await transfer to tele. PT/OT ordered. NSR remains.     Ana Castañeda is a 84 y.o. female patient with a PMHx asthma, HTN, h/o CVA with residual right-sided weakness, hypothyroidism, gout, debility (wheelchair bound), and chronic anticoagulation therapy (reason unknown) admitted to ICU on Bipap, with acute hypoxemic resp failure sec  to LLL Pneumonia and Afib w RVR and Lactic Acidosis. She was aggressively rehydrated with NS and started on IV Vanc and Zosyn as well as IV Solumedrol. Her Afib RVR was treated initially with IV Lopressor 5 mg IVP x 2 which  which did not affect her HR but dropped BP to 98/70 -- necessitating Charbel-senephrine gtt by eICU this am. This am she was given a dose of Cardizem 10 mg IVP which slowed down her HR and also converted to NSR and her BP improved. She was started on Cardizem oral tabs and was able to come off Charbel gtt as well as Bipap to NC and feels much better.  Urine output is good. She is AAOx3, speech is clear, family at bedside. Troponin increased to 1.98-- cardiology consulted.    11/17- doing better, comfortable, pleasant. Appears mildly fluid overloaded, she is about 5 L fluid positive. Given IV lasix, all Cx NGTD. Cardiology thinks its demand ischemia from sepsis and recommended out pt NMP scan when stable. Await transfer to tele. PT/OT ordered. NSR remains.   11/18- went into Afib RVR when PT evaluated her yesterday- has remained in it since- no response to Dig, Cardizem. Eventually cardiology had to start her on Cardizem and Amiodarone gtt with some slowing. Otherwise feels good. Repeat CXR shows clear lungs, no Pneumonia. Bun/Cr elevated at 34/1.5- hence Lasix d/hira and started on gentle rehydration. Zosyn also d/hira.   11/19- looks much better, sitting up in chair, eating lunch well, appears in good spirits. No dysphagia, doing IS. Bun improved to 34. Doing a little PT. Has great family support.     Interval History: looks much better, sitting up in chair, eating lunch well, appears in good spirits. No dysphagia, doing IS. Bun improved to 34. Doing a little PT. Has great family support.     Review of Systems   Unable to perform ROS: Acuity of condition   Constitutional: Negative for chills and fever.   HENT: Negative for postnasal drip, rhinorrhea, sinus pressure and sore throat.    Respiratory:  Negative for cough, shortness of breath and wheezing.    Cardiovascular: Negative for chest pain, palpitations and leg swelling.   Gastrointestinal: Negative for abdominal distention, abdominal pain, blood in stool, constipation, diarrhea, nausea and vomiting.   Genitourinary: Negative for difficulty urinating, dysuria, hematuria and urgency.   Musculoskeletal: Positive for gait problem (chronic). Negative for arthralgias, back pain, myalgias, neck pain and neck stiffness.   Skin: Negative for pallor, rash and wound.   Neurological: Negative for dizziness, syncope, facial asymmetry, speech difficulty, light-headedness and headaches.   Psychiatric/Behavioral: Negative for confusion. The patient is not nervous/anxious.    All other systems reviewed and are negative.    Objective:     Vital Signs (Most Recent):  Temp: 97.8 °F (36.6 °C) (11/19/19 1135)  Pulse: 93 (11/19/19 1135)  Resp: 20 (11/19/19 1135)  BP: (!) 146/68 (11/19/19 1135)  SpO2: 98 % (11/19/19 1135) Vital Signs (24h Range):  Temp:  [97.4 °F (36.3 °C)-98.2 °F (36.8 °C)] 97.8 °F (36.6 °C)  Pulse:  [] 93  Resp:  [18-24] 20  SpO2:  [94 %-98 %] 98 %  BP: (116-184)/(63-94) 146/68     Weight: 88.5 kg (195 lb 1.7 oz)  Body mass index is 30.56 kg/m².    Intake/Output Summary (Last 24 hours) at 11/19/2019 1245  Last data filed at 11/19/2019 0500  Gross per 24 hour   Intake 834.47 ml   Output 500 ml   Net 334.47 ml      Physical Exam   Constitutional: She is oriented to person, place, and time. She appears well-developed and well-nourished. She is cooperative.  Non-toxic appearance. She does not have a sickly appearance. She does not appear ill. No distress. She is not intubated. Nasal cannula in place.   HENT:   Head: Normocephalic and atraumatic.   Mouth/Throat: Oropharynx is clear and moist and mucous membranes are normal.   Eyes: Pupils are equal, round, and reactive to light. EOM and lids are normal.   Neck: Trachea normal and full passive range of motion  without pain. Carotid bruit is not present.   Cardiovascular: Normal rate and normal heart sounds. An irregular rhythm present.   Pulses:       Radial pulses are 2+ on the right side, and 2+ on the left side.        Dorsalis pedis pulses are 1+ on the right side, and 1+ on the left side.   Pulmonary/Chest: Effort normal. No accessory muscle usage. No tachypnea. She is not intubated. No respiratory distress. She has decreased breath sounds in the right lower field and the left lower field. She has no wheezes.   Abdominal: Soft. She exhibits no distension. Bowel sounds are decreased. There is no tenderness.   Genitourinary:   Genitourinary Comments: Ratliff in place   Musculoskeletal: Normal range of motion.        Right foot: There is no deformity.        Left foot: There is no deformity.   No edema   Lymphadenopathy:     She has no cervical adenopathy.   Neurological: She is alert and oriented to person, place, and time.   Skin: Skin is warm, dry and intact. Capillary refill takes less than 2 seconds. No rash noted. No cyanosis.   Psychiatric: She has a normal mood and affect. Her speech is normal and behavior is normal. Judgment and thought content normal. Cognition and memory are normal.   Nursing note and vitals reviewed.      Significant Labs:   BMP:   Recent Labs   Lab 11/18/19  0417 11/19/19  0820   * 142*    144   K 3.8 4.0    109   CO2 23 23   BUN 34* 32*   CREATININE 1.8* 1.2   CALCIUM 9.0 8.9   MG 2.2  --      CBC:   Recent Labs   Lab 11/18/19  0417   WBC 12.14   HGB 10.6*   HCT 34.4*        All pertinent labs within the past 24 hours have been reviewed.    Significant Imaging: I have reviewed all pertinent imaging results/findings within the past 24 hours.      Assessment/Plan:      * Pneumonia of right lower lobe due to infectious organism  - WBC 13, TMax 101.2°F, HR >90, RR >20, lactic 3.1.  Patient became hypotensive in the ED after receiving 5 mg IV Lopressor for tachycardia.   Blood pressure responded well to IV fluid resuscitation, and remains stable.  - Blood cultures obtained in the ED.  Continue empiric IV vanc and Zosyn.  - 30 mL/kg IV fluids given in the ED.  Continue IV hydration.  - Continue bronchodilators.  - Wean BiPAP as tolerated.  - Check for influenza.  - Follow labs.    Improving, off Charbel gtt  Leukocytosis better  Continue IV abx, steroids    Doing better  All cx ngtd  Cont same abx  Decrease steroids    Doing better, repeat cxr in am    CXR clear, pneumonia resolved    Acute respiratory failure with hypoxia  - Secondary to asthma exacerbation + PNA.    - O2 sat stable on 50% FiO2 BiPAP, wean as tolerated.  Repeat ABG in AM.  - DuoNebs Q 6.  - IV Solu-Medrol 80 mg Q8.  - Empiric antibiotics.  - Will obtain V/Q scan to r/o PE (unable to perform CTA due to TARA).  - Pulmonology consult.    - Improving, came off BIPAP- Oxygenation improving  Improving    Doing well on 2 L NC  IS started    NSTEMI  - Initial troponin of 0.095, likely demand secondary to above.  EKG unrevealing.  Chest pain-free.  - Trend serial cardiac enzymes.  - Continue aspirin and statin.  Beta-blocker on hold to prevent hypotension, resume as BP tolerates.  - Further recs pending clinical course.    Repeat elevated to 1.98  Pt has no CP, likely sec to demand Ischemia from Sepsis, resp failure  Will consult Cards    Demand ischemia- no ACS    Needs NMP scan as out pt    Paroxysmal A-fib with RVR  Converted to NSR with IV Cardizem  Troponin elevated  Cards consulted, pt already on Xarelto    Remains in NSR    Back in afib w RVR-- will try Dig and oral Cardizem    S/p 1 day of Cardizem and Amiodarone gtt-- doing well.  Converted back to NSR- will start oral Amio and Card and then d/c the gtts.  Cards following      Moderate persistent asthma with exacerbation  - Wean BiPAP as tolerated.  - Continue bronchodilators.  - Continue IV steroids.  - Empiric antibiotics as above.    Sec to Pneumonia-  improving      VTE Risk Mitigation (From admission, onward)         Ordered     rivaroxaban tablet 15 mg  With dinner      11/17/19 1054     IP VTE HIGH RISK PATIENT  Once      11/16/19 0430     Reason for No Pharmacological VTE Prophylaxis  Once     Question:  Reasons:  Answer:  Already adequately anticoagulated on oral Anticoagulants    11/16/19 0430     Place sequential compression device  Until discontinued      11/16/19 0430                      Tiffanie Jones MD  Department of Hospital Medicine   [facility]

## 2019-11-19 NOTE — ASSESSMENT & PLAN NOTE
Converted to NSR with IV Cardizem  Troponin elevated  Cards consulted, pt already on Xarelto    Remains in NSR    Back in afib w RVR-- will try Dig and oral Cardizem    S/p 1 day of Cardizem and Amiodarone gtt-- doing well.  Converted back to NSR- will start oral Amio and Card and then d/c the gtts.  Cards following

## 2019-11-19 NOTE — PLAN OF CARE
Discharge re-assessment complete.  Patient is current with [facility] which has been bought by [facility].  She wishes to resume home health services with the same company.  Preference signed.       11/19/19 4479   Discharge Reassessment   Assessment Type Discharge Planning Reassessment   Provided patient/caregiver education on the expected discharge date and the discharge plan Yes   Do you have any problems affording any of your prescribed medications? No   Discharge Plan A Home Health   DME Needed Upon Discharge  none   Patient choice form signed by patient/caregiver Yes   Anticipated Discharge Disposition Home-Health   Can the patient answer the patient profile reliably? Yes, cognitively intact

## 2019-11-19 NOTE — PLAN OF CARE
Patient encouraged to participate in activities today. Patient assisted to chair alongside bed. Amiodarone and Cardizem drips stopped this afternoon. PO meds given before gtts d/c. She tolerated well. She converted to NSR last night per night nurse around 1030. Patient in NSR, no other complaints voiced at this time.

## 2019-11-19 NOTE — SUBJECTIVE & OBJECTIVE
Interval History: 11/17- doing better, comfortable, pleasant. Appears mildly fluid overloaded, she is about 5 L fluid positive. Given IV lasix, all Cx NGTD. Cardiology thinks its demand ischemia from sepsis and recommended out pt NMP scan when stable. Await transfer to tele. PT/OT ordered. NSR remains.     Review of Systems   Unable to perform ROS: Acuity of condition   Constitutional: Negative for chills and fever.   HENT: Negative for postnasal drip, rhinorrhea, sinus pressure and sore throat.    Respiratory: Negative for cough, shortness of breath and wheezing.    Cardiovascular: Negative for chest pain, palpitations and leg swelling.   Gastrointestinal: Negative for abdominal distention, abdominal pain, blood in stool, constipation, diarrhea, nausea and vomiting.   Genitourinary: Negative for difficulty urinating, dysuria, hematuria and urgency.   Musculoskeletal: Positive for gait problem (chronic). Negative for arthralgias, back pain, myalgias, neck pain and neck stiffness.   Skin: Negative for pallor, rash and wound.   Neurological: Negative for dizziness, syncope, facial asymmetry, speech difficulty, light-headedness and headaches.   Psychiatric/Behavioral: Negative for confusion. The patient is not nervous/anxious.    All other systems reviewed and are negative.    Objective:     Vital Signs (Most Recent):  Temp: 98.2 °F (36.8 °C) (11/18/19 1523)  Pulse: 90 (11/18/19 1804)  Resp: (!) 24 (11/18/19 1523)  BP: (!) 141/90 (11/18/19 1804)  SpO2: 98 % (11/18/19 1523) Vital Signs (24h Range):  Temp:  [97.1 °F (36.2 °C)-98.2 °F (36.8 °C)] 98.2 °F (36.8 °C)  Pulse:  [] 90  Resp:  [14-28] 24  SpO2:  [81 %-99 %] 98 %  BP: (111-167)/() 141/90     Weight: 94.3 kg (207 lb 14.3 oz)  Body mass index is 32.56 kg/m².    Intake/Output Summary (Last 24 hours) at 11/18/2019 1819  Last data filed at 11/18/2019 1807  Gross per 24 hour   Intake 1060 ml   Output 2135 ml   Net -1075 ml      Physical Exam    Constitutional: She is oriented to person, place, and time. She appears well-developed and well-nourished. She is cooperative.  Non-toxic appearance. She does not have a sickly appearance. She appears ill. No distress. She is not intubated. Nasal cannula in place.   HENT:   Head: Normocephalic and atraumatic.   Mouth/Throat: Oropharynx is clear and moist and mucous membranes are normal.   Eyes: Pupils are equal, round, and reactive to light. EOM and lids are normal.   Neck: Trachea normal and full passive range of motion without pain. Carotid bruit is not present.   Cardiovascular: Normal rate and normal heart sounds. An irregular rhythm present.   Pulses:       Radial pulses are 2+ on the right side, and 2+ on the left side.        Dorsalis pedis pulses are 1+ on the right side, and 1+ on the left side.   Pulmonary/Chest: Effort normal. No accessory muscle usage. No tachypnea. She is not intubated. No respiratory distress. She has decreased breath sounds in the right lower field and the left lower field. She has wheezes.   Abdominal: Soft. She exhibits no distension. Bowel sounds are decreased. There is no tenderness.   Genitourinary:   Genitourinary Comments: Ratliff in place   Musculoskeletal: Normal range of motion.        Right foot: There is no deformity.        Left foot: There is no deformity.   No edema   Lymphadenopathy:     She has no cervical adenopathy.   Neurological: She is alert and oriented to person, place, and time.   Skin: Skin is warm, dry and intact. Capillary refill takes less than 2 seconds. No rash noted. No cyanosis.   Psychiatric: She has a normal mood and affect. Her speech is normal and behavior is normal. Judgment and thought content normal. Cognition and memory are normal.   Nursing note and vitals reviewed.      Significant Labs: All pertinent labs within the past 24 hours have been reviewed.    Significant Imaging: I have reviewed all pertinent imaging results/findings within the  past 24 hours.

## 2019-11-20 PROBLEM — J45.41 MODERATE PERSISTENT ASTHMA WITH EXACERBATION: Status: RESOLVED | Noted: 2019-11-16 | Resolved: 2019-11-20

## 2019-11-20 PROBLEM — I48.0 PAROXYSMAL A-FIB: Status: RESOLVED | Noted: 2019-11-16 | Resolved: 2019-11-20

## 2019-11-20 PROBLEM — R53.1 WEAKNESS: Status: ACTIVE | Noted: 2019-11-20

## 2019-11-20 LAB
ALBUMIN SERPL BCP-MCNC: 2.9 G/DL (ref 3.5–5.2)
ALP SERPL-CCNC: 76 U/L (ref 55–135)
ALT SERPL W/O P-5'-P-CCNC: 24 U/L (ref 10–44)
ANION GAP SERPL CALC-SCNC: 9 MMOL/L (ref 8–16)
AST SERPL-CCNC: 30 U/L (ref 10–40)
BASOPHILS # BLD AUTO: 0.02 K/UL (ref 0–0.2)
BASOPHILS NFR BLD: 0.2 % (ref 0–1.9)
BILIRUB SERPL-MCNC: 0.9 MG/DL (ref 0.1–1)
BUN SERPL-MCNC: 28 MG/DL (ref 8–23)
CALCIUM SERPL-MCNC: 8.9 MG/DL (ref 8.7–10.5)
CHLORIDE SERPL-SCNC: 109 MMOL/L (ref 95–110)
CO2 SERPL-SCNC: 25 MMOL/L (ref 23–29)
CREAT SERPL-MCNC: 1.1 MG/DL (ref 0.5–1.4)
DIFFERENTIAL METHOD: ABNORMAL
EOSINOPHIL # BLD AUTO: 0 K/UL (ref 0–0.5)
EOSINOPHIL NFR BLD: 0 % (ref 0–8)
ERYTHROCYTE [DISTWIDTH] IN BLOOD BY AUTOMATED COUNT: 14.6 % (ref 11.5–14.5)
EST. GFR  (AFRICAN AMERICAN): 54 ML/MIN/1.73 M^2
EST. GFR  (NON AFRICAN AMERICAN): 47 ML/MIN/1.73 M^2
GLUCOSE SERPL-MCNC: 127 MG/DL (ref 70–110)
HCT VFR BLD AUTO: 33 % (ref 37–48.5)
HGB BLD-MCNC: 10.1 G/DL (ref 12–16)
IMM GRANULOCYTES # BLD AUTO: 0.15 K/UL (ref 0–0.04)
IMM GRANULOCYTES NFR BLD AUTO: 1.6 % (ref 0–0.5)
LYMPHOCYTES # BLD AUTO: 1.9 K/UL (ref 1–4.8)
LYMPHOCYTES NFR BLD: 20.1 % (ref 18–48)
MCH RBC QN AUTO: 31.7 PG (ref 27–31)
MCHC RBC AUTO-ENTMCNC: 30.6 G/DL (ref 32–36)
MCV RBC AUTO: 103 FL (ref 82–98)
MONOCYTES # BLD AUTO: 1 K/UL (ref 0.3–1)
MONOCYTES NFR BLD: 10.8 % (ref 4–15)
NEUTROPHILS # BLD AUTO: 6.4 K/UL (ref 1.8–7.7)
NEUTROPHILS NFR BLD: 67.3 % (ref 38–73)
NRBC BLD-RTO: 0 /100 WBC
PLATELET # BLD AUTO: 236 K/UL (ref 150–350)
PMV BLD AUTO: 10.2 FL (ref 9.2–12.9)
POCT GLUCOSE: 103 MG/DL (ref 70–110)
POCT GLUCOSE: 122 MG/DL (ref 70–110)
POTASSIUM SERPL-SCNC: 3.7 MMOL/L (ref 3.5–5.1)
PROT SERPL-MCNC: 6.1 G/DL (ref 6–8.4)
RBC # BLD AUTO: 3.19 M/UL (ref 4–5.4)
SODIUM SERPL-SCNC: 143 MMOL/L (ref 136–145)
WBC # BLD AUTO: 9.55 K/UL (ref 3.9–12.7)

## 2019-11-20 PROCEDURE — 63600175 PHARM REV CODE 636 W HCPCS: Performed by: NURSE PRACTITIONER

## 2019-11-20 PROCEDURE — 63600175 PHARM REV CODE 636 W HCPCS: Performed by: EMERGENCY MEDICINE

## 2019-11-20 PROCEDURE — 92611 MOTION FLUOROSCOPY/SWALLOW: CPT

## 2019-11-20 PROCEDURE — 25000003 PHARM REV CODE 250: Performed by: NURSE PRACTITIONER

## 2019-11-20 PROCEDURE — 85025 COMPLETE CBC W/AUTO DIFF WBC: CPT

## 2019-11-20 PROCEDURE — A9698 NON-RAD CONTRAST MATERIALNOC: HCPCS | Performed by: EMERGENCY MEDICINE

## 2019-11-20 PROCEDURE — 25000242 PHARM REV CODE 250 ALT 637 W/ HCPCS: Performed by: NURSE PRACTITIONER

## 2019-11-20 PROCEDURE — 97110 THERAPEUTIC EXERCISES: CPT

## 2019-11-20 PROCEDURE — 80053 COMPREHEN METABOLIC PANEL: CPT

## 2019-11-20 PROCEDURE — 21400001 HC TELEMETRY ROOM

## 2019-11-20 PROCEDURE — 94761 N-INVAS EAR/PLS OXIMETRY MLT: CPT

## 2019-11-20 PROCEDURE — 27000221 HC OXYGEN, UP TO 24 HOURS

## 2019-11-20 PROCEDURE — 25500020 PHARM REV CODE 255: Performed by: EMERGENCY MEDICINE

## 2019-11-20 PROCEDURE — 36415 COLL VENOUS BLD VENIPUNCTURE: CPT

## 2019-11-20 PROCEDURE — 25000242 PHARM REV CODE 250 ALT 637 W/ HCPCS: Performed by: EMERGENCY MEDICINE

## 2019-11-20 PROCEDURE — 92610 EVALUATE SWALLOWING FUNCTION: CPT

## 2019-11-20 PROCEDURE — 97530 THERAPEUTIC ACTIVITIES: CPT

## 2019-11-20 PROCEDURE — 99900035 HC TECH TIME PER 15 MIN (STAT)

## 2019-11-20 PROCEDURE — 99232 PR SUBSEQUENT HOSPITAL CARE,LEVL II: ICD-10-PCS | Mod: ,,, | Performed by: INTERNAL MEDICINE

## 2019-11-20 PROCEDURE — 25000003 PHARM REV CODE 250: Performed by: EMERGENCY MEDICINE

## 2019-11-20 PROCEDURE — 94640 AIRWAY INHALATION TREATMENT: CPT

## 2019-11-20 PROCEDURE — 99232 SBSQ HOSP IP/OBS MODERATE 35: CPT | Mod: ,,, | Performed by: INTERNAL MEDICINE

## 2019-11-20 RX ORDER — CYANOCOBALAMIN 1000 UG/ML
1000 INJECTION, SOLUTION INTRAMUSCULAR; SUBCUTANEOUS ONCE
Status: COMPLETED | OUTPATIENT
Start: 2019-11-20 | End: 2019-11-20

## 2019-11-20 RX ORDER — CLONIDINE HYDROCHLORIDE 0.1 MG/1
0.1 TABLET ORAL ONCE
Status: COMPLETED | OUTPATIENT
Start: 2019-11-20 | End: 2019-11-20

## 2019-11-20 RX ADMIN — CYANOCOBALAMIN 1000 MCG: 1000 INJECTION, SOLUTION INTRAMUSCULAR; SUBCUTANEOUS at 12:11

## 2019-11-20 RX ADMIN — BARIUM SULFATE 40 G: 960 POWDER, FOR SUSPENSION ORAL at 12:11

## 2019-11-20 RX ADMIN — ATORVASTATIN CALCIUM 20 MG: 10 TABLET, FILM COATED ORAL at 08:11

## 2019-11-20 RX ADMIN — RIVAROXABAN 15 MG: 15 TABLET, FILM COATED ORAL at 05:11

## 2019-11-20 RX ADMIN — LEVALBUTEROL 0.63 MG: 0.63 SOLUTION RESPIRATORY (INHALATION) at 05:11

## 2019-11-20 RX ADMIN — ASPIRIN 81 MG: 81 TABLET, COATED ORAL at 08:11

## 2019-11-20 RX ADMIN — AMIODARONE HYDROCHLORIDE 200 MG: 200 TABLET ORAL at 09:11

## 2019-11-20 RX ADMIN — HYDRALAZINE HYDROCHLORIDE 10 MG: 20 INJECTION INTRAMUSCULAR; INTRAVENOUS at 11:11

## 2019-11-20 RX ADMIN — CLONIDINE HYDROCHLORIDE 0.1 MG: 0.1 TABLET ORAL at 05:11

## 2019-11-20 RX ADMIN — AMIODARONE HYDROCHLORIDE 200 MG: 200 TABLET ORAL at 08:11

## 2019-11-20 RX ADMIN — HYDRALAZINE HYDROCHLORIDE 10 MG: 20 INJECTION INTRAMUSCULAR; INTRAVENOUS at 12:11

## 2019-11-20 RX ADMIN — AMOXICILLIN AND CLAVULANATE POTASSIUM 500 MG: 500; 125 TABLET, FILM COATED ORAL at 08:11

## 2019-11-20 RX ADMIN — PREDNISONE 10 MG: 10 TABLET ORAL at 09:11

## 2019-11-20 RX ADMIN — PANTOPRAZOLE SODIUM 40 MG: 40 TABLET, DELAYED RELEASE ORAL at 08:11

## 2019-11-20 RX ADMIN — IPRATROPIUM BROMIDE AND ALBUTEROL SULFATE 3 ML: .5; 3 SOLUTION RESPIRATORY (INHALATION) at 07:11

## 2019-11-20 RX ADMIN — PREDNISONE 10 MG: 10 TABLET ORAL at 08:11

## 2019-11-20 RX ADMIN — LEVOTHYROXINE SODIUM 112 MCG: 112 TABLET ORAL at 05:11

## 2019-11-20 RX ADMIN — DILTIAZEM HYDROCHLORIDE 180 MG: 180 CAPSULE, COATED, EXTENDED RELEASE ORAL at 08:11

## 2019-11-20 RX ADMIN — AMOXICILLIN AND CLAVULANATE POTASSIUM 500 MG: 500; 125 TABLET, FILM COATED ORAL at 09:11

## 2019-11-20 NOTE — PLAN OF CARE
Spoke with daughter, Wendy Hair.  She states patient had a bad experience at [facility] and would like to withdraw request.  Preference signed for [facility] and [facility].  Referrals sent via [employer].       11/20/19 0584   Post-Acute Status   Post-Acute Authorization Placement   Post-Acute Placement Status Referrals Sent        4:15pm Wilberto contacted Dr. Jones.  [facility] in [place] ready to receive Medicare patient's.  Spoke to daughter.  She lives behind the facility and will visit today.  Referral faxed via [email or web address].

## 2019-11-20 NOTE — ASSESSMENT & PLAN NOTE
- Secondary to asthma exacerbation + PNA.    - O2 sat stable on 50% FiO2 BiPAP, wean as tolerated.  Repeat ABG in AM.  - DuoNebs Q 6.  - IV Solu-Medrol 80 mg Q8.  - Empiric antibiotics.  - Will obtain V/Q scan to r/o PE (unable to perform CTA due to TARA).  - Pulmonology consult.    - Improving, came off BIPAP- Oxygenation improving  Improving    Doing well on 2 L NC  IS started    Home o2 eval

## 2019-11-20 NOTE — PROGRESS NOTES
[facility]  Cardiology  Progress Note    Patient Name: Ana Castañeda  MRN: [id number]  Admission Date: 11/16/2019  Hospital Length of Stay: 4 days  Code Status: Full Code   Attending Physician: Tiffanie Jones MD   Primary Care Physician: Stephen Tarango MD  Expected Discharge Date:   Principal Problem:Pneumonia of right lower lobe due to infectious organism    Subjective:   HPI:  Ana Castañeda is a 84 year old female who presented to [facility] due to shortness of breath which progressively worsened prior to arrival. She reported associated cough with mucus, wheezing and congestion for 3 days. Per H&P, patient was seen at [facility] on 11/13 and diagnosed with acute bronchitis and received IM Steroid shot and PO ABX. Her chronic medical conditions include Asthma, HTN, H/O CVA with right sided weakness, H/O CEA, Normal pressure hydrocephalus, hypothyroidism, gout, debility (wheelchair bound), and chronic AC on Xarelto (unknown reason). ED workup revealed Cr 1.4, BUN 25, Troponin 0.095>1.983>.992,, LA 3.1, Procal 0.15. She was placed on Bipap in ED for shortness of breath and increased work of breathing. CXR revealed RLL infiltrate. She received IV lopressor 5 mg x 2 dose in ED and IV lasix 40 mg x 1 dose with some improvement in HR control to 80s. She received IV Solumedrol, Duonebs, and IVF resuscitation in ED along with IV ABX. She was admitted to ICU under the care of hospital medicine. Cardiology consulted to assist with management of AFIB/RVR, NSTEMI. Chart reviewed, Patient seen and examined in ICU. Repeat troponin, EKG, and ECHO pending. She is followed by Dr. Christiansen as OP Cardiology but has not seen him in quite some time. She does report a vague history of AFIB and was started on Xarelto at that time in 2017. Denies chest pain or anginal equivalents. She continues to have some shortness of breath and wheezing on exam today. She is wheelchair bound at home but can walk very short  distances. NO leg swelling on exam today. Will optimize her medical therapy today. ECHO pending. Would benefit from OP MPI stress test once pulmonary status optimized after discharge. Further recs in AM    Hospital Course:   11/18/19-Patient seen and examined today, resting in bed. Still SOB with wheezing but states she has improved. No chest pain. Converted back to afib with RVR this AM during PT/OT. Labs reviewed, creatinine 1.8.    11/19/19-Patient seen and examined today, lying in bed. Feels more SOB and congested this AM. Denies chest pain. Converted to SR overnight on amio and cardizem drips. Labs reviewed. Creatinine stable.    11/20/19-Patient seen and examined today, sitting up in bed. Feeling better today. SOB slowly improving. No cardiac complaints. Remains in SR.         Review of Systems   Constitution: Positive for malaise/fatigue.   HENT: Negative.    Eyes: Negative.    Cardiovascular: Positive for dyspnea on exertion.   Respiratory: Positive for cough and shortness of breath.    Endocrine: Negative.    Hematologic/Lymphatic: Negative.    Skin: Negative.    Musculoskeletal: Negative.    Gastrointestinal: Negative.    Genitourinary: Negative.    Neurological: Positive for weakness.   Psychiatric/Behavioral: Negative.    Allergic/Immunologic: Negative.      Objective:     Vital Signs (Most Recent):  Temp: 97.7 °F (36.5 °C) (11/20/19 0723)  Pulse: 77 (11/20/19 0723)  Resp: 20 (11/20/19 0723)  BP: 126/76 (11/20/19 0723)  SpO2: 99 % (11/20/19 0723) Vital Signs (24h Range):  Temp:  [97 °F (36.1 °C)-98.5 °F (36.9 °C)] 97.7 °F (36.5 °C)  Pulse:  [65-95] 77  Resp:  [16-22] 20  SpO2:  [95 %-99 %] 99 %  BP: (126-196)/(58-81) 126/76     Weight: 91.6 kg (201 lb 15.1 oz)  Body mass index is 31.63 kg/m².     SpO2: 99 %  O2 Device (Oxygen Therapy): nasal cannula      Intake/Output Summary (Last 24 hours) at 11/20/2019 1011  Last data filed at 11/20/2019 0600  Gross per 24 hour   Intake 2302.67 ml   Output 4 ml   Net  2298.67 ml       Lines/Drains/Airways     Peripheral Intravenous Line                 Peripheral IV - Single Lumen 11/16/19 0058 20 G Right Wrist 4 days         Peripheral IV - Single Lumen 11/18/19 1800 24 G Left;Posterior Hand 1 day                Physical Exam   Constitutional: She is oriented to person, place, and time. She appears well-developed and well-nourished. No distress.   HENT:   Head: Normocephalic and atraumatic.   Eyes: Pupils are equal, round, and reactive to light. Right eye exhibits no discharge. Left eye exhibits no discharge.   Neck: Neck supple. No JVD present.   Cardiovascular: Normal rate, regular rhythm, S1 normal, S2 normal and normal heart sounds.   No murmur heard.  Pulmonary/Chest: Effort normal. No respiratory distress. She has wheezes.   Coarse rhonchi     Abdominal: Soft. She exhibits no distension.   Musculoskeletal: She exhibits no edema.   Neurological: She is alert and oriented to person, place, and time.   Skin: Skin is warm and dry. She is not diaphoretic. No erythema.   Psychiatric: She has a normal mood and affect. Her behavior is normal. Thought content normal.   Nursing note and vitals reviewed.      Significant Labs:   CMP   Recent Labs   Lab 11/19/19  0820 11/20/19  0759    143   K 4.0 3.7    109   CO2 23 25   * 127*   BUN 32* 28*   CREATININE 1.2 1.1   CALCIUM 8.9 8.9   PROT  --  6.1   ALBUMIN  --  2.9*   BILITOT  --  0.9   ALKPHOS  --  76   AST  --  30   ALT  --  24   ANIONGAP 12 9   ESTGFRAFRICA 49* 54*   EGFRNONAA 42* 47*   , CBC   Recent Labs   Lab 11/20/19  0759   WBC 9.55   HGB 10.1*   HCT 33.0*      , Troponin No results for input(s): TROPONINI in the last 48 hours. and All pertinent lab results from the last 24 hours have been reviewed.    Significant Imaging: Echocardiogram:   2D echo with color flow doppler:   Results for orders placed or performed during the hospital encounter of 11/16/19   2D echo with color flow doppler   Result  Value Ref Range    QEF 60 55 - 65    Diastolic Dysfunction No     Est. PA Systolic Pressure 34.57     Narrative    Date of Procedure: 11/17/2019        TEST DESCRIPTION   Technical Quality: This is a portable study performed at the patient's bedside. This is a technically challenging study. There is poor endocardial definition. This study was performed in conjunction with a 3ml intravenous injection of Optison contrast   agent.     Aorta: The aortic root is normal in size, measuring 2.4 cm at sinotubular junction and 2.7 cm at Sinuses of Valsalva. The proximal ascending aorta is normal in size, measuring 2.4 cm across.     Left Atrium: The left atrial volume index is normal, measuring 15.17 cc/m2.     Left Ventricle: The left ventricle is normal in size, with an end-diastolic diameter of 3.7 cm, and an end-systolic diameter of 2.0 cm. Wall thickness is mildly increased, with the septum measuring 1.4 cm and the posterior wall measuring 1.3 cm across.   Relative wall thickness was increased at 0.70, and the LV mass index was 100.2 g/m2 consistent with concentric remodeling. There are no regional wall motion abnormalities. Left ventricular systolic function appears normal. Visually estimated ejection   fraction is 60-65%. The LV Doppler derived stroke volume equals 109.0 ccs.     Diastolic indices: E wave velocity 0.9 m/s, E/A ratio 0.8,  msec., E/e' ratio(avg) 9. Diastolic function is normal.     Right Atrium: The right atrium is normal in size.     Right Ventricle: The right ventricle is normal in size. Global right ventricular systolic function appears normal. The estimated PA systolic pressure is greater than 35 mmHg.     Aortic Valve:  Aortic valve is normal in structure with normal leaflet mobility. The peak gradient obtained across the aortic valve is 7 mmHg, with a mean gradient of 5 mmHg. Using a left ventricular outflow tract diameter of 2.1 cm, a left ventricular   outflow tract velocity time integral  of 31 cm, and a peak instantaneous transvalvular velocity time integral of 32 cm, the calculated aortic valve area is 3.41 cm2(AVAi is 1.66 cm2/m2).     Mitral Valve:  Mitral valve is normal in structure with normal leaflet mobility. The pressure half time is 54 msec. The calculated mitral valve area is 4.07 cm2.     Tricuspid Valve:  Tricuspid valve is normal in structure with normal leaflet mobility.     Pulmonary Valve:  Pulmonary valve is normal in structure with normal leaflet mobility.     Intracavitary: There is no evidence of pericardial effusion, intracavity mass, thrombi, or vegetation.         CONCLUSIONS     1 - Concentric remodeling.     2 - No wall motion abnormalities.     3 - Normal left ventricular systolic function (EF 60-65%).     4 - Normal left ventricular diastolic function.     5 - Normal right ventricular systolic function .     6 - The estimated PA systolic pressure is greater than 35 mmHg.             This document has been electronically    SIGNED BY: Rodrigo Yi MD On: 11/17/2019 12:56   , EKG: Reviewed and X-Ray: CXR: X-Ray Chest 1 View (CXR):   Results for orders placed or performed during the hospital encounter of 11/16/19   X-Ray Chest 1 View    Narrative    EXAMINATION:  XR CHEST 1 VIEW    CLINICAL HISTORY:  PNA;    FINDINGS:  Single view of the chest.   Aorta demonstrates atherosclerotic disease.    Cardiac silhouette is normal.  Significant improvement in patchy airspace opacities within the right mid lower lung zone.  No evidence of pleural effusion or pneumothorax.  Bones appear intact.      Impression    Significant improvement in patchy airspace opacities within the right mid lower lung zone.      Electronically signed by: Meng Sharp MD  Date:    11/18/2019  Time:    08:48    and X-Ray Chest PA and Lateral (CXR): No results found for this visit on 11/16/19.    Assessment and Plan:   Patient who presents with elevated troponin and PAF in setting of PNA/sepsis. Clinically  improved. Back in SR. Continue same meds, rec's listed below. Needs to f/u with her primary cardiologist for OP stress test. Will be available as needed.    * Pneumonia of right lower lobe due to infectious organism  -On ABX, per primary team    Essential hypertension  -Continue same meds  -Monitor      Paroxysmal A-fib with RVR  -No known history of AFIB/AFL but on Xarelto as OP  -Received IV Lopressor x 2 dose in ED and IV CCB with conversion to SR yesterday  -Increase Cardizem to 90 mg TID for better rate control  -NO BB for now given wheezing on exam today  -ECHO pending    11/18/19  -Converted back to afib with RVR this AM  -Start cardizem gtt, titrate as needed  -Continue Xarelto for CVA prophylaxis     11/19/19  -Converted to SR this AM  -Discussed switching cardizem and amiodarone to po, patient unsure if she can swallow pills at this time  -Will further discuss with hospital medicine  -Continue Xarelto for CVA prophylaxis     11/20/19  -Remains in SR  -Continue Cardizem  -Continue amiodarone 200 mg BID x 2 weeks, then decrease to 200 mg daily  -Continue Xarelto for CVA prophylaxis, will verify correct dosage with pharmacy    NSTEMI  -Admitted due to shortness of breath, AFIB/RVR  -Troponin 0.95>1.983>.992  -Repeat Troponin 0.371  -Likely due to demand ischemia from AFIB/RVR and sepsis  -ECHO pending  -Increase CCB to 90 mg TID for better rate control today  -NO BB given wheezing on exam today  -Continue ASA, Statin, CCB, Xarelto  -Start Losartan for better BP control.  -Would benefit from OP MPI stress test once pulmonary status optimized after discharge.   -Reassess in AM    11/18/19  -Stable overnight, remains chest pain free  -2D echo showed normal EF  -Continue ASA, statin, CCB, ARB, Xarelto  -No BB given wheezing  -OP MPI stress test    11/19/19  -Stable overnight  -No chest pain  -Continue ASA, statin, CCB, ARB, Xarelto  -Plan on OP MPI stress test    11/20/19  -Remains stable CV wise  -No chest  pain/anginal equivalent  -Continue ASA, statin, CCB, ARB, Xarelto  -No Plavix given bleeding risk  -No BB given intermittent wheezing  -No ACEi/ARB indicated as EF is normal  -Needs to f/u with primary cardiologist upon d/c for OP MPI stress test    Moderate persistent asthma with exacerbation  -Mgmt per primary team    Acute respiratory failure with hypoxia  -Secondary to PNA  -Continue mgmt per primary team, on abx  -Improving        VTE Risk Mitigation (From admission, onward)         Ordered     rivaroxaban tablet 15 mg  With dinner      11/17/19 1054     IP VTE HIGH RISK PATIENT  Once      11/16/19 0430     Reason for No Pharmacological VTE Prophylaxis  Once     Question:  Reasons:  Answer:  Already adequately anticoagulated on oral Anticoagulants    11/16/19 0430     Place sequential compression device  Until discontinued      11/16/19 0430                Susan Alcocer PA-C  Cardiology  [facility]

## 2019-11-20 NOTE — PROCEDURES
Modified Barium Swallow    Patient Name:  Ana Castañeda   MRN:  [id number]      Recommendations:     Recommendations:                General Recommendations:  Dysphagia therapy  Diet recommendations:  Puree, Honey Thick   Aspiration Precautions: 1 bite/sip at a time, Alternating bites/sips, Assistance with meals and Assistance with thickening liquids, Double swallow with each bite/sip, HOB to 90 degrees, Meds crushed in puree, No straws and Small bites/sips   General Precautions: Standard, aspiration, fall, respiratory  Communication strategies:  none    Referral     Reason for Referral  Patient was referred for a Modified Barium Swallow Study to assess the efficiency of his/her swallow function, rule out aspiration and make recommendations regarding safe dietary consistencies, effective compensatory strategies, and safe eating environment.     Diagnosis: Pneumonia of right lower lobe due to infectious organism       History:     Past Medical History:   Diagnosis Date    Anticoagulant long-term use     Asthma     Debility     Gait apraxia     Gout     Hypertension     NPH (normal pressure hydrocephalus)     Stroke     Thyroid disease        Objective:     Current Respiratory Status: 11/20/19    Alert: yes    Cooperative: yes    Follows Directions: yes    Visualization  · Patient was seen in the lateral view    Oral Peripheral Examination  · Oral Musculature: general weakness  · Dentition: edentulous  · Mucosal Quality: good    Consistencies Assessed  · Thin, nectar, honey thick liquids , puree and solids    Oral Preparation/Oral Phase  · prolonged mastication    Pharyngeal Phase   Delayed swallow  Decreased laryngeal elevation with significant penetration of thin and nectar thick liquids with trace silent aspiration suspected  No penetration or aspiration with honey thick liquids and puree.  Solids were taken with poor mastication and breakdown followed by max vallecular residue requiring 2 swallows to clear  and liquids to wash     Cervical Esophageal Phase  · UES appeared to accommodate all bolus types without stasis or retrograde movement observed     Assessment:     Impressions  ·  Pt presents with oropharyngeal dysphagia characterized by reduced mastication, delayed swallow and decreased laryngeal elevation resulting in penetration and aspiration of thin and nectar thick liquids. She is at increased risk of aspiration with solids due to max vallecular residue.     Prognosis: Fair    Barriers:  · None    Plan  Speech therapy  Modified diet  Compensatory strategies    Education  Results were discussed with patient.    Goals:   Multidisciplinary Problems     SLP Goals        Problem: SLP Goal    Goal Priority Disciplines Outcome   SLP Goal     SLP    Description:  1. Pt will tolerate po diet of puree with honey thick liquids.   2. Pt will utilize compensatory swallow strategies with min cues.  3. Pt will complete oral motor, pharyngeal ex x 20 each with min cues to improve swallow safety.                     Plan:   · Patient to be seen:  Therapy Frequency: 3 x/week   · Plan of Care expires:  11/27/19  · Plan of Care reviewed with:  patient, spouse        Discharge recommendations:      Barriers to Discharge:      Time Tracking:   SLP Treatment Date:   11/20/19  Speech Start Time:  1115  Speech Stop Time:  1140     Speech Total Time (min):  25 min    Kia Roach CCC-SLP  11/20/2019

## 2019-11-20 NOTE — PT/OT/SLP PROGRESS
Physical Therapy Treatment    Patient Name:  Ana Castañeda   MRN:  [id number]    Recommendations:     Discharge Recommendations:  nursing facility, skilled   Discharge Equipment Recommendations: (defer to next level of care)   Barriers to discharge: None    Assessment:     Ana Castañeda is a 84 y.o. female admitted with a medical diagnosis of Pneumonia of right lower lobe due to infectious organism.  She presents with the following impairments/functional limitations:  weakness, impaired endurance, gait instability, impaired functional mobilty, impaired balance, decreased lower extremity function .    Rehab Prognosis: Good; patient would benefit from acute skilled PT services to address these deficits and reach maximum level of function.    Recent Surgery: * No surgery found *      Plan:     During this hospitalization, patient to be seen 5 x/week to address the identified rehab impairments via gait training, therapeutic activities, therapeutic exercises, neuromuscular re-education and progress toward the following goals:    · Plan of Care Expires:  11/25/19    Subjective     Chief Complaint: defficulty with mobility  Patient/Family Comments/goals: return home at highest level of func  Pain/Comfort:  · Pain Rating 1: 0/10      Objective:     Communicated with EPIC prior to session.  Patient found supine with   upon PT entry to room.     General Precautions: Standard,     Orthopedic Precautions:N/A   Braces: N/A     Functional Mobility:  · Supine to sit with max  A x1 n  · seated scooting with max A  · Sit to/from stand with max A      AM-PAC 6 CLICK MOBILITY  Turning over in bed (including adjusting bedclothes, sheets and blankets)?: 2  Sitting down on and standing up from a chair with arms (e.g., wheelchair, bedside commode, etc.): 2  Moving from lying on back to sitting on the side of the bed?: 2  Moving to and from a bed to a chair (including a wheelchair)?: 2  Need to walk in hospital room?: 1  Climbing 3-5 steps with  a railing?: 1  Basic Mobility Total Score: 10       Therapeutic Activities and Exercises:   sit to stand x 3 reps.  Pt unable to step to complete pivot.  Pt able to complete SPT w/o use of Rw but requires maX A.    Patient left up in chair with call button in reach..    GOALS:   Multidisciplinary Problems     Physical Therapy Goals        Problem: Physical Therapy Goal    Goal Priority Disciplines Outcome Goal Variances Interventions   Physical Therapy Goal     PT, PT/OT Ongoing, Progressing     Description:  LTGs to be met by 11/25/19  1. Pt will perform bed mobility with Mod A  2. Pt will perform sit<>stand functional t/fs with Max A  3. Pt will ambulate ~5ft using RW with Max A  4. Pt will perform (B) LE therapeutic exercises 1 x 15 reps                    Time Tracking:     PT Received On: 11/20/19  PT Start Time: 0935     PT Stop Time: 1000  PT Total Time (min): 25 min     Billable Minutes: Therapeutic Activity 25    Treatment Type: Treatment  PT/PTA: PT           Missy Shafer, PT  11/20/2019

## 2019-11-20 NOTE — ASSESSMENT & PLAN NOTE
Converted to NSR with IV Cardizem  Troponin elevated  Cards consulted, pt already on Xarelto    Remains in NSR    Back in afib w RVR-- will try Dig and oral Cardizem    S/p 1 day of Cardizem and Amiodarone gtt-- doing well.  Converted back to NSR- will start oral Amio and Card and then d/c the gtts.  Cards following    Resolved, continue Xarelto

## 2019-11-20 NOTE — ASSESSMENT & PLAN NOTE
-Admitted due to shortness of breath, AFIB/RVR  -Troponin 0.95>1.983>.992  -Repeat Troponin 0.371  -Likely due to demand ischemia from AFIB/RVR and sepsis  -ECHO pending  -Increase CCB to 90 mg TID for better rate control today  -NO BB given wheezing on exam today  -Continue ASA, Statin, CCB, Xarelto  -Start Losartan for better BP control.  -Would benefit from OP MPI stress test once pulmonary status optimized after discharge.   -Reassess in AM    11/18/19  -Stable overnight, remains chest pain free  -2D echo showed normal EF  -Continue ASA, statin, CCB, ARB, Xarelto  -No BB given wheezing  -OP MPI stress test    11/19/19  -Stable overnight  -No chest pain  -Continue ASA, statin, CCB, ARB, Xarelto  -Plan on OP MPI stress test    11/20/19  -Remains stable CV wise  -No chest pain/anginal equivalent  -Continue ASA, statin, CCB, ARB, Xarelto  -No Plavix given bleeding risk  -No BB given intermittent wheezing  -No ACEi/ARB indicated as EF is normal  -Needs to f/u with primary cardiologist upon d/c for OP MPI stress test

## 2019-11-20 NOTE — PLAN OF CARE
Patient accepted at [facility].  Spoke with Daughter Wendy Hair.  Advised CM has left message for [facility] to see if they have a bed available.  Daughter states she has also left messages with [facility] without a return phone call.  She will discuss with sister.       11/20/19 7360   Post-Acute Status   Post-Acute Authorization Placement   Post-Acute Placement Status Authorization Obtained

## 2019-11-20 NOTE — PT/OT/SLP PROGRESS
Occupational Therapy  Treatment    Ana Castañeda   MRN: [id number]   Admitting Diagnosis: Pneumonia of right lower lobe due to infectious organism    OT Date of Treatment: 11/20/19   OT Start Time: 0853  OT Stop Time: 0916  OT Total Time (min): 23 min    Billable Minutes:  Therapeutic Activity  x 13 min and Therapeutic Exercise  x 10 min    General Precautions: Standard, aspiration, fall, respiratory  Orthopedic Precautions: N/A  Braces: N/A         Subjective:  Communicated with pt, spouse, and nurse - Mariel prior to session.         Objective:  Patient found with: peripheral IV, oxygen, telemetry     Functional Mobility:  Bed Mobility:   Pt requires max a for supine to sit, mod a for rolling to right.    Transfers: Pt performed sit to stand with bed elevated with mod A, stand pivot with max A with v/c for sequencing due to anxiety.         Functional Ambulation:     Activities of Daily Living:     Feeding adaptive equipment:      UE adaptive equipment:      LE adaptive equipment:                     Bathing adaptive equipment:     Balance:   Static Sit: FAIR-: Maintains without assist but inconsistent   Dynamic Sit: FAIR: Cannot move trunk without losing balance  Static Stand: POOR: Needs MODERATE assist to maintain  Dynamic stand: 0: N/A    Therapeutic Activities and Exercises:  Pt performed AAROM/AROM ex to BUE to all available planes. Pt exhibits tremor to RUE with movement. Pt reports tremor starts since hospitalization.     AM-PAC 6 CLICK ADL   How much help from another person does this patient currently need?   1 = Unable, Total/Dependent Assistance  2 = A lot, Maximum/Moderate Assistance  3 = A little, Minimum/Contact Guard/Supervision  4 = None, Modified [place]/Independent    Putting on and taking off regular lower body clothing? : 2  Bathing (including washing, rinsing, drying)?: 2  Toileting, which includes using toilet, bedpan, or urinal? : 2  Putting on and taking off regular upper body clothing?:  "2  Taking care of personal grooming such as brushing teeth?: 3  Eating meals?: 3  Daily Activity Total Score: 14     AM-PAC Raw Score CMS "G-Code Modifier Level of Impairment Assistance   6 % Total / Unable   7 - 8 CM 80 - 100% Maximal Assist   9-13 CL 60 - 80% Moderate Assist   14 - 19 CK 40 - 60% Moderate Assist   20 - 22 CJ 20 - 40% Minimal Assist   23 CI 1-20% SBA / CGA   24 CH 0% Independent/ Mod I       Patient left up in chair with all lines intact, call button in reach, chair alarm on and spouse present    ASSESSMENT:  Ana Castañeda is a 84 y.o. female with a medical diagnosis of Pneumonia of right lower lobe due to infectious organism and presents with impaired self care and fx mobility.    Rehab identified problem list/impairments: Rehab identified problem list/impairments: weakness, impaired balance, impaired self care skills, impaired functional mobilty, decreased coordination, abnormal tone, decreased safety awareness, decreased ROM, other (comment)(tremor to RUE)    Rehab potential is fair.    Activity tolerance: Fair    Discharge recommendations: Discharge Facility/Level of Care Needs: home health OT, nursing facility, skilled     Barriers to discharge: Barriers to Discharge: None    Equipment recommendations: none     GOALS:   Multidisciplinary Problems     Occupational Therapy Goals        Problem: Occupational Therapy Goal    Goal Priority Disciplines Outcome Interventions   Occupational Therapy Goal     OT, PT/OT Ongoing, Progressing    Description:  LTGs to be met by 11/25/19   1. Pt will perform LE dressing with Max A  2. Pt will perform BSC t/f with Max A  3. Pt will perform (B) UE ROM exercises 1 x 20 reps                    Plan:  Patient to be seen 3 x/week to address the above listed problems via self-care/home management, therapeutic activities, therapeutic exercises  Plan of Care expires: 11/25/19  Plan of Care reviewed with: patient, spouse         Tiesha Lee, " OT  11/20/2019

## 2019-11-20 NOTE — SUBJECTIVE & OBJECTIVE
Review of Systems   Constitution: Positive for malaise/fatigue.   HENT: Negative.    Eyes: Negative.    Cardiovascular: Positive for dyspnea on exertion.   Respiratory: Positive for cough and shortness of breath.    Endocrine: Negative.    Hematologic/Lymphatic: Negative.    Skin: Negative.    Musculoskeletal: Negative.    Gastrointestinal: Negative.    Genitourinary: Negative.    Neurological: Positive for weakness.   Psychiatric/Behavioral: Negative.    Allergic/Immunologic: Negative.      Objective:     Vital Signs (Most Recent):  Temp: 97.7 °F (36.5 °C) (11/20/19 0723)  Pulse: 77 (11/20/19 0723)  Resp: 20 (11/20/19 0723)  BP: 126/76 (11/20/19 0723)  SpO2: 99 % (11/20/19 0723) Vital Signs (24h Range):  Temp:  [97 °F (36.1 °C)-98.5 °F (36.9 °C)] 97.7 °F (36.5 °C)  Pulse:  [65-95] 77  Resp:  [16-22] 20  SpO2:  [95 %-99 %] 99 %  BP: (126-196)/(58-81) 126/76     Weight: 91.6 kg (201 lb 15.1 oz)  Body mass index is 31.63 kg/m².     SpO2: 99 %  O2 Device (Oxygen Therapy): nasal cannula      Intake/Output Summary (Last 24 hours) at 11/20/2019 1011  Last data filed at 11/20/2019 0600  Gross per 24 hour   Intake 2302.67 ml   Output 4 ml   Net 2298.67 ml       Lines/Drains/Airways     Peripheral Intravenous Line                 Peripheral IV - Single Lumen 11/16/19 0058 20 G Right Wrist 4 days         Peripheral IV - Single Lumen 11/18/19 1800 24 G Left;Posterior Hand 1 day                Physical Exam   Constitutional: She is oriented to person, place, and time. She appears well-developed and well-nourished. No distress.   HENT:   Head: Normocephalic and atraumatic.   Eyes: Pupils are equal, round, and reactive to light. Right eye exhibits no discharge. Left eye exhibits no discharge.   Neck: Neck supple. No JVD present.   Cardiovascular: Normal rate, regular rhythm, S1 normal, S2 normal and normal heart sounds.   No murmur heard.  Pulmonary/Chest: Effort normal. No respiratory distress. She has wheezes.   Coarse  rhonchi     Abdominal: Soft. She exhibits no distension.   Musculoskeletal: She exhibits no edema.   Neurological: She is alert and oriented to person, place, and time.   Skin: Skin is warm and dry. She is not diaphoretic. No erythema.   Psychiatric: She has a normal mood and affect. Her behavior is normal. Thought content normal.   Nursing note and vitals reviewed.      Significant Labs:   CMP   Recent Labs   Lab 11/19/19  0820 11/20/19  0759    143   K 4.0 3.7    109   CO2 23 25   * 127*   BUN 32* 28*   CREATININE 1.2 1.1   CALCIUM 8.9 8.9   PROT  --  6.1   ALBUMIN  --  2.9*   BILITOT  --  0.9   ALKPHOS  --  76   AST  --  30   ALT  --  24   ANIONGAP 12 9   ESTGFRAFRICA 49* 54*   EGFRNONAA 42* 47*   , CBC   Recent Labs   Lab 11/20/19  0759   WBC 9.55   HGB 10.1*   HCT 33.0*      , Troponin No results for input(s): TROPONINI in the last 48 hours. and All pertinent lab results from the last 24 hours have been reviewed.    Significant Imaging: Echocardiogram:   2D echo with color flow doppler:   Results for orders placed or performed during the hospital encounter of 11/16/19   2D echo with color flow doppler   Result Value Ref Range    QEF 60 55 - 65    Diastolic Dysfunction No     Est. PA Systolic Pressure 34.57     Narrative    Date of Procedure: 11/17/2019        TEST DESCRIPTION   Technical Quality: This is a portable study performed at the patient's bedside. This is a technically challenging study. There is poor endocardial definition. This study was performed in conjunction with a 3ml intravenous injection of Optison contrast   agent.     Aorta: The aortic root is normal in size, measuring 2.4 cm at sinotubular junction and 2.7 cm at Sinuses of Valsalva. The proximal ascending aorta is normal in size, measuring 2.4 cm across.     Left Atrium: The left atrial volume index is normal, measuring 15.17 cc/m2.     Left Ventricle: The left ventricle is normal in size, with an end-diastolic  diameter of 3.7 cm, and an end-systolic diameter of 2.0 cm. Wall thickness is mildly increased, with the septum measuring 1.4 cm and the posterior wall measuring 1.3 cm across.   Relative wall thickness was increased at 0.70, and the LV mass index was 100.2 g/m2 consistent with concentric remodeling. There are no regional wall motion abnormalities. Left ventricular systolic function appears normal. Visually estimated ejection   fraction is 60-65%. The LV Doppler derived stroke volume equals 109.0 ccs.     Diastolic indices: E wave velocity 0.9 m/s, E/A ratio 0.8,  msec., E/e' ratio(avg) 9. Diastolic function is normal.     Right Atrium: The right atrium is normal in size.     Right Ventricle: The right ventricle is normal in size. Global right ventricular systolic function appears normal. The estimated PA systolic pressure is greater than 35 mmHg.     Aortic Valve:  Aortic valve is normal in structure with normal leaflet mobility. The peak gradient obtained across the aortic valve is 7 mmHg, with a mean gradient of 5 mmHg. Using a left ventricular outflow tract diameter of 2.1 cm, a left ventricular   outflow tract velocity time integral of 31 cm, and a peak instantaneous transvalvular velocity time integral of 32 cm, the calculated aortic valve area is 3.41 cm2(AVAi is 1.66 cm2/m2).     Mitral Valve:  Mitral valve is normal in structure with normal leaflet mobility. The pressure half time is 54 msec. The calculated mitral valve area is 4.07 cm2.     Tricuspid Valve:  Tricuspid valve is normal in structure with normal leaflet mobility.     Pulmonary Valve:  Pulmonary valve is normal in structure with normal leaflet mobility.     Intracavitary: There is no evidence of pericardial effusion, intracavity mass, thrombi, or vegetation.         CONCLUSIONS     1 - Concentric remodeling.     2 - No wall motion abnormalities.     3 - Normal left ventricular systolic function (EF 60-65%).     4 - Normal left ventricular  diastolic function.     5 - Normal right ventricular systolic function .     6 - The estimated PA systolic pressure is greater than 35 mmHg.             This document has been electronically    SIGNED BY: Rodrigo Yi MD On: 11/17/2019 12:56   , EKG: Reviewed and X-Ray: CXR: X-Ray Chest 1 View (CXR):   Results for orders placed or performed during the hospital encounter of 11/16/19   X-Ray Chest 1 View    Narrative    EXAMINATION:  XR CHEST 1 VIEW    CLINICAL HISTORY:  PNA;    FINDINGS:  Single view of the chest.   Aorta demonstrates atherosclerotic disease.    Cardiac silhouette is normal.  Significant improvement in patchy airspace opacities within the right mid lower lung zone.  No evidence of pleural effusion or pneumothorax.  Bones appear intact.      Impression    Significant improvement in patchy airspace opacities within the right mid lower lung zone.      Electronically signed by: Meng Sharp MD  Date:    11/18/2019  Time:    08:48    and X-Ray Chest PA and Lateral (CXR): No results found for this visit on 11/16/19.

## 2019-11-20 NOTE — PT/OT/SLP EVAL
Speech Language Pathology Evaluation  Bedside Swallow    Patient Name:  Ana Castañeda   MRN:  [id number]  Admitting Diagnosis: Pneumonia of right lower lobe due to infectious organism    Recommendations:                 General Recommendations:  Dysphagia therapy and Modified barium swallow study  Diet recommendations:  Puree, Honey Thick   Aspiration Precautions: 1 bite/sip at a time, Alternating bites/sips, Assistance with meals and Assistance with thickening liquids, Double swallow with each bite/sip, Feed only when awake/alert, HOB to 90 degrees and Small bites/sips   General Precautions: Standard, aspiration, fall, respiratory  Communication strategies:  none    History:     Past Medical History:   Diagnosis Date    Anticoagulant long-term use     Asthma     Debility     Gait apraxia     Gout     Hypertension     NPH (normal pressure hydrocephalus)     Stroke     Thyroid disease        Past Surgical History:   Procedure Laterality Date    CAROTID ENDARTERECTOMY         Social History: Patient lives with family.    Prior Intubation HX:      Modified Barium Swallow: reports having one but pt is not sure when. MBSS is recommended.    Chest X-Rays:    Prior diet: soft foods/thin liquids    Occupation/hobbies/homemaking:     Subjective     Pt cooperative. She is a poor historian and was unsure if she has been having difficulty swallowing.   Patient goals: none stated    Pain/Comfort:  · Pain Rating 1: 0/10  · Pain Rating Post-Intervention 1: 0/10    Objective:     Oral Musculature Evaluation  · Oral Musculature: general weakness  · Dentition: edentulous  · Mucosal Quality: good    Bedside Swallow Eval:   Consistencies Assessed:  · Thin, puree, solids     Oral Phase:   · WFL    Pharyngeal Phase:   · decreased hyolaryngeal excursion to palpation  · delayed swallow initation    Compensatory Strategies  · None    Treatment:     Assessment:     Ana Castañeda is a 84 y.o. female with an SLP diagnosis of Dysphagia.   Swallow was assessed at bedside with thin liquids, puree and solids. Pt has decreased laryngeal elevation but no overt s/s of aspiration. Recommend MBSS to further assess safety of swallow function and make diet recommendations.     Goals:   Multidisciplinary Problems     SLP Goals        Problem: SLP Goal    Goal Priority Disciplines Outcome   SLP Goal     SLP    Description:  1. Pt will tolerate po diet of puree with honey thick liquids.   2. Pt will utilize compensatory swallow strategies with min cues.  3. Pt will complete oral motor, pharyngeal ex x 20 each with min cues to improve swallow safety.                     Plan:     · Patient to be seen:  3 x/week   · Plan of Care expires:  11/27/19  · Plan of Care reviewed with:  patient, spouse   · SLP Follow-Up:  Yes       Discharge recommendations:      Barriers to Discharge:  None    Time Tracking:     SLP Treatment Date:   11/20/19  Speech Start Time:  0910  Speech Stop Time:  0923     Speech Total Time (min):  13 min    Billable Minutes: Eval Swallow and Oral Function 13    Kia Roach CCC-SLP  11/20/2019

## 2019-11-20 NOTE — PLAN OF CARE
Patient in good spirits. She agrees with POC to d/c to nursing home. Activity has been encouraged. She exhibits SOB on exertion, and when eating or drinking. She continue on oxygen, needs reassurance to keep nasal cannula in her noise. Pitting edema to upper and lower extremities. No other complaints voiced at this time.

## 2019-11-20 NOTE — PROGRESS NOTES
"[facility] Hospital Medicine  Progress Note    Patient Name: Ana Castañeda  MRN: [id number]  Patient Class: IP- Inpatient   Admission Date: 11/16/2019  Length of Stay: 4 days  Attending Physician: Tiffanie Jones MD  Primary Care Provider: Stephen Tarango MD        Subjective:     Principal Problem:Pneumonia of right lower lobe due to infectious organism        HPI:  Ana Castañeda is a 84 y.o. female patient with a PMHx asthma, HTN, h/o CVA with residual right-sided weakness, hypothyroidism, gout, debility (wheelchair bound), and chronic anticoagulation therapy (reason unknown).  She presented to the emergency department with complaints of shortness of breath that progressively worsened shortly before arrival.  No aggravating or alleviating factors.  Associated cough producing mucus, wheezing and congestion x 3 days.  Denies any chest pain, palpitations, orthopnea, PND, edema, abdominal pain, nausea, vomiting, diarrhea, dysuria, hematuria, back pain, AMS, lightheadedness or dizziness, syncope, rhinorrhea, sore throat, weakness, fever or chills.  Family at bedside reports patient was seen at [facility] on 11/13 and diagnosed with acute bronchitis.  She was given a "steroid shot" and prescribed Cefdinir, which she has taken x 1 dose on 11/15.  Patient is on chronic Xarelto, but unsure of reason.  She denies any history of AF/FL, DVT or PE.  Initial workup in the ED resulted WBC of 13.14, creatinine 1.4, BUN 25, troponin 0.095, , lactic 3.1, procalcitonin 0.15, UA unremarkable, EKG unrevealing.  ABG with pH of 7.224, pCO2 66, pO2 241, SaO2 100 on BiPAP.  CXR showed right lower lobe infiltrate.  Patient's O2 sat remained stable in the ED, but BiPAP placed due to increased respiratory distress. Upon arrival to the ED, patient was tachycardic with heart rate of 136 bpm, /78.  She was given IV Lopressor 5 mg x 2 doses and 40 mg IV Lasix, which improved her HR into the 80s but also decreased " blood pressure to 98/70.  ED also administered 125 mg IV Solu-Medrol, DuoNeb treatments x 3, 2.5 L IV fluid resuscitation, IV vanc and Zosyn.  Patient was admitted to ICU under Hospital Medicine services.  Currently, patient appears comfortable in no acute distress.  BiPAP remains in place with stable O2 sat.  Patient is a full code.   is surrogate decision maker.      Overview/Hospital Course:  Ana Castañeda is a 84 y.o. female patient with a PMHx asthma, HTN, h/o CVA with residual right-sided weakness, hypothyroidism, gout, debility (wheelchair bound), and chronic anticoagulation therapy (reason unknown) admitted to ICU on Bipap, with acute hypoxemic resp failure sec to LLL Pneumonia and Afib w RVR and Lactic Acidosis. She was aggressively rehydrated with NS and started on IV Vanc and Zosyn as well as IV Solumedrol. Her Afib RVR was treated initially with IV Lopressor 5 mg IVP x 2 which  which did not affect her HR but dropped BP to 98/70 -- necessitating Charbel-senephrine gtt by eICU this am. This am she was given a dose of Cardizem 10 mg IVP which slowed down her HR and also converted to NSR and her BP improved. She was started on Cardizem oral tabs and was able to come off Charbel gtt as well as Bipap to NC and feels much better.  Urine output is good. She is AAOx3, speech is clear, family at bedside. Troponin increased to 1.98-- cardiology consulted.    11/17- doing better, comfortable, pleasant. Appears mildly fluid overloaded, she is about 5 L fluid positive. Given IV lasix, all Cx NGTD. Cardiology thinks its demand ischemia from sepsis and recommended out pt NMP scan when stable. Await transfer to tele. PT/OT ordered. NSR remains.     Ana Castañeda is a 84 y.o. female patient with a PMHx asthma, HTN, h/o CVA with residual right-sided weakness, hypothyroidism, gout, debility (wheelchair bound), and chronic anticoagulation therapy (reason unknown) admitted to ICU on Bipap, with acute hypoxemic resp failure sec  to LLL Pneumonia and Afib w RVR and Lactic Acidosis. She was aggressively rehydrated with NS and started on IV Vanc and Zosyn as well as IV Solumedrol. Her Afib RVR was treated initially with IV Lopressor 5 mg IVP x 2 which  which did not affect her HR but dropped BP to 98/70 -- necessitating Charbel-senephrine gtt by eICU this am. This am she was given a dose of Cardizem 10 mg IVP which slowed down her HR and also converted to NSR and her BP improved. She was started on Cardizem oral tabs and was able to come off Charbel gtt as well as Bipap to NC and feels much better.  Urine output is good. She is AAOx3, speech is clear, family at bedside. Troponin increased to 1.98-- cardiology consulted.    11/17- doing better, comfortable, pleasant. Appears mildly fluid overloaded, she is about 5 L fluid positive. Given IV lasix, all Cx NGTD. Cardiology thinks its demand ischemia from sepsis and recommended out pt NMP scan when stable. Await transfer to tele. PT/OT ordered. NSR remains.   11/18- went into Afib RVR when PT evaluated her yesterday- has remained in it since- no response to Dig, Cardizem. Eventually cardiology had to start her on Cardizem and Amiodarone gtt with some slowing. Otherwise feels good. Repeat CXR shows clear lungs, no Pneumonia. Bun/Cr elevated at 34/1.5- hence Lasix d/hira and started on gentle rehydration. Zosyn also d/hira.   11/19- looks much better, sitting up in chair, eating lunch well, appears in good spirits. No dysphagia, doing IS. Bun improved to 34. Doing a little PT. Has great family support.   11/20- looks better, sitting up in chair, but needed lot more support from PT/OT then at home-- hence family requests SNF at [facility] preferably. Continue present care.    Interval History: looks better, sitting up in chair, but needed lot more support from PT/OT then at home-- hence family requests SNF at [facility] preferably. Continue present care.    Review of Systems   Unable to perform ROS: Acuity  of condition   Constitutional: Negative for chills and fever.   HENT: Negative for postnasal drip, rhinorrhea, sinus pressure and sore throat.    Respiratory: Negative for cough, shortness of breath and wheezing.    Cardiovascular: Negative for chest pain, palpitations and leg swelling.   Gastrointestinal: Negative for abdominal distention, abdominal pain, blood in stool, constipation, diarrhea, nausea and vomiting.   Genitourinary: Negative for difficulty urinating, dysuria, hematuria and urgency.   Musculoskeletal: Positive for gait problem (chronic). Negative for arthralgias, back pain, myalgias, neck pain and neck stiffness.   Skin: Negative for pallor, rash and wound.   Neurological: Positive for weakness. Negative for dizziness, syncope, facial asymmetry, speech difficulty, light-headedness and headaches.   Psychiatric/Behavioral: Negative for confusion. The patient is not nervous/anxious.    All other systems reviewed and are negative.    Objective:     Vital Signs (Most Recent):  Temp: 97.7 °F (36.5 °C) (11/20/19 0723)  Pulse: 77 (11/20/19 0723)  Resp: 20 (11/20/19 0723)  BP: 126/76 (11/20/19 0723)  SpO2: 99 % (11/20/19 0723) Vital Signs (24h Range):  Temp:  [97 °F (36.1 °C)-98.5 °F (36.9 °C)] 97.7 °F (36.5 °C)  Pulse:  [65-95] 77  Resp:  [16-22] 20  SpO2:  [95 %-99 %] 99 %  BP: (126-196)/(58-81) 126/76     Weight: 91.6 kg (201 lb 15.1 oz)  Body mass index is 31.63 kg/m².    Intake/Output Summary (Last 24 hours) at 11/20/2019 1043  Last data filed at 11/20/2019 0600  Gross per 24 hour   Intake 2302.67 ml   Output 4 ml   Net 2298.67 ml      Physical Exam   Constitutional: She is oriented to person, place, and time. She appears well-developed and well-nourished. She is cooperative.  Non-toxic appearance. She does not have a sickly appearance. She does not appear ill. No distress. She is not intubated. Nasal cannula in place.   HENT:   Head: Normocephalic and atraumatic.   Mouth/Throat: Oropharynx is clear and  moist and mucous membranes are normal.   Eyes: Pupils are equal, round, and reactive to light. EOM and lids are normal.   Neck: Trachea normal and full passive range of motion without pain. Carotid bruit is not present.   Cardiovascular: Normal rate, regular rhythm and normal heart sounds.   Pulses:       Radial pulses are 2+ on the right side, and 2+ on the left side.        Dorsalis pedis pulses are 1+ on the right side, and 1+ on the left side.   Pulmonary/Chest: Effort normal. No accessory muscle usage. No tachypnea. She is not intubated. No respiratory distress. She has decreased breath sounds in the right lower field and the left lower field. She has no wheezes.   Abdominal: Soft. She exhibits no distension. Bowel sounds are decreased. There is no tenderness.   Genitourinary:   Genitourinary Comments: Ratliff in place   Musculoskeletal: Normal range of motion.        Right foot: There is no deformity.        Left foot: There is no deformity.   No edema   Lymphadenopathy:     She has no cervical adenopathy.   Neurological: She is alert and oriented to person, place, and time.   Skin: Skin is warm, dry and intact. Capillary refill takes less than 2 seconds. No rash noted. No cyanosis.   Psychiatric: She has a normal mood and affect. Her speech is normal and behavior is normal. Judgment and thought content normal. Cognition and memory are normal.   Nursing note and vitals reviewed.      Significant Labs:   BMP:   Recent Labs   Lab 11/20/19  0759   *      K 3.7      CO2 25   BUN 28*   CREATININE 1.1   CALCIUM 8.9     CBC:   Recent Labs   Lab 11/20/19  0759   WBC 9.55   HGB 10.1*   HCT 33.0*        All pertinent labs within the past 24 hours have been reviewed.    Significant Imaging: I have reviewed all pertinent imaging results/findings within the past 24 hours.      Assessment/Plan:      * Pneumonia of right lower lobe due to infectious organism  - WBC 13, TMax 101.2°F, HR >90, RR >20,  lactic 3.1.  Patient became hypotensive in the ED after receiving 5 mg IV Lopressor for tachycardia.  Blood pressure responded well to IV fluid resuscitation, and remains stable.  - Blood cultures obtained in the ED.  Continue empiric IV vanc and Zosyn.  - 30 mL/kg IV fluids given in the ED.  Continue IV hydration.  - Continue bronchodilators.  - Wean BiPAP as tolerated.  - Check for influenza.  - Follow labs.    Improving, off Charbel gtt  Leukocytosis better  Continue IV abx, steroids    Doing better  All cx ngtd  Cont same abx  Decrease steroids    Doing better, repeat cxr in am    CXR clear, pneumonia resolved  Cont augmentin      Acute respiratory failure with hypoxia  - Secondary to asthma exacerbation + PNA.    - O2 sat stable on 50% FiO2 BiPAP, wean as tolerated.  Repeat ABG in AM.  - DuoNebs Q 6.  - IV Solu-Medrol 80 mg Q8.  - Empiric antibiotics.  - Will obtain V/Q scan to r/o PE (unable to perform CTA due to TARA).  - Pulmonology consult.    - Improving, came off BIPAP- Oxygenation improving  Improving    Doing well on 2 L NC  IS started    Home o2 eval    NSTEMI  - Initial troponin of 0.095, likely demand secondary to above.  EKG unrevealing.  Chest pain-free.  - Trend serial cardiac enzymes.  - Continue aspirin and statin.  Beta-blocker on hold to prevent hypotension, resume as BP tolerates.  - Further recs pending clinical course.    Repeat elevated to 1.98  Pt has no CP, likely sec to demand Ischemia from Sepsis, resp failure  Will consult Cards    Demand ischemia- no ACS    Needs NMP scan as out pt    Weakness  Cont OT/ PT  Await SNF placement    inj B12 IM        VTE Risk Mitigation (From admission, onward)         Ordered     rivaroxaban tablet 15 mg  With dinner      11/17/19 1054     IP VTE HIGH RISK PATIENT  Once      11/16/19 7800     Reason for No Pharmacological VTE Prophylaxis  Once     Question:  Reasons:  Answer:  Already adequately anticoagulated on oral Anticoagulants    11/16/19 8040      Place sequential compression device  Until discontinued      11/16/19 0154                      Tiffanie Jones MD  Department of Hospital Medicine   [facility]

## 2019-11-20 NOTE — PLAN OF CARE
Pt awake and oriented. BP was elevated MD notified fluids were Dc'd and PRN Hydralazine ordered and given. BP responding to treatment. Pt remains in sinus rhythm. Care plan reviewed and pt verbalized understanding. Will continue to monitor.

## 2019-11-20 NOTE — ASSESSMENT & PLAN NOTE
-No known history of AFIB/AFL but on Xarelto as OP  -Received IV Lopressor x 2 dose in ED and IV CCB with conversion to SR yesterday  -Increase Cardizem to 90 mg TID for better rate control  -NO BB for now given wheezing on exam today  -ECHO pending    11/18/19  -Converted back to afib with RVR this AM  -Start cardizem gtt, titrate as needed  -Continue Xarelto for CVA prophylaxis     11/19/19  -Converted to SR this AM  -Discussed switching cardizem and amiodarone to po, patient unsure if she can swallow pills at this time  -Will further discuss with hospital medicine  -Continue Xarelto for CVA prophylaxis     11/20/19  -Remains in SR  -Continue Cardizem  -Continue amiodarone 200 mg BID x 2 weeks, then decrease to 200 mg daily  -Continue Xarelto for CVA prophylaxis, will verify correct dosage with pharmacy

## 2019-11-20 NOTE — SUBJECTIVE & OBJECTIVE
Interval History: looks better, sitting up in chair, but needed lot more support from PT/OT then at home-- hence family requests SNF at [facility] preferably. Continue present care.    Review of Systems   Unable to perform ROS: Acuity of condition   Constitutional: Negative for chills and fever.   HENT: Negative for postnasal drip, rhinorrhea, sinus pressure and sore throat.    Respiratory: Negative for cough, shortness of breath and wheezing.    Cardiovascular: Negative for chest pain, palpitations and leg swelling.   Gastrointestinal: Negative for abdominal distention, abdominal pain, blood in stool, constipation, diarrhea, nausea and vomiting.   Genitourinary: Negative for difficulty urinating, dysuria, hematuria and urgency.   Musculoskeletal: Positive for gait problem (chronic). Negative for arthralgias, back pain, myalgias, neck pain and neck stiffness.   Skin: Negative for pallor, rash and wound.   Neurological: Positive for weakness. Negative for dizziness, syncope, facial asymmetry, speech difficulty, light-headedness and headaches.   Psychiatric/Behavioral: Negative for confusion. The patient is not nervous/anxious.    All other systems reviewed and are negative.    Objective:     Vital Signs (Most Recent):  Temp: 97.7 °F (36.5 °C) (11/20/19 0723)  Pulse: 77 (11/20/19 0723)  Resp: 20 (11/20/19 0723)  BP: 126/76 (11/20/19 0723)  SpO2: 99 % (11/20/19 0723) Vital Signs (24h Range):  Temp:  [97 °F (36.1 °C)-98.5 °F (36.9 °C)] 97.7 °F (36.5 °C)  Pulse:  [65-95] 77  Resp:  [16-22] 20  SpO2:  [95 %-99 %] 99 %  BP: (126-196)/(58-81) 126/76     Weight: 91.6 kg (201 lb 15.1 oz)  Body mass index is 31.63 kg/m².    Intake/Output Summary (Last 24 hours) at 11/20/2019 1043  Last data filed at 11/20/2019 0600  Gross per 24 hour   Intake 2302.67 ml   Output 4 ml   Net 2298.67 ml      Physical Exam   Constitutional: She is oriented to person, place, and time. She appears well-developed and well-nourished. She is  cooperative.  Non-toxic appearance. She does not have a sickly appearance. She does not appear ill. No distress. She is not intubated. Nasal cannula in place.   HENT:   Head: Normocephalic and atraumatic.   Mouth/Throat: Oropharynx is clear and moist and mucous membranes are normal.   Eyes: Pupils are equal, round, and reactive to light. EOM and lids are normal.   Neck: Trachea normal and full passive range of motion without pain. Carotid bruit is not present.   Cardiovascular: Normal rate, regular rhythm and normal heart sounds.   Pulses:       Radial pulses are 2+ on the right side, and 2+ on the left side.        Dorsalis pedis pulses are 1+ on the right side, and 1+ on the left side.   Pulmonary/Chest: Effort normal. No accessory muscle usage. No tachypnea. She is not intubated. No respiratory distress. She has decreased breath sounds in the right lower field and the left lower field. She has no wheezes.   Abdominal: Soft. She exhibits no distension. Bowel sounds are decreased. There is no tenderness.   Genitourinary:   Genitourinary Comments: Ratliff in place   Musculoskeletal: Normal range of motion.        Right foot: There is no deformity.        Left foot: There is no deformity.   No edema   Lymphadenopathy:     She has no cervical adenopathy.   Neurological: She is alert and oriented to person, place, and time.   Skin: Skin is warm, dry and intact. Capillary refill takes less than 2 seconds. No rash noted. No cyanosis.   Psychiatric: She has a normal mood and affect. Her speech is normal and behavior is normal. Judgment and thought content normal. Cognition and memory are normal.   Nursing note and vitals reviewed.      Significant Labs:   BMP:   Recent Labs   Lab 11/20/19  0759   *      K 3.7      CO2 25   BUN 28*   CREATININE 1.1   CALCIUM 8.9     CBC:   Recent Labs   Lab 11/20/19  0759   WBC 9.55   HGB 10.1*   HCT 33.0*        All pertinent labs within the past 24 hours have been  reviewed.    Significant Imaging: I have reviewed all pertinent imaging results/findings within the past 24 hours.

## 2019-11-20 NOTE — PLAN OF CARE
PT is now recommending SNF placement.  Preference signed for [facility] and [facility].  Referrals sent via [employer].       11/20/19 1051   Post-Acute Status   Post-Acute Authorization Placement   Post-Acute Placement Status Referrals Sent

## 2019-11-20 NOTE — ASSESSMENT & PLAN NOTE
- WBC 13, TMax 101.2°F, HR >90, RR >20, lactic 3.1.  Patient became hypotensive in the ED after receiving 5 mg IV Lopressor for tachycardia.  Blood pressure responded well to IV fluid resuscitation, and remains stable.  - Blood cultures obtained in the ED.  Continue empiric IV vanc and Zosyn.  - 30 mL/kg IV fluids given in the ED.  Continue IV hydration.  - Continue bronchodilators.  - Wean BiPAP as tolerated.  - Check for influenza.  - Follow labs.    Improving, off Charbel gtt  Leukocytosis better  Continue IV abx, steroids    Doing better  All cx ngtd  Cont same abx  Decrease steroids    Doing better, repeat cxr in am    CXR clear, pneumonia resolved  Cont augmentin

## 2019-11-20 NOTE — PROGRESS NOTES
Pulmonary Note    History reviewed , patient examined, lab and radiographic studies reviewed.  Exam stable.  Assessment:  Patient Active Problem List   Diagnosis    Acute respiratory failure with hypoxia    Moderate persistent asthma with exacerbation    Pneumonia of right lower lobe due to infectious organism    Asthma    Anxiety    Bronchitis, acute    Cerebrovascular accident (CVA)    Congenital hypothyroidism    Depression    Gout    Hyperlipidemia    Polyneuropathy    NSTEMI    Paroxysmal A-fib with RVR    Essential hypertension    Incontinence associated dermatitis       Plan:  No new suggestions  Will sign off  Please re consult if any new problems develop    Rafa Brewer MD  Pulmonary / Critical Care Medicine

## 2019-11-21 PROBLEM — R79.89 ELEVATED TROPONIN: Status: RESOLVED | Noted: 2019-11-16 | Resolved: 2019-11-21

## 2019-11-21 PROBLEM — J18.9 PNEUMONIA OF RIGHT LOWER LOBE DUE TO INFECTIOUS ORGANISM: Status: RESOLVED | Noted: 2019-11-16 | Resolved: 2019-11-21

## 2019-11-21 LAB
BACTERIA BLD CULT: NORMAL
BACTERIA BLD CULT: NORMAL
POCT GLUCOSE: 111 MG/DL (ref 70–110)
POCT GLUCOSE: 111 MG/DL (ref 70–110)
POCT GLUCOSE: 119 MG/DL (ref 70–110)
POCT GLUCOSE: 145 MG/DL (ref 70–110)

## 2019-11-21 PROCEDURE — 97110 THERAPEUTIC EXERCISES: CPT

## 2019-11-21 PROCEDURE — 25000003 PHARM REV CODE 250: Performed by: NURSE PRACTITIONER

## 2019-11-21 PROCEDURE — 25000003 PHARM REV CODE 250: Performed by: INTERNAL MEDICINE

## 2019-11-21 PROCEDURE — 27000221 HC OXYGEN, UP TO 24 HOURS

## 2019-11-21 PROCEDURE — 94761 N-INVAS EAR/PLS OXIMETRY MLT: CPT

## 2019-11-21 PROCEDURE — 97110 THERAPEUTIC EXERCISES: CPT | Performed by: PHYSICAL THERAPIST

## 2019-11-21 PROCEDURE — 63600175 PHARM REV CODE 636 W HCPCS: Performed by: EMERGENCY MEDICINE

## 2019-11-21 PROCEDURE — 93010 EKG 12-LEAD: ICD-10-PCS | Mod: ,,, | Performed by: INTERNAL MEDICINE

## 2019-11-21 PROCEDURE — 93010 ELECTROCARDIOGRAM REPORT: CPT | Mod: ,,, | Performed by: INTERNAL MEDICINE

## 2019-11-21 PROCEDURE — 92507 TX SP LANG VOICE COMM INDIV: CPT

## 2019-11-21 PROCEDURE — 63600175 PHARM REV CODE 636 W HCPCS: Performed by: PHYSICIAN ASSISTANT

## 2019-11-21 PROCEDURE — 21400001 HC TELEMETRY ROOM

## 2019-11-21 PROCEDURE — 25000003 PHARM REV CODE 250: Performed by: EMERGENCY MEDICINE

## 2019-11-21 PROCEDURE — 63600175 PHARM REV CODE 636 W HCPCS: Performed by: NURSE PRACTITIONER

## 2019-11-21 PROCEDURE — 94660 CPAP INITIATION&MGMT: CPT

## 2019-11-21 PROCEDURE — 93005 ELECTROCARDIOGRAM TRACING: CPT

## 2019-11-21 PROCEDURE — 99900035 HC TECH TIME PER 15 MIN (STAT)

## 2019-11-21 RX ORDER — DIGOXIN 0.25 MG/ML
250 INJECTION INTRAMUSCULAR; INTRAVENOUS ONCE
Status: DISCONTINUED | OUTPATIENT
Start: 2019-11-21 | End: 2019-11-21

## 2019-11-21 RX ORDER — DILTIAZEM HYDROCHLORIDE 180 MG/1
180 CAPSULE, COATED, EXTENDED RELEASE ORAL DAILY
Status: DISCONTINUED | OUTPATIENT
Start: 2019-11-22 | End: 2019-11-21

## 2019-11-21 RX ORDER — DILTIAZEM HCL/D5W 125 MG/125
15 PLASTIC BAG, INJECTION (ML) INTRAVENOUS CONTINUOUS
Status: DISCONTINUED | OUTPATIENT
Start: 2019-11-21 | End: 2019-11-21

## 2019-11-21 RX ORDER — AMIODARONE HYDROCHLORIDE 200 MG/1
200 TABLET ORAL 2 TIMES DAILY
Status: DISCONTINUED | OUTPATIENT
Start: 2019-11-21 | End: 2019-11-21

## 2019-11-21 RX ORDER — FUROSEMIDE 10 MG/ML
20 INJECTION INTRAMUSCULAR; INTRAVENOUS ONCE
Status: COMPLETED | OUTPATIENT
Start: 2019-11-21 | End: 2019-11-21

## 2019-11-21 RX ORDER — DIGOXIN 0.25 MG/ML
125 INJECTION INTRAMUSCULAR; INTRAVENOUS ONCE
Status: COMPLETED | OUTPATIENT
Start: 2019-11-21 | End: 2019-11-21

## 2019-11-21 RX ORDER — DILTIAZEM HYDROCHLORIDE 5 MG/ML
5 INJECTION INTRAVENOUS ONCE
Status: COMPLETED | OUTPATIENT
Start: 2019-11-21 | End: 2019-11-21

## 2019-11-21 RX ORDER — DILTIAZEM HYDROCHLORIDE 120 MG/1
120 CAPSULE, COATED, EXTENDED RELEASE ORAL DAILY
Status: DISCONTINUED | OUTPATIENT
Start: 2019-11-22 | End: 2019-11-25 | Stop reason: HOSPADM

## 2019-11-21 RX ORDER — METOPROLOL TARTRATE 1 MG/ML
INJECTION, SOLUTION INTRAVENOUS
Status: DISCONTINUED
Start: 2019-11-21 | End: 2019-11-21 | Stop reason: WASHOUT

## 2019-11-21 RX ORDER — METOPROLOL TARTRATE 1 MG/ML
5 INJECTION, SOLUTION INTRAVENOUS ONCE
Status: COMPLETED | OUTPATIENT
Start: 2019-11-21 | End: 2019-11-21

## 2019-11-21 RX ADMIN — Medication 5 MG/HR: at 03:11

## 2019-11-21 RX ADMIN — AMOXICILLIN AND CLAVULANATE POTASSIUM 500 MG: 500; 125 TABLET, FILM COATED ORAL at 09:11

## 2019-11-21 RX ADMIN — ATORVASTATIN CALCIUM 20 MG: 10 TABLET, FILM COATED ORAL at 09:11

## 2019-11-21 RX ADMIN — RIVAROXABAN 15 MG: 15 TABLET, FILM COATED ORAL at 04:11

## 2019-11-21 RX ADMIN — METOPROLOL TARTRATE 5 MG: 5 INJECTION, SOLUTION INTRAVENOUS at 04:11

## 2019-11-21 RX ADMIN — Medication 15 MG/HR: at 11:11

## 2019-11-21 RX ADMIN — ASPIRIN 81 MG: 81 TABLET, COATED ORAL at 09:11

## 2019-11-21 RX ADMIN — FUROSEMIDE 20 MG: 10 INJECTION, SOLUTION INTRAMUSCULAR; INTRAVENOUS at 09:11

## 2019-11-21 RX ADMIN — PREDNISONE 10 MG: 10 TABLET ORAL at 09:11

## 2019-11-21 RX ADMIN — DILTIAZEM HYDROCHLORIDE 5 MG: 5 INJECTION INTRAVENOUS at 03:11

## 2019-11-21 RX ADMIN — AMIODARONE HYDROCHLORIDE 1 MG/MIN: 1.8 INJECTION, SOLUTION INTRAVENOUS at 07:11

## 2019-11-21 RX ADMIN — PREDNISONE 10 MG: 10 TABLET ORAL at 08:11

## 2019-11-21 RX ADMIN — DIGOXIN 125 MCG: 0.25 INJECTION INTRAMUSCULAR; INTRAVENOUS at 06:11

## 2019-11-21 RX ADMIN — DILTIAZEM HYDROCHLORIDE 5 MG: 5 INJECTION INTRAVENOUS at 05:11

## 2019-11-21 RX ADMIN — PANTOPRAZOLE SODIUM 40 MG: 40 TABLET, DELAYED RELEASE ORAL at 09:11

## 2019-11-21 RX ADMIN — AMOXICILLIN AND CLAVULANATE POTASSIUM 500 MG: 500; 125 TABLET, FILM COATED ORAL at 08:11

## 2019-11-21 RX ADMIN — AMIODARONE HYDROCHLORIDE 0.5 MG/MIN: 1.8 INJECTION, SOLUTION INTRAVENOUS at 01:11

## 2019-11-21 RX ADMIN — Medication 15 MG/HR: at 08:11

## 2019-11-21 RX ADMIN — LEVOTHYROXINE SODIUM 112 MCG: 112 TABLET ORAL at 05:11

## 2019-11-21 NOTE — ASSESSMENT & PLAN NOTE
- Secondary to asthma exacerbation + PNA.    - O2 sat stable on 50% FiO2 BiPAP, wean as tolerated.  Repeat ABG in AM.  - DuoNebs Q 6.  - IV Solu-Medrol 80 mg Q8.  - Empiric antibiotics.  - Will obtain V/Q scan to r/o PE (unable to perform CTA due to TARA).  - Pulmonology consult.    - Improving, came off BIPAP- Oxygenation improving  Improving    Doing well on 2 L NC  IS started    Home o2 eval    Persists, needed Bipap this am, may switch to NC

## 2019-11-21 NOTE — PLAN OF CARE
Pt Hr and rhythm went back into A-fib with RVR and pt was placed on a B-pap for increasing SOB. Cardizem 5ml was bolused and a Cardizem drip was initiated at 5ml/hr. The patients heart rate stayed in the 140's and 150's so the Cardizem drip was increased to 10 ml/hr. Pt HR remained elevated MD ordered another 5mg Cardizem STAT. Cardizem drip increased to 15 ml /hr HR  Sustaining in the 150's. 125 mcg Digoxin given because HRremained in the pper 150'2. Will continue to monitor

## 2019-11-21 NOTE — PROGRESS NOTES
"[facility] Hospital Medicine  Progress Note    Patient Name: Ana Castañeda  MRN: [id number]  Patient Class: IP- Inpatient   Admission Date: 11/16/2019  Length of Stay: 5 days  Attending Physician: Tiffanie Jones MD  Primary Care Provider: Stephen Tarango MD        Subjective:     Principal Problem:Pneumonia of right lower lobe due to infectious organism        HPI:  Ana Castañeda is a 84 y.o. female patient with a PMHx asthma, HTN, h/o CVA with residual right-sided weakness, hypothyroidism, gout, debility (wheelchair bound), and chronic anticoagulation therapy (reason unknown).  She presented to the emergency department with complaints of shortness of breath that progressively worsened shortly before arrival.  No aggravating or alleviating factors.  Associated cough producing mucus, wheezing and congestion x 3 days.  Denies any chest pain, palpitations, orthopnea, PND, edema, abdominal pain, nausea, vomiting, diarrhea, dysuria, hematuria, back pain, AMS, lightheadedness or dizziness, syncope, rhinorrhea, sore throat, weakness, fever or chills.  Family at bedside reports patient was seen at [facility] on 11/13 and diagnosed with acute bronchitis.  She was given a "steroid shot" and prescribed Cefdinir, which she has taken x 1 dose on 11/15.  Patient is on chronic Xarelto, but unsure of reason.  She denies any history of AF/FL, DVT or PE.  Initial workup in the ED resulted WBC of 13.14, creatinine 1.4, BUN 25, troponin 0.095, , lactic 3.1, procalcitonin 0.15, UA unremarkable, EKG unrevealing.  ABG with pH of 7.224, pCO2 66, pO2 241, SaO2 100 on BiPAP.  CXR showed right lower lobe infiltrate.  Patient's O2 sat remained stable in the ED, but BiPAP placed due to increased respiratory distress. Upon arrival to the ED, patient was tachycardic with heart rate of 136 bpm, /78.  She was given IV Lopressor 5 mg x 2 doses and 40 mg IV Lasix, which improved her HR into the 80s but also decreased " blood pressure to 98/70.  ED also administered 125 mg IV Solu-Medrol, DuoNeb treatments x 3, 2.5 L IV fluid resuscitation, IV vanc and Zosyn.  Patient was admitted to ICU under Hospital Medicine services.  Currently, patient appears comfortable in no acute distress.  BiPAP remains in place with stable O2 sat.  Patient is a full code.   is surrogate decision maker.      Overview/Hospital Course:  Ana Castañeda is a 84 y.o. female patient with a PMHx asthma, HTN, h/o CVA with residual right-sided weakness, hypothyroidism, gout, debility (wheelchair bound), and chronic anticoagulation therapy (reason unknown) admitted to ICU on Bipap, with acute hypoxemic resp failure sec to LLL Pneumonia and Afib w RVR and Lactic Acidosis. She was aggressively rehydrated with NS and started on IV Vanc and Zosyn as well as IV Solumedrol. Her Afib RVR was treated initially with IV Lopressor 5 mg IVP x 2 which  which did not affect her HR but dropped BP to 98/70 -- necessitating Charbel-senephrine gtt by eICU this am. This am she was given a dose of Cardizem 10 mg IVP which slowed down her HR and also converted to NSR and her BP improved. She was started on Cardizem oral tabs and was able to come off Charbel gtt as well as Bipap to NC and feels much better.  Urine output is good. She is AAOx3, speech is clear, family at bedside. Troponin increased to 1.98-- cardiology consulted.    11/17- doing better, comfortable, pleasant. Appears mildly fluid overloaded, she is about 5 L fluid positive. Given IV lasix, all Cx NGTD. Cardiology thinks its demand ischemia from sepsis and recommended out pt NMP scan when stable. Await transfer to tele. PT/OT ordered. NSR remains.     Ana Castañeda is a 84 y.o. female patient with a PMHx asthma, HTN, h/o CVA with residual right-sided weakness, hypothyroidism, gout, debility (wheelchair bound), and chronic anticoagulation therapy (reason unknown) admitted to ICU on Bipap, with acute hypoxemic resp failure sec  to LLL Pneumonia and Afib w RVR and Lactic Acidosis. She was aggressively rehydrated with NS and started on IV Vanc and Zosyn as well as IV Solumedrol. Her Afib RVR was treated initially with IV Lopressor 5 mg IVP x 2 which  which did not affect her HR but dropped BP to 98/70 -- necessitating Charbel-senephrine gtt by eICU this am. This am she was given a dose of Cardizem 10 mg IVP which slowed down her HR and also converted to NSR and her BP improved. She was started on Cardizem oral tabs and was able to come off Charbel gtt as well as Bipap to NC and feels much better.  Urine output is good. She is AAOx3, speech is clear, family at bedside. Troponin increased to 1.98-- cardiology consulted.    11/17- doing better, comfortable, pleasant. Appears mildly fluid overloaded, she is about 5 L fluid positive. Given IV lasix, all Cx NGTD. Cardiology thinks its demand ischemia from sepsis and recommended out pt NMP scan when stable. Await transfer to tele. PT/OT ordered. NSR remains.   11/18- went into Afib RVR when PT evaluated her yesterday- has remained in it since- no response to Dig, Cardizem. Eventually cardiology had to start her on Cardizem and Amiodarone gtt with some slowing. Otherwise feels good. Repeat CXR shows clear lungs, no Pneumonia. Bun/Cr elevated at 34/1.5- hence Lasix d/hira and started on gentle rehydration. Zosyn also d/hira.   11/19- looks much better, sitting up in chair, eating lunch well, appears in good spirits. No dysphagia, doing IS. Bun improved to 34. Doing a little PT. Has great family support.   11/20- looks better, sitting up in chair, but needed lot more support from PT/OT then at home-- hence family requests SNF at [facility]. Continue present care.  11/21- looks better, went into Afib w RVR last night-- had to resume Amiodarone and Cardizem gtt again this am, also had received a dose of IV Dig but no improvement-- this am received IV Lopressor 5 mg and she converted to NSR &2, also  had some wheezing-- likely was in fluid overload-- so IVF d/hira and given a dose of IV Lasix, she improved, CXR clear, RLL infiltrate from admit resolved.     Interval History: looks better, went into Afib w RVR last night-- had to resume Amiodarone and Cardizem gtt again this am, also had received a dose of IV Dig but no improvement-- this am received IV Lopressor 5 mg and she converted to NSR &2, also had some wheezing-- likely was in fluid overload-- so IVF d/hira and given a dose of IV Lasix, she improved, CXR clear, RLL infiltrate from admit resolved.     Review of Systems   Unable to perform ROS: Acuity of condition   Constitutional: Negative for chills and fever.   HENT: Negative for postnasal drip, rhinorrhea, sinus pressure and sore throat.    Respiratory: Negative for cough, shortness of breath and wheezing.    Cardiovascular: Negative for chest pain, palpitations and leg swelling.   Gastrointestinal: Negative for abdominal distention, abdominal pain, blood in stool, constipation, diarrhea, nausea and vomiting.   Genitourinary: Negative for difficulty urinating, dysuria, hematuria and urgency.   Musculoskeletal: Positive for gait problem (chronic). Negative for arthralgias, back pain, myalgias, neck pain and neck stiffness.   Skin: Negative for pallor, rash and wound.   Neurological: Positive for weakness. Negative for dizziness, syncope, facial asymmetry, speech difficulty, light-headedness and headaches.   Psychiatric/Behavioral: Negative for confusion. The patient is not nervous/anxious.    All other systems reviewed and are negative.    Objective:     Vital Signs (Most Recent):  Temp: 97.4 °F (36.3 °C) (11/21/19 1110)  Pulse: 73 (11/21/19 1252)  Resp: (!) 24 (11/21/19 1200)  BP: (!) 147/67 (11/21/19 1252)  SpO2: 98 % (11/21/19 1200) Vital Signs (24h Range):  Temp:  [97.1 °F (36.2 °C)-98.3 °F (36.8 °C)] 97.4 °F (36.3 °C)  Pulse:  [] 73  Resp:  [18-36] 24  SpO2:  [91 %-98 %] 98 %  BP:  (109-190)/(56-83) 147/67     Weight: 91.2 kg (201 lb 1 oz)  Body mass index is 31.49 kg/m².    Intake/Output Summary (Last 24 hours) at 11/21/2019 1448  Last data filed at 11/21/2019 0517  Gross per 24 hour   Intake 270 ml   Output 8 ml   Net 262 ml      Physical Exam   Constitutional: She is oriented to person, place, and time. She appears well-developed and well-nourished. She is cooperative.  Non-toxic appearance. She does not have a sickly appearance. She does not appear ill. No distress. She is not intubated. Nasal cannula in place.   HENT:   Head: Normocephalic and atraumatic.   Mouth/Throat: Oropharynx is clear and moist and mucous membranes are normal.   Eyes: Pupils are equal, round, and reactive to light. EOM and lids are normal.   Neck: Trachea normal and full passive range of motion without pain. Carotid bruit is not present.   Cardiovascular: Normal rate, regular rhythm and normal heart sounds.   Pulses:       Radial pulses are 2+ on the right side, and 2+ on the left side.        Dorsalis pedis pulses are 1+ on the right side, and 1+ on the left side.   Pulmonary/Chest: Effort normal. No accessory muscle usage. No tachypnea. She is not intubated. No respiratory distress. She has decreased breath sounds in the right lower field and the left lower field. She has no wheezes.   Abdominal: Soft. She exhibits no distension. Bowel sounds are decreased. There is no tenderness.   Genitourinary:   Genitourinary Comments: Ratliff in place   Musculoskeletal: Normal range of motion.        Right foot: There is no deformity.        Left foot: There is no deformity.   No edema   Lymphadenopathy:     She has no cervical adenopathy.   Neurological: She is alert and oriented to person, place, and time.   Skin: Skin is warm, dry and intact. Capillary refill takes less than 2 seconds. No rash noted. No cyanosis.   Psychiatric: She has a normal mood and affect. Her speech is normal and behavior is normal. Judgment and  thought content normal. Cognition and memory are normal.   Nursing note and vitals reviewed.      Significant Labs:   BMP:   Recent Labs   Lab 11/20/19  0759   *      K 3.7      CO2 25   BUN 28*   CREATININE 1.1   CALCIUM 8.9     CBC:   Recent Labs   Lab 11/20/19  0759   WBC 9.55   HGB 10.1*   HCT 33.0*        All pertinent labs within the past 24 hours have been reviewed.    Significant Imaging: I have reviewed all pertinent imaging results/findings within the past 24 hours.      Assessment/Plan:      Acute respiratory failure with hypoxia  - Secondary to asthma exacerbation + PNA.    - O2 sat stable on 50% FiO2 BiPAP, wean as tolerated.  Repeat ABG in AM.  - DuoNebs Q 6.  - IV Solu-Medrol 80 mg Q8.  - Empiric antibiotics.  - Will obtain V/Q scan to r/o PE (unable to perform CTA due to TARA).  - Pulmonology consult.    - Improving, came off BIPAP- Oxygenation improving  Improving    Doing well on 2 L NC  IS started    Home o2 eval    Persists, needed Bipap this am, may switch to NC    Weakness  Cont OT/ PT  Await SNF placement    inj B12 IM    Persists, improving but needs a few days of rehab  Continue PT/OT        VTE Risk Mitigation (From admission, onward)         Ordered     rivaroxaban tablet 15 mg  With dinner      11/17/19 1054     IP VTE HIGH RISK PATIENT  Once      11/16/19 0430     Reason for No Pharmacological VTE Prophylaxis  Once     Question:  Reasons:  Answer:  Already adequately anticoagulated on oral Anticoagulants    11/16/19 0430     Place sequential compression device  Until discontinued      11/16/19 0430                      Tiffanie Jones MD  Department of Hospital Medicine   [facility]

## 2019-11-21 NOTE — PT/OT/SLP PROGRESS
Speech Language Pathology Treatment    Patient Name:  Ana Castañeda   MRN:  [id number]  Admitting Diagnosis: Pneumonia of right lower lobe due to infectious organism    Recommendations:                 General Recommendations:  Dysphagia therapy  Diet recommendations:  Puree, Liquid Diet Level: Honey Thick   Aspiration Precautions: 1 bite/sip at a time, Assistance with meals and Assistance with thickening liquids, Double swallow with each bite/sip, HOB to 90 degrees, Remain upright 30 minutes post meal and Small bites/sips   General Precautions: Standard, aspiration, fall, respiratory  Communication strategies:  none    Subjective     Pt was seen lying in bed with her family present.    Patient goals: none stated     Pain/Comfort:  · Pain Rating 1: 0/10    Objective:     Current Respiratory Status: bi-pap      Pt currently on bi-pap and NPO awaiting a NOMAN.  ST provided education to family on dysphagia, aspiration, MBSS results, recommended diet, dysphagia therapy, and ST POC.  All questions were answered and family verbalized understanding.     Assessment:     Ana Castañeda is a 84 y.o. female with an SLP diagnosis of Dysphagia.  She will benefit from continued ST for dysphagia therapy per POC.     Goals:   Multidisciplinary Problems     SLP Goals        Problem: SLP Goal    Goal Priority Disciplines Outcome   SLP Goal     SLP Ongoing, Progressing   Description:  1. Pt will tolerate po diet of puree with honey thick liquids.   2. Pt will utilize compensatory swallow strategies with min cues.  3. Pt will complete oral motor, pharyngeal ex x 20 each with min cues to improve swallow safety.                     Plan:     · Patient to be seen:  3 x/week   · Plan of Care expires:  11/27/19  · Plan of Care reviewed with:  patient, spouse   · SLP Follow-Up:  Yes      Time Tracking:     SLP Treatment Date:   11/21/19  Speech Start Time:  0930  Speech Stop Time:  0945     Speech Total Time (min):  15 min    Billable Minutes:  Speech Therapy Individual 15    Eden Buck CCC-SLP  11/21/2019

## 2019-11-21 NOTE — SUBJECTIVE & OBJECTIVE
Interval History: looks better, went into Afib w RVR last night-- had to resume Amiodarone and Cardizem gtt again this am, also had received a dose of IV Dig but no improvement-- this am received IV Lopressor 5 mg and she converted to NSR &2, also had some wheezing-- likely was in fluid overload-- so IVF d/hira and given a dose of IV Lasix, she improved, CXR clear, RLL infiltrate from admit resolved.     Review of Systems   Unable to perform ROS: Acuity of condition   Constitutional: Negative for chills and fever.   HENT: Negative for postnasal drip, rhinorrhea, sinus pressure and sore throat.    Respiratory: Negative for cough, shortness of breath and wheezing.    Cardiovascular: Negative for chest pain, palpitations and leg swelling.   Gastrointestinal: Negative for abdominal distention, abdominal pain, blood in stool, constipation, diarrhea, nausea and vomiting.   Genitourinary: Negative for difficulty urinating, dysuria, hematuria and urgency.   Musculoskeletal: Positive for gait problem (chronic). Negative for arthralgias, back pain, myalgias, neck pain and neck stiffness.   Skin: Negative for pallor, rash and wound.   Neurological: Positive for weakness. Negative for dizziness, syncope, facial asymmetry, speech difficulty, light-headedness and headaches.   Psychiatric/Behavioral: Negative for confusion. The patient is not nervous/anxious.    All other systems reviewed and are negative.    Objective:     Vital Signs (Most Recent):  Temp: 97.4 °F (36.3 °C) (11/21/19 1110)  Pulse: 73 (11/21/19 1252)  Resp: (!) 24 (11/21/19 1200)  BP: (!) 147/67 (11/21/19 1252)  SpO2: 98 % (11/21/19 1200) Vital Signs (24h Range):  Temp:  [97.1 °F (36.2 °C)-98.3 °F (36.8 °C)] 97.4 °F (36.3 °C)  Pulse:  [] 73  Resp:  [18-36] 24  SpO2:  [91 %-98 %] 98 %  BP: (109-190)/(56-83) 147/67     Weight: 91.2 kg (201 lb 1 oz)  Body mass index is 31.49 kg/m².    Intake/Output Summary (Last 24 hours) at 11/21/2019 1441  Last data filed at  11/21/2019 0517  Gross per 24 hour   Intake 270 ml   Output 8 ml   Net 262 ml      Physical Exam   Constitutional: She is oriented to person, place, and time. She appears well-developed and well-nourished. She is cooperative.  Non-toxic appearance. She does not have a sickly appearance. She does not appear ill. No distress. She is not intubated. Nasal cannula in place.   HENT:   Head: Normocephalic and atraumatic.   Mouth/Throat: Oropharynx is clear and moist and mucous membranes are normal.   Eyes: Pupils are equal, round, and reactive to light. EOM and lids are normal.   Neck: Trachea normal and full passive range of motion without pain. Carotid bruit is not present.   Cardiovascular: Normal rate, regular rhythm and normal heart sounds.   Pulses:       Radial pulses are 2+ on the right side, and 2+ on the left side.        Dorsalis pedis pulses are 1+ on the right side, and 1+ on the left side.   Pulmonary/Chest: Effort normal. No accessory muscle usage. No tachypnea. She is not intubated. No respiratory distress. She has decreased breath sounds in the right lower field and the left lower field. She has no wheezes.   Abdominal: Soft. She exhibits no distension. Bowel sounds are decreased. There is no tenderness.   Genitourinary:   Genitourinary Comments: Ratliff in place   Musculoskeletal: Normal range of motion.        Right foot: There is no deformity.        Left foot: There is no deformity.   No edema   Lymphadenopathy:     She has no cervical adenopathy.   Neurological: She is alert and oriented to person, place, and time.   Skin: Skin is warm, dry and intact. Capillary refill takes less than 2 seconds. No rash noted. No cyanosis.   Psychiatric: She has a normal mood and affect. Her speech is normal and behavior is normal. Judgment and thought content normal. Cognition and memory are normal.   Nursing note and vitals reviewed.      Significant Labs:   BMP:   Recent Labs   Lab 11/20/19  0759   *       K 3.7      CO2 25   BUN 28*   CREATININE 1.1   CALCIUM 8.9     CBC:   Recent Labs   Lab 11/20/19  0759   WBC 9.55   HGB 10.1*   HCT 33.0*        All pertinent labs within the past 24 hours have been reviewed.    Significant Imaging: I have reviewed all pertinent imaging results/findings within the past 24 hours.

## 2019-11-21 NOTE — ASSESSMENT & PLAN NOTE
Cont OT/ PT  Await SNF placement    inj B12 IM    Persists, improving but needs a few days of rehab  Continue PT/OT

## 2019-11-21 NOTE — PT/OT/SLP PROGRESS
Occupational Therapy      Patient Name:  Ana Castañeda   MRN:  [id number]    S: pt cooperative with therapy session. Nurse requested no oob activities  O: pt performed  1 set x 10 reps  l ue and r ue aarom exercises in all available planes and ranges  supine in bed with hob elevated and pt on cpap   A: Pt with limited tx session due to pt unable to get OOB   P: continue with POC  Carla Jordan, OT  11/21/2019   [id number]  1 dulce

## 2019-11-21 NOTE — SIGNIFICANT EVENT
Was called into room for elevated HR and acute dyspnea. -170's on monitor. EKG confirmed atrial fibrillation with RVR. She is tachypneic and appears anxious. Blood pressure and oxygen saturation stable.  Was previously on Cardizem and Amiodarone infusion for atrial fibrillation. Converted to NSR and transitioned to orals. Will give Cardizem IV x 1 now and place back on Cardizem infusion. Order STAT CXR now.

## 2019-11-21 NOTE — PLAN OF CARE
Patient Afib RVR this Am she converted around 1038 to NSR. Pt on cardizem drip and amiodarone drip. Patient went back into A fib with HR controlled 60-70s. Around 8582-6667 HR increased from 120s to 130s until it stayed in 140s for a few minutes. New order to push lopressor 5mg once per Robert. Pushed over 5 minutes Pt stable no complaints of chest pain or SOB. After lopressor pt converted back to SR around 1700 with a HR of 58. Notified MD of HR. No new orders wants to continue both drip. If HR increase again MD wants to give lopressor. Pt stable no other concerns voiced at this time family at the bedside

## 2019-11-21 NOTE — ASSESSMENT & PLAN NOTE
- WBC 13, TMax 101.2°F, HR >90, RR >20, lactic 3.1.  Patient became hypotensive in the ED after receiving 5 mg IV Lopressor for tachycardia.  Blood pressure responded well to IV fluid resuscitation, and remains stable.  - Blood cultures obtained in the ED.  Continue empiric IV vanc and Zosyn.  - 30 mL/kg IV fluids given in the ED.  Continue IV hydration.  - Continue bronchodilators.  - Wean BiPAP as tolerated.  - Check for influenza.  - Follow labs.    Improving, off Charbel gtt  Leukocytosis better  Continue IV abx, steroids    Doing better  All cx ngtd  Cont same abx  Decrease steroids    Doing better, repeat cxr in am    CXR clear, pneumonia resolved  Cont augmentin    Resolved, cxr clear

## 2019-11-21 NOTE — SIGNIFICANT EVENT
Cardizem has been increased to 15 mg/hr. HR remains >140's. Digoxin 125mcg IV x 1 given. Case discussed Cardiology, Allie, who agrees with plan above. Will see patient later this morning.

## 2019-11-21 NOTE — PT/OT/SLP PROGRESS
Physical Therapy  Treatment    Ana Castañeda   MRN: [id number]   Admitting Diagnosis: Pneumonia of right lower lobe due to infectious organism    PT Received On: 11/21/19  PT Start Time: 1110     PT Stop Time: 1125    PT Total Time (min): 15 min       Billable Minutes:  Therapeutic Exercise 15 min    Treatment Type: Treatment  PT/PTA: PT     PTA Visit Number: 0       General Precautions: Standard, aspiration, fall, respiratory  Orthopedic Precautions: N/A   Braces: N/A    Spiritual, Cultural Beliefs, Hindu Practices, Values that Affect Care: no    Subjective:  Communicated with Nurse Floyd and [facility] chart review prior to session.  Nurse requested only bed exercises at this time secondary to pt being on CPAP.     Pain/Comfort  Pain Rating 1: 0/10    Objective:   Patient found with: peripheral IV, oxygen, telemetry(on CPAP)    Functional Mobility:  Therapeutic Activities and Exercises:  Pt performed (B) LE therapeutic exercises in supine in bed with HOB elevated, 1 x 10 reps: heel slides, hip abd/ add, ankle pumps, quad sets, glut sets.      AM-PAC 6 CLICK MOBILITY  How much help from another person does this patient currently need?   1 = Unable, Total/Dependent Assistance  2 = A lot, Maximum/Moderate Assistance  3 = A little, Minimum/Contact Guard/Supervision  4 = None, Modified [place]/Independent    Turning over in bed (including adjusting bedclothes, sheets and blankets)?: 2  Sitting down on and standing up from a chair with arms (e.g., wheelchair, bedside commode, etc.): 2  Moving from lying on back to sitting on the side of the bed?: 2  Moving to and from a bed to a chair (including a wheelchair)?: 2  Need to walk in hospital room?: 1  Climbing 3-5 steps with a railing?: 1  Basic Mobility Total Score: 10    AM-PAC Raw Score CMS G-Code Modifier Level of Impairment Assistance   6 % Total / Unable   7 - 9 CM 80 - 100% Maximal Assist   10 - 14 CL 60 - 80% Moderate Assist   15 - 19 CK 40 - 60% Moderate  Assist   20 - 22 CJ 20 - 40% Minimal Assist   23 CI 1-20% SBA / CGA   24 CH 0% Independent/ Mod I     Patient left HOB elevated with all lines intact, call button in reach, Nurse Mariel notified and family present.    Assessment:  Ana Castañeda is a 84 y.o. female with a medical diagnosis of Pneumonia of right lower lobe due to infectious organism and presents with impaired functional mobility. Pt will benefit from continued skilled PT in order to address impairments.     Rehab identified problem list/impairments: Rehab identified problem list/impairments: weakness, impaired endurance, gait instability, impaired functional mobilty, impaired self care skills, impaired balance, decreased lower extremity function, decreased upper extremity function, decreased coordination, decreased safety awareness, pain    Rehab potential is fair.    Activity tolerance: Fair    Discharge recommendations: Discharge Facility/Level of Care Needs: nursing facility, skilled     Barriers to discharge:      Equipment recommendations: Equipment Needed After Discharge: none     GOALS:   Multidisciplinary Problems     Physical Therapy Goals        Problem: Physical Therapy Goal    Goal Priority Disciplines Outcome Goal Variances Interventions   Physical Therapy Goal     PT, PT/OT Ongoing, Progressing     Description:  LTGs to be met by 11/25/19  1. Pt will perform bed mobility with Mod A  2. Pt will perform sit<>stand functional t/fs with Max A  3. Pt will ambulate ~5ft using RW with Max A  4. Pt will perform (B) LE therapeutic exercises 1 x 15 reps                    PLAN:    Patient to be seen 5 x/week  to address the above listed problems via gait training, therapeutic activities, therapeutic exercises  Plan of Care expires: 11/25/19  Plan of Care reviewed with: patient, spouse, daughter    PT G-Codes  Functional Assessment Tool Used: [facility]  Score: 10    Karen Sutherland, PT/OT  11/21/2019

## 2019-11-21 NOTE — SUBJECTIVE & OBJECTIVE
Review of Systems   Constitution: Positive for malaise/fatigue.   HENT: Negative.    Eyes: Negative.    Cardiovascular: Positive for dyspnea on exertion.   Respiratory: Positive for cough, shortness of breath and wheezing.    Hematologic/Lymphatic: Bruises/bleeds easily.   Skin: Negative.    Musculoskeletal: Positive for arthritis and joint pain.   Gastrointestinal: Negative.    Genitourinary: Negative.    Neurological: Negative.    Psychiatric/Behavioral: Negative.    Allergic/Immunologic: Negative.      Objective:     Vital Signs (Most Recent):  Temp: 97.4 °F (36.3 °C) (11/21/19 1110)  Pulse: 84 (11/21/19 1200)  Resp: (!) 24 (11/21/19 1200)  BP: (!) 144/62 (11/21/19 1110)  SpO2: 98 % (11/21/19 1200) Vital Signs (24h Range):  Temp:  [97.1 °F (36.2 °C)-98.3 °F (36.8 °C)] 97.4 °F (36.3 °C)  Pulse:  [] 84  Resp:  [18-36] 24  SpO2:  [91 %-98 %] 98 %  BP: (109-190)/(56-83) 144/62     Weight: 91.2 kg (201 lb 1 oz)  Body mass index is 31.49 kg/m².     SpO2: 98 %  O2 Device (Oxygen Therapy): nasal cannula      Intake/Output Summary (Last 24 hours) at 11/21/2019 1214  Last data filed at 11/21/2019 0517  Gross per 24 hour   Intake 270 ml   Output 8 ml   Net 262 ml       Lines/Drains/Airways     Peripheral Intravenous Line                 Peripheral IV - Single Lumen 11/16/19 0058 20 G Right Wrist 5 days         Peripheral IV - Single Lumen 11/18/19 1800 24 G Left;Posterior Hand 2 days                Physical Exam   Constitutional: She is oriented to person, place, and time. She appears well-developed and well-nourished. No distress.   On supplemental O2  Appears weak/deconditioned   HENT:   Head: Normocephalic and atraumatic.   Eyes: Pupils are equal, round, and reactive to light. Right eye exhibits no discharge. Left eye exhibits no discharge.   Neck: Neck supple. No JVD present.   Cardiovascular: Normal rate, regular rhythm, S1 normal and S2 normal.   No murmur heard.  Pulmonary/Chest: Effort normal. No  respiratory distress. She has wheezes.   Diminished BS/rhonchi   Abdominal: Soft. She exhibits no distension.   Musculoskeletal: She exhibits no edema.   Neurological: She is alert and oriented to person, place, and time.   Skin: Skin is warm and dry. She is not diaphoretic. No erythema.   Psychiatric: She has a normal mood and affect. Her behavior is normal. Thought content normal.   Nursing note and vitals reviewed.      Significant Labs:   CMP   Recent Labs   Lab 11/22/19  0832      K 3.6      CO2 25   *   BUN 23   CREATININE 1.0   CALCIUM 9.1   PROT 6.4   ALBUMIN 3.1*   BILITOT 1.2*   ALKPHOS 83   AST 29   ALT 24   ANIONGAP 13   ESTGFRAFRICA >60   EGFRNONAA 53*   , CBC   Recent Labs   Lab 11/22/19  0832   WBC 11.83   HGB 11.7*   HCT 36.9*      , Troponin No results for input(s): TROPONINI in the last 48 hours. and All pertinent lab results from the last 24 hours have been reviewed.    Significant Imaging: Echocardiogram:   2D echo with color flow doppler:   Results for orders placed or performed during the hospital encounter of 11/16/19   2D echo with color flow doppler   Result Value Ref Range    QEF 60 55 - 65    Diastolic Dysfunction No     Est. PA Systolic Pressure 34.57     Narrative    Date of Procedure: 11/17/2019        TEST DESCRIPTION   Technical Quality: This is a portable study performed at the patient's bedside. This is a technically challenging study. There is poor endocardial definition. This study was performed in conjunction with a 3ml intravenous injection of Optison contrast   agent.     Aorta: The aortic root is normal in size, measuring 2.4 cm at sinotubular junction and 2.7 cm at Sinuses of Valsalva. The proximal ascending aorta is normal in size, measuring 2.4 cm across.     Left Atrium: The left atrial volume index is normal, measuring 15.17 cc/m2.     Left Ventricle: The left ventricle is normal in size, with an end-diastolic diameter of 3.7 cm, and an  end-systolic diameter of 2.0 cm. Wall thickness is mildly increased, with the septum measuring 1.4 cm and the posterior wall measuring 1.3 cm across.   Relative wall thickness was increased at 0.70, and the LV mass index was 100.2 g/m2 consistent with concentric remodeling. There are no regional wall motion abnormalities. Left ventricular systolic function appears normal. Visually estimated ejection   fraction is 60-65%. The LV Doppler derived stroke volume equals 109.0 ccs.     Diastolic indices: E wave velocity 0.9 m/s, E/A ratio 0.8,  msec., E/e' ratio(avg) 9. Diastolic function is normal.     Right Atrium: The right atrium is normal in size.     Right Ventricle: The right ventricle is normal in size. Global right ventricular systolic function appears normal. The estimated PA systolic pressure is greater than 35 mmHg.     Aortic Valve:  Aortic valve is normal in structure with normal leaflet mobility. The peak gradient obtained across the aortic valve is 7 mmHg, with a mean gradient of 5 mmHg. Using a left ventricular outflow tract diameter of 2.1 cm, a left ventricular   outflow tract velocity time integral of 31 cm, and a peak instantaneous transvalvular velocity time integral of 32 cm, the calculated aortic valve area is 3.41 cm2(AVAi is 1.66 cm2/m2).     Mitral Valve:  Mitral valve is normal in structure with normal leaflet mobility. The pressure half time is 54 msec. The calculated mitral valve area is 4.07 cm2.     Tricuspid Valve:  Tricuspid valve is normal in structure with normal leaflet mobility.     Pulmonary Valve:  Pulmonary valve is normal in structure with normal leaflet mobility.     Intracavitary: There is no evidence of pericardial effusion, intracavity mass, thrombi, or vegetation.         CONCLUSIONS     1 - Concentric remodeling.     2 - No wall motion abnormalities.     3 - Normal left ventricular systolic function (EF 60-65%).     4 - Normal left ventricular diastolic function.     5  - Normal right ventricular systolic function .     6 - The estimated PA systolic pressure is greater than 35 mmHg.             This document has been electronically    SIGNED BY: Rodrigo Yi MD On: 11/17/2019 12:56   , EKG: Reviewed and X-Ray: CXR: X-Ray Chest 1 View (CXR):   Results for orders placed or performed during the hospital encounter of 11/16/19   X-Ray Chest 1 View    Narrative    EXAMINATION:  XR CHEST 1 VIEW    CLINICAL HISTORY:  PNA;    FINDINGS:  Single view of the chest.   Aorta demonstrates atherosclerotic disease.    Cardiac silhouette is normal.  Significant improvement in patchy airspace opacities within the right mid lower lung zone.  No evidence of pleural effusion or pneumothorax.  Bones appear intact.      Impression    Significant improvement in patchy airspace opacities within the right mid lower lung zone.      Electronically signed by: Meng Sharp MD  Date:    11/18/2019  Time:    08:48    and X-Ray Chest PA and Lateral (CXR): No results found for this visit on 11/16/19.

## 2019-11-21 NOTE — PLAN OF CARE
Patient has tachycardia and respiratory 30 bpm. This afternoon her condition improved with spo2 stable on nasal cannula 3 lpm, hr 91 bpm

## 2019-11-22 PROBLEM — R79.89 ELEVATED TROPONIN: Status: RESOLVED | Noted: 2019-11-16 | Resolved: 2019-11-22

## 2019-11-22 PROBLEM — I21.4 NSTEMI (NON-ST ELEVATED MYOCARDIAL INFARCTION): Status: RESOLVED | Noted: 2019-11-22 | Resolved: 2019-11-22

## 2019-11-22 PROBLEM — J44.89 ASTHMA WITH COPD: Status: ACTIVE | Noted: 2019-11-16

## 2019-11-22 PROBLEM — I21.4 NSTEMI (NON-ST ELEVATED MYOCARDIAL INFARCTION): Status: ACTIVE | Noted: 2019-11-22

## 2019-11-22 LAB
ALBUMIN SERPL BCP-MCNC: 3.1 G/DL (ref 3.5–5.2)
ALP SERPL-CCNC: 83 U/L (ref 55–135)
ALT SERPL W/O P-5'-P-CCNC: 24 U/L (ref 10–44)
ANION GAP SERPL CALC-SCNC: 13 MMOL/L (ref 8–16)
AST SERPL-CCNC: 29 U/L (ref 10–40)
BASOPHILS # BLD AUTO: 0.03 K/UL (ref 0–0.2)
BASOPHILS NFR BLD: 0.3 % (ref 0–1.9)
BILIRUB SERPL-MCNC: 1.2 MG/DL (ref 0.1–1)
BUN SERPL-MCNC: 23 MG/DL (ref 8–23)
CALCIUM SERPL-MCNC: 9.1 MG/DL (ref 8.7–10.5)
CHLORIDE SERPL-SCNC: 103 MMOL/L (ref 95–110)
CO2 SERPL-SCNC: 25 MMOL/L (ref 23–29)
CREAT SERPL-MCNC: 1 MG/DL (ref 0.5–1.4)
DIFFERENTIAL METHOD: ABNORMAL
EOSINOPHIL # BLD AUTO: 0 K/UL (ref 0–0.5)
EOSINOPHIL NFR BLD: 0.3 % (ref 0–8)
ERYTHROCYTE [DISTWIDTH] IN BLOOD BY AUTOMATED COUNT: 14.5 % (ref 11.5–14.5)
EST. GFR  (AFRICAN AMERICAN): >60 ML/MIN/1.73 M^2
EST. GFR  (NON AFRICAN AMERICAN): 53 ML/MIN/1.73 M^2
GLUCOSE SERPL-MCNC: 144 MG/DL (ref 70–110)
HCT VFR BLD AUTO: 36.9 % (ref 37–48.5)
HGB BLD-MCNC: 11.7 G/DL (ref 12–16)
IMM GRANULOCYTES # BLD AUTO: 0.38 K/UL (ref 0–0.04)
IMM GRANULOCYTES NFR BLD AUTO: 3.2 % (ref 0–0.5)
LYMPHOCYTES # BLD AUTO: 2.4 K/UL (ref 1–4.8)
LYMPHOCYTES NFR BLD: 20.5 % (ref 18–48)
MCH RBC QN AUTO: 31.5 PG (ref 27–31)
MCHC RBC AUTO-ENTMCNC: 31.7 G/DL (ref 32–36)
MCV RBC AUTO: 99 FL (ref 82–98)
MONOCYTES # BLD AUTO: 0.9 K/UL (ref 0.3–1)
MONOCYTES NFR BLD: 7.9 % (ref 4–15)
NEUTROPHILS # BLD AUTO: 8 K/UL (ref 1.8–7.7)
NEUTROPHILS NFR BLD: 67.8 % (ref 38–73)
NRBC BLD-RTO: 0 /100 WBC
PLATELET # BLD AUTO: 327 K/UL (ref 150–350)
PMV BLD AUTO: 9.9 FL (ref 9.2–12.9)
POCT GLUCOSE: 114 MG/DL (ref 70–110)
POCT GLUCOSE: 99 MG/DL (ref 70–110)
POTASSIUM SERPL-SCNC: 3.6 MMOL/L (ref 3.5–5.1)
PROT SERPL-MCNC: 6.4 G/DL (ref 6–8.4)
RBC # BLD AUTO: 3.72 M/UL (ref 4–5.4)
SODIUM SERPL-SCNC: 141 MMOL/L (ref 136–145)
WBC # BLD AUTO: 11.83 K/UL (ref 3.9–12.7)

## 2019-11-22 PROCEDURE — 99232 SBSQ HOSP IP/OBS MODERATE 35: CPT | Mod: ,,, | Performed by: INTERNAL MEDICINE

## 2019-11-22 PROCEDURE — 99232 PR SUBSEQUENT HOSPITAL CARE,LEVL II: ICD-10-PCS | Mod: ,,, | Performed by: INTERNAL MEDICINE

## 2019-11-22 PROCEDURE — 80053 COMPREHEN METABOLIC PANEL: CPT

## 2019-11-22 PROCEDURE — 99233 PR SUBSEQUENT HOSPITAL CARE,LEVL III: ICD-10-PCS | Mod: ,,, | Performed by: INTERNAL MEDICINE

## 2019-11-22 PROCEDURE — 27000221 HC OXYGEN, UP TO 24 HOURS

## 2019-11-22 PROCEDURE — 94761 N-INVAS EAR/PLS OXIMETRY MLT: CPT

## 2019-11-22 PROCEDURE — 94640 AIRWAY INHALATION TREATMENT: CPT

## 2019-11-22 PROCEDURE — 97530 THERAPEUTIC ACTIVITIES: CPT

## 2019-11-22 PROCEDURE — 25000242 PHARM REV CODE 250 ALT 637 W/ HCPCS: Performed by: INTERNAL MEDICINE

## 2019-11-22 PROCEDURE — 25000003 PHARM REV CODE 250: Performed by: NURSE PRACTITIONER

## 2019-11-22 PROCEDURE — 92526 ORAL FUNCTION THERAPY: CPT

## 2019-11-22 PROCEDURE — 93010 ELECTROCARDIOGRAM REPORT: CPT | Mod: ,,, | Performed by: INTERNAL MEDICINE

## 2019-11-22 PROCEDURE — 93010 EKG 12-LEAD: ICD-10-PCS | Mod: ,,, | Performed by: INTERNAL MEDICINE

## 2019-11-22 PROCEDURE — 97530 THERAPEUTIC ACTIVITIES: CPT | Performed by: PHYSICAL THERAPIST

## 2019-11-22 PROCEDURE — 21400001 HC TELEMETRY ROOM

## 2019-11-22 PROCEDURE — 63600175 PHARM REV CODE 636 W HCPCS: Performed by: PHYSICIAN ASSISTANT

## 2019-11-22 PROCEDURE — 25000003 PHARM REV CODE 250: Performed by: EMERGENCY MEDICINE

## 2019-11-22 PROCEDURE — 93005 ELECTROCARDIOGRAM TRACING: CPT

## 2019-11-22 PROCEDURE — 25000242 PHARM REV CODE 250 ALT 637 W/ HCPCS: Performed by: EMERGENCY MEDICINE

## 2019-11-22 PROCEDURE — 99233 SBSQ HOSP IP/OBS HIGH 50: CPT | Mod: ,,, | Performed by: INTERNAL MEDICINE

## 2019-11-22 PROCEDURE — 63600175 PHARM REV CODE 636 W HCPCS: Performed by: NURSE PRACTITIONER

## 2019-11-22 PROCEDURE — 25000003 PHARM REV CODE 250: Performed by: PHYSICIAN ASSISTANT

## 2019-11-22 PROCEDURE — 63600175 PHARM REV CODE 636 W HCPCS: Performed by: EMERGENCY MEDICINE

## 2019-11-22 PROCEDURE — 36415 COLL VENOUS BLD VENIPUNCTURE: CPT

## 2019-11-22 PROCEDURE — 25000003 PHARM REV CODE 250: Performed by: INTERNAL MEDICINE

## 2019-11-22 PROCEDURE — 85025 COMPLETE CBC W/AUTO DIFF WBC: CPT

## 2019-11-22 RX ORDER — GUAIFENESIN 600 MG/1
600 TABLET, EXTENDED RELEASE ORAL 2 TIMES DAILY
Status: DISCONTINUED | OUTPATIENT
Start: 2019-11-22 | End: 2019-11-25 | Stop reason: HOSPADM

## 2019-11-22 RX ORDER — IPRATROPIUM BROMIDE 0.5 MG/2.5ML
0.5 SOLUTION RESPIRATORY (INHALATION) EVERY 6 HOURS
Status: DISCONTINUED | OUTPATIENT
Start: 2019-11-22 | End: 2019-11-25 | Stop reason: HOSPADM

## 2019-11-22 RX ORDER — AMIODARONE HYDROCHLORIDE 200 MG/1
200 TABLET ORAL 2 TIMES DAILY
Status: DISCONTINUED | OUTPATIENT
Start: 2019-11-22 | End: 2019-11-25 | Stop reason: HOSPADM

## 2019-11-22 RX ADMIN — PANTOPRAZOLE SODIUM 40 MG: 40 TABLET, DELAYED RELEASE ORAL at 08:11

## 2019-11-22 RX ADMIN — PREDNISONE 10 MG: 10 TABLET ORAL at 08:11

## 2019-11-22 RX ADMIN — AMIODARONE HYDROCHLORIDE 200 MG: 200 TABLET ORAL at 08:11

## 2019-11-22 RX ADMIN — AMIODARONE HYDROCHLORIDE 200 MG: 200 TABLET ORAL at 10:11

## 2019-11-22 RX ADMIN — LEVOTHYROXINE SODIUM 112 MCG: 112 TABLET ORAL at 05:11

## 2019-11-22 RX ADMIN — GUAIFENESIN 600 MG: 600 TABLET, EXTENDED RELEASE ORAL at 08:11

## 2019-11-22 RX ADMIN — DILTIAZEM HYDROCHLORIDE 120 MG: 120 CAPSULE, COATED, EXTENDED RELEASE ORAL at 08:11

## 2019-11-22 RX ADMIN — HYDRALAZINE HYDROCHLORIDE 10 MG: 20 INJECTION INTRAMUSCULAR; INTRAVENOUS at 05:11

## 2019-11-22 RX ADMIN — ATORVASTATIN CALCIUM 20 MG: 10 TABLET, FILM COATED ORAL at 08:11

## 2019-11-22 RX ADMIN — AMOXICILLIN AND CLAVULANATE POTASSIUM 500 MG: 500; 125 TABLET, FILM COATED ORAL at 08:11

## 2019-11-22 RX ADMIN — ASPIRIN 81 MG: 81 TABLET, COATED ORAL at 08:11

## 2019-11-22 RX ADMIN — AMIODARONE HYDROCHLORIDE 0.5 MG/MIN: 1.8 INJECTION, SOLUTION INTRAVENOUS at 02:11

## 2019-11-22 RX ADMIN — IPRATROPIUM BROMIDE 0.5 MG: 0.5 SOLUTION RESPIRATORY (INHALATION) at 07:11

## 2019-11-22 RX ADMIN — RIVAROXABAN 15 MG: 15 TABLET, FILM COATED ORAL at 05:11

## 2019-11-22 RX ADMIN — LEVALBUTEROL 0.63 MG: 0.63 SOLUTION RESPIRATORY (INHALATION) at 05:11

## 2019-11-22 RX ADMIN — HYDRALAZINE HYDROCHLORIDE 10 MG: 20 INJECTION INTRAMUSCULAR; INTRAVENOUS at 03:11

## 2019-11-22 NOTE — ASSESSMENT & PLAN NOTE
- Secondary to asthma exacerbation + PNA.    - O2 sat stable on 50% FiO2 BiPAP, wean as tolerated.  Repeat ABG in AM.  - DuoNebs Q 6.  - IV Solu-Medrol 80 mg Q8.  - Empiric antibiotics.  - Will obtain V/Q scan to r/o PE (unable to perform CTA due to TARA).  - Pulmonology consult.    - Improving, came off BIPAP- Oxygenation improving  Improving    Doing well on 2 L NC  IS started    Home o2 eval    Persists, needed Bipap this am, may switch to NC  Doing well on NC

## 2019-11-22 NOTE — PT/OT/SLP PROGRESS
Speech Language Pathology Treatment    Patient Name:  Ana Castañeda   MRN:  [id number]  Admitting Diagnosis: Pneumonia of right lower lobe due to infectious organism    Recommendations:                 General Recommendations:  Dysphagia therapy  Diet recommendations:  Puree, Liquid Diet Level: Honey Thick   Aspiration Precautions: 1 bite/sip at a time, Alternating bites/sips, HOB to 90 degrees, Meds crushed in puree, Remain upright 30 minutes post meal and Small bites/sips   General Precautions: Standard, fall, aspiration    Subjective     Pt seen at bedside with her daughter present.  She is awake, alert, and cooperative.    Patient goals: none stated     Pain/Comfort:  · Pain Rating 1: 0/10    Objective:     Has the patient been evaluated by SLP for swallowing?   Yes  Keep patient NPO? No   Current Respiratory Status: nasal cannula      Pt completed Laryngeal elevation times 10 reps, BOT retraction ex's times 10, and pharyngeal constriction ex's times 10 reps each.  Pt required min cueing for completion of tasks to enhance her swallow function and safety.      Assessment:     Ana Castañeda is a 84 y.o. female with an SLP diagnosis of Dysphagia.  She will benefit from continued ST for dysphagia therapy.    Goals:   Multidisciplinary Problems     SLP Goals        Problem: SLP Goal    Goal Priority Disciplines Outcome   SLP Goal     SLP Ongoing, Progressing   Description:  1. Pt will tolerate po diet of puree with honey thick liquids.   2. Pt will utilize compensatory swallow strategies with min cues.  3. Pt will complete oral motor, pharyngeal ex x 20 each with min cues to improve swallow safety.                     Plan:     · Patient to be seen:  3 x/week   · Plan of Care expires:  11/27/19  · Plan of Care reviewed with:  patient   · SLP Follow-Up:  Yes         Time Tracking:     SLP Treatment Date:   11/22/19  Speech Start Time:  1158  Speech Stop Time:  1215     Speech Total Time (min):  17 min    Billable Minutes:  Treatment Swallowing Dysfunction 17    Eden Buck CCC-SLP  11/22/2019

## 2019-11-22 NOTE — ASSESSMENT & PLAN NOTE
-Admitted due to shortness of breath, AFIB/RVR  -Troponin 0.95>1.983>.992  -Repeat Troponin 0.371  -Likely due to demand ischemia from AFIB/RVR and sepsis  -ECHO pending  -Increase CCB to 90 mg TID for better rate control today  -NO BB given wheezing on exam today  -Continue ASA, Statin, CCB, Xarelto  -Start Losartan for better BP control.  -Would benefit from OP MPI stress test once pulmonary status optimized after discharge.   -Reassess in AM    11/18/19  -Stable overnight, remains chest pain free  -2D echo showed normal EF  -Continue ASA, statin, CCB, ARB, Xarelto  -No BB given wheezing  -OP MPI stress test    11/19/19  -Stable overnight  -No chest pain  -Continue ASA, statin, CCB, ARB, Xarelto  -Plan on OP MPI stress test    11/20/19  -Remains stable CV wise  -No chest pain/anginal equivalent  -Continue ASA, statin, CCB, Xarelto  -No Plavix given bleeding risk  -No BB given intermittent wheezing  -No ACEi/ARB indicated as EF is normal  -Needs to f/u with primary cardiologist upon d/c for OP MPI stress test    11/22/19  -Remains chest pain free  -Continue ASA, statin, CCB, ARB, Xarelto  -OP MPI stress test once respiratory status has improved

## 2019-11-22 NOTE — PLAN OF CARE
Marychuy from [facility] came to evaluate the patient for acceptance to SNF. Hard faxed requested updated information to [phone].

## 2019-11-22 NOTE — PLAN OF CARE
Pt completed Laryngeal elevation times 10 reps, BOT retraction ex's times 10, and pharyngeal constriction ex's times 10 reps each.  Pt required min cueing for completion of tasks.

## 2019-11-22 NOTE — PROGRESS NOTES
"[facility] Hospital Medicine  Progress Note    Patient Name: Ana Castañeda  MRN: [id number]  Patient Class: IP- Inpatient   Admission Date: 11/16/2019  Length of Stay: 6 days  Attending Physician: Tiffanie Jones MD  Primary Care Provider: Stephen Tarango MD        Subjective:     Principal Problem:Pneumonia of right lower lobe due to infectious organism        HPI:  Ana Castañeda is a 84 y.o. female patient with a PMHx asthma, HTN, h/o CVA with residual right-sided weakness, hypothyroidism, gout, debility (wheelchair bound), and chronic anticoagulation therapy (reason unknown).  She presented to the emergency department with complaints of shortness of breath that progressively worsened shortly before arrival.  No aggravating or alleviating factors.  Associated cough producing mucus, wheezing and congestion x 3 days.  Denies any chest pain, palpitations, orthopnea, PND, edema, abdominal pain, nausea, vomiting, diarrhea, dysuria, hematuria, back pain, AMS, lightheadedness or dizziness, syncope, rhinorrhea, sore throat, weakness, fever or chills.  Family at bedside reports patient was seen at [facility] on 11/13 and diagnosed with acute bronchitis.  She was given a "steroid shot" and prescribed Cefdinir, which she has taken x 1 dose on 11/15.  Patient is on chronic Xarelto, but unsure of reason.  She denies any history of AF/FL, DVT or PE.  Initial workup in the ED resulted WBC of 13.14, creatinine 1.4, BUN 25, troponin 0.095, , lactic 3.1, procalcitonin 0.15, UA unremarkable, EKG unrevealing.  ABG with pH of 7.224, pCO2 66, pO2 241, SaO2 100 on BiPAP.  CXR showed right lower lobe infiltrate.  Patient's O2 sat remained stable in the ED, but BiPAP placed due to increased respiratory distress. Upon arrival to the ED, patient was tachycardic with heart rate of 136 bpm, /78.  She was given IV Lopressor 5 mg x 2 doses and 40 mg IV Lasix, which improved her HR into the 80s but also decreased " blood pressure to 98/70.  ED also administered 125 mg IV Solu-Medrol, DuoNeb treatments x 3, 2.5 L IV fluid resuscitation, IV vanc and Zosyn.  Patient was admitted to ICU under Hospital Medicine services.  Currently, patient appears comfortable in no acute distress.  BiPAP remains in place with stable O2 sat.  Patient is a full code.   is surrogate decision maker.      Overview/Hospital Course:  Ana Castañeda is a 84 y.o. female patient with a PMHx asthma, HTN, h/o CVA with residual right-sided weakness, hypothyroidism, gout, debility (wheelchair bound), and chronic anticoagulation therapy (reason unknown) admitted to ICU on Bipap, with acute hypoxemic resp failure sec to LLL Pneumonia and Afib w RVR and Lactic Acidosis. She was aggressively rehydrated with NS and started on IV Vanc and Zosyn as well as IV Solumedrol. Her Afib RVR was treated initially with IV Lopressor 5 mg IVP x 2 which  which did not affect her HR but dropped BP to 98/70 -- necessitating Charbel-senephrine gtt by eICU this am. This am she was given a dose of Cardizem 10 mg IVP which slowed down her HR and also converted to NSR and her BP improved. She was started on Cardizem oral tabs and was able to come off Charbel gtt as well as Bipap to NC and feels much better.  Urine output is good. She is AAOx3, speech is clear, family at bedside. Troponin increased to 1.98-- cardiology consulted.    11/17- doing better, comfortable, pleasant. Appears mildly fluid overloaded, she is about 5 L fluid positive. Given IV lasix, all Cx NGTD. Cardiology thinks its demand ischemia from sepsis and recommended out pt NMP scan when stable. Await transfer to tele. PT/OT ordered. NSR remains.     Ana Castañeda is a 84 y.o. female patient with a PMHx asthma, HTN, h/o CVA with residual right-sided weakness, hypothyroidism, gout, debility (wheelchair bound), and chronic anticoagulation therapy (reason unknown) admitted to ICU on Bipap, with acute hypoxemic resp failure sec  to LLL Pneumonia and Afib w RVR and Lactic Acidosis. She was aggressively rehydrated with NS and started on IV Vanc and Zosyn as well as IV Solumedrol. Her Afib RVR was treated initially with IV Lopressor 5 mg IVP x 2 which  which did not affect her HR but dropped BP to 98/70 -- necessitating Charbel-senephrine gtt by eICU this am. This am she was given a dose of Cardizem 10 mg IVP which slowed down her HR and also converted to NSR and her BP improved. She was started on Cardizem oral tabs and was able to come off Charbel gtt as well as Bipap to NC and feels much better.  Urine output is good. She is AAOx3, speech is clear, family at bedside. Troponin increased to 1.98-- cardiology consulted.    11/17- doing better, comfortable, pleasant. Appears mildly fluid overloaded, she is about 5 L fluid positive. Given IV lasix, all Cx NGTD. Cardiology thinks its demand ischemia from sepsis and recommended out pt NMP scan when stable. Await transfer to tele. PT/OT ordered. NSR remains.   11/18- went into Afib RVR when PT evaluated her yesterday- has remained in it since- no response to Dig, Cardizem. Eventually cardiology had to start her on Cardizem and Amiodarone gtt with some slowing. Otherwise feels good. Repeat CXR shows clear lungs, no Pneumonia. Bun/Cr elevated at 34/1.5- hence Lasix d/hira and started on gentle rehydration. Zosyn also d/hira.   11/19- looks much better, sitting up in chair, eating lunch well, appears in good spirits. No dysphagia, doing IS. Bun improved to 34. Doing a little PT. Has great family support.   11/20- looks better, sitting up in chair, but needed lot more support from PT/OT then at home-- hence family requests SNF at [facility]. Continue present care.  11/21- looks better, went into Afib w RVR last night-- had to resume Amiodarone and Cardizem gtt again this am, also had received a dose of IV Dig but no improvement-- this am received IV Lopressor 5 mg and she converted to NSR &2, also  had some wheezing-- likely was in fluid overload-- so IVF d/hira and given a dose of IV Lasix, she improved, CXR clear, RLL infiltrate from admit resolved.   11/22- looks better, relaxed, eating lunch. Remained in NSR since yesterday, had bradycardia last night-- hence Cardizem gtt d/hira. Now on oral Amiodarone and Cardizem. Doing PT/OT gently. She keeps going into afib RVR with minimal exertion, any PT etc. She has been accepted at [facility] on Monday. Will continue present care.      Interval History: looks better, relaxed, eating lunch. Remained in NSR since yesterday, had bradycardia last night-- hence Cardizem gtt d/hira. Now on oral Amiodarone and Cardizem. Doing PT/OT gently. She keeps going into afib RVR with minimal exertion, any PT etc. She has been accepted at [facility] on Monday. Will continue present care.     Review of Systems   Unable to perform ROS: Acuity of condition   Constitutional: Negative for chills and fever.   HENT: Negative for postnasal drip, rhinorrhea, sinus pressure and sore throat.    Respiratory: Negative for cough, shortness of breath and wheezing.    Cardiovascular: Negative for chest pain, palpitations and leg swelling.   Gastrointestinal: Negative for abdominal distention, abdominal pain, blood in stool, constipation, diarrhea, nausea and vomiting.   Genitourinary: Negative for difficulty urinating, dysuria, hematuria and urgency.   Musculoskeletal: Positive for gait problem (chronic). Negative for arthralgias, back pain, myalgias, neck pain and neck stiffness.   Skin: Negative for pallor, rash and wound.   Neurological: Positive for weakness. Negative for dizziness, syncope, facial asymmetry, speech difficulty, light-headedness and headaches.   Psychiatric/Behavioral: Negative for confusion. The patient is not nervous/anxious.    All other systems reviewed and are negative.    Objective:     Vital Signs (Most Recent):  Temp: 97.5 °F (36.4 °C) (11/22/19 1135)  Pulse:  74 (11/22/19 1135)  Resp: 20 (11/22/19 1135)  BP: (!) 199/78 (11/22/19 1135)  SpO2: (!) 94 % (11/22/19 1135) Vital Signs (24h Range):  Temp:  [96.4 °F (35.8 °C)-97.6 °F (36.4 °C)] 97.5 °F (36.4 °C)  Pulse:  [] 74  Resp:  [16-22] 20  SpO2:  [92 %-98 %] 94 %  BP: (130-199)/(64-78) 199/78     Weight: 92.1 kg (203 lb 0.7 oz)  Body mass index is 31.8 kg/m².    Intake/Output Summary (Last 24 hours) at 11/22/2019 1419  Last data filed at 11/22/2019 0500  Gross per 24 hour   Intake 1136.11 ml   Output --   Net 1136.11 ml      Physical Exam   Constitutional: She is oriented to person, place, and time. She appears well-developed and well-nourished. She is cooperative.  Non-toxic appearance. She does not have a sickly appearance. She does not appear ill. No distress. She is not intubated. Nasal cannula in place.   HENT:   Head: Normocephalic and atraumatic.   Mouth/Throat: Oropharynx is clear and moist and mucous membranes are normal.   Eyes: Pupils are equal, round, and reactive to light. EOM and lids are normal.   Neck: Trachea normal and full passive range of motion without pain. Carotid bruit is not present.   Cardiovascular: Normal rate, regular rhythm and normal heart sounds.   Pulses:       Radial pulses are 2+ on the right side, and 2+ on the left side.        Dorsalis pedis pulses are 1+ on the right side, and 1+ on the left side.   Pulmonary/Chest: Effort normal. No accessory muscle usage. No tachypnea. She is not intubated. No respiratory distress. She has decreased breath sounds in the right lower field and the left lower field. She has no wheezes.   Abdominal: Soft. She exhibits no distension. Bowel sounds are decreased. There is no tenderness.   Genitourinary:   Genitourinary Comments: Ratliff in place   Musculoskeletal: Normal range of motion.        Right foot: There is no deformity.        Left foot: There is no deformity.   No edema   Lymphadenopathy:     She has no cervical adenopathy.   Neurological:  She is alert and oriented to person, place, and time.   Skin: Skin is warm, dry and intact. Capillary refill takes less than 2 seconds. No rash noted. No cyanosis.   Psychiatric: She has a normal mood and affect. Her speech is normal and behavior is normal. Judgment and thought content normal. Cognition and memory are normal.   Nursing note and vitals reviewed.      Significant Labs:   BMP:   Recent Labs   Lab 11/22/19  0832   *      K 3.6      CO2 25   BUN 23   CREATININE 1.0   CALCIUM 9.1     CBC:   Recent Labs   Lab 11/22/19  0832   WBC 11.83   HGB 11.7*   HCT 36.9*        All pertinent labs within the past 24 hours have been reviewed.    Significant Imaging: I have reviewed all pertinent imaging results/findings within the past 24 hours.      Assessment/Plan:      Acute respiratory failure with hypoxia  - Secondary to asthma exacerbation + PNA.    - O2 sat stable on 50% FiO2 BiPAP, wean as tolerated.  Repeat ABG in AM.  - DuoNebs Q 6.  - IV Solu-Medrol 80 mg Q8.  - Empiric antibiotics.  - Will obtain V/Q scan to r/o PE (unable to perform CTA due to TARA).  - Pulmonology consult.    - Improving, came off BIPAP- Oxygenation improving  Improving    Doing well on 2 L NC  IS started    Home o2 eval    Persists, needed Bipap this am, may switch to NC  Doing well on NC    PAF (paroxysmal atrial fibrillation)  Converted to NSR with IV Cardizem  Troponin elevated  Cards consulted, pt already on Xarelto    Remains in NSR    Back in afib w RVR-- will try Dig and oral Cardizem    S/p 1 day of Cardizem and Amiodarone gtt-- doing well.  Converted back to NSR- will start oral Amio and Card and then d/c the gtts.  Cards following    Resolved, continue Xarelto    Recurrent with RVR s/p multiple rounds of Cardizem and Amiodarone gtt  Continue Xarelto    Weakness  Cont OT/ PT  Await SNF placement    inj B12 IM    Persists, improving but needs a few days of rehab  Continue PT/OT    Continue ROM,  PT/OT  Await discharge to SNF    Essential hypertension  Under control        VTE Risk Mitigation (From admission, onward)         Ordered     rivaroxaban tablet 15 mg  With dinner      11/17/19 1054     IP VTE HIGH RISK PATIENT  Once      11/16/19 0430     Reason for No Pharmacological VTE Prophylaxis  Once     Question:  Reasons:  Answer:  Already adequately anticoagulated on oral Anticoagulants    11/16/19 0430     Place sequential compression device  Until discontinued      11/16/19 0430                      Tiffanie Jones MD  Department of Hospital Medicine   [facility]

## 2019-11-22 NOTE — PROGRESS NOTES
[facility]  Cardiology  Progress Note    Patient Name: Ana Castañeda  MRN: [id number]  Admission Date: 11/16/2019  Hospital Length of Stay: 6 days  Code Status: Full Code   Attending Physician: Tiffanie Jones MD   Primary Care Physician: Stephen Tarango MD  Expected Discharge Date:   Principal Problem:Pneumonia of right lower lobe due to infectious organism    Subjective:   HPI:  Ana Castañeda is a 84 year old female who presented to [facility] due to shortness of breath which progressively worsened prior to arrival. She reported associated cough with mucus, wheezing and congestion for 3 days. Per H&P, patient was seen at [facility] on 11/13 and diagnosed with acute bronchitis and received IM Steroid shot and PO ABX. Her chronic medical conditions include Asthma, HTN, H/O CVA with right sided weakness, H/O CEA, Normal pressure hydrocephalus, hypothyroidism, gout, debility (wheelchair bound), and chronic AC on Xarelto (unknown reason). ED workup revealed Cr 1.4, BUN 25, Troponin 0.095>1.983>.992,, LA 3.1, Procal 0.15. She was placed on Bipap in ED for shortness of breath and increased work of breathing. CXR revealed RLL infiltrate. She received IV lopressor 5 mg x 2 dose in ED and IV lasix 40 mg x 1 dose with some improvement in HR control to 80s. She received IV Solumedrol, Duonebs, and IVF resuscitation in ED along with IV ABX. She was admitted to ICU under the care of hospital medicine. Cardiology consulted to assist with management of AFIB/RVR, NSTEMI. Chart reviewed, Patient seen and examined in ICU. Repeat troponin, EKG, and ECHO pending. She is followed by Dr. Christiansen as OP Cardiology but has not seen him in quite some time. She does report a vague history of AFIB and was started on Xarelto at that time in 2017. Denies chest pain or anginal equivalents. She continues to have some shortness of breath and wheezing on exam today. She is wheelchair bound at home but can walk very short  distances. NO leg swelling on exam today. Will optimize her medical therapy today. ECHO pending. Would benefit from OP MPI stress test once pulmonary status optimized after discharge. Further recs in AM    Hospital Course:   11/18/19-Patient seen and examined today, resting in bed. Still SOB with wheezing but states she has improved. No chest pain. Converted back to afib with RVR this AM during PT/OT. Labs reviewed, creatinine 1.8.    11/19/19-Patient seen and examined today, lying in bed. Feels more SOB and congested this AM. Denies chest pain. Converted to SR overnight on amio and cardizem drips. Labs reviewed. Creatinine stable.    11/20/19-Patient seen and examined today, sitting up in bed. Feeling better today. SOB slowly improving. No cardiac complaints. Remains in SR.     11/21/19-Patient seen and examined today. More SOB this AM with wheezing. Placed on Bipap.     11/22/19-Patient seen and examined today, sitting up in bed. Feeling better. Still SOB with intermittent wheezing. No chest pain. Converted to SR on amio gtt, will switch to po. Labs reviewed, stable. EKG reviewed, marked ST depression noted, QTc prolonged.        Review of Systems   Constitution: Positive for malaise/fatigue.   HENT: Negative.    Eyes: Negative.    Cardiovascular: Positive for dyspnea on exertion.   Respiratory: Positive for cough, shortness of breath and wheezing.    Hematologic/Lymphatic: Bruises/bleeds easily.   Skin: Negative.    Musculoskeletal: Positive for arthritis and joint pain.   Gastrointestinal: Negative.    Genitourinary: Negative.    Neurological: Negative.    Psychiatric/Behavioral: Negative.    Allergic/Immunologic: Negative.      Objective:     Vital Signs (Most Recent):  Temp: 97.4 °F (36.3 °C) (11/21/19 1110)  Pulse: 84 (11/21/19 1200)  Resp: (!) 24 (11/21/19 1200)  BP: (!) 144/62 (11/21/19 1110)  SpO2: 98 % (11/21/19 1200) Vital Signs (24h Range):  Temp:  [97.1 °F (36.2 °C)-98.3 °F (36.8 °C)] 97.4 °F (36.3  °C)  Pulse:  [] 84  Resp:  [18-36] 24  SpO2:  [91 %-98 %] 98 %  BP: (109-190)/(56-83) 144/62     Weight: 91.2 kg (201 lb 1 oz)  Body mass index is 31.49 kg/m².     SpO2: 98 %  O2 Device (Oxygen Therapy): nasal cannula      Intake/Output Summary (Last 24 hours) at 11/21/2019 1214  Last data filed at 11/21/2019 0517  Gross per 24 hour   Intake 270 ml   Output 8 ml   Net 262 ml       Lines/Drains/Airways     Peripheral Intravenous Line                 Peripheral IV - Single Lumen 11/16/19 0058 20 G Right Wrist 5 days         Peripheral IV - Single Lumen 11/18/19 1800 24 G Left;Posterior Hand 2 days                Physical Exam   Constitutional: She is oriented to person, place, and time. She appears well-developed and well-nourished. No distress.   On supplemental O2  Appears weak/deconditioned   HENT:   Head: Normocephalic and atraumatic.   Eyes: Pupils are equal, round, and reactive to light. Right eye exhibits no discharge. Left eye exhibits no discharge.   Neck: Neck supple. No JVD present.   Cardiovascular: Normal rate, regular rhythm, S1 normal and S2 normal.   No murmur heard.  Pulmonary/Chest: Effort normal. No respiratory distress. She has wheezes.   Diminished BS/rhonchi   Abdominal: Soft. She exhibits no distension.   Musculoskeletal: She exhibits no edema.   Neurological: She is alert and oriented to person, place, and time.   Skin: Skin is warm and dry. She is not diaphoretic. No erythema.   Psychiatric: She has a normal mood and affect. Her behavior is normal. Thought content normal.   Nursing note and vitals reviewed.      Significant Labs:   CMP   Recent Labs   Lab 11/22/19  0832      K 3.6      CO2 25   *   BUN 23   CREATININE 1.0   CALCIUM 9.1   PROT 6.4   ALBUMIN 3.1*   BILITOT 1.2*   ALKPHOS 83   AST 29   ALT 24   ANIONGAP 13   ESTGFRAFRICA >60   EGFRNONAA 53*   , CBC   Recent Labs   Lab 11/22/19  0832   WBC 11.83   HGB 11.7*   HCT 36.9*      , Troponin No results for  input(s): TROPONINI in the last 48 hours. and All pertinent lab results from the last 24 hours have been reviewed.    Significant Imaging: Echocardiogram:   2D echo with color flow doppler:   Results for orders placed or performed during the hospital encounter of 11/16/19   2D echo with color flow doppler   Result Value Ref Range    QEF 60 55 - 65    Diastolic Dysfunction No     Est. PA Systolic Pressure 34.57     Narrative    Date of Procedure: 11/17/2019        TEST DESCRIPTION   Technical Quality: This is a portable study performed at the patient's bedside. This is a technically challenging study. There is poor endocardial definition. This study was performed in conjunction with a 3ml intravenous injection of Optison contrast   agent.     Aorta: The aortic root is normal in size, measuring 2.4 cm at sinotubular junction and 2.7 cm at Sinuses of Valsalva. The proximal ascending aorta is normal in size, measuring 2.4 cm across.     Left Atrium: The left atrial volume index is normal, measuring 15.17 cc/m2.     Left Ventricle: The left ventricle is normal in size, with an end-diastolic diameter of 3.7 cm, and an end-systolic diameter of 2.0 cm. Wall thickness is mildly increased, with the septum measuring 1.4 cm and the posterior wall measuring 1.3 cm across.   Relative wall thickness was increased at 0.70, and the LV mass index was 100.2 g/m2 consistent with concentric remodeling. There are no regional wall motion abnormalities. Left ventricular systolic function appears normal. Visually estimated ejection   fraction is 60-65%. The LV Doppler derived stroke volume equals 109.0 ccs.     Diastolic indices: E wave velocity 0.9 m/s, E/A ratio 0.8,  msec., E/e' ratio(avg) 9. Diastolic function is normal.     Right Atrium: The right atrium is normal in size.     Right Ventricle: The right ventricle is normal in size. Global right ventricular systolic function appears normal. The estimated PA systolic pressure is  greater than 35 mmHg.     Aortic Valve:  Aortic valve is normal in structure with normal leaflet mobility. The peak gradient obtained across the aortic valve is 7 mmHg, with a mean gradient of 5 mmHg. Using a left ventricular outflow tract diameter of 2.1 cm, a left ventricular   outflow tract velocity time integral of 31 cm, and a peak instantaneous transvalvular velocity time integral of 32 cm, the calculated aortic valve area is 3.41 cm2(AVAi is 1.66 cm2/m2).     Mitral Valve:  Mitral valve is normal in structure with normal leaflet mobility. The pressure half time is 54 msec. The calculated mitral valve area is 4.07 cm2.     Tricuspid Valve:  Tricuspid valve is normal in structure with normal leaflet mobility.     Pulmonary Valve:  Pulmonary valve is normal in structure with normal leaflet mobility.     Intracavitary: There is no evidence of pericardial effusion, intracavity mass, thrombi, or vegetation.         CONCLUSIONS     1 - Concentric remodeling.     2 - No wall motion abnormalities.     3 - Normal left ventricular systolic function (EF 60-65%).     4 - Normal left ventricular diastolic function.     5 - Normal right ventricular systolic function .     6 - The estimated PA systolic pressure is greater than 35 mmHg.             This document has been electronically    SIGNED BY: Rodrigo Yi MD On: 11/17/2019 12:56   , EKG: Reviewed and X-Ray: CXR: X-Ray Chest 1 View (CXR):   Results for orders placed or performed during the hospital encounter of 11/16/19   X-Ray Chest 1 View    Narrative    EXAMINATION:  XR CHEST 1 VIEW    CLINICAL HISTORY:  PNA;    FINDINGS:  Single view of the chest.   Aorta demonstrates atherosclerotic disease.    Cardiac silhouette is normal.  Significant improvement in patchy airspace opacities within the right mid lower lung zone.  No evidence of pleural effusion or pneumothorax.  Bones appear intact.      Impression    Significant improvement in patchy airspace opacities within  the right mid lower lung zone.      Electronically signed by: Meng Sharp MD  Date:    11/18/2019  Time:    08:48    and X-Ray Chest PA and Lateral (CXR): No results found for this visit on 11/16/19.    Assessment and Plan:   Patient who presents with PAF and elevated troponin in setting of sepsis/PNA. Converted back to SR. Switch to po amio, reviewed EKG with MD. She will need OP ischemic workup with her primary cardiologist once her respiratory status has improved.     Weakness  -Secondary to acute illness/deconditioning  -Mgmt as per primary team    Essential hypertension  -Continue same meds  -Monitor      PAF (paroxysmal atrial fibrillation)  -No known history of AFIB/AFL but on Xarelto as OP  -Received IV Lopressor x 2 dose in ED and IV CCB with conversion to SR yesterday  -Increase Cardizem to 90 mg TID for better rate control  -NO BB for now given wheezing on exam today  -ECHO pending    11/18/19  -Converted back to afib with RVR this AM  -Start cardizem gtt, titrate as needed  -Continue Xarelto for CVA prophylaxis     11/19/19  -Converted to SR this AM  -Discussed switching cardizem and amiodarone to po, patient unsure if she can swallow pills at this time  -Will further discuss with hospital medicine  -Continue Xarelto for CVA prophylaxis     11/20/19  -Remains in SR  -Continue Cardizem  -Continue amiodarone 200 mg BID x 2 weeks, then decrease to 200 mg daily  -Continue Xarelto for CVA prophylaxis, will verify correct dosage with pharmacy    11/22/19  -Patient back in SR after amio gtt re-started  -Will transition to po-amio 200 mg BID, QTc prolonged this AM  -Continue oral Cardizem  -Continue Xarelto for CVA prophylaxis     NSTEMI  -Admitted due to shortness of breath, AFIB/RVR  -Troponin 0.95>1.983>.992  -Repeat Troponin 0.371  -Likely due to demand ischemia from AFIB/RVR and sepsis  -ECHO pending  -Increase CCB to 90 mg TID for better rate control today  -NO BB given wheezing on exam today  -Continue  ASA, Statin, CCB, Xarelto  -Start Losartan for better BP control.  -Would benefit from OP MPI stress test once pulmonary status optimized after discharge.   -Reassess in AM    11/18/19  -Stable overnight, remains chest pain free  -2D echo showed normal EF  -Continue ASA, statin, CCB, ARB, Xarelto  -No BB given wheezing  -OP MPI stress test    11/19/19  -Stable overnight  -No chest pain  -Continue ASA, statin, CCB, ARB, Xarelto  -Plan on OP MPI stress test    11/20/19  -Remains stable CV wise  -No chest pain/anginal equivalent  -Continue ASA, statin, CCB, Xarelto  -No Plavix given bleeding risk  -No BB given intermittent wheezing  -No ACEi/ARB indicated as EF is normal  -Needs to f/u with primary cardiologist upon d/c for OP MPI stress test    11/22/19  -Remains chest pain free  -Continue ASA, statin, CCB, ARB, Xarelto  -OP MPI stress test once respiratory status has improved    Acute respiratory failure with hypoxia  -Secondary to PNA  -Continue mgmt per primary team, on abx  -Improving        VTE Risk Mitigation (From admission, onward)         Ordered     rivaroxaban tablet 15 mg  With dinner      11/17/19 1054     IP VTE HIGH RISK PATIENT  Once      11/16/19 0430     Reason for No Pharmacological VTE Prophylaxis  Once     Question:  Reasons:  Answer:  Already adequately anticoagulated on oral Anticoagulants    11/16/19 0430     Place sequential compression device  Until discontinued      11/16/19 0430                Susan Alcocer PA-C  Cardiology  [facility]

## 2019-11-22 NOTE — PROGRESS NOTES
[facility]   Pulmonology  Progress Note    Patient Name: Ana Castañeda  MRN: [id number]  Admission Date: 11/16/2019  Hospital Length of Stay: 6 days  Code Status: Full Code  Attending Provider: Tiffanie Jones MD  Primary Care Provider: Stephen Tarango MD   Principal Problem: Pneumonia of right lower lobe due to infectious organism    Subjective: chest congeston , not tolerating albuterol - palpitations     Review of Systems   Constitutional: Positive for malaise/fatigue. Negative for chills and fever.   HENT: Negative for congestion.    Eyes: Negative for blurred vision.   Respiratory: Positive for cough and wheezing. Negative for sputum production and shortness of breath.    Cardiovascular: Negative for chest pain and leg swelling.   Gastrointestinal: Negative for abdominal pain, nausea and vomiting.   Genitourinary: Negative for dysuria.   Musculoskeletal: Negative for myalgias.   Skin: Negative for rash.   Neurological: Negative for dizziness, weakness and headaches.   Endo/Heme/Allergies: Does not bruise/bleed easily.   Psychiatric/Behavioral: The patient is not nervous/anxious.          Objective:     Vital Signs (Most Recent):  Temp: 96.6 °F (35.9 °C) (11/22/19 1602)  Pulse: 81 (11/22/19 1602)  Resp: 19 (11/22/19 1602)  BP: (!) 134/59 (11/22/19 1602)  SpO2: 95 % (11/22/19 1602) Vital Signs (24h Range):  Temp:  [96.4 °F (35.8 °C)-97.6 °F (36.4 °C)] 96.6 °F (35.9 °C)  Pulse:  [56-81] 81  Resp:  [16-20] 19  SpO2:  [92 %-98 %] 95 %  BP: (130-199)/(59-78) 134/59     Weight: 92.1 kg (203 lb 0.7 oz)  Body mass index is 31.8 kg/m².      Intake/Output Summary (Last 24 hours) at 11/22/2019 1645  Last data filed at 11/22/2019 0500  Gross per 24 hour   Intake 1136.11 ml   Output --   Net 1136.11 ml       Physical Exam   Constitutional: She is oriented to person, place, and time. She appears well-developed and well-nourished. She is cooperative.  Non-toxic appearance. She does not have a sickly appearance.  She appears ill. No distress. She is not intubated. Nasal cannula in place.   HENT:   Head: Normocephalic and atraumatic.   Mouth/Throat: Oropharynx is clear and moist and mucous membranes are normal.   Eyes: Pupils are equal, round, and reactive to light. EOM and lids are normal.   Neck: Trachea normal and full passive range of motion without pain. Carotid bruit is not present.   Cardiovascular: Normal rate and normal heart sounds. An irregular rhythm present.   Pulses:       Radial pulses are 2+ on the right side, and 2+ on the left side.        Dorsalis pedis pulses are 1+ on the right side, and 1+ on the left side.   Pulmonary/Chest: Effort normal. No accessory muscle usage. No tachypnea. She is not intubated. No respiratory distress. She has decreased breath sounds in the right lower field and the left lower field. She has wheezes.   Abdominal: Soft. She exhibits no distension. Bowel sounds are decreased. There is no tenderness.   Genitourinary:   Genitourinary Comments: Ratliff in place   Musculoskeletal: Normal range of motion.        Right foot: There is no deformity.        Left foot: There is no deformity.   No edema   Lymphadenopathy:     She has no cervical adenopathy.   Neurological: She is alert and oriented to person, place, and time.   Skin: Skin is warm, dry and intact. Capillary refill takes less than 2 seconds. No rash noted. No cyanosis.   Psychiatric: She has a normal mood and affect. Her speech is normal and behavior is normal. Judgment and thought content normal. Cognition and memory are normal.       Vents:  Oxygen Concentration (%): 32 (11/21/19 1914)    Lines/Drains/Airways     Peripheral Intravenous Line                 Peripheral IV - Single Lumen 11/16/19 0058 20 G Right Wrist 6 days         Peripheral IV - Single Lumen 11/18/19 1800 24 G Left;Posterior Hand 3 days                Significant Labs:    CBC/Anemia Profile:  Recent Labs   Lab 11/22/19  0832   WBC 11.83   HGB 11.7*   HCT 36.9*       MCV 99*   RDW 14.5        Chemistries:  Recent Labs   Lab 11/22/19  0832      K 3.6      CO2 25   BUN 23   CREATININE 1.0   CALCIUM 9.1   ALBUMIN 3.1*   PROT 6.4   BILITOT 1.2*   ALKPHOS 83   ALT 24   AST 29       POCT Glucose:   Recent Labs   Lab 11/21/19  1637 11/21/19  2040 11/22/19  0506   POCTGLUCOSE 145* 119* 114*     All pertinent labs within the past 24 hours have been reviewed.        ABG  Recent Labs   Lab 11/16/19  0826   PH 7.416   PO2 80   PCO2 34.4*   HCO3 22.1*   BE -2     Assessment/Plan:     Asthma with COPD  11/22 steroids, chest chest physical therapy, atrovent jet nebs    Acute respiratory failure with hypoxia  Supplement oxygenation. Keep SAO2 >= 92%. BIPAP weaned off.  Bronchodilators per orders  11/22 switch to atrovent jet nebs           Rafa Brewer MD  Pulmonology  [facility]

## 2019-11-22 NOTE — ASSESSMENT & PLAN NOTE
Converted to NSR with IV Cardizem  Troponin elevated  Cards consulted, pt already on Xarelto    Remains in NSR    Back in afib w RVR-- will try Dig and oral Cardizem    S/p 1 day of Cardizem and Amiodarone gtt-- doing well.  Converted back to NSR- will start oral Amio and Card and then d/c the gtts.  Cards following    Resolved, continue Xarelto    Recurrent with RVR s/p multiple rounds of Cardizem and Amiodarone gtt  Continue Xarelto

## 2019-11-22 NOTE — PT/OT/SLP PROGRESS
"Occupational Therapy  Treatment    Ana Castañeda   MRN: [id number]   Admitting Diagnosis: Pneumonia of right lower lobe due to infectious organism    OT Date of Treatment: 11/22/19   OT Start Time: 1320  OT Stop Time: 1330  OT Total Time (min): 10 min    Billable Minutes:  Therapeutic Activity 10    General Precautions: Standard, fall, aspiration  Orthopedic Precautions: N/A  Braces: N/A         Subjective:  Communicated with nurse and completed Epic chart review prior to session.    Pain/Comfort  Pain Rating 1: 0/10    Objective:  Patient found with: peripheral IV, telemetry, oxygen     Therapeutic Activities and Exercises:  Pt supine in bed upon OT arrival. Pt with c/o shortness of breath. Nursing notified. Pt educated on UB exercises and deep breathing techniques. OT re-educated pt to notify nurse with any further feelings of shortness of breath. Continue per OT POC.    AM-PAC 6 CLICK ADL   How much help from another person does this patient currently need?   1 = Unable, Total/Dependent Assistance  2 = A lot, Maximum/Moderate Assistance  3 = A little, Minimum/Contact Guard/Supervision  4 = None, Modified [place]/Independent    Putting on and taking off regular lower body clothing? : 2  Bathing (including washing, rinsing, drying)?: 2  Toileting, which includes using toilet, bedpan, or urinal? : 2  Putting on and taking off regular upper body clothing?: 2  Taking care of personal grooming such as brushing teeth?: 3  Eating meals?: 3  Daily Activity Total Score: 14     AM-PAC Raw Score CMS "G-Code Modifier Level of Impairment Assistance   6 % Total / Unable   7 - 8 CM 80 - 100% Maximal Assist   9-13 CL 60 - 80% Moderate Assist   14 - 19 CK 40 - 60% Moderate Assist   20 - 22 CJ 20 - 40% Minimal Assist   23 CI 1-20% SBA / CGA   24 CH 0% Independent/ Mod I       Patient left supine with all lines intact, call button in reach, nurse notified and family present    ASSESSMENT:  Ana Castañeda is a 84 y.o. female " with a medical diagnosis of Pneumonia of right lower lobe due to infectious organism and presents with generalized weakness and decline in ADLs.    Rehab identified problem list/impairments: Rehab identified problem list/impairments: weakness, impaired endurance, impaired self care skills, impaired balance, decreased coordination, decreased safety awareness, decreased upper extremity function, pain    Rehab potential is fair.    Activity tolerance: Fair    Discharge recommendations: Discharge Facility/Level of Care Needs: nursing facility, skilled     Barriers to discharge:      Equipment recommendations:       GOALS:   Multidisciplinary Problems     Occupational Therapy Goals        Problem: Occupational Therapy Goal    Goal Priority Disciplines Outcome Interventions   Occupational Therapy Goal     OT, PT/OT Ongoing, Progressing    Description:  LTGs to be met by 11/25/19   1. Pt will perform LE dressing with Max A  2. Pt will perform BSC t/f with Max A  3. Pt will perform (B) UE ROM exercises 1 x 20 reps                    Plan:  Patient to be seen 3 x/week to address the above listed problems via self-care/home management, therapeutic exercises, therapeutic activities  Plan of Care expires: 11/25/19  Plan of Care reviewed with: patient    OT G-codes  Functional Assessment Tool Used: [facility]  Score: 14    Nelly Farr OT  11/22/2019

## 2019-11-22 NOTE — SUBJECTIVE & OBJECTIVE
Review of Systems   Constitutional: Positive for malaise/fatigue. Negative for chills and fever.   HENT: Negative for congestion.    Eyes: Negative for blurred vision.   Respiratory: Positive for cough and wheezing. Negative for sputum production and shortness of breath.    Cardiovascular: Negative for chest pain and leg swelling.   Gastrointestinal: Negative for abdominal pain, nausea and vomiting.   Genitourinary: Negative for dysuria.   Musculoskeletal: Negative for myalgias.   Skin: Negative for rash.   Neurological: Negative for dizziness, weakness and headaches.   Endo/Heme/Allergies: Does not bruise/bleed easily.   Psychiatric/Behavioral: The patient is not nervous/anxious.          Objective:     Vital Signs (Most Recent):  Temp: 96.6 °F (35.9 °C) (11/22/19 1602)  Pulse: 81 (11/22/19 1602)  Resp: 19 (11/22/19 1602)  BP: (!) 134/59 (11/22/19 1602)  SpO2: 95 % (11/22/19 1602) Vital Signs (24h Range):  Temp:  [96.4 °F (35.8 °C)-97.6 °F (36.4 °C)] 96.6 °F (35.9 °C)  Pulse:  [56-81] 81  Resp:  [16-20] 19  SpO2:  [92 %-98 %] 95 %  BP: (130-199)/(59-78) 134/59     Weight: 92.1 kg (203 lb 0.7 oz)  Body mass index is 31.8 kg/m².      Intake/Output Summary (Last 24 hours) at 11/22/2019 1645  Last data filed at 11/22/2019 0500  Gross per 24 hour   Intake 1136.11 ml   Output --   Net 1136.11 ml       Physical Exam   Constitutional: She is oriented to person, place, and time. She appears well-developed and well-nourished. She is cooperative.  Non-toxic appearance. She does not have a sickly appearance. She appears ill. No distress. She is not intubated. Nasal cannula in place.   HENT:   Head: Normocephalic and atraumatic.   Mouth/Throat: Oropharynx is clear and moist and mucous membranes are normal.   Eyes: Pupils are equal, round, and reactive to light. EOM and lids are normal.   Neck: Trachea normal and full passive range of motion without pain. Carotid bruit is not present.   Cardiovascular: Normal rate and normal  heart sounds. An irregular rhythm present.   Pulses:       Radial pulses are 2+ on the right side, and 2+ on the left side.        Dorsalis pedis pulses are 1+ on the right side, and 1+ on the left side.   Pulmonary/Chest: Effort normal. No accessory muscle usage. No tachypnea. She is not intubated. No respiratory distress. She has decreased breath sounds in the right lower field and the left lower field. She has wheezes.   Abdominal: Soft. She exhibits no distension. Bowel sounds are decreased. There is no tenderness.   Genitourinary:   Genitourinary Comments: Ratliff in place   Musculoskeletal: Normal range of motion.        Right foot: There is no deformity.        Left foot: There is no deformity.   No edema   Lymphadenopathy:     She has no cervical adenopathy.   Neurological: She is alert and oriented to person, place, and time.   Skin: Skin is warm, dry and intact. Capillary refill takes less than 2 seconds. No rash noted. No cyanosis.   Psychiatric: She has a normal mood and affect. Her speech is normal and behavior is normal. Judgment and thought content normal. Cognition and memory are normal.       Vents:  Oxygen Concentration (%): 32 (11/21/19 1914)    Lines/Drains/Airways     Peripheral Intravenous Line                 Peripheral IV - Single Lumen 11/16/19 0058 20 G Right Wrist 6 days         Peripheral IV - Single Lumen 11/18/19 1800 24 G Left;Posterior Hand 3 days                Significant Labs:    CBC/Anemia Profile:  Recent Labs   Lab 11/22/19  0832   WBC 11.83   HGB 11.7*   HCT 36.9*      MCV 99*   RDW 14.5        Chemistries:  Recent Labs   Lab 11/22/19  0832      K 3.6      CO2 25   BUN 23   CREATININE 1.0   CALCIUM 9.1   ALBUMIN 3.1*   PROT 6.4   BILITOT 1.2*   ALKPHOS 83   ALT 24   AST 29       POCT Glucose:   Recent Labs   Lab 11/21/19  1637 11/21/19  2040 11/22/19  0506   POCTGLUCOSE 145* 119* 114*     All pertinent labs within the past 24 hours have been reviewed.

## 2019-11-22 NOTE — SUBJECTIVE & OBJECTIVE
Interval History: looks better, relaxed, eating lunch. Remained in NSR since yesterday, had bradycardia last night-- hence Cardizem gtt d/hira. Now on oral Amiodarone and Cardizem. Doing PT/OT gently. She keeps going into afib RVR with minimal exertion, any PT etc. She has been accepted at [facility] on Monday. Will continue present care.     Review of Systems   Unable to perform ROS: Acuity of condition   Constitutional: Negative for chills and fever.   HENT: Negative for postnasal drip, rhinorrhea, sinus pressure and sore throat.    Respiratory: Negative for cough, shortness of breath and wheezing.    Cardiovascular: Negative for chest pain, palpitations and leg swelling.   Gastrointestinal: Negative for abdominal distention, abdominal pain, blood in stool, constipation, diarrhea, nausea and vomiting.   Genitourinary: Negative for difficulty urinating, dysuria, hematuria and urgency.   Musculoskeletal: Positive for gait problem (chronic). Negative for arthralgias, back pain, myalgias, neck pain and neck stiffness.   Skin: Negative for pallor, rash and wound.   Neurological: Positive for weakness. Negative for dizziness, syncope, facial asymmetry, speech difficulty, light-headedness and headaches.   Psychiatric/Behavioral: Negative for confusion. The patient is not nervous/anxious.    All other systems reviewed and are negative.    Objective:     Vital Signs (Most Recent):  Temp: 97.5 °F (36.4 °C) (11/22/19 1135)  Pulse: 74 (11/22/19 1135)  Resp: 20 (11/22/19 1135)  BP: (!) 199/78 (11/22/19 1135)  SpO2: (!) 94 % (11/22/19 1135) Vital Signs (24h Range):  Temp:  [96.4 °F (35.8 °C)-97.6 °F (36.4 °C)] 97.5 °F (36.4 °C)  Pulse:  [] 74  Resp:  [16-22] 20  SpO2:  [92 %-98 %] 94 %  BP: (130-199)/(64-78) 199/78     Weight: 92.1 kg (203 lb 0.7 oz)  Body mass index is 31.8 kg/m².    Intake/Output Summary (Last 24 hours) at 11/22/2019 1419  Last data filed at 11/22/2019 0500  Gross per 24 hour   Intake 1136.11 ml    Output --   Net 1136.11 ml      Physical Exam   Constitutional: She is oriented to person, place, and time. She appears well-developed and well-nourished. She is cooperative.  Non-toxic appearance. She does not have a sickly appearance. She does not appear ill. No distress. She is not intubated. Nasal cannula in place.   HENT:   Head: Normocephalic and atraumatic.   Mouth/Throat: Oropharynx is clear and moist and mucous membranes are normal.   Eyes: Pupils are equal, round, and reactive to light. EOM and lids are normal.   Neck: Trachea normal and full passive range of motion without pain. Carotid bruit is not present.   Cardiovascular: Normal rate, regular rhythm and normal heart sounds.   Pulses:       Radial pulses are 2+ on the right side, and 2+ on the left side.        Dorsalis pedis pulses are 1+ on the right side, and 1+ on the left side.   Pulmonary/Chest: Effort normal. No accessory muscle usage. No tachypnea. She is not intubated. No respiratory distress. She has decreased breath sounds in the right lower field and the left lower field. She has no wheezes.   Abdominal: Soft. She exhibits no distension. Bowel sounds are decreased. There is no tenderness.   Genitourinary:   Genitourinary Comments: Ratliff in place   Musculoskeletal: Normal range of motion.        Right foot: There is no deformity.        Left foot: There is no deformity.   No edema   Lymphadenopathy:     She has no cervical adenopathy.   Neurological: She is alert and oriented to person, place, and time.   Skin: Skin is warm, dry and intact. Capillary refill takes less than 2 seconds. No rash noted. No cyanosis.   Psychiatric: She has a normal mood and affect. Her speech is normal and behavior is normal. Judgment and thought content normal. Cognition and memory are normal.   Nursing note and vitals reviewed.      Significant Labs:   BMP:   Recent Labs   Lab 11/22/19  0832   *      K 3.6      CO2 25   BUN 23   CREATININE  1.0   CALCIUM 9.1     CBC:   Recent Labs   Lab 11/22/19  0832   WBC 11.83   HGB 11.7*   HCT 36.9*        All pertinent labs within the past 24 hours have been reviewed.    Significant Imaging: I have reviewed all pertinent imaging results/findings within the past 24 hours.

## 2019-11-22 NOTE — SIGNIFICANT EVENT
HR ranging from 40-50 bpm. Currently on Cardizem and Amiodarone infusion. Will discontinue Cardizem infusion. Will start previous dose of oral Cardizem. Will follow.

## 2019-11-22 NOTE — PT/OT/SLP PROGRESS
Physical Therapy      Patient Name:  Ana Castañeda   MRN:  [id number]    PT attempted tx at 11:43am, pt eating lunch at this itme and requested therapy later today. PT will attempt tx at later time/ date in order to continue with POC.     Karen Sutherland, PT/OT   11/22/2019

## 2019-11-22 NOTE — PLAN OF CARE
Spoke with patient and family regarding multiple referrals to SNF.  They prefer [facility].  They will have a bed on Monday, November 25, 2019.  Patient is not stable for discharge today.  Plan for Monday discharge.       11/22/19 1517   Post-Acute Status   Post-Acute Authorization Placement   Post-Acute Placement Status Authorization Obtained

## 2019-11-22 NOTE — ASSESSMENT & PLAN NOTE
-No known history of AFIB/AFL but on Xarelto as OP  -Received IV Lopressor x 2 dose in ED and IV CCB with conversion to SR yesterday  -Increase Cardizem to 90 mg TID for better rate control  -NO BB for now given wheezing on exam today  -ECHO pending    11/18/19  -Converted back to afib with RVR this AM  -Start cardizem gtt, titrate as needed  -Continue Xarelto for CVA prophylaxis     11/19/19  -Converted to SR this AM  -Discussed switching cardizem and amiodarone to po, patient unsure if she can swallow pills at this time  -Will further discuss with hospital medicine  -Continue Xarelto for CVA prophylaxis     11/20/19  -Remains in SR  -Continue Cardizem  -Continue amiodarone 200 mg BID x 2 weeks, then decrease to 200 mg daily  -Continue Xarelto for CVA prophylaxis, will verify correct dosage with pharmacy    11/22/19  -Patient back in SR after amio gtt re-started  -Will transition to po-amio 200 mg BID, QTc prolonged this AM  -Continue oral Cardizem  -Continue Xarelto for CVA prophylaxis

## 2019-11-22 NOTE — PLAN OF CARE
POC reviewed with pt verbalized understanding  Pt remained free from fall, precautions in place  Pt is NSR on monitor 70-86  VS monitored  Glucose monitored  IV intact  Not other complaints at this time. Call bell in belongings in reach, hourly rounding complete, reminded to call for assist will continue monitor.

## 2019-11-22 NOTE — ASSESSMENT & PLAN NOTE
Supplement oxygenation. Keep SAO2 >= 92%. BIPAP weaned off.  Bronchodilators per orders  11/22 switch to atrovent jet nebs

## 2019-11-22 NOTE — PLAN OF CARE
Pt is continuing to make progress toward all goals. Pt was educated on UB exercises to complete from bed level. Pt educated on deep breathing techniques.    Requested Prescriptions     Signed Prescriptions Disp Refills   • zolpidem (AMBIEN CR) 12.5 MG CR tablet 30 Tab 0     Sig: TAKE ONE TABLET BY MOUTH AT BEDTIME AS NEEDED     Authorizing Provider: HOANG OVALLE A.P.R.N.

## 2019-11-22 NOTE — ASSESSMENT & PLAN NOTE
Cont OT/ PT  Await SNF placement    inj B12 IM    Persists, improving but needs a few days of rehab  Continue PT/OT    Continue ROM, PT/OT  Await discharge to SNF

## 2019-11-22 NOTE — PT/OT/SLP PROGRESS
Physical Therapy  Treatment    Ana Castañeda   MRN: [id number]   Admitting Diagnosis: Pneumonia of right lower lobe due to infectious organism    PT Received On: 11/22/19  PT Start Time: 1320     PT Stop Time: 1330    PT Total Time (min): 10 min       Billable Minutes:  Therapeutic Activity 10 min    Treatment Type: Treatment  PT/PTA: PT     PTA Visit Number: 0       General Precautions: Standard, aspiration, fall  Orthopedic Precautions: N/A   Braces: N/A    Spiritual, Cultural Beliefs, Yazidi Practices, Values that Affect Care: no    Subjective:  Communicated with Nurse Paget and epic chart review prior to session.  Pt found supine in bed with HOB elevated.    Pain/Comfort  Pain Rating 1: 0/10  Pain Rating Post-Intervention 1: 0/10    Objective:   Patient found with: peripheral IV, telemetry, oxygen    Functional Mobility:  Therapeutic Activities and Exercises:  Pt had c/o shortness of breath, Nurse Paget notified. Pt and daughter educated on proper breathing techniques and LE therapeutic exercises 1 x 10-15 reps: heel slides, hip abd/ add, ankle pumps, quad sets, glut sets.        AM-PAC 6 CLICK MOBILITY  How much help from another person does this patient currently need?   1 = Unable, Total/Dependent Assistance  2 = A lot, Maximum/Moderate Assistance  3 = A little, Minimum/Contact Guard/Supervision  4 = None, Modified [place]/Independent    Turning over in bed (including adjusting bedclothes, sheets and blankets)?: 2  Sitting down on and standing up from a chair with arms (e.g., wheelchair, bedside commode, etc.): 2  Moving from lying on back to sitting on the side of the bed?: 2  Moving to and from a bed to a chair (including a wheelchair)?: 2  Need to walk in hospital room?: 1  Climbing 3-5 steps with a railing?: 1  Basic Mobility Total Score: 10    AM-PAC Raw Score CMS G-Code Modifier Level of Impairment Assistance   6 % Total / Unable   7 - 9 CM 80 - 100% Maximal Assist   10 - 14 CL 60 - 80%  Moderate Assist   15 - 19 CK 40 - 60% Moderate Assist   20 - 22 CJ 20 - 40% Minimal Assist   23 CI 1-20% SBA / CGA   24 CH 0% Independent/ Mod I     Patient left HOB elevated with all lines intact, call button in reach, Nurse Paget notified and daughter present.    Assessment:  Ana Castañeda is a 84 y.o. female with a medical diagnosis of Pneumonia of right lower lobe due to infectious organism and presents with impaired functional mobility. Pt will benefit from continued skilled PT in order to address impairments.     Rehab identified problem list/impairments: Rehab identified problem list/impairments: weakness, impaired endurance, impaired self care skills, impaired functional mobilty, decreased coordination, edema, decreased safety awareness, decreased lower extremity function, decreased upper extremity function, gait instability, impaired balance    Rehab potential is fair.    Activity tolerance: Fair    Discharge recommendations: Discharge Facility/Level of Care Needs: nursing facility, skilled     Barriers to discharge:      Equipment recommendations: Equipment Needed After Discharge: none     GOALS:   Multidisciplinary Problems     Physical Therapy Goals        Problem: Physical Therapy Goal    Goal Priority Disciplines Outcome Goal Variances Interventions   Physical Therapy Goal     PT, PT/OT Ongoing, Not Progressing     Description:  LTGs to be met by 11/25/19  1. Pt will perform bed mobility with Mod A  2. Pt will perform sit<>stand functional t/fs with Max A  3. Pt will ambulate ~5ft using RW with Max A  4. Pt will perform (B) LE therapeutic exercises 1 x 15 reps                    PLAN:    Patient to be seen 5 x/week  to address the above listed problems via therapeutic activities, therapeutic exercises, gait training  Plan of Care expires: 11/25/19  Plan of Care reviewed with: patient, daughter    PT G-Codes  Functional Assessment Tool Used: [facility]  Score: 10    Karen Sutherland  PT/OT  11/22/2019

## 2019-11-23 PROCEDURE — 25000003 PHARM REV CODE 250: Performed by: NURSE PRACTITIONER

## 2019-11-23 PROCEDURE — 97530 THERAPEUTIC ACTIVITIES: CPT

## 2019-11-23 PROCEDURE — 94761 N-INVAS EAR/PLS OXIMETRY MLT: CPT

## 2019-11-23 PROCEDURE — 99900035 HC TECH TIME PER 15 MIN (STAT)

## 2019-11-23 PROCEDURE — 94640 AIRWAY INHALATION TREATMENT: CPT

## 2019-11-23 PROCEDURE — 63600175 PHARM REV CODE 636 W HCPCS: Performed by: NURSE PRACTITIONER

## 2019-11-23 PROCEDURE — 27000646 HC AEROBIKA DEVICE

## 2019-11-23 PROCEDURE — 27000221 HC OXYGEN, UP TO 24 HOURS

## 2019-11-23 PROCEDURE — 21400001 HC TELEMETRY ROOM

## 2019-11-23 PROCEDURE — 25000242 PHARM REV CODE 250 ALT 637 W/ HCPCS: Performed by: INTERNAL MEDICINE

## 2019-11-23 PROCEDURE — 25000003 PHARM REV CODE 250: Performed by: PHYSICIAN ASSISTANT

## 2019-11-23 PROCEDURE — 92526 ORAL FUNCTION THERAPY: CPT

## 2019-11-23 PROCEDURE — 99231 PR SUBSEQUENT HOSPITAL CARE,LEVL I: ICD-10-PCS | Mod: ,,, | Performed by: INTERNAL MEDICINE

## 2019-11-23 PROCEDURE — 63600175 PHARM REV CODE 636 W HCPCS: Performed by: EMERGENCY MEDICINE

## 2019-11-23 PROCEDURE — 25000003 PHARM REV CODE 250: Performed by: INTERNAL MEDICINE

## 2019-11-23 PROCEDURE — 97535 SELF CARE MNGMENT TRAINING: CPT

## 2019-11-23 PROCEDURE — 99231 SBSQ HOSP IP/OBS SF/LOW 25: CPT | Mod: ,,, | Performed by: INTERNAL MEDICINE

## 2019-11-23 PROCEDURE — 25000003 PHARM REV CODE 250: Performed by: EMERGENCY MEDICINE

## 2019-11-23 PROCEDURE — 94664 DEMO&/EVAL PT USE INHALER: CPT

## 2019-11-23 RX ADMIN — AMIODARONE HYDROCHLORIDE 200 MG: 200 TABLET ORAL at 08:11

## 2019-11-23 RX ADMIN — ATORVASTATIN CALCIUM 20 MG: 10 TABLET, FILM COATED ORAL at 10:11

## 2019-11-23 RX ADMIN — AMOXICILLIN AND CLAVULANATE POTASSIUM 500 MG: 500; 125 TABLET, FILM COATED ORAL at 10:11

## 2019-11-23 RX ADMIN — PREDNISONE 10 MG: 10 TABLET ORAL at 10:11

## 2019-11-23 RX ADMIN — IPRATROPIUM BROMIDE 0.5 MG: 0.5 SOLUTION RESPIRATORY (INHALATION) at 07:11

## 2019-11-23 RX ADMIN — DILTIAZEM HYDROCHLORIDE 120 MG: 120 CAPSULE, COATED, EXTENDED RELEASE ORAL at 10:11

## 2019-11-23 RX ADMIN — AMOXICILLIN AND CLAVULANATE POTASSIUM 500 MG: 500; 125 TABLET, FILM COATED ORAL at 08:11

## 2019-11-23 RX ADMIN — HYDRALAZINE HYDROCHLORIDE 10 MG: 20 INJECTION INTRAMUSCULAR; INTRAVENOUS at 01:11

## 2019-11-23 RX ADMIN — LEVOTHYROXINE SODIUM 112 MCG: 112 TABLET ORAL at 05:11

## 2019-11-23 RX ADMIN — IPRATROPIUM BROMIDE 0.5 MG: 0.5 SOLUTION RESPIRATORY (INHALATION) at 12:11

## 2019-11-23 RX ADMIN — RIVAROXABAN 15 MG: 15 TABLET, FILM COATED ORAL at 05:11

## 2019-11-23 RX ADMIN — ASPIRIN 81 MG: 81 TABLET, COATED ORAL at 10:11

## 2019-11-23 RX ADMIN — GUAIFENESIN 600 MG: 600 TABLET, EXTENDED RELEASE ORAL at 08:11

## 2019-11-23 RX ADMIN — AMIODARONE HYDROCHLORIDE 200 MG: 200 TABLET ORAL at 10:11

## 2019-11-23 RX ADMIN — GUAIFENESIN 600 MG: 600 TABLET, EXTENDED RELEASE ORAL at 10:11

## 2019-11-23 RX ADMIN — PANTOPRAZOLE SODIUM 40 MG: 40 TABLET, DELAYED RELEASE ORAL at 10:11

## 2019-11-23 RX ADMIN — PREDNISONE 10 MG: 10 TABLET ORAL at 08:11

## 2019-11-23 RX ADMIN — IPRATROPIUM BROMIDE 0.5 MG: 0.5 SOLUTION RESPIRATORY (INHALATION) at 01:11

## 2019-11-23 NOTE — PT/OT/SLP PROGRESS
Occupational Therapy  Treatment    Ana Castañeda   MRN: [id number]   Admitting Diagnosis: Weakness    OT Date of Treatment: 11/23/19   OT Start Time: 0849  OT Stop Time: 0916  OT Total Time (min): 27 min    Billable Minutes:  Self Care/Home Management 15 and Therapeutic Activity 12    General Precautions: Standard, fall, aspiration  Orthopedic Precautions:    Braces:           Subjective:  Communicated with NURSE prior to session.       Pain/Comfort  Pain Rating 1: 0/10    Objective:        Functional Mobility:  Bed Mobility:       Transfers:        Functional Ambulation: STEPS ATTEMPTED, BUT PATIENT COULD NOT MOVE (B) FEET.    Activities of Daily Living:     Feeding adaptive equipment: NT     UE adaptive equipment: NT     LE adaptive equipment: SHOE HORN TO (A) WITH (L) SHOE.                    Bathing adaptive equipment: NT    Balance:   Static Sit: FAIR-: Maintains without assist but inconsistent   Dynamic Sit: FAIR: Cannot move trunk without losing balance  Static Stand: POOR: Needs MODERATE assist to maintain  Dynamic stand: 0: N/A    Therapeutic Activities and Exercises:  PATIENT T/F'ED SUPINE > SIT WITH MAX (A) AND SIT > SUPINE WITH MOD (A) X2.  PATIENT T/F'ED SIT <> STAND WITH MAX (A).  PATIENT (D) TO DON (B) SHOES AND MAX (A) TO COMB HAIR SEATED EOB.  WHILE SUPINE, PATIENT PERFORMED X10 REPS OF (B) SHLD FLEX AND X10 REPS OF (B) SHLD ABD AND EDUCATED TO CONT AX THROUGHOUT DAY EVERY DAY TO KEEP BUE'S ACTIVE WHILE IN HOSPITAL BED.    AM-PAC 6 CLICK ADL   How much help from another person does this patient currently need?   1 = Unable, Total/Dependent Assistance  2 = A lot, Maximum/Moderate Assistance  3 = A little, Minimum/Contact Guard/Supervision  4 = None, Modified [place]/Independent    Putting on and taking off regular lower body clothing? : 2  Bathing (including washing, rinsing, drying)?: 2  Toileting, which includes using toilet, bedpan, or urinal? : 2  Putting on and taking off regular upper body  "clothing?: 2  Taking care of personal grooming such as brushing teeth?: 3  Eating meals?: 3  Daily Activity Total Score: 14     AM-PAC Raw Score CMS "G-Code Modifier Level of Impairment Assistance   6 % Total / Unable   7 - 8 CM 80 - 100% Maximal Assist   9-13 CL 60 - 80% Moderate Assist   14 - 19 CK 40 - 60% Moderate Assist   20 - 22 CJ 20 - 40% Minimal Assist   23 CI 1-20% SBA / CGA   24 CH 0% Independent/ Mod I       Patient left HOB elevated with all lines intact, call button in reach, bed alarm on and  PRESENT BUT LEFT TO GO TO [place].    ASSESSMENT:  Ana Castañeda is a 84 y.o. female with a medical diagnosis of Weakness and presents with SELF CARE DEBILITY.    Rehab identified problem list/impairments: Rehab identified problem list/impairments: weakness, impaired endurance, impaired self care skills, visual deficits, impaired balance, gait instability, impaired functional mobilty, decreased lower extremity function    Rehab potential is good.    Activity tolerance: Fair    Discharge recommendations: Discharge Facility/Level of Care Needs: nursing facility, skilled     Barriers to discharge: Barriers to Discharge: None    Equipment recommendations: none     GOALS:   Multidisciplinary Problems     Occupational Therapy Goals        Problem: Occupational Therapy Goal    Goal Priority Disciplines Outcome Interventions   Occupational Therapy Goal     OT, PT/OT Ongoing, Progressing    Description:  LTGs to be met by 11/25/19   1. Pt will perform LE dressing with Max A  2. Pt will perform BSC t/f with Max A  3. Pt will perform (B) UE ROM exercises 1 x 20 reps                    Plan:  Patient to be seen 3 x/week to address the above listed problems via self-care/home management, therapeutic activities, therapeutic exercises  Plan of Care expires: 11/25/19  Plan of Care reviewed with: patient, spouse    OT G-codes  Functional Assessment Tool Used: [facility]  Score: 14  Functional Limitation: Self " care    Katherin Bullard, OT  11/23/2019

## 2019-11-23 NOTE — PT/OT/SLP PROGRESS
Physical Therapy Treatment    Patient Name:  Ana Castañeda   MRN:  [id number]    Recommendations:     Discharge Recommendations:  nursing facility, skilled   Discharge Equipment Recommendations: none   Barriers to discharge: Decreased caregiver support    Assessment:     Continues with marked LE weakness. Max A x2 with transfers and bed mobility. Unable to perform gait     Rehab Prognosis: Fair; patient would benefit from acute skilled PT services to address these deficits and reach maximum level of function.    Recent Surgery: * No surgery found *      Plan:     During this hospitalization, patient to be seen 5 x/week to address the identified rehab impairments via gait training, therapeutic activities, therapeutic exercises and progress toward the following goals:    · Plan of Care Expires:  11/25/19    Subjective     Chief Complaint: weakness  Patient/Family Comments/goals: improve mobility  Pain/Comfort:  · Pain Rating 1: 0/10  · Pain Rating Post-Intervention 1: 0/10      Objective:     Communicated with Epic and nursing prior to session.  Patient found supine with peripheral IV, oxygen upon PT entry to room.     General Precautions: Standard,     Orthopedic Precautions:N/A   Braces:       Functional Mobility:  · Bed Mobility:     · Supine to Sit: maximal assistance and of 2 persons  · Transfers:     · Sit to Stand:  maximal assistance and of 2 persons with rolling walker      AM-PAC 6 CLICK MOBILITY  Turning over in bed (including adjusting bedclothes, sheets and blankets)?: 2  Sitting down on and standing up from a chair with arms (e.g., wheelchair, bedside commode, etc.): 2  Moving from lying on back to sitting on the side of the bed?: 2  Moving to and from a bed to a chair (including a wheelchair)?: 1  Need to walk in hospital room?: 1  Climbing 3-5 steps with a railing?: 1  Basic Mobility Total Score: 9       Therapeutic Activities and Exercises:   Supine to sit and scooting bed mobility with max x2 . Sit to  stand x 1 with RW with max x2. Pt unable to initiate second attempt. Pt very fearful of falling during entire treatment. Needs lots of verbal and tactile cuing for movement progressions    Patient left supine with all lines intact and call button in reach..    GOALS:   Multidisciplinary Problems     Physical Therapy Goals        Problem: Physical Therapy Goal    Goal Priority Disciplines Outcome Goal Variances Interventions   Physical Therapy Goal     PT, PT/OT Ongoing, Not Progressing     Description:  LTGs to be met by 11/25/19  1. Pt will perform bed mobility with Mod A  2. Pt will perform sit<>stand functional t/fs with Max A  3. Pt will ambulate ~5ft using RW with Max A  4. Pt will perform (B) LE therapeutic exercises 1 x 15 reps                    Time Tracking:     PT Received On: 11/23/19  PT Start Time: 0900     PT Stop Time: 0930  PT Total Time (min): 30 min     Billable Minutes: Therapeutic Activity 25    Treatment Type: Treatment  PT/PTA: PT           Binh Powers, PT  11/23/2019

## 2019-11-23 NOTE — PT/OT/SLP PROGRESS
Speech Language Pathology Treatment    Patient Name:  Ana Castañeda   MRN:  [id number]  Admitting Diagnosis: Weakness    Recommendations:                 General Recommendations:  Swallowing therapy  Diet recommendations:  Puree, Liquid Diet Level: Honey Thick   Aspiration Precautions: In place and reviewed with pt and family  General Precautions: Standard, fall, aspiration  Communication strategies:  Verbal    Subjective     Pt reporting that she fatigues easily  Patient goals: to feel better    Pain/Comfort:  · Pain Rating 1: 0/10    Objective:     Has the patient been evaluated by SLP for swallowing?   Yes  Keep patient NPO? No   Current Respiratory Status: nasal cannula      t completed 10-12 reps of Laryngeal Elevation, Tongue Base Retraction, Pharyngeal Constriction and Oral Motor tasks with cues.  Pt did need frequent rest breaks due to some shortness of breath.  S.T. Recommendation of pureed consistency diet and Honey Thick Liquids as well as swallowing precautions reviewed with the patient and her .      Assessment:     Ana Castañeda is a 84 y.o. female with an SLP diagnosis of Dysphagia.  She is recommended to continue on Pureed consistency diet and Honey Thick liquids per MBSS on 11/20/19.  Continued S.T. Recommended to address swallowing skills.      Goals:   Multidisciplinary Problems     SLP Goals        Problem: SLP Goal    Goal Priority Disciplines Outcome   SLP Goal     SLP Ongoing, Progressing   Description:  1. Pt will tolerate po diet of puree with honey thick liquids.   2. Pt will utilize compensatory swallow strategies with min cues.  3. Pt will complete oral motor, pharyngeal ex x 20 each with min cues to improve swallow safety.                     Plan:     · Patient to be seen:  3 x/week   · Plan of Care expires:  11/27/19  · Plan of Care reviewed with:  patient   · SLP Follow-Up:  Yes       Discharge recommendations:    continued S.T. For Dysphagia  Barriers to Discharge:  Will need  assistance    Time Tracking:     SLP Treatment Date:   11/23/19  Speech Start Time:  0936  Speech Stop Time:  1000     Speech Total Time (min):  24 min    Billable Minutes: 24 minutes    Kaylan Carrasquillo CCC-HERMELINDA  11/23/2019

## 2019-11-23 NOTE — ASSESSMENT & PLAN NOTE
Supplement oxygenation. Keep SAO2 >= 92%. BIPAP weaned off.  Bronchodilators per orders  11/22 switch to atrovent jet nebs  11/23 Improved

## 2019-11-23 NOTE — PROGRESS NOTES
[facility]  Pulmonology  Progress Note    Patient Name: Ana Castañeda  MRN: [id number]  Admission Date: 11/16/2019  Hospital Length of Stay: 7 days  Code Status: Full Code  Attending Provider: Rivas Hester MD  Primary Care Provider: Stephen Tarango MD   Principal Problem: Weakness    Subjective: feels better     Review of Systems   Constitutional: Positive for malaise/fatigue. Negative for chills and fever.   HENT: Negative for congestion.    Eyes: Negative for blurred vision.   Respiratory: Positive for cough and wheezing. Negative for sputum production and shortness of breath.    Cardiovascular: Negative for chest pain and leg swelling.   Gastrointestinal: Negative for abdominal pain, nausea and vomiting.   Genitourinary: Negative for dysuria.   Musculoskeletal: Negative for myalgias.   Skin: Negative for rash.   Neurological: Negative for dizziness, weakness and headaches.   Endo/Heme/Allergies: Does not bruise/bleed easily.   Psychiatric/Behavioral: The patient is not nervous/anxious.          Objective:     Vital Signs (Most Recent):  Temp: 97.7 °F (36.5 °C) (11/23/19 1541)  Pulse: 86 (11/23/19 1541)  Resp: 16 (11/23/19 1541)  BP: (!) 170/74 (11/23/19 1541)  SpO2: (!) 93 % (11/23/19 1541) Vital Signs (24h Range):  Temp:  [96.4 °F (35.8 °C)-98.4 °F (36.9 °C)] 97.7 °F (36.5 °C)  Pulse:  [66-88] 86  Resp:  [16-24] 16  SpO2:  [93 %-98 %] 93 %  BP: (143-174)/(63-74) 170/74     Weight: 92.5 kg (203 lb 14.8 oz)  Body mass index is 31.94 kg/m².      Intake/Output Summary (Last 24 hours) at 11/23/2019 1607  Last data filed at 11/23/2019 0500  Gross per 24 hour   Intake 240 ml   Output --   Net 240 ml       Physical Exam   Constitutional: She is oriented to person, place, and time. She appears well-developed and well-nourished. She is cooperative.  Non-toxic appearance. She does not have a sickly appearance. She appears ill. No distress. She is not intubated. Nasal cannula in place.   HENT:   Head:  Normocephalic and atraumatic.   Mouth/Throat: Oropharynx is clear and moist and mucous membranes are normal.   Eyes: Pupils are equal, round, and reactive to light. EOM and lids are normal.   Neck: Trachea normal and full passive range of motion without pain. Carotid bruit is not present.   Cardiovascular: Normal rate and normal heart sounds. An irregular rhythm present.   Pulses:       Radial pulses are 2+ on the right side, and 2+ on the left side.        Dorsalis pedis pulses are 1+ on the right side, and 1+ on the left side.   Pulmonary/Chest: Effort normal. No accessory muscle usage. No tachypnea. She is not intubated. No respiratory distress. She has decreased breath sounds in the right lower field and the left lower field. She has wheezes. She has rales.   Abdominal: Soft. She exhibits no distension. Bowel sounds are decreased. There is no tenderness.   Genitourinary:   Genitourinary Comments: Ratliff in place   Musculoskeletal: Normal range of motion.        Right foot: There is no deformity.        Left foot: There is no deformity.   No edema   Lymphadenopathy:     She has no cervical adenopathy.   Neurological: She is alert and oriented to person, place, and time.   Skin: Skin is warm, dry and intact. Capillary refill takes less than 2 seconds. No rash noted. No cyanosis.   Psychiatric: She has a normal mood and affect. Her speech is normal and behavior is normal. Judgment and thought content normal. Cognition and memory are normal.       Vents:  Oxygen Concentration (%): 28 (11/23/19 0708)    Lines/Drains/Airways     Peripheral Intravenous Line                 Peripheral IV - Single Lumen 11/16/19 0058 20 G Right Wrist 7 days         Peripheral IV - Single Lumen 11/18/19 1800 24 G Left;Posterior Hand 4 days                Significant Labs:    CBC/Anemia Profile:  Recent Labs   Lab 11/22/19  0832   WBC 11.83   HGB 11.7*   HCT 36.9*      MCV 99*   RDW 14.5        Chemistries:  Recent Labs   Lab  11/22/19  0832      K 3.6      CO2 25   BUN 23   CREATININE 1.0   CALCIUM 9.1   ALBUMIN 3.1*   PROT 6.4   BILITOT 1.2*   ALKPHOS 83   ALT 24   AST 29       POCT Glucose:   Recent Labs   Lab 11/21/19  2040 11/22/19  0506 11/22/19  1158   POCTGLUCOSE 119* 114* 99     All pertinent labs within the past 24 hours have been reviewed.        ABG  No results for input(s): PH, PO2, PCO2, HCO3, BE in the last 168 hours.  Assessment/Plan:     Acute respiratory failure with hypoxia  Supplement oxygenation. Keep SAO2 >= 92%. BIPAP weaned off.  Bronchodilators per orders  11/22 switch to atrovent jet nebs  11/23 Improved    Continue present treatment, out of bed        Rafa Brewer MD  Pulmonology  [facility]

## 2019-11-23 NOTE — SUBJECTIVE & OBJECTIVE
Review of Systems   Constitutional: Positive for malaise/fatigue. Negative for chills and fever.   HENT: Negative for congestion.    Eyes: Negative for blurred vision.   Respiratory: Positive for cough and wheezing. Negative for sputum production and shortness of breath.    Cardiovascular: Negative for chest pain and leg swelling.   Gastrointestinal: Negative for abdominal pain, nausea and vomiting.   Genitourinary: Negative for dysuria.   Musculoskeletal: Negative for myalgias.   Skin: Negative for rash.   Neurological: Negative for dizziness, weakness and headaches.   Endo/Heme/Allergies: Does not bruise/bleed easily.   Psychiatric/Behavioral: The patient is not nervous/anxious.          Objective:     Vital Signs (Most Recent):  Temp: 97.7 °F (36.5 °C) (11/23/19 1541)  Pulse: 86 (11/23/19 1541)  Resp: 16 (11/23/19 1541)  BP: (!) 170/74 (11/23/19 1541)  SpO2: (!) 93 % (11/23/19 1541) Vital Signs (24h Range):  Temp:  [96.4 °F (35.8 °C)-98.4 °F (36.9 °C)] 97.7 °F (36.5 °C)  Pulse:  [66-88] 86  Resp:  [16-24] 16  SpO2:  [93 %-98 %] 93 %  BP: (143-174)/(63-74) 170/74     Weight: 92.5 kg (203 lb 14.8 oz)  Body mass index is 31.94 kg/m².      Intake/Output Summary (Last 24 hours) at 11/23/2019 1607  Last data filed at 11/23/2019 0500  Gross per 24 hour   Intake 240 ml   Output --   Net 240 ml       Physical Exam   Constitutional: She is oriented to person, place, and time. She appears well-developed and well-nourished. She is cooperative.  Non-toxic appearance. She does not have a sickly appearance. She appears ill. No distress. She is not intubated. Nasal cannula in place.   HENT:   Head: Normocephalic and atraumatic.   Mouth/Throat: Oropharynx is clear and moist and mucous membranes are normal.   Eyes: Pupils are equal, round, and reactive to light. EOM and lids are normal.   Neck: Trachea normal and full passive range of motion without pain. Carotid bruit is not present.   Cardiovascular: Normal rate and normal heart  sounds. An irregular rhythm present.   Pulses:       Radial pulses are 2+ on the right side, and 2+ on the left side.        Dorsalis pedis pulses are 1+ on the right side, and 1+ on the left side.   Pulmonary/Chest: Effort normal. No accessory muscle usage. No tachypnea. She is not intubated. No respiratory distress. She has decreased breath sounds in the right lower field and the left lower field. She has wheezes. She has rales.   Abdominal: Soft. She exhibits no distension. Bowel sounds are decreased. There is no tenderness.   Genitourinary:   Genitourinary Comments: Ratliff in place   Musculoskeletal: Normal range of motion.        Right foot: There is no deformity.        Left foot: There is no deformity.   No edema   Lymphadenopathy:     She has no cervical adenopathy.   Neurological: She is alert and oriented to person, place, and time.   Skin: Skin is warm, dry and intact. Capillary refill takes less than 2 seconds. No rash noted. No cyanosis.   Psychiatric: She has a normal mood and affect. Her speech is normal and behavior is normal. Judgment and thought content normal. Cognition and memory are normal.       Vents:  Oxygen Concentration (%): 28 (11/23/19 0708)    Lines/Drains/Airways     Peripheral Intravenous Line                 Peripheral IV - Single Lumen 11/16/19 0058 20 G Right Wrist 7 days         Peripheral IV - Single Lumen 11/18/19 1800 24 G Left;Posterior Hand 4 days                Significant Labs:    CBC/Anemia Profile:  Recent Labs   Lab 11/22/19  0832   WBC 11.83   HGB 11.7*   HCT 36.9*      MCV 99*   RDW 14.5        Chemistries:  Recent Labs   Lab 11/22/19  0832      K 3.6      CO2 25   BUN 23   CREATININE 1.0   CALCIUM 9.1   ALBUMIN 3.1*   PROT 6.4   BILITOT 1.2*   ALKPHOS 83   ALT 24   AST 29       POCT Glucose:   Recent Labs   Lab 11/21/19  2040 11/22/19  0506 11/22/19  1158   POCTGLUCOSE 119* 114* 99     All pertinent labs within the past 24 hours have been  reviewed.

## 2019-11-23 NOTE — PLAN OF CARE
Pt tolerated S.T. For swallowing but needed frequent rest breaks.  Swallowing precautions reviewed with pt and her .

## 2019-11-23 NOTE — PLAN OF CARE
"POC reviewed with patient; verbalizes understanding. Compliant with medication administration regimen. Patient expresses she "feels better then I have in a few days". Total assist. No s/sx of pain/distress noted. Denies c/o pain. Son @ bedside. Safety precautions in place; call light within reach, bed in low position, wheels locked, sie rails up x2. Will continue to monitor.  "

## 2019-11-24 LAB
ALBUMIN SERPL BCP-MCNC: 2.8 G/DL (ref 3.5–5.2)
ANION GAP SERPL CALC-SCNC: 8 MMOL/L (ref 8–16)
BASOPHILS # BLD AUTO: 0.04 K/UL (ref 0–0.2)
BASOPHILS NFR BLD: 0.3 % (ref 0–1.9)
BUN SERPL-MCNC: 32 MG/DL (ref 8–23)
CALCIUM SERPL-MCNC: 9.1 MG/DL (ref 8.7–10.5)
CHLORIDE SERPL-SCNC: 108 MMOL/L (ref 95–110)
CO2 SERPL-SCNC: 25 MMOL/L (ref 23–29)
CREAT SERPL-MCNC: 1 MG/DL (ref 0.5–1.4)
DIFFERENTIAL METHOD: ABNORMAL
EOSINOPHIL # BLD AUTO: 0 K/UL (ref 0–0.5)
EOSINOPHIL NFR BLD: 0.3 % (ref 0–8)
ERYTHROCYTE [DISTWIDTH] IN BLOOD BY AUTOMATED COUNT: 14.9 % (ref 11.5–14.5)
EST. GFR  (AFRICAN AMERICAN): >60 ML/MIN/1.73 M^2
EST. GFR  (NON AFRICAN AMERICAN): 53 ML/MIN/1.73 M^2
GLUCOSE SERPL-MCNC: 119 MG/DL (ref 70–110)
HCT VFR BLD AUTO: 35.8 % (ref 37–48.5)
HGB BLD-MCNC: 10.8 G/DL (ref 12–16)
IMM GRANULOCYTES # BLD AUTO: 0.36 K/UL (ref 0–0.04)
IMM GRANULOCYTES NFR BLD AUTO: 3.1 % (ref 0–0.5)
LYMPHOCYTES # BLD AUTO: 1.2 K/UL (ref 1–4.8)
LYMPHOCYTES NFR BLD: 10.7 % (ref 18–48)
MCH RBC QN AUTO: 31.1 PG (ref 27–31)
MCHC RBC AUTO-ENTMCNC: 30.2 G/DL (ref 32–36)
MCV RBC AUTO: 103 FL (ref 82–98)
MONOCYTES # BLD AUTO: 0.8 K/UL (ref 0.3–1)
MONOCYTES NFR BLD: 6.5 % (ref 4–15)
NEUTROPHILS # BLD AUTO: 9.2 K/UL (ref 1.8–7.7)
NEUTROPHILS NFR BLD: 79.1 % (ref 38–73)
NRBC BLD-RTO: 0 /100 WBC
PHOSPHATE SERPL-MCNC: 3.4 MG/DL (ref 2.7–4.5)
PLATELET # BLD AUTO: 282 K/UL (ref 150–350)
PMV BLD AUTO: 10.2 FL (ref 9.2–12.9)
POTASSIUM SERPL-SCNC: 4.1 MMOL/L (ref 3.5–5.1)
RBC # BLD AUTO: 3.47 M/UL (ref 4–5.4)
SODIUM SERPL-SCNC: 141 MMOL/L (ref 136–145)
WBC # BLD AUTO: 11.62 K/UL (ref 3.9–12.7)

## 2019-11-24 PROCEDURE — 27000221 HC OXYGEN, UP TO 24 HOURS

## 2019-11-24 PROCEDURE — 25000003 PHARM REV CODE 250: Performed by: EMERGENCY MEDICINE

## 2019-11-24 PROCEDURE — 63600175 PHARM REV CODE 636 W HCPCS: Performed by: NURSE PRACTITIONER

## 2019-11-24 PROCEDURE — 85025 COMPLETE CBC W/AUTO DIFF WBC: CPT

## 2019-11-24 PROCEDURE — 80069 RENAL FUNCTION PANEL: CPT

## 2019-11-24 PROCEDURE — 94640 AIRWAY INHALATION TREATMENT: CPT

## 2019-11-24 PROCEDURE — 36415 COLL VENOUS BLD VENIPUNCTURE: CPT

## 2019-11-24 PROCEDURE — 94664 DEMO&/EVAL PT USE INHALER: CPT

## 2019-11-24 PROCEDURE — 27000646 HC AEROBIKA DEVICE

## 2019-11-24 PROCEDURE — 25000003 PHARM REV CODE 250: Performed by: NURSE PRACTITIONER

## 2019-11-24 PROCEDURE — 99231 SBSQ HOSP IP/OBS SF/LOW 25: CPT | Mod: ,,, | Performed by: INTERNAL MEDICINE

## 2019-11-24 PROCEDURE — 94761 N-INVAS EAR/PLS OXIMETRY MLT: CPT

## 2019-11-24 PROCEDURE — 99231 PR SUBSEQUENT HOSPITAL CARE,LEVL I: ICD-10-PCS | Mod: ,,, | Performed by: INTERNAL MEDICINE

## 2019-11-24 PROCEDURE — 25000242 PHARM REV CODE 250 ALT 637 W/ HCPCS: Performed by: INTERNAL MEDICINE

## 2019-11-24 PROCEDURE — 25000003 PHARM REV CODE 250: Performed by: PHYSICIAN ASSISTANT

## 2019-11-24 PROCEDURE — 63600175 PHARM REV CODE 636 W HCPCS: Performed by: INTERNAL MEDICINE

## 2019-11-24 PROCEDURE — 99900035 HC TECH TIME PER 15 MIN (STAT)

## 2019-11-24 PROCEDURE — 21400001 HC TELEMETRY ROOM

## 2019-11-24 PROCEDURE — 25000003 PHARM REV CODE 250: Performed by: INTERNAL MEDICINE

## 2019-11-24 RX ORDER — PREDNISONE 10 MG/1
10 TABLET ORAL DAILY
Status: DISCONTINUED | OUTPATIENT
Start: 2019-11-24 | End: 2019-11-25 | Stop reason: HOSPADM

## 2019-11-24 RX ADMIN — IPRATROPIUM BROMIDE 0.5 MG: 0.5 SOLUTION RESPIRATORY (INHALATION) at 07:11

## 2019-11-24 RX ADMIN — GUAIFENESIN 600 MG: 600 TABLET, EXTENDED RELEASE ORAL at 08:11

## 2019-11-24 RX ADMIN — PREDNISONE 10 MG: 10 TABLET ORAL at 08:11

## 2019-11-24 RX ADMIN — AMOXICILLIN AND CLAVULANATE POTASSIUM 500 MG: 500; 125 TABLET, FILM COATED ORAL at 08:11

## 2019-11-24 RX ADMIN — HYDRALAZINE HYDROCHLORIDE 10 MG: 20 INJECTION INTRAMUSCULAR; INTRAVENOUS at 12:11

## 2019-11-24 RX ADMIN — DILTIAZEM HYDROCHLORIDE 120 MG: 120 CAPSULE, COATED, EXTENDED RELEASE ORAL at 08:11

## 2019-11-24 RX ADMIN — AMIODARONE HYDROCHLORIDE 200 MG: 200 TABLET ORAL at 08:11

## 2019-11-24 RX ADMIN — HYDRALAZINE HYDROCHLORIDE 10 MG: 20 INJECTION INTRAMUSCULAR; INTRAVENOUS at 09:11

## 2019-11-24 RX ADMIN — RIVAROXABAN 15 MG: 15 TABLET, FILM COATED ORAL at 04:11

## 2019-11-24 RX ADMIN — IPRATROPIUM BROMIDE 0.5 MG: 0.5 SOLUTION RESPIRATORY (INHALATION) at 01:11

## 2019-11-24 RX ADMIN — ASPIRIN 81 MG: 81 TABLET, COATED ORAL at 08:11

## 2019-11-24 RX ADMIN — PANTOPRAZOLE SODIUM 40 MG: 40 TABLET, DELAYED RELEASE ORAL at 08:11

## 2019-11-24 RX ADMIN — ATORVASTATIN CALCIUM 20 MG: 10 TABLET, FILM COATED ORAL at 08:11

## 2019-11-24 RX ADMIN — LEVOTHYROXINE SODIUM 112 MCG: 112 TABLET ORAL at 05:11

## 2019-11-24 RX ADMIN — IPRATROPIUM BROMIDE 0.5 MG: 0.5 SOLUTION RESPIRATORY (INHALATION) at 12:11

## 2019-11-24 NOTE — PROGRESS NOTES
[facility]   Pulmonology  Progress Note    Patient Name: Ana Castañeda  MRN: [id number]  Admission Date: 11/16/2019  Hospital Length of Stay: 8 days  Code Status: Full Code  Attending Provider: Rivas Hester MD  Primary Care Provider: Stephen Tarango MD   Principal Problem: Weakness    Subjective: getting stronger     Review of Systems   Constitutional: Positive for malaise/fatigue. Negative for chills and fever.   HENT: Negative for congestion.    Eyes: Negative for blurred vision.   Respiratory: Positive for cough and wheezing. Negative for sputum production and shortness of breath.    Cardiovascular: Negative for chest pain and leg swelling.   Gastrointestinal: Negative for abdominal pain, nausea and vomiting.   Genitourinary: Negative for dysuria.   Musculoskeletal: Negative for myalgias.   Skin: Negative for rash.   Neurological: Positive for weakness. Negative for dizziness and headaches.   Endo/Heme/Allergies: Does not bruise/bleed easily.   Psychiatric/Behavioral: The patient is not nervous/anxious.          Objective:     Vital Signs (Most Recent):  Temp: 98.5 °F (36.9 °C) (11/24/19 1542)  Pulse: (P) 85 (11/24/19 1705)  Resp: 18 (11/24/19 1542)  BP: (!) 145/65 (11/24/19 1542)  SpO2: 97 % (11/24/19 1542) Vital Signs (24h Range):  Temp:  [97.5 °F (36.4 °C)-98.5 °F (36.9 °C)] 98.5 °F (36.9 °C)  Pulse:  [68-93] (P) 85  Resp:  [18-20] 18  SpO2:  [92 %-97 %] 97 %  BP: (127-176)/(61-84) 145/65     Weight: 94.8 kg (208 lb 15.9 oz)  Body mass index is 32.73 kg/m².      Intake/Output Summary (Last 24 hours) at 11/24/2019 1755  Last data filed at 11/24/2019 1505  Gross per 24 hour   Intake 812 ml   Output --   Net 812 ml       Physical Exam   Constitutional: She is oriented to person, place, and time. She appears well-developed and well-nourished. She is cooperative.  Non-toxic appearance. She does not have a sickly appearance. She appears ill. No distress. She is not intubated. Nasal cannula in  place.   HENT:   Head: Normocephalic and atraumatic.   Mouth/Throat: Oropharynx is clear and moist and mucous membranes are normal.   Eyes: Pupils are equal, round, and reactive to light. EOM and lids are normal.   Neck: Trachea normal and full passive range of motion without pain. Carotid bruit is not present.   Cardiovascular: Normal rate and normal heart sounds. An irregular rhythm present.   Pulses:       Radial pulses are 2+ on the right side, and 2+ on the left side.        Dorsalis pedis pulses are 1+ on the right side, and 1+ on the left side.   Pulmonary/Chest: Effort normal. No accessory muscle usage. No tachypnea. She is not intubated. No respiratory distress. She has decreased breath sounds in the right lower field and the left lower field. She has wheezes. She has rales.   Abdominal: Soft. She exhibits no distension. Bowel sounds are decreased. There is no tenderness.   Genitourinary:   Genitourinary Comments: Ratliff in place   Musculoskeletal: Normal range of motion.        Right foot: There is no deformity.        Left foot: There is no deformity.   No edema   Lymphadenopathy:     She has no cervical adenopathy.   Neurological: She is alert and oriented to person, place, and time.   Skin: Skin is warm, dry and intact. Capillary refill takes less than 2 seconds. No rash noted. No cyanosis.   Psychiatric: She has a normal mood and affect. Her speech is normal and behavior is normal. Judgment and thought content normal. Cognition and memory are normal.   Nursing note and vitals reviewed.      Vents:  Oxygen Concentration (%): 28 (11/24/19 0027)    Lines/Drains/Airways     Peripheral Intravenous Line                 Peripheral IV - Single Lumen 11/16/19 0058 20 G Right Wrist 8 days         Peripheral IV - Single Lumen 11/18/19 1800 24 G Left;Posterior Hand 5 days                Significant Labs:    CBC/Anemia Profile:  Recent Labs   Lab 11/24/19  0443   WBC 11.62   HGB 10.8*   HCT 35.8*      MCV  103*   RDW 14.9*        Chemistries:  Recent Labs   Lab 11/24/19  0443      K 4.1      CO2 25   BUN 32*   CREATININE 1.0   CALCIUM 9.1   ALBUMIN 2.8*   PHOS 3.4       POCT Glucose:   No results for input(s): POCTGLUCOSE in the last 48 hours.  All pertinent labs within the past 24 hours have been reviewed.        ABG  No results for input(s): PH, PO2, PCO2, HCO3, BE in the last 168 hours.  Assessment/Plan:     Asthma with COPD  11/22 steroids, chest chest physical therapy, atrovent jet nebs  11/24 improved - will sign off    Acute respiratory failure with hypoxia  Supplement oxygenation. Keep SAO2 >= 92%. BIPAP weaned off.  Bronchodilators per orders  11/22 switch to atrovent jet nebs  11/23 Improved    Will sign off      Thanks     Rafa Brewer MD  Pulmonology  [facility]

## 2019-11-24 NOTE — ASSESSMENT & PLAN NOTE
- Secondary to asthma exacerbation + PNA.    - O2 sat stable on 50% FiO2 BiPAP, wean as tolerated.  Repeat ABG in AM.  - DuoNebs Q 6.  - IV Solu-Medrol 80 mg Q8.  - Empiric antibiotics.  - Will obtain V/Q scan to r/o PE (unable to perform CTA due to TARA).  - Pulmonology consult.    - Improving, came off BIPAP- Oxygenation improving  Improving.  Home o2 eval.  Doing well on NC

## 2019-11-24 NOTE — SUBJECTIVE & OBJECTIVE
Interval History:  No acute problems or complaints.  Working with PT and OT.  Limited by shortness of breath.    Review of Systems   Unable to perform ROS: Acuity of condition   Constitutional: Negative for chills and fever.   HENT: Negative for postnasal drip, rhinorrhea, sinus pressure and sore throat.    Respiratory: Negative for cough, shortness of breath and wheezing.    Cardiovascular: Negative for chest pain, palpitations and leg swelling.   Gastrointestinal: Negative for abdominal distention, abdominal pain, blood in stool, constipation, diarrhea, nausea and vomiting.   Genitourinary: Negative for difficulty urinating, dysuria, hematuria and urgency.   Musculoskeletal: Positive for gait problem (chronic). Negative for arthralgias, back pain, myalgias, neck pain and neck stiffness.   Skin: Negative for pallor, rash and wound.   Neurological: Positive for weakness. Negative for dizziness, syncope, facial asymmetry, speech difficulty, light-headedness and headaches.   Psychiatric/Behavioral: Negative for confusion. The patient is not nervous/anxious.    All other systems reviewed and are negative.    Objective:     Vital Signs (Most Recent):  Temp: 97.5 °F (36.4 °C) (11/23/19 1911)  Pulse: 87 (11/23/19 1914)  Resp: 18 (11/23/19 1914)  BP: 127/84 (11/23/19 1911)  SpO2: 95 % (11/23/19 1914) Vital Signs (24h Range):  Temp:  [96.4 °F (35.8 °C)-98.4 °F (36.9 °C)] 97.5 °F (36.4 °C)  Pulse:  [66-88] 87  Resp:  [16-20] 18  SpO2:  [93 %-98 %] 95 %  BP: (127-174)/(64-84) 127/84     Weight: 92.5 kg (203 lb 14.8 oz)  Body mass index is 31.94 kg/m².    Intake/Output Summary (Last 24 hours) at 11/23/2019 2028  Last data filed at 11/23/2019 0500  Gross per 24 hour   Intake 240 ml   Output --   Net 240 ml      Physical Exam   Constitutional: She is oriented to person, place, and time. She appears well-developed and well-nourished. She is cooperative.  Non-toxic appearance. She does not have a sickly appearance. She does not  appear ill. No distress. She is not intubated. Nasal cannula in place.   HENT:   Head: Normocephalic and atraumatic.   Mouth/Throat: Oropharynx is clear and moist and mucous membranes are normal.   Eyes: Pupils are equal, round, and reactive to light. EOM and lids are normal.   Neck: Trachea normal and full passive range of motion without pain. Carotid bruit is not present.   Cardiovascular: Normal rate, regular rhythm and normal heart sounds.   Pulses:       Radial pulses are 2+ on the right side, and 2+ on the left side.        Dorsalis pedis pulses are 1+ on the right side, and 1+ on the left side.   Pulmonary/Chest: Effort normal. No accessory muscle usage. No tachypnea. She is not intubated. No respiratory distress. She has decreased breath sounds in the right lower field and the left lower field. She has no wheezes.   Abdominal: Soft. She exhibits no distension. Bowel sounds are decreased. There is no tenderness.   Genitourinary:   Genitourinary Comments: Ratliff in place   Musculoskeletal: Normal range of motion.        Right foot: There is no deformity.        Left foot: There is no deformity.   No edema   Lymphadenopathy:     She has no cervical adenopathy.   Neurological: She is alert and oriented to person, place, and time.   Skin: Skin is warm, dry and intact. Capillary refill takes less than 2 seconds. No rash noted. No cyanosis.   Psychiatric: She has a normal mood and affect. Her speech is normal and behavior is normal. Judgment and thought content normal. Cognition and memory are normal.   Nursing note and vitals reviewed.      Significant Labs:   BMP:   Recent Labs   Lab 11/22/19  0832   *      K 3.6      CO2 25   BUN 23   CREATININE 1.0   CALCIUM 9.1     CBC:   Recent Labs   Lab 11/22/19  0832   WBC 11.83   HGB 11.7*   HCT 36.9*        All pertinent labs within the past 24 hours have been reviewed.    Significant Imaging: I have reviewed all pertinent imaging  results/findings within the past 24 hours.

## 2019-11-24 NOTE — ASSESSMENT & PLAN NOTE
Supplement oxygenation. Keep SAO2 >= 92%. BIPAP weaned off.  Bronchodilators per orders  11/22 switch to atrovent jet nebs  11/23 Improved    Will sign off

## 2019-11-24 NOTE — SUBJECTIVE & OBJECTIVE
Interval History:  No acute problems or complaints.  Working with PT and OT.  Limited by shortness of breath.  Reports general improvement and easier to speak full sentences.    Review of Systems   Unable to perform ROS: Acuity of condition   Constitutional: Negative for chills and fever.   HENT: Negative for postnasal drip, rhinorrhea, sinus pressure and sore throat.    Respiratory: Negative for cough, shortness of breath and wheezing.    Cardiovascular: Negative for chest pain, palpitations and leg swelling.   Gastrointestinal: Negative for abdominal distention, abdominal pain, blood in stool, constipation, diarrhea, nausea and vomiting.   Genitourinary: Negative for difficulty urinating, dysuria, hematuria and urgency.   Musculoskeletal: Positive for gait problem (chronic). Negative for arthralgias, back pain, myalgias, neck pain and neck stiffness.   Skin: Negative for pallor, rash and wound.   Neurological: Positive for weakness. Negative for dizziness, syncope, facial asymmetry, speech difficulty, light-headedness and headaches.   Psychiatric/Behavioral: Negative for confusion. The patient is not nervous/anxious.    All other systems reviewed and are negative.    Objective:     Vital Signs (Most Recent):  Temp: 97.9 °F (36.6 °C) (11/24/19 0705)  Pulse: 75 (11/24/19 0709)  Resp: 20 (11/24/19 0709)  BP: (!) 165/72 (11/24/19 0705)  SpO2: 97 % (11/24/19 0709) Vital Signs (24h Range):  Temp:  [97.5 °F (36.4 °C)-98.4 °F (36.9 °C)] 97.9 °F (36.6 °C)  Pulse:  [68-93] 75  Resp:  [16-20] 20  SpO2:  [93 %-98 %] 97 %  BP: (127-176)/(61-84) 165/72     Weight: 94.8 kg (208 lb 15.9 oz)  Body mass index is 32.73 kg/m².    Intake/Output Summary (Last 24 hours) at 11/24/2019 0838  Last data filed at 11/24/2019 0452  Gross per 24 hour   Intake 100 ml   Output --   Net 100 ml      Physical Exam   Constitutional: She is oriented to person, place, and time. She appears well-developed and well-nourished. She is cooperative.   Non-toxic appearance. She does not have a sickly appearance. She does not appear ill. No distress. She is not intubated. Nasal cannula in place.   HENT:   Head: Normocephalic and atraumatic.   Mouth/Throat: Oropharynx is clear and moist and mucous membranes are normal.   Eyes: Pupils are equal, round, and reactive to light. EOM and lids are normal.   Neck: Trachea normal and full passive range of motion without pain. Carotid bruit is not present.   Cardiovascular: Normal rate, regular rhythm and normal heart sounds.   Pulses:       Radial pulses are 2+ on the right side, and 2+ on the left side.        Dorsalis pedis pulses are 1+ on the right side, and 1+ on the left side.   Pulmonary/Chest: Effort normal. No accessory muscle usage. No tachypnea. She is not intubated. No respiratory distress. She has decreased breath sounds in the right lower field and the left lower field. She has wheezes.   Slight end-expiratory wheezes.   Abdominal: Soft. She exhibits no distension. Bowel sounds are decreased. There is no tenderness.   Genitourinary:   Genitourinary Comments: Ratliff in place   Musculoskeletal: Normal range of motion.        Right foot: There is no deformity.        Left foot: There is no deformity.   No edema   Lymphadenopathy:     She has no cervical adenopathy.   Neurological: She is alert and oriented to person, place, and time.   Skin: Skin is warm, dry and intact. Capillary refill takes less than 2 seconds. No rash noted. No cyanosis.   Psychiatric: She has a normal mood and affect. Her speech is normal and behavior is normal. Judgment and thought content normal. Cognition and memory are normal.   Nursing note and vitals reviewed.      Significant Labs:   BMP:   Recent Labs   Lab 11/24/19  0443   *      K 4.1      CO2 25   BUN 32*   CREATININE 1.0   CALCIUM 9.1     CBC:   Recent Labs   Lab 11/24/19  0443   WBC 11.62   HGB 10.8*   HCT 35.8*        All pertinent labs within the past  24 hours have been reviewed.    Significant Imaging: I have reviewed all pertinent imaging results/findings within the past 24 hours.

## 2019-11-24 NOTE — PROGRESS NOTES
"[facility] Hospital Medicine  Progress Note    Patient Name: Ana Castañeda  MRN: [id number]  Patient Class: IP- Inpatient   Admission Date: 11/16/2019  Length of Stay: 8 days  Attending Physician: Rivas Hester MD  Primary Care Provider: Stephen Tarango MD        Subjective:     Principal Problem:Weakness    HPI:  Ana Castañeda is a 84 y.o. female patient with a PMHx asthma, HTN, h/o CVA with residual right-sided weakness, hypothyroidism, gout, debility (wheelchair bound), and chronic anticoagulation therapy (reason unknown).  She presented to the emergency department with complaints of shortness of breath that progressively worsened shortly before arrival.  No aggravating or alleviating factors.  Associated cough producing mucus, wheezing and congestion x 3 days.  Denies any chest pain, palpitations, orthopnea, PND, edema, abdominal pain, nausea, vomiting, diarrhea, dysuria, hematuria, back pain, AMS, lightheadedness or dizziness, syncope, rhinorrhea, sore throat, weakness, fever or chills.  Family at bedside reports patient was seen at [facility] on 11/13 and diagnosed with acute bronchitis.  She was given a "steroid shot" and prescribed Cefdinir, which she has taken x 1 dose on 11/15.  Patient is on chronic Xarelto, but unsure of reason.  She denies any history of AF/FL, DVT or PE.  Initial workup in the ED resulted WBC of 13.14, creatinine 1.4, BUN 25, troponin 0.095, , lactic 3.1, procalcitonin 0.15, UA unremarkable, EKG unrevealing.  ABG with pH of 7.224, pCO2 66, pO2 241, SaO2 100 on BiPAP.  CXR showed right lower lobe infiltrate.  Patient's O2 sat remained stable in the ED, but BiPAP placed due to increased respiratory distress. Upon arrival to the ED, patient was tachycardic with heart rate of 136 bpm, /78.  She was given IV Lopressor 5 mg x 2 doses and 40 mg IV Lasix, which improved her HR into the 80s but also decreased blood pressure to 98/70.  ED also administered " 125 mg IV Solu-Medrol, DuoNeb treatments x 3, 2.5 L IV fluid resuscitation, IV vanc and Zosyn.  Patient was admitted to ICU under Hospital Medicine services.  Currently, patient appears comfortable in no acute distress.  BiPAP remains in place with stable O2 sat.  Patient is a full code.   is surrogate decision maker.    Overview/Hospital Course:  Ana Castañeda is a 84 y.o. female patient with a PMHx asthma, HTN, h/o CVA with residual right-sided weakness, hypothyroidism, gout, debility (wheelchair bound), and chronic anticoagulation therapy (reason unknown) admitted to ICU on Bipap, with acute hypoxemic resp failure sec to LLL Pneumonia and Afib w RVR and Lactic Acidosis. She was aggressively rehydrated with NS and started on IV Vanc and Zosyn as well as IV Solumedrol. Her Afib RVR was treated initially with IV Lopressor 5 mg IVP x 2 which  which did not affect her HR but dropped BP to 98/70 -- necessitating Charbel-senephrine gtt by eICU this am. This am she was given a dose of Cardizem 10 mg IVP which slowed down her HR and also converted to NSR and her BP improved. She was started on Cardizem oral tabs and was able to come off Charbel gtt as well as Bipap to NC and feels much better.  Urine output is good. She is AAOx3, speech is clear, family at bedside. Troponin increased to 1.98-- cardiology consulted.    11/17- doing better, comfortable, pleasant. Appears mildly fluid overloaded, she is about 5 L fluid positive. Given IV lasix, all Cx NGTD. Cardiology thinks its demand ischemia from sepsis and recommended out pt NMP scan when stable. Await transfer to tele. PT/OT ordered. NSR remains.     Ana Castañeda is a 84 y.o. female patient with a PMHx asthma, HTN, h/o CVA with residual right-sided weakness, hypothyroidism, gout, debility (wheelchair bound), and chronic anticoagulation therapy (reason unknown) admitted to ICU on Bipap, with acute hypoxemic resp failure sec to LLL Pneumonia and Afib w RVR and Lactic  Acidosis. She was aggressively rehydrated with NS and started on IV Vanc and Zosyn as well as IV Solumedrol. Her Afib RVR was treated initially with IV Lopressor 5 mg IVP x 2 which  which did not affect her HR but dropped BP to 98/70 -- necessitating Charbel-senephrine gtt by eICU this am. This am she was given a dose of Cardizem 10 mg IVP which slowed down her HR and also converted to NSR and her BP improved. She was started on Cardizem oral tabs and was able to come off Charbel gtt as well as Bipap to NC and feels much better.  Urine output is good. She is AAOx3, speech is clear, family at bedside. Troponin increased to 1.98-- cardiology consulted.    11/17- doing better, comfortable, pleasant. Appears mildly fluid overloaded, she is about 5 L fluid positive. Given IV lasix, all Cx NGTD. Cardiology thinks its demand ischemia from sepsis and recommended out pt NMP scan when stable. Await transfer to tele. PT/OT ordered. NSR remains.   11/18- went into Afib RVR when PT evaluated her yesterday- has remained in it since- no response to Dig, Cardizem. Eventually cardiology had to start her on Cardizem and Amiodarone gtt with some slowing. Otherwise feels good. Repeat CXR shows clear lungs, no Pneumonia. Bun/Cr elevated at 34/1.5- hence Lasix d/hira and started on gentle rehydration. Zosyn also d/hira.   11/19- looks much better, sitting up in chair, eating lunch well, appears in good spirits. No dysphagia, doing IS. Bun improved to 34. Doing a little PT. Has great family support.   11/20- looks better, sitting up in chair, but needed lot more support from PT/OT then at home-- hence family requests SNF at [facility]. Continue present care.  11/21- looks better, went into Afib w RVR last night-- had to resume Amiodarone and Cardizem gtt again this am, also had received a dose of IV Dig but no improvement-- this am received IV Lopressor 5 mg and she converted to NSR &2, also had some wheezing-- likely was in fluid  overload-- so IVF d/hira and given a dose of IV Lasix, she improved, CXR clear, RLL infiltrate from admit resolved.   11/22- looks better, relaxed, eating lunch. Remained in NSR since yesterday, had bradycardia last night-- hence Cardizem gtt d/hira. Now on oral Amiodarone and Cardizem. Doing PT/OT gently. She keeps going into afib RVR with minimal exertion, any PT etc. She has been accepted at [facility] on Monday. Will continue present care.      Interval History:  No acute problems or complaints.  Working with PT and OT.  Limited by shortness of breath.  Reports general improvement and easier to speak full sentences.    Review of Systems   Unable to perform ROS: Acuity of condition   Constitutional: Negative for chills and fever.   HENT: Negative for postnasal drip, rhinorrhea, sinus pressure and sore throat.    Respiratory: Negative for cough, shortness of breath and wheezing.    Cardiovascular: Negative for chest pain, palpitations and leg swelling.   Gastrointestinal: Negative for abdominal distention, abdominal pain, blood in stool, constipation, diarrhea, nausea and vomiting.   Genitourinary: Negative for difficulty urinating, dysuria, hematuria and urgency.   Musculoskeletal: Positive for gait problem (chronic). Negative for arthralgias, back pain, myalgias, neck pain and neck stiffness.   Skin: Negative for pallor, rash and wound.   Neurological: Positive for weakness. Negative for dizziness, syncope, facial asymmetry, speech difficulty, light-headedness and headaches.   Psychiatric/Behavioral: Negative for confusion. The patient is not nervous/anxious.    All other systems reviewed and are negative.    Objective:     Vital Signs (Most Recent):  Temp: 97.9 °F (36.6 °C) (11/24/19 0705)  Pulse: 75 (11/24/19 0709)  Resp: 20 (11/24/19 0709)  BP: (!) 165/72 (11/24/19 0705)  SpO2: 97 % (11/24/19 0709) Vital Signs (24h Range):  Temp:  [97.5 °F (36.4 °C)-98.4 °F (36.9 °C)] 97.9 °F (36.6 °C)  Pulse:  [68-93]  75  Resp:  [16-20] 20  SpO2:  [93 %-98 %] 97 %  BP: (127-176)/(61-84) 165/72     Weight: 94.8 kg (208 lb 15.9 oz)  Body mass index is 32.73 kg/m².    Intake/Output Summary (Last 24 hours) at 11/24/2019 0838  Last data filed at 11/24/2019 0452  Gross per 24 hour   Intake 100 ml   Output --   Net 100 ml      Physical Exam   Constitutional: She is oriented to person, place, and time. She appears well-developed and well-nourished. She is cooperative.  Non-toxic appearance. She does not have a sickly appearance. She does not appear ill. No distress. She is not intubated. Nasal cannula in place.   HENT:   Head: Normocephalic and atraumatic.   Mouth/Throat: Oropharynx is clear and moist and mucous membranes are normal.   Eyes: Pupils are equal, round, and reactive to light. EOM and lids are normal.   Neck: Trachea normal and full passive range of motion without pain. Carotid bruit is not present.   Cardiovascular: Normal rate, regular rhythm and normal heart sounds.   Pulses:       Radial pulses are 2+ on the right side, and 2+ on the left side.        Dorsalis pedis pulses are 1+ on the right side, and 1+ on the left side.   Pulmonary/Chest: Effort normal. No accessory muscle usage. No tachypnea. She is not intubated. No respiratory distress. She has decreased breath sounds in the right lower field and the left lower field. She has wheezes.   Slight end-expiratory wheezes.   Abdominal: Soft. She exhibits no distension. Bowel sounds are decreased. There is no tenderness.   Genitourinary:   Genitourinary Comments: Ratliff in place   Musculoskeletal: Normal range of motion.        Right foot: There is no deformity.        Left foot: There is no deformity.   No edema   Lymphadenopathy:     She has no cervical adenopathy.   Neurological: She is alert and oriented to person, place, and time.   Skin: Skin is warm, dry and intact. Capillary refill takes less than 2 seconds. No rash noted. No cyanosis.   Psychiatric: She has a  normal mood and affect. Her speech is normal and behavior is normal. Judgment and thought content normal. Cognition and memory are normal.   Nursing note and vitals reviewed.      Significant Labs:   BMP:   Recent Labs   Lab 11/24/19  0443   *      K 4.1      CO2 25   BUN 32*   CREATININE 1.0   CALCIUM 9.1     CBC:   Recent Labs   Lab 11/24/19  0443   WBC 11.62   HGB 10.8*   HCT 35.8*        All pertinent labs within the past 24 hours have been reviewed.    Significant Imaging: I have reviewed all pertinent imaging results/findings within the past 24 hours.      Assessment/Plan:      * Weakness  Multifactorial due to pneumonia and general deconditioning.  Chronic debility related to history of stroke.  Cont OT/ PT.  inj B12 IM.  Await discharge to SNF.  Ramsay Age vs [facility] Velez.    Acute respiratory failure with hypoxia  - Secondary to asthma exacerbation + PNA.    - O2 sat stable on 50% FiO2 BiPAP, wean as tolerated.  Repeat ABG in AM.  - DuoNebs Q 6.  - IV Solu-Medrol 80 mg Q8.  - Empiric antibiotics.  - Will obtain V/Q scan to r/o PE (unable to perform CTA due to TARA).  - Pulmonology consult.    - Improving, came off BIPAP- Oxygenation improving  Improving.  Home o2 eval.  Doing well on NC    Essential hypertension  Under control      PAF (paroxysmal atrial fibrillation)  Converted to NSR with IV Cardizem  Troponin elevated  Cards consulted, pt already on Xarelto    Remains in NSR    Back in afib w RVR-- will try Dig and oral Cardizem    S/p 1 day of Cardizem and Amiodarone gtt-- doing well.  Converted back to NSR- will start oral Amio and Card and then d/c the gtts.  Cards following    Resolved, continue Xarelto    Recurrent with RVR s/p multiple rounds of Cardizem and Amiodarone gtt  Continue Xarelto      VTE Risk Mitigation (From admission, onward)         Ordered     rivaroxaban tablet 15 mg  With dinner      11/17/19 1054     IP VTE HIGH RISK PATIENT  Once      11/16/19 8090      Reason for No Pharmacological VTE Prophylaxis  Once     Question:  Reasons:  Answer:  Already adequately anticoagulated on oral Anticoagulants    11/16/19 0430     Place sequential compression device  Until discontinued      11/16/19 0430                      Rivas Hester MD  Department of Hospital Medicine   [facility]

## 2019-11-24 NOTE — ASSESSMENT & PLAN NOTE
Multifactorial due to pneumonia and general deconditioning.  Chronic debility related to history of stroke.  Cont OT/ PT.  inj B12 IM.  Await discharge to SNF.  Devendra Prado vs Riley Velez.

## 2019-11-24 NOTE — PROGRESS NOTES
"[facility] Hospital Medicine  Progress Note    Patient Name: Ana Castañeda  MRN: [id number]  Patient Class: IP- Inpatient   Admission Date: 11/16/2019  Length of Stay: 7 days  Attending Physician: Rivas Hester MD  Primary Care Provider: Stephen Tarango MD        Subjective:     Principal Problem:Weakness    HPI:  Ana Castañeda is a 84 y.o. female patient with a PMHx asthma, HTN, h/o CVA with residual right-sided weakness, hypothyroidism, gout, debility (wheelchair bound), and chronic anticoagulation therapy (reason unknown).  She presented to the emergency department with complaints of shortness of breath that progressively worsened shortly before arrival.  No aggravating or alleviating factors.  Associated cough producing mucus, wheezing and congestion x 3 days.  Denies any chest pain, palpitations, orthopnea, PND, edema, abdominal pain, nausea, vomiting, diarrhea, dysuria, hematuria, back pain, AMS, lightheadedness or dizziness, syncope, rhinorrhea, sore throat, weakness, fever or chills.  Family at bedside reports patient was seen at [facility] on 11/13 and diagnosed with acute bronchitis.  She was given a "steroid shot" and prescribed Cefdinir, which she has taken x 1 dose on 11/15.  Patient is on chronic Xarelto, but unsure of reason.  She denies any history of AF/FL, DVT or PE.  Initial workup in the ED resulted WBC of 13.14, creatinine 1.4, BUN 25, troponin 0.095, , lactic 3.1, procalcitonin 0.15, UA unremarkable, EKG unrevealing.  ABG with pH of 7.224, pCO2 66, pO2 241, SaO2 100 on BiPAP.  CXR showed right lower lobe infiltrate.  Patient's O2 sat remained stable in the ED, but BiPAP placed due to increased respiratory distress. Upon arrival to the ED, patient was tachycardic with heart rate of 136 bpm, /78.  She was given IV Lopressor 5 mg x 2 doses and 40 mg IV Lasix, which improved her HR into the 80s but also decreased blood pressure to 98/70.  ED also administered " 125 mg IV Solu-Medrol, DuoNeb treatments x 3, 2.5 L IV fluid resuscitation, IV vanc and Zosyn.  Patient was admitted to ICU under Hospital Medicine services.  Currently, patient appears comfortable in no acute distress.  BiPAP remains in place with stable O2 sat.  Patient is a full code.   is surrogate decision maker.      Overview/Hospital Course:  Ana Castañeda is a 84 y.o. female patient with a PMHx asthma, HTN, h/o CVA with residual right-sided weakness, hypothyroidism, gout, debility (wheelchair bound), and chronic anticoagulation therapy (reason unknown) admitted to ICU on Bipap, with acute hypoxemic resp failure sec to LLL Pneumonia and Afib w RVR and Lactic Acidosis. She was aggressively rehydrated with NS and started on IV Vanc and Zosyn as well as IV Solumedrol. Her Afib RVR was treated initially with IV Lopressor 5 mg IVP x 2 which  which did not affect her HR but dropped BP to 98/70 -- necessitating Charbel-senephrine gtt by eICU this am. This am she was given a dose of Cardizem 10 mg IVP which slowed down her HR and also converted to NSR and her BP improved. She was started on Cardizem oral tabs and was able to come off Charbel gtt as well as Bipap to NC and feels much better.  Urine output is good. She is AAOx3, speech is clear, family at bedside. Troponin increased to 1.98-- cardiology consulted.    11/17- doing better, comfortable, pleasant. Appears mildly fluid overloaded, she is about 5 L fluid positive. Given IV lasix, all Cx NGTD. Cardiology thinks its demand ischemia from sepsis and recommended out pt NMP scan when stable. Await transfer to tele. PT/OT ordered. NSR remains.     Ana Castañeda is a 84 y.o. female patient with a PMHx asthma, HTN, h/o CVA with residual right-sided weakness, hypothyroidism, gout, debility (wheelchair bound), and chronic anticoagulation therapy (reason unknown) admitted to ICU on Bipap, with acute hypoxemic resp failure sec to LLL Pneumonia and Afib w RVR and Lactic  Acidosis. She was aggressively rehydrated with NS and started on IV Vanc and Zosyn as well as IV Solumedrol. Her Afib RVR was treated initially with IV Lopressor 5 mg IVP x 2 which  which did not affect her HR but dropped BP to 98/70 -- necessitating Charbel-senephrine gtt by eICU this am. This am she was given a dose of Cardizem 10 mg IVP which slowed down her HR and also converted to NSR and her BP improved. She was started on Cardizem oral tabs and was able to come off Charbel gtt as well as Bipap to NC and feels much better.  Urine output is good. She is AAOx3, speech is clear, family at bedside. Troponin increased to 1.98-- cardiology consulted.    11/17- doing better, comfortable, pleasant. Appears mildly fluid overloaded, she is about 5 L fluid positive. Given IV lasix, all Cx NGTD. Cardiology thinks its demand ischemia from sepsis and recommended out pt NMP scan when stable. Await transfer to tele. PT/OT ordered. NSR remains.   11/18- went into Afib RVR when PT evaluated her yesterday- has remained in it since- no response to Dig, Cardizem. Eventually cardiology had to start her on Cardizem and Amiodarone gtt with some slowing. Otherwise feels good. Repeat CXR shows clear lungs, no Pneumonia. Bun/Cr elevated at 34/1.5- hence Lasix d/hira and started on gentle rehydration. Zosyn also d/hira.   11/19- looks much better, sitting up in chair, eating lunch well, appears in good spirits. No dysphagia, doing IS. Bun improved to 34. Doing a little PT. Has great family support.   11/20- looks better, sitting up in chair, but needed lot more support from PT/OT then at home-- hence family requests SNF at [facility] preferably. Continue present care.  11/21- looks better, went into Afib w RVR last night-- had to resume Amiodarone and Cardizem gtt again this am, also had received a dose of IV Dig but no improvement-- this am received IV Lopressor 5 mg and she converted to NSR &2, also had some wheezing-- likely was in fluid  overload-- so IVF d/hira and given a dose of IV Lasix, she improved, CXR clear, RLL infiltrate from admit resolved.   11/22- looks better, relaxed, eating lunch. Remained in NSR since yesterday, had bradycardia last night-- hence Cardizem gtt d/hira. Now on oral Amiodarone and Cardizem. Doing PT/OT gently. She keeps going into afib RVR with minimal exertion, any PT etc. She has been accepted at [facility] on Monday. Will continue present care.      Interval History:  No acute problems or complaints.  Working with PT and OT.  Limited by shortness of breath.    Review of Systems   Unable to perform ROS: Acuity of condition   Constitutional: Negative for chills and fever.   HENT: Negative for postnasal drip, rhinorrhea, sinus pressure and sore throat.    Respiratory: Negative for cough, shortness of breath and wheezing.    Cardiovascular: Negative for chest pain, palpitations and leg swelling.   Gastrointestinal: Negative for abdominal distention, abdominal pain, blood in stool, constipation, diarrhea, nausea and vomiting.   Genitourinary: Negative for difficulty urinating, dysuria, hematuria and urgency.   Musculoskeletal: Positive for gait problem (chronic). Negative for arthralgias, back pain, myalgias, neck pain and neck stiffness.   Skin: Negative for pallor, rash and wound.   Neurological: Positive for weakness. Negative for dizziness, syncope, facial asymmetry, speech difficulty, light-headedness and headaches.   Psychiatric/Behavioral: Negative for confusion. The patient is not nervous/anxious.    All other systems reviewed and are negative.    Objective:     Vital Signs (Most Recent):  Temp: 97.5 °F (36.4 °C) (11/23/19 1911)  Pulse: 87 (11/23/19 1914)  Resp: 18 (11/23/19 1914)  BP: 127/84 (11/23/19 1911)  SpO2: 95 % (11/23/19 1914) Vital Signs (24h Range):  Temp:  [96.4 °F (35.8 °C)-98.4 °F (36.9 °C)] 97.5 °F (36.4 °C)  Pulse:  [66-88] 87  Resp:  [16-20] 18  SpO2:  [93 %-98 %] 95 %  BP: (127-174)/(64-84)  127/84     Weight: 92.5 kg (203 lb 14.8 oz)  Body mass index is 31.94 kg/m².    Intake/Output Summary (Last 24 hours) at 11/23/2019 2028  Last data filed at 11/23/2019 0500  Gross per 24 hour   Intake 240 ml   Output --   Net 240 ml      Physical Exam   Constitutional: She is oriented to person, place, and time. She appears well-developed and well-nourished. She is cooperative.  Non-toxic appearance. She does not have a sickly appearance. She does not appear ill. No distress. She is not intubated. Nasal cannula in place.   HENT:   Head: Normocephalic and atraumatic.   Mouth/Throat: Oropharynx is clear and moist and mucous membranes are normal.   Eyes: Pupils are equal, round, and reactive to light. EOM and lids are normal.   Neck: Trachea normal and full passive range of motion without pain. Carotid bruit is not present.   Cardiovascular: Normal rate, regular rhythm and normal heart sounds.   Pulses:       Radial pulses are 2+ on the right side, and 2+ on the left side.        Dorsalis pedis pulses are 1+ on the right side, and 1+ on the left side.   Pulmonary/Chest: Effort normal. No accessory muscle usage. No tachypnea. She is not intubated. No respiratory distress. She has decreased breath sounds in the right lower field and the left lower field. She has no wheezes.   Abdominal: Soft. She exhibits no distension. Bowel sounds are decreased. There is no tenderness.   Genitourinary:   Genitourinary Comments: Ratliff in place   Musculoskeletal: Normal range of motion.        Right foot: There is no deformity.        Left foot: There is no deformity.   No edema   Lymphadenopathy:     She has no cervical adenopathy.   Neurological: She is alert and oriented to person, place, and time.   Skin: Skin is warm, dry and intact. Capillary refill takes less than 2 seconds. No rash noted. No cyanosis.   Psychiatric: She has a normal mood and affect. Her speech is normal and behavior is normal. Judgment and thought content normal.  Cognition and memory are normal.   Nursing note and vitals reviewed.      Significant Labs:   BMP:   Recent Labs   Lab 11/22/19  0832   *      K 3.6      CO2 25   BUN 23   CREATININE 1.0   CALCIUM 9.1     CBC:   Recent Labs   Lab 11/22/19  0832   WBC 11.83   HGB 11.7*   HCT 36.9*        All pertinent labs within the past 24 hours have been reviewed.    Significant Imaging: I have reviewed all pertinent imaging results/findings within the past 24 hours.      Assessment/Plan:      * Weakness  Cont OT/ PT  Await SNF placement    inj B12 IM    Continue ROM, PT/OT  Await discharge to SNF    Essential hypertension  Under control      PAF (paroxysmal atrial fibrillation)  Converted to NSR with IV Cardizem  Troponin elevated  Cards consulted, pt already on Xarelto    Remains in NSR    Back in afib w RVR-- will try Dig and oral Cardizem    S/p 1 day of Cardizem and Amiodarone gtt-- doing well.  Converted back to NSR- will start oral Amio and Card and then d/c the gtts.  Cards following    Resolved, continue Xarelto    Recurrent with RVR s/p multiple rounds of Cardizem and Amiodarone gtt  Continue Xarelto    Acute respiratory failure with hypoxia  - Secondary to asthma exacerbation + PNA.    - O2 sat stable on 50% FiO2 BiPAP, wean as tolerated.  Repeat ABG in AM.  - DuoNebs Q 6.  - IV Solu-Medrol 80 mg Q8.  - Empiric antibiotics.  - Will obtain V/Q scan to r/o PE (unable to perform CTA due to TARA).  - Pulmonology consult.    - Improving, came off BIPAP- Oxygenation improving  Improving    Doing well on 2 L NC  IS started    Home o2 eval    Persists, needed Bipap this am, may switch to NC  Doing well on NC      VTE Risk Mitigation (From admission, onward)         Ordered     rivaroxaban tablet 15 mg  With dinner      11/17/19 1054     IP VTE HIGH RISK PATIENT  Once      11/16/19 0430     Reason for No Pharmacological VTE Prophylaxis  Once     Question:  Reasons:  Answer:  Already adequately  anticoagulated on oral Anticoagulants    11/16/19 0430     Place sequential compression device  Until discontinued      11/16/19 0430                      Rivas Hester MD  Department of Hospital Medicine   [facility]

## 2019-11-24 NOTE — SUBJECTIVE & OBJECTIVE
Review of Systems   Constitutional: Positive for malaise/fatigue. Negative for chills and fever.   HENT: Negative for congestion.    Eyes: Negative for blurred vision.   Respiratory: Positive for cough and wheezing. Negative for sputum production and shortness of breath.    Cardiovascular: Negative for chest pain and leg swelling.   Gastrointestinal: Negative for abdominal pain, nausea and vomiting.   Genitourinary: Negative for dysuria.   Musculoskeletal: Negative for myalgias.   Skin: Negative for rash.   Neurological: Positive for weakness. Negative for dizziness and headaches.   Endo/Heme/Allergies: Does not bruise/bleed easily.   Psychiatric/Behavioral: The patient is not nervous/anxious.          Objective:     Vital Signs (Most Recent):  Temp: 98.5 °F (36.9 °C) (11/24/19 1542)  Pulse: (P) 85 (11/24/19 1705)  Resp: 18 (11/24/19 1542)  BP: (!) 145/65 (11/24/19 1542)  SpO2: 97 % (11/24/19 1542) Vital Signs (24h Range):  Temp:  [97.5 °F (36.4 °C)-98.5 °F (36.9 °C)] 98.5 °F (36.9 °C)  Pulse:  [68-93] (P) 85  Resp:  [18-20] 18  SpO2:  [92 %-97 %] 97 %  BP: (127-176)/(61-84) 145/65     Weight: 94.8 kg (208 lb 15.9 oz)  Body mass index is 32.73 kg/m².      Intake/Output Summary (Last 24 hours) at 11/24/2019 1755  Last data filed at 11/24/2019 1505  Gross per 24 hour   Intake 812 ml   Output --   Net 812 ml       Physical Exam   Constitutional: She is oriented to person, place, and time. She appears well-developed and well-nourished. She is cooperative.  Non-toxic appearance. She does not have a sickly appearance. She appears ill. No distress. She is not intubated. Nasal cannula in place.   HENT:   Head: Normocephalic and atraumatic.   Mouth/Throat: Oropharynx is clear and moist and mucous membranes are normal.   Eyes: Pupils are equal, round, and reactive to light. EOM and lids are normal.   Neck: Trachea normal and full passive range of motion without pain. Carotid bruit is not present.   Cardiovascular: Normal rate  and normal heart sounds. An irregular rhythm present.   Pulses:       Radial pulses are 2+ on the right side, and 2+ on the left side.        Dorsalis pedis pulses are 1+ on the right side, and 1+ on the left side.   Pulmonary/Chest: Effort normal. No accessory muscle usage. No tachypnea. She is not intubated. No respiratory distress. She has decreased breath sounds in the right lower field and the left lower field. She has wheezes. She has rales.   Abdominal: Soft. She exhibits no distension. Bowel sounds are decreased. There is no tenderness.   Genitourinary:   Genitourinary Comments: Ratliff in place   Musculoskeletal: Normal range of motion.        Right foot: There is no deformity.        Left foot: There is no deformity.   No edema   Lymphadenopathy:     She has no cervical adenopathy.   Neurological: She is alert and oriented to person, place, and time.   Skin: Skin is warm, dry and intact. Capillary refill takes less than 2 seconds. No rash noted. No cyanosis.   Psychiatric: She has a normal mood and affect. Her speech is normal and behavior is normal. Judgment and thought content normal. Cognition and memory are normal.   Nursing note and vitals reviewed.      Vents:  Oxygen Concentration (%): 28 (11/24/19 0027)    Lines/Drains/Airways     Peripheral Intravenous Line                 Peripheral IV - Single Lumen 11/16/19 0058 20 G Right Wrist 8 days         Peripheral IV - Single Lumen 11/18/19 1800 24 G Left;Posterior Hand 5 days                Significant Labs:    CBC/Anemia Profile:  Recent Labs   Lab 11/24/19  0443   WBC 11.62   HGB 10.8*   HCT 35.8*      *   RDW 14.9*        Chemistries:  Recent Labs   Lab 11/24/19  0443      K 4.1      CO2 25   BUN 32*   CREATININE 1.0   CALCIUM 9.1   ALBUMIN 2.8*   PHOS 3.4       POCT Glucose:   No results for input(s): POCTGLUCOSE in the last 48 hours.  All pertinent labs within the past 24 hours have been reviewed.

## 2019-11-24 NOTE — PLAN OF CARE
CM was consulted by Dr. Hester to speak with client about SNF preference. SW met with client and  at the bedside. Patient and  requested [facility] but was told bed availability will be on tomorrow. TRISTEN notified MD of plans.

## 2019-11-24 NOTE — PLAN OF CARE
Patient AAO x4. VSS..   Patient remained afebrile throughout the shift.  Patient remained free of falls this shift.  Plan of care reviewed.  Patient verbalized understanding.  Frequent weight shifting encouraged.  Patient NSR on monitor.  Bed low, siderails up x2, wheels locked, call light in reach.  Patient instructed to call for assistance.  Bed alarm maintained for safety  Patient turned q2h  Hourly rounding completed.  Will continue to monitor.

## 2019-11-25 VITALS
DIASTOLIC BLOOD PRESSURE: 65 MMHG | HEIGHT: 67 IN | WEIGHT: 208.13 LBS | HEART RATE: 83 BPM | BODY MASS INDEX: 32.67 KG/M2 | RESPIRATION RATE: 18 BRPM | SYSTOLIC BLOOD PRESSURE: 150 MMHG | OXYGEN SATURATION: 94 % | TEMPERATURE: 99 F

## 2019-11-25 LAB
ALBUMIN SERPL BCP-MCNC: 2.6 G/DL (ref 3.5–5.2)
ANION GAP SERPL CALC-SCNC: 8 MMOL/L (ref 8–16)
BASOPHILS # BLD AUTO: 0.03 K/UL (ref 0–0.2)
BASOPHILS NFR BLD: 0.2 % (ref 0–1.9)
BUN SERPL-MCNC: 28 MG/DL (ref 8–23)
CALCIUM SERPL-MCNC: 8.6 MG/DL (ref 8.7–10.5)
CHLORIDE SERPL-SCNC: 108 MMOL/L (ref 95–110)
CO2 SERPL-SCNC: 27 MMOL/L (ref 23–29)
CREAT SERPL-MCNC: 1 MG/DL (ref 0.5–1.4)
DIFFERENTIAL METHOD: ABNORMAL
EOSINOPHIL # BLD AUTO: 0.1 K/UL (ref 0–0.5)
EOSINOPHIL NFR BLD: 0.8 % (ref 0–8)
ERYTHROCYTE [DISTWIDTH] IN BLOOD BY AUTOMATED COUNT: 14.7 % (ref 11.5–14.5)
EST. GFR  (AFRICAN AMERICAN): >60 ML/MIN/1.73 M^2
EST. GFR  (NON AFRICAN AMERICAN): 53 ML/MIN/1.73 M^2
GLUCOSE SERPL-MCNC: 94 MG/DL (ref 70–110)
HCT VFR BLD AUTO: 32.7 % (ref 37–48.5)
HGB BLD-MCNC: 10 G/DL (ref 12–16)
IMM GRANULOCYTES # BLD AUTO: 0.32 K/UL (ref 0–0.04)
IMM GRANULOCYTES NFR BLD AUTO: 2.2 % (ref 0–0.5)
LYMPHOCYTES # BLD AUTO: 2.9 K/UL (ref 1–4.8)
LYMPHOCYTES NFR BLD: 19.7 % (ref 18–48)
MCH RBC QN AUTO: 31.1 PG (ref 27–31)
MCHC RBC AUTO-ENTMCNC: 30.6 G/DL (ref 32–36)
MCV RBC AUTO: 102 FL (ref 82–98)
MONOCYTES # BLD AUTO: 1.3 K/UL (ref 0.3–1)
MONOCYTES NFR BLD: 9.1 % (ref 4–15)
NEUTROPHILS # BLD AUTO: 9.9 K/UL (ref 1.8–7.7)
NEUTROPHILS NFR BLD: 68 % (ref 38–73)
NRBC BLD-RTO: 0 /100 WBC
PHOSPHATE SERPL-MCNC: 2.9 MG/DL (ref 2.7–4.5)
PLATELET # BLD AUTO: 272 K/UL (ref 150–350)
PMV BLD AUTO: 9.7 FL (ref 9.2–12.9)
POTASSIUM SERPL-SCNC: 3.8 MMOL/L (ref 3.5–5.1)
RBC # BLD AUTO: 3.22 M/UL (ref 4–5.4)
SODIUM SERPL-SCNC: 143 MMOL/L (ref 136–145)
WBC # BLD AUTO: 14.58 K/UL (ref 3.9–12.7)

## 2019-11-25 PROCEDURE — 36415 COLL VENOUS BLD VENIPUNCTURE: CPT

## 2019-11-25 PROCEDURE — 97530 THERAPEUTIC ACTIVITIES: CPT | Performed by: PHYSICAL THERAPIST

## 2019-11-25 PROCEDURE — 25000003 PHARM REV CODE 250: Performed by: PHYSICIAN ASSISTANT

## 2019-11-25 PROCEDURE — 94761 N-INVAS EAR/PLS OXIMETRY MLT: CPT

## 2019-11-25 PROCEDURE — 97530 THERAPEUTIC ACTIVITIES: CPT

## 2019-11-25 PROCEDURE — 99900035 HC TECH TIME PER 15 MIN (STAT)

## 2019-11-25 PROCEDURE — 80069 RENAL FUNCTION PANEL: CPT

## 2019-11-25 PROCEDURE — 94640 AIRWAY INHALATION TREATMENT: CPT

## 2019-11-25 PROCEDURE — 63600175 PHARM REV CODE 636 W HCPCS: Performed by: INTERNAL MEDICINE

## 2019-11-25 PROCEDURE — 97110 THERAPEUTIC EXERCISES: CPT | Performed by: PHYSICAL THERAPIST

## 2019-11-25 PROCEDURE — 94664 DEMO&/EVAL PT USE INHALER: CPT

## 2019-11-25 PROCEDURE — 25000003 PHARM REV CODE 250: Performed by: EMERGENCY MEDICINE

## 2019-11-25 PROCEDURE — 25000003 PHARM REV CODE 250: Performed by: NURSE PRACTITIONER

## 2019-11-25 PROCEDURE — 85025 COMPLETE CBC W/AUTO DIFF WBC: CPT

## 2019-11-25 PROCEDURE — 25000003 PHARM REV CODE 250: Performed by: INTERNAL MEDICINE

## 2019-11-25 PROCEDURE — 25000242 PHARM REV CODE 250 ALT 637 W/ HCPCS: Performed by: INTERNAL MEDICINE

## 2019-11-25 PROCEDURE — 97110 THERAPEUTIC EXERCISES: CPT

## 2019-11-25 PROCEDURE — 27000221 HC OXYGEN, UP TO 24 HOURS

## 2019-11-25 RX ORDER — PANTOPRAZOLE SODIUM 40 MG/1
40 TABLET, DELAYED RELEASE ORAL DAILY
Qty: 30 TABLET | Refills: 11
Start: 2019-11-26 | End: 2022-03-31

## 2019-11-25 RX ORDER — ASPIRIN 81 MG/1
81 TABLET ORAL DAILY
Refills: 0
Start: 2019-11-26 | End: 2022-03-31

## 2019-11-25 RX ORDER — AMOXICILLIN AND CLAVULANATE POTASSIUM 500; 125 MG/1; MG/1
1 TABLET, FILM COATED ORAL 2 TIMES DAILY
Qty: 14 TABLET | Refills: 0
Start: 2019-11-25 | End: 2019-12-02

## 2019-11-25 RX ORDER — LEVOTHYROXINE SODIUM 112 UG/1
112 TABLET ORAL DAILY
Qty: 30 TABLET | Refills: 11
Start: 2019-11-26 | End: 2022-03-31 | Stop reason: SDUPTHER

## 2019-11-25 RX ORDER — AMIODARONE HYDROCHLORIDE 200 MG/1
200 TABLET ORAL 2 TIMES DAILY
Qty: 60 TABLET | Refills: 11
Start: 2019-11-25 | End: 2022-03-31

## 2019-11-25 RX ORDER — IPRATROPIUM BROMIDE 0.5 MG/2.5ML
500 SOLUTION RESPIRATORY (INHALATION) EVERY 6 HOURS
Qty: 1 BOX | Refills: 0 | Status: ON HOLD
Start: 2019-11-25 | End: 2022-04-06 | Stop reason: HOSPADM

## 2019-11-25 RX ORDER — GUAIFENESIN 600 MG/1
600 TABLET, EXTENDED RELEASE ORAL 2 TIMES DAILY
Start: 2019-11-25 | End: 2022-03-31

## 2019-11-25 RX ORDER — PREDNISONE 10 MG/1
10 TABLET ORAL DAILY
Qty: 5 TABLET | Refills: 0
Start: 2019-11-26 | End: 2019-12-01

## 2019-11-25 RX ORDER — LEVALBUTEROL INHALATION SOLUTION 0.63 MG/3ML
0.63 SOLUTION RESPIRATORY (INHALATION) 3 TIMES DAILY PRN
Refills: 0 | Status: ON HOLD
Start: 2019-11-25 | End: 2022-04-06 | Stop reason: HOSPADM

## 2019-11-25 RX ORDER — DILTIAZEM HYDROCHLORIDE 120 MG/1
120 CAPSULE, COATED, EXTENDED RELEASE ORAL DAILY
Qty: 30 CAPSULE | Refills: 11
Start: 2019-11-26 | End: 2022-03-31

## 2019-11-25 RX ADMIN — ATORVASTATIN CALCIUM 20 MG: 10 TABLET, FILM COATED ORAL at 08:11

## 2019-11-25 RX ADMIN — AMOXICILLIN AND CLAVULANATE POTASSIUM 500 MG: 500; 125 TABLET, FILM COATED ORAL at 08:11

## 2019-11-25 RX ADMIN — PANTOPRAZOLE SODIUM 40 MG: 40 TABLET, DELAYED RELEASE ORAL at 08:11

## 2019-11-25 RX ADMIN — DILTIAZEM HYDROCHLORIDE 120 MG: 120 CAPSULE, COATED, EXTENDED RELEASE ORAL at 08:11

## 2019-11-25 RX ADMIN — IPRATROPIUM BROMIDE 0.5 MG: 0.5 SOLUTION RESPIRATORY (INHALATION) at 12:11

## 2019-11-25 RX ADMIN — IPRATROPIUM BROMIDE 0.5 MG: 0.5 SOLUTION RESPIRATORY (INHALATION) at 07:11

## 2019-11-25 RX ADMIN — GUAIFENESIN 600 MG: 600 TABLET, EXTENDED RELEASE ORAL at 08:11

## 2019-11-25 RX ADMIN — PREDNISONE 10 MG: 10 TABLET ORAL at 08:11

## 2019-11-25 RX ADMIN — IPRATROPIUM BROMIDE 0.5 MG: 0.5 SOLUTION RESPIRATORY (INHALATION) at 01:11

## 2019-11-25 RX ADMIN — LEVOTHYROXINE SODIUM 112 MCG: 112 TABLET ORAL at 05:11

## 2019-11-25 RX ADMIN — ASPIRIN 81 MG: 81 TABLET, COATED ORAL at 08:11

## 2019-11-25 RX ADMIN — AMIODARONE HYDROCHLORIDE 200 MG: 200 TABLET ORAL at 08:11

## 2019-11-25 NOTE — PROGRESS NOTES
[facility] transport arrived to take patient - Changed patients brief and applied critic paste to buttocks - assisted by Denise ALSTON we  transferred from the bed to the wheelchair, placed her O2 on her at 1L/NC and she was accompanied by [facility] staff to [place] without apparent distress noted.

## 2019-11-25 NOTE — PT/OT/SLP PROGRESS
"Occupational Therapy  Treatment    Ana Castañeda   MRN: [id number]   Admitting Diagnosis: Weakness    OT Date of Treatment: 11/25/19   OT Start Time: 0825  OT Stop Time: 0850  OT Total Time (min): 25 min    Billable Minutes:  Therapeutic Activity 15 min and Therapeutic Exercise 10 min    General Precautions: Standard, aspiration, fall  Orthopedic Precautions: N/A  Braces: N/A         Subjective:  Communicated with Nurse Burns and epic chart review prior to session.  Pt found supine in bed and agreeable to tx at this time.   Pain/Comfort  Pain Rating 1: 0/10  Pain Rating Post-Intervention 1: 0/10    Objective:  Patient found with: peripheral IV, telemetry, oxygen     Functional Mobility:  Therapeutic Activities and Exercises:  Pt performed supine>sit with Mod A, scooted to EOB with Max A x2, donned shoes with Total A, sit>stand using RW with Mod A x 2, step t/f to chair using RW with Max A x 2. Pt educated in and performed (B) UE ROM exercises 1 x 10 reps: shoulder flex, chest press, bicep curls, finger flex/ ext.     AM-PAC 6 CLICK ADL   How much help from another person does this patient currently need?   1 = Unable, Total/Dependent Assistance  2 = A lot, Maximum/Moderate Assistance  3 = A little, Minimum/Contact Guard/Supervision  4 = None, Modified [place]/Independent    Putting on and taking off regular lower body clothing? : 2  Bathing (including washing, rinsing, drying)?: 2  Toileting, which includes using toilet, bedpan, or urinal? : 2  Putting on and taking off regular upper body clothing?: 2  Taking care of personal grooming such as brushing teeth?: 3  Eating meals?: 3  Daily Activity Total Score: 14     AM-PAC Raw Score CMS "G-Code Modifier Level of Impairment Assistance   6 % Total / Unable   7 - 8 CM 80 - 100% Maximal Assist   9-13 CL 60 - 80% Moderate Assist   14 - 19 CK 40 - 60% Moderate Assist   20 - 22 CJ 20 - 40% Minimal Assist   23 CI 1-20% SBA / CGA   24 CH 0% Independent/ Mod I "       Patient left up in chair with all lines intact, call button in reach, chair alarm on, Nurse Katy notified and  present    ASSESSMENT:  Ana Castañeda is a 84 y.o. female with a medical diagnosis of Weakness and presents with impaired functional mobility and ADLs. Pt will benefit from continued skilled OT in order to address impairments.     Rehab identified problem list/impairments: Rehab identified problem list/impairments: weakness, impaired endurance, impaired self care skills, impaired functional mobilty, decreased coordination, pain, decreased safety awareness, decreased lower extremity function, impaired balance, gait instability, decreased upper extremity function    Rehab potential is good.    Activity tolerance: Fair    Discharge recommendations: Discharge Facility/Level of Care Needs: nursing facility, skilled     Barriers to discharge: Barriers to Discharge: None    Equipment recommendations: none     GOALS:   Multidisciplinary Problems     Occupational Therapy Goals        Problem: Occupational Therapy Goal    Goal Priority Disciplines Outcome Interventions   Occupational Therapy Goal     OT, PT/OT Ongoing, Progressing    Description:  LTGs to be met by 11/25/19   1. Pt will perform LE dressing with Max A  2. Pt will perform BSC t/f with Max A  3. Pt will perform (B) UE ROM exercises 1 x 20 reps                     Plan:  Patient to be seen 3 x/week to address the above listed problems via self-care/home management, therapeutic activities, therapeutic exercises  Plan of Care expires: 11/25/19  Plan of Care reviewed with: patient, spouse    OT G-codes  Functional Assessment Tool Used: [facility]  Score: 14    Karen Sutherland, PT/OT  11/25/2019

## 2019-11-25 NOTE — PLAN OF CARE
D/C order, MAR, AVS and 142/PASRR sent via [place].  Awaiting phone number to call report and transportation time.       11/25/19 0918   Post-Acute Status   Post-Acute Placement Status Set-up Complete     10:35am Secure chat to ALBERTO Guerrero RN to call report to [phone] - speak to [address] [place] [address] Nurse.  Transportation time still pending.    11:35am Transportation will be sent between 1:30pm and 2:00pm. ALBERTO Guerrero RN notified via secure chat.

## 2019-11-25 NOTE — PROGRESS NOTES
Follow up on Ms. Castañeda today. She is awake and alert, lying on left side with foam wedge. IAD again noted to perineal area, mild blanchable redness. Patient expresses discomfort to this area. Applied another layer of critic aid paste. Recommend continued use of critic aid paste after discharge. Patient verbalizes understanding.

## 2019-11-25 NOTE — PROGRESS NOTES
Removed saline locks x2 without difficulty, pressure dressings applied to sites - monitor removed and returned to monitor tech.

## 2019-11-25 NOTE — PLAN OF CARE
Bed mobility Mod A x 2; sit<>stand Mod A x 2 using RW; t/f to chair with Max A x 2; donned shoes with Total A

## 2019-11-25 NOTE — PROGRESS NOTES
Report called to [address] nurse [place] at [facility] ([phone]) - transportation to be here between 1330 and 1400.

## 2019-11-25 NOTE — PT/OT/SLP PROGRESS
Physical Therapy  Treatment    Ana Castañeda   MRN: [id number]   Admitting Diagnosis: Weakness    PT Received On: 11/25/19  PT Start Time: 0825     PT Stop Time: 0850    PT Total Time (min): 25 min       Billable Minutes:  Therapeutic Activity 15 and Therapeutic Exercise 10    Treatment Type: Treatment  PT/PTA: PT         General Precautions: Standard, fall, respiratory  Orthopedic Precautions: N/A   Braces: N/A    Subjective:  Communicated with NURSE VIZCARRA prior to session.  Pain/Comfort  Pain Rating 1: 0/10    Objective:   Patient found with: telemetry, peripheral IV, oxygen    Functional Mobility:  Therapeutic Activities and Exercises:  PT FOUND SUPINE IN BED UPON ARRIVAL, AGREEABLE TO TX., PT C/O NO PAIN, SUP>SIT WITH MAXA X 2, SEATED SCOOT TO EOB WITH MAXA X 2, PT ATTEMPTING TO ASSIST BUT DIFFICULT, SIT>STAND WITH MODA X 2, VERBAL AND TACTILE CUES FOR UPRIGHT POSTURE, MAXA X 2 FOR TF TO CHAIR, QUICK TO FATIGUE SO QUICK TO SIT, PT PRESENTS WITH SHUFFLED STEPS DURING TF, P DYNAMIC BALANCE, PT EDUCATED IN AND PERFORMED BLE THEREX X 15 REPS AROM WITH REST, PT ENCOURAGED TO INCREASE TIME OOB IN CHAIR, PT AGREEABLE    AM-PAC 6 CLICK MOBILITY  How much help from another person does this patient currently need?   1 = Unable, Total/Dependent Assistance  2 = A lot, Maximum/Moderate Assistance  3 = A little, Minimum/Contact Guard/Supervision  4 = None, Modified [place]/Independent    Turning over in bed (including adjusting bedclothes, sheets and blankets)?: 2  Sitting down on and standing up from a chair with arms (e.g., wheelchair, bedside commode, etc.): 2  Moving from lying on back to sitting on the side of the bed?: 2  Moving to and from a bed to a chair (including a wheelchair)?: 2  Need to walk in hospital room?: 1  Climbing 3-5 steps with a railing?: 1  Basic Mobility Total Score: 10    AM-PAC Raw Score CMS G-Code Modifier Level of Impairment Assistance   6 % Total / Unable   7 - 9 CM 80 - 100% Maximal  Assist   10 - 14 CL 60 - 80% Moderate Assist   15 - 19 CK 40 - 60% Moderate Assist   20 - 22 CJ 20 - 40% Minimal Assist   23 CI 1-20% SBA / CGA   24 CH 0% Independent/ Mod I     Patient left up in chair with all lines intact, call button in reach, chair alarm on, NURSE notified and  present.    Assessment:  Ana Castañeda is a 84 y.o. female with a medical diagnosis of Weakness and presents with IMPAIRED FUNCTIONAL MOBILITY. PT WILL BENEFIT FROM CONT. SKILLED P.T. TO ADDRESS IMPAIRMENTS    Rehab identified problem list/impairments: Rehab identified problem list/impairments: weakness, impaired endurance, impaired functional mobilty, impaired balance, decreased coordination, decreased safety awareness    Rehab potential is good.    Activity tolerance: Good    Discharge recommendations: Discharge Facility/Level of Care Needs: nursing facility, skilled     Barriers to discharge:      Equipment recommendations: Equipment Needed After Discharge: none     GOALS:   Multidisciplinary Problems     Physical Therapy Goals        Problem: Physical Therapy Goal    Goal Priority Disciplines Outcome Goal Variances Interventions   Physical Therapy Goal     PT, PT/OT Ongoing, Progressing     Description:  LTGs to be met by 11/25/19  1. Pt will perform bed mobility with Mod A  2. Pt will perform sit<>stand functional t/fs with Max A  3. Pt will ambulate ~5ft using RW with Max A  4. Pt will perform (B) LE therapeutic exercises 1 x 15 reps                     PLAN:    Patient to be seen 5 x/week  to address the above listed problems via therapeutic exercises, therapeutic activities, gait training  Plan of Care expires: 11/25/19  Plan of Care reviewed with: patient, spouse    PT ENCOURAGED TO CALL FOR ASSISTANCE WITH ALL NEEDS DUE TO FALL RISK STATUS, PT AGREEABLE    Catherine Rocha, PT  11/25/2019

## 2019-11-26 NOTE — PLAN OF CARE
11/26/19 0742   Final Note   Anticipated Discharge Disposition SNF   Right Care Referral Info   Post Acute Recommendation SNF / Sub-Acute Rehab   Facility Name [facility]

## 2019-11-26 NOTE — DISCHARGE SUMMARY
"[facility] Hospital Medicine  Discharge Summary      Patient Name: Ana Castañeda  MRN: [id number]  Admission Date: 11/16/2019  Hospital Length of Stay: 9 days  Discharge Date and Time: 11/25/2019  2:30 PM  Attending Physician:  Rivas Hester MD  Discharging Provider: Rivas Hester MD  Primary Care Provider: Stephen Tarango MD      HPI:   Ana Castañeda is a 84 y.o. female patient with a PMHx asthma, HTN, h/o CVA with residual right-sided weakness, hypothyroidism, gout, debility (wheelchair bound), and chronic anticoagulation therapy (reason unknown).  She presented to the emergency department with complaints of shortness of breath that progressively worsened shortly before arrival.  No aggravating or alleviating factors.  Associated cough producing mucus, wheezing and congestion x 3 days.  Denies any chest pain, palpitations, orthopnea, PND, edema, abdominal pain, nausea, vomiting, diarrhea, dysuria, hematuria, back pain, AMS, lightheadedness or dizziness, syncope, rhinorrhea, sore throat, weakness, fever or chills.  Family at bedside reports patient was seen at [facility] on 11/13 and diagnosed with acute bronchitis.  She was given a "steroid shot" and prescribed Cefdinir, which she has taken x 1 dose on 11/15.  Patient is on chronic Xarelto, but unsure of reason.  She denies any history of AF/FL, DVT or PE.  Initial workup in the ED resulted WBC of 13.14, creatinine 1.4, BUN 25, troponin 0.095, , lactic 3.1, procalcitonin 0.15, UA unremarkable, EKG unrevealing.  ABG with pH of 7.224, pCO2 66, pO2 241, SaO2 100 on BiPAP.  CXR showed right lower lobe infiltrate.  Patient's O2 sat remained stable in the ED, but BiPAP placed due to increased respiratory distress. Upon arrival to the ED, patient was tachycardic with heart rate of 136 bpm, /78.  She was given IV Lopressor 5 mg x 2 doses and 40 mg IV Lasix, which improved her HR into the 80s but also decreased blood pressure to " 98/70.  ED also administered 125 mg IV Solu-Medrol, DuoNeb treatments x 3, 2.5 L IV fluid resuscitation, IV vanc and Zosyn.  Patient was admitted to ICU under Hospital Medicine services.  Currently, patient appears comfortable in no acute distress.  BiPAP remains in place with stable O2 sat.  Patient is a full code.   is surrogate decision maker.    * No surgery found *      Hospital Course:   Ana Castañeda is a 84 y.o. female patient with a PMHx asthma, HTN, h/o CVA with residual right-sided weakness, hypothyroidism, gout, debility (wheelchair bound), and chronic anticoagulation therapy (reason unknown) admitted to ICU on Bipap, with acute hypoxemic resp failure sec to LLL Pneumonia and Afib w RVR and Lactic Acidosis. She was aggressively rehydrated with NS and started on IV Vanc and Zosyn as well as IV Solumedrol. Her Afib RVR was treated initially with IV Lopressor 5 mg IVP x 2 which  which did not affect her HR but dropped BP to 98/70 -- necessitating Charbel-senephrine gtt by eICU this am. This am she was given a dose of Cardizem 10 mg IVP which slowed down her HR and also converted to NSR and her BP improved. She was started on Cardizem oral tabs and was able to come off Charbel gtt as well as Bipap to NC and feels much better.  Urine output is good. She is AAOx3, speech is clear, family at bedside. Troponin increased to 1.98-- cardiology consulted.  11/17- doing better, comfortable, pleasant. Appears mildly fluid overloaded, she is about 5 L fluid positive. Given IV lasix, all Cx NGTD. Cardiology thinks its demand ischemia from sepsis and recommended out pt NMP scan when stable. Await transfer to tele. PT/OT ordered. NSR remains.  Ana Castañeda is a 84 y.o. female patient with a PMHx asthma, HTN, h/o CVA with residual right-sided weakness, hypothyroidism, gout, debility (wheelchair bound), and chronic anticoagulation therapy (reason unknown) admitted to ICU on Bipap, with acute hypoxemic resp failure sec to LLL  Pneumonia and Afib w RVR and Lactic Acidosis. She was aggressively rehydrated with NS and started on IV Vanc and Zosyn as well as IV Solumedrol. Her Afib RVR was treated initially with IV Lopressor 5 mg IVP x 2 which  which did not affect her HR but dropped BP to 98/70 -- necessitating Charbel-senephrine gtt by eICU this am. This am she was given a dose of Cardizem 10 mg IVP which slowed down her HR and also converted to NSR and her BP improved. She was started on Cardizem oral tabs and was able to come off Charbel gtt as well as Bipap to NC and feels much better.  Urine output is good. She is AAOx3, speech is clear, family at bedside. Troponin increased to 1.98-- cardiology consulted.  11/17- doing better, comfortable, pleasant. Appears mildly fluid overloaded, she is about 5 L fluid positive. Given IV lasix, all Cx NGTD. Cardiology thinks its demand ischemia from sepsis and recommended out pt NMP scan when stable. Await transfer to tele. PT/OT ordered. NSR remains.  11/18- went into Afib RVR when PT evaluated her yesterday- has remained in it since- no response to Dig, Cardizem. Eventually cardiology had to start her on Cardizem and Amiodarone gtt with some slowing. Otherwise feels good. Repeat CXR shows clear lungs, no Pneumonia. Bun/Cr elevated at 34/1.5- hence Lasix d/hira and started on gentle rehydration. Zosyn also d/hira.  11/19- looks much better, sitting up in chair, eating lunch well, appears in good spirits. No dysphagia, doing IS. Bun improved to 34. Doing a little PT. Has great family support.  11/20- looks better, sitting up in chair, but needed lot more support from PT/OT then at home-- hence family requests SNF at [facility]. Continue present care.  11/21- looks better, went into Afib w RVR last night-- had to resume Amiodarone and Cardizem gtt again this am, also had received a dose of IV Dig but no improvement-- this am received IV Lopressor 5 mg and she converted to NSR &2, also had some  wheezing-- likely was in fluid overload-- so IVF d/hira and given a dose of IV Lasix, she improved, CXR clear, RLL infiltrate from admit resolved.  11/22- looks better, relaxed, eating lunch. Remained in NSR since yesterday, had bradycardia last night-- hence Cardizem gtt d/hira. Now on oral Amiodarone and Cardizem. Doing PT/OT gently. She keeps going into afib RVR with minimal exertion, any PT etc. She has been accepted at [facility] on Monday. Will continue present care.  Performed ambulatory oxygen evaluation and did not meet criteria for supplemental oxygen at rest or with activity.  Accepted for skilled nursing placement at [facility].  Discharge plan to transfer to SNF.     Consults:   Consults (From admission, onward)        Status Ordering Provider     Inpatient consult to Cardiology  Once     Provider:  Rodrigo Yi MD    Completed EDGAR VERDIN     Inpatient consult to Social Work  Once     Provider:  (Not yet assigned)    Completed TUTU HARVEY     Inpatient consult to Social Work  Once     Provider:  (Not yet assigned)    Completed NAKITA JOLLY          No new Assessment & Plan notes have been filed under this hospital service since the last note was generated.  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] 11/18/2019 Yes    PAF (paroxysmal atrial fibrillation) [I48.0] 11/16/2019 Yes    Asthma with COPD [J44.9] 11/16/2019 Yes      Problems Resolved During this Admission:    Diagnosis Date Noted Date Resolved POA    NSTEMI (non-ST elevated myocardial infarction) [I21.4] 11/22/2019 11/22/2019 Unknown    Moderate persistent asthma with exacerbation [J45.41] 11/16/2019 11/20/2019 Yes    Pneumonia of right lower lobe due  to infectious organism [J18.1] 11/16/2019 11/21/2019 Yes    NSTEMI [R79.89] 11/16/2019 11/22/2019 Yes    TARA (acute kidney injury) [N17.9] 11/16/2019 11/17/2019 Yes    Lactic acidosis [E87.2] 11/16/2019 11/17/2019 Yes       Discharged Condition: good    Disposition: Skilled Nursing Facility    Follow Up:  Follow-up Information     Stephen Tarango MD In 3 weeks.    Specialty:  Family Medicine  Why:  Hospital follow up  Contact information:  [id number] [facility]  [place] [id number] [phone]                 Patient Instructions:      Diet Cardiac     Activity as tolerated       Significant Diagnostic Studies: Labs: All labs within the past 24 hours have been reviewed    Pending Diagnostic Studies:     None         Medications:  Reconciled Home Medications:      Medication List      START taking these medications    amiodarone 200 MG Tab  Commonly known as:  PACERONE  Take 1 tablet (200 mg total) by mouth 2 (two) times daily.     amoxicillin-clavulanate 500-125mg 500-125 mg Tab  Commonly known as:  AUGMENTIN  Take 1 tablet (500 mg total) by mouth 2 (two) times daily. for 7 days     diltiaZEM 120 MG Cp24  Commonly known as:  CARDIZEM CD  Take 1 capsule (120 mg total) by mouth once daily.  Start taking on:  November 26, 2019     guaiFENesin 600 mg 12 hr tablet  Commonly known as:  MUCINEX  Take 1 tablet (600 mg total) by mouth 2 (two) times daily.     ipratropium 0.02 % nebulizer solution  Commonly known as:  ATROVENT  Take 2.5 mLs (500 mcg total) by nebulization every 6 (six) hours. Rescue     levalbuterol 0.63 mg/3 mL nebulizer solution  Commonly known as:  XOPENEX  Take 3 mLs (0.63 mg total) by nebulization 3 (three) times daily as needed for Wheezing. Rescue     predniSONE 10 MG tablet  Commonly known as:  DELTASONE  Take 1 tablet (10 mg total) by mouth once daily. for 5 days  Start taking on:  November 26, 2019        CHANGE how you take these medications    levothyroxine 112 MCG tablet  Commonly known as:   SYNTHROID  Take 1 tablet (112 mcg total) by mouth once daily.  Start taking on:  November 26, 2019  What changed:  medication strength     rivaroxaban 15 mg Tab  Commonly known as:  XARELTO  Take 1 tablet (15 mg total) by mouth daily with dinner or evening meal.  What changed:    · medication strength  · how much to take        CONTINUE taking these medications    allopurinol 300 MG tablet  Commonly known as:  ZYLOPRIM  Take 300 mg by mouth once daily.     aspirin 81 MG EC tablet  Commonly known as:  ECOTRIN  Take 1 tablet (81 mg total) by mouth once daily.  Start taking on:  November 26, 2019     atorvastatin 20 MG tablet  Commonly known as:  LIPITOR  Take 20 mg by mouth once daily.     gabapentin 100 MG capsule  Commonly known as:  NEURONTIN  Take 100 mg by mouth 3 (three) times daily.     pantoprazole 40 MG tablet  Commonly known as:  PROTONIX  Take 1 tablet (40 mg total) by mouth once daily.  Start taking on:  November 26, 2019        STOP taking these medications    CALCIUM 600 ORAL     cefdinir 300 MG capsule  Commonly known as:  OMNICEF     escitalopram oxalate 10 MG tablet  Commonly known as:  LEXAPRO     guaifenesin-codeine 100-10 mg/5 ml  mg/5 mL syrup  Commonly known as:  TUSSI-ORGANIDIN NR     metoprolol tartrate 25 MG tablet  Commonly known as:  LOPRESSOR     ONE DAILY [facility]OM[facility] ORAL            Indwelling Lines/Drains at time of discharge:   Lines/Drains/Airways     None                 Time spent on the discharge of patient: 30 minutes  Patient was seen and examined on the date of discharge and determined to be suitable for discharge.         Rivas Hester MD  Department of Hospital Medicine  [facility]

## 2019-11-27 ENCOUNTER — PATIENT OUTREACH (OUTPATIENT)
Dept: ADMINISTRATIVE | Facility: CLINIC | Age: 84
End: 2019-11-27

## 2019-12-05 ENCOUNTER — PATIENT OUTREACH (OUTPATIENT)
Dept: ADMINISTRATIVE | Facility: CLINIC | Age: 84
End: 2019-12-05

## 2019-12-06 NOTE — TELEPHONE ENCOUNTER
Patient update received per SOHA Ferro at [facility], and RENEE Berry at [facility].    Clinical status:  Requires  a. Injections  b. IV  c. Nebulizers, 02 evaluation sats  d. Enteral feedings new or recent change  e. Care of new colostomy or teaching  f. Frequent suctioning, trach and /or vent needs  g. Frequent irrigation, replacement of urinary cath, complex suprapubic  h. Treatment stg 3/ or 4 pressure ulcers, complex wound care  i. Nursing evaluation due to unstable and complex medical condition  j. Nursing rehab teaching e.g. bowel and bladder training, adaptive care    PT/OT/ST    *Transfer mobility (eg, from bed to chair)   Independent (patient can perform an activity safely and independently without any devices, physical assistance, or supervision)  Modified independent (patient requires the use of an assistive device or extra time to complete an activity, but is otherwise independent)  Contact guard assistance (patient can perform an activity independently, but needs constant physical touch to steady or guide to ensure safe performance)  Supervision (patient can perform an activity, but supervision is provided to address safety concerns)  Minimum assistance (patient can perform most of an activity (ie, approximately 75%), but requires limited assistance from one person for safe completion)  X    Moderate assistance (patient can perform about half the effort of an activity, but requires extensive assistance from one or more persons for safe performance of the other half)  Maximum assistance (patient has difficulty performing an activity, but can offer some assistance (eg, approximately 25% of effort) and is dependent on one or more people to assist for safe completion)  Dependent or unable (patient is unable to perform an activity or depends on 2 or more people to complete it)    *Ambulation inside (eg, within room, hallways, to toilet) __7ftx3___Ft (inside parallel  bars)  Independent  Modified independent  Contact guard assistance  Supervision  Minimum assistance  X    Moderate assistance  Maximum assistance  Dependent or unable    *Ambulation outside (eg, getting in or out of buildings, walking on uneven surfaces) ______Ft  Independent  Modified independent  Contact guard assistance  Supervision  Minimum assistance  Moderate assistance  Maximum assistance  Dependent or unable    *Toileting self-management:   Independent  Modified independent  Contact guard assistance  Supervision  Minimum assistance  Moderate assistance  X    Maximum assistance  Dependent or unable    *Nourishment self-management (ie, ability to feed self)   Independent  Modified independent  Supervision  X     Minimum assistance  Moderate assistance  Maximum assistance  Dependent or unable    *Personal care self-management (eg, dressing, bathing, brushing teeth, washing hair)   Independent  Modified independent  Contact guard assistance  Supervision  Minimum assistance  X    Moderate assistance  Maximum assistance  Dependent or unable    Medication support (ie, preparing/taking all prescribed and over-the-counter medications reliably and safely, including correct dosage at correct times)   Not applicable  Independent  Modified independent  Supervision  Minimum assistance  Moderate assistance  X    Maximum assistance    ADL adaptive devices self-management (ie, ability to manage putting on and/or removing braces, splints, and other adaptive devices)   Not applicable  Independent  Modified independent  Supervision  Minimum assistance  Moderate assistance  Maximum assistance  Dependent or unable          For Extensions  - Unanticipated functional problems impacting safe completion of therapy  - Multiple comorbidities or frailty impairing functional improvement  - Cognitive impairment requiring additional intervention and education  - Communication impairment requiring additional intervention and  education  - Assessment or care unmanageable at lower level of care  - Expect brief to moderate stay extension.    X     IQ completed for extension    X     Criteria met    Criteria not met    X     SNF notified of IQ results      Barriers to progress:  Pt continues with weakness.   Coordination.  Fatigues quickly.        New treatments:  Working on standing tolerance and pivoting.        Projected length of stay/ expected discharge:  Unknown      Discharge Disposition:  Home with HH.   unable to manage care at home at this time due to pt's functioning level.        Recommendations:

## 2019-12-16 ENCOUNTER — PATIENT OUTREACH (OUTPATIENT)
Dept: HOME HEALTH SERVICES | Facility: HOSPITAL | Age: 84
End: 2019-12-16

## 2019-12-16 NOTE — PROGRESS NOTES
PT/OT/ST     *Transfer mobility (eg, from bed to chair)   Independent (patient can perform an activity safely and independently without any devices, physical assistance, or supervision)  Modified independent (patient requires the use of an assistive device or extra time to complete an activity, but is otherwise independent)  Contact guard assistance (patient can perform an activity independently, but needs constant physical touch to steady or guide to ensure safe performance)  Supervision (patient can perform an activity, but supervision is provided to address safety concerns)  Minimum assistance (patient can perform most of an activity (ie, approximately 75%), but requires limited assistance from one person for safe completion)  Moderate assistance (patient can perform about half the effort of an activity, but requires extensive assistance from one or more persons for safe performance of the other half)  X    Maximum assistance (patient has difficulty performing an activity, but can offer some assistance (eg, approximately 25% of effort) and is dependent on one or more people to assist for safe completion)  Dependent or unable (patient is unable to perform an activity or depends on 2 or more people to complete it)    *Ambulation inside (eg, within room, hallways, to toilet) 12 Ft  Independent  Modified independent  Contact guard assistance  Supervision  Minimum assistance  Moderate assistance  X    Maximum assistance  Dependent or unable    *Ambulation outside (eg, getting in or out of buildings, walking on uneven surfaces) ______Ft  Independent  Modified independent  Contact guard assistance  Supervision  Minimum assistance  Moderate assistance  Maximum assistance  X    Dependent or unable    *Toileting self-management:   Independent  Modified independent  Contact guard assistance  Supervision  Minimum assistance  X    Moderate assistance  Maximum assistance  Dependent or unable    *Nourishment self-management  (ie, ability to feed self)   Independent  Modified independent  Supervision  Minimum assistance  Moderate assistance  Maximum assistance  Dependent or unable  Not addressed    *Personal care self-management (eg, dressing, bathing, brushing teeth, washing hair)   Independent  Modified independent  X    Contact guard assistance  Supervision  Minimum assistance  Moderate assistance  Maximum assistance  Dependent or unable    Medication support (ie, preparing/taking all prescribed and over-the-counter medications reliably and safely, including correct dosage at correct times)   Not applicable  Independent  Modified independent  Supervision  Minimum assistance  Moderate assistance  Maximum assistance    ADL adaptive devices self-management (ie, ability to manage putting on and/or removing braces, splints, and other adaptive devices)   Not applicable  Independent  Modified independent  Supervision  Minimum assistance  Moderate assistance  Maximum assistance  Dependent or unable          For Extensions  - Unanticipated functional problems impacting safe completion of therapy  - Multiple comorbidities or frailty impairing functional improvement  - Cognitive impairment requiring additional intervention and education  - Communication impairment requiring additional intervention and education  - Assessment or care unmanageable at lower level of care  - Expect brief to moderate stay extension.    X     IQ performed for extension    X     Criteria Met    Criteria Not Met    X     SNF notified    Barriers to progress: na    New treatments:na    Projected length of stay/ expected discharge: unknown    Discharge Disposition: Home with   Recommendations: none

## 2021-07-24 ENCOUNTER — HOSPITAL ENCOUNTER (EMERGENCY)
Facility: HOSPITAL | Age: 86
Discharge: HOME | End: 2021-07-24
Attending: EMERGENCY MEDICINE
Payer: MEDICARE

## 2021-07-24 VITALS
TEMPERATURE: 99 F | DIASTOLIC BLOOD PRESSURE: 92 MMHG | BODY MASS INDEX: 32.6 KG/M2 | HEIGHT: 67 IN | SYSTOLIC BLOOD PRESSURE: 132 MMHG | HEART RATE: 108 BPM | RESPIRATION RATE: 18 BRPM | OXYGEN SATURATION: 98 %

## 2021-07-24 DIAGNOSIS — R40.0 DROWSY: ICD-10-CM

## 2021-07-24 DIAGNOSIS — R05.9 COUGH: ICD-10-CM

## 2021-07-24 DIAGNOSIS — U07.1 COVID-19 VIRUS INFECTION: Primary | ICD-10-CM

## 2021-07-24 DIAGNOSIS — U07.1 COVID-19 VIRUS DETECTED: ICD-10-CM

## 2021-07-24 LAB
ALBUMIN SERPL BCP-MCNC: 3.8 G/DL (ref 3.5–5.2)
ALP SERPL-CCNC: 115 U/L (ref 55–135)
ALT SERPL W/O P-5'-P-CCNC: 17 U/L (ref 10–44)
ANION GAP SERPL CALC-SCNC: 14 MMOL/L (ref 8–16)
AST SERPL-CCNC: 26 U/L (ref 10–40)
BACTERIA #/AREA URNS HPF: ABNORMAL /HPF
BASOPHILS # BLD AUTO: 0.06 K/UL (ref 0–0.2)
BASOPHILS NFR BLD: 0.6 % (ref 0–1.9)
BILIRUB SERPL-MCNC: 0.9 MG/DL (ref 0.1–1)
BILIRUB UR QL STRIP: NEGATIVE
BNP SERPL-MCNC: 157 PG/ML (ref 0–99)
BUN SERPL-MCNC: 14 MG/DL (ref 8–23)
CALCIUM SERPL-MCNC: 9.4 MG/DL (ref 8.7–10.5)
CHLORIDE SERPL-SCNC: 105 MMOL/L (ref 95–110)
CLARITY UR: CLEAR
CO2 SERPL-SCNC: 20 MMOL/L (ref 23–29)
COLOR UR: YELLOW
CREAT SERPL-MCNC: 1 MG/DL (ref 0.5–1.4)
CTP QC/QA: YES
DIFFERENTIAL METHOD: ABNORMAL
EOSINOPHIL # BLD AUTO: 0.4 K/UL (ref 0–0.5)
EOSINOPHIL NFR BLD: 3.4 % (ref 0–8)
ERYTHROCYTE [DISTWIDTH] IN BLOOD BY AUTOMATED COUNT: 15.7 % (ref 11.5–14.5)
EST. GFR  (AFRICAN AMERICAN): >60 ML/MIN/1.73 M^2
EST. GFR  (NON AFRICAN AMERICAN): 52 ML/MIN/1.73 M^2
GLUCOSE SERPL-MCNC: 103 MG/DL (ref 70–110)
GLUCOSE UR QL STRIP: NEGATIVE
HCT VFR BLD AUTO: 38.7 % (ref 37–48.5)
HGB BLD-MCNC: 12.5 G/DL (ref 12–16)
HGB UR QL STRIP: ABNORMAL
IMM GRANULOCYTES # BLD AUTO: 0.03 K/UL (ref 0–0.04)
IMM GRANULOCYTES NFR BLD AUTO: 0.3 % (ref 0–0.5)
KETONES UR QL STRIP: NEGATIVE
LEUKOCYTE ESTERASE UR QL STRIP: ABNORMAL
LYMPHOCYTES # BLD AUTO: 1.9 K/UL (ref 1–4.8)
LYMPHOCYTES NFR BLD: 18.3 % (ref 18–48)
MCH RBC QN AUTO: 30.3 PG (ref 27–31)
MCHC RBC AUTO-ENTMCNC: 32.3 G/DL (ref 32–36)
MCV RBC AUTO: 94 FL (ref 82–98)
MICROSCOPIC COMMENT: ABNORMAL
MONOCYTES # BLD AUTO: 1.6 K/UL (ref 0.3–1)
MONOCYTES NFR BLD: 15.9 % (ref 4–15)
NEUTROPHILS # BLD AUTO: 6.3 K/UL (ref 1.8–7.7)
NEUTROPHILS NFR BLD: 61.5 % (ref 38–73)
NITRITE UR QL STRIP: NEGATIVE
NRBC BLD-RTO: 0 /100 WBC
PH UR STRIP: 6 [PH] (ref 5–8)
PLATELET # BLD AUTO: 260 K/UL (ref 150–450)
PMV BLD AUTO: 10.1 FL (ref 9.2–12.9)
POTASSIUM SERPL-SCNC: 4.2 MMOL/L (ref 3.5–5.1)
PROT SERPL-MCNC: 7.8 G/DL (ref 6–8.4)
PROT UR QL STRIP: NEGATIVE
RBC # BLD AUTO: 4.12 M/UL (ref 4–5.4)
RBC #/AREA URNS HPF: 1 /HPF (ref 0–4)
SARS-COV-2 RDRP RESP QL NAA+PROBE: POSITIVE
SODIUM SERPL-SCNC: 139 MMOL/L (ref 136–145)
SP GR UR STRIP: 1.02 (ref 1–1.03)
TROPONIN I SERPL DL<=0.01 NG/ML-MCNC: 0.01 NG/ML (ref 0–0.03)
URN SPEC COLLECT METH UR: ABNORMAL
UROBILINOGEN UR STRIP-ACNC: NEGATIVE EU/DL
WBC # BLD AUTO: 10.23 K/UL (ref 3.9–12.7)
WBC #/AREA URNS HPF: 5 /HPF (ref 0–5)

## 2021-07-24 PROCEDURE — 93010 ELECTROCARDIOGRAM REPORT: CPT | Mod: ,,, | Performed by: INTERNAL MEDICINE

## 2021-07-24 PROCEDURE — 93010 EKG 12-LEAD: ICD-10-PCS | Mod: ,,, | Performed by: INTERNAL MEDICINE

## 2021-07-24 PROCEDURE — 81000 URINALYSIS NONAUTO W/SCOPE: CPT | Performed by: NURSE PRACTITIONER

## 2021-07-24 PROCEDURE — 80053 COMPREHEN METABOLIC PANEL: CPT | Performed by: NURSE PRACTITIONER

## 2021-07-24 PROCEDURE — U0002 COVID-19 LAB TEST NON-CDC: HCPCS | Performed by: NURSE PRACTITIONER

## 2021-07-24 PROCEDURE — 93005 ELECTROCARDIOGRAM TRACING: CPT

## 2021-07-24 PROCEDURE — 83880 ASSAY OF NATRIURETIC PEPTIDE: CPT | Performed by: NURSE PRACTITIONER

## 2021-07-24 PROCEDURE — 84484 ASSAY OF TROPONIN QUANT: CPT | Performed by: NURSE PRACTITIONER

## 2021-07-24 PROCEDURE — 99284 EMERGENCY DEPT VISIT MOD MDM: CPT | Mod: 25

## 2021-07-24 PROCEDURE — 85025 COMPLETE CBC W/AUTO DIFF WBC: CPT | Performed by: NURSE PRACTITIONER

## 2021-07-24 RX ORDER — METHYLPREDNISOLONE 4 MG/1
TABLET ORAL
Qty: 1 PACKAGE | Refills: 0 | Status: SHIPPED | OUTPATIENT
Start: 2021-07-24 | End: 2022-03-31

## 2021-07-24 RX ORDER — ALBUTEROL SULFATE 90 UG/1
2 AEROSOL, METERED RESPIRATORY (INHALATION) EVERY 4 HOURS PRN
Qty: 3.5 G | Refills: 0 | Status: ON HOLD | OUTPATIENT
Start: 2021-07-24 | End: 2022-04-06 | Stop reason: HOSPADM

## 2021-07-24 RX ORDER — BUDESONIDE AND FORMOTEROL FUMARATE DIHYDRATE 160; 4.5 UG/1; UG/1
AEROSOL RESPIRATORY (INHALATION)
Status: ON HOLD | COMMUNITY
End: 2022-04-06 | Stop reason: HOSPADM

## 2022-03-30 ENCOUNTER — NURSE TRIAGE (OUTPATIENT)
Dept: ADMINISTRATIVE | Facility: CLINIC | Age: 87
End: 2022-03-30
Payer: MEDICARE

## 2022-03-31 ENCOUNTER — HOSPITAL ENCOUNTER (INPATIENT)
Facility: HOSPITAL | Age: 87
LOS: 6 days | Discharge: HOSPICE | DRG: 280 | End: 2022-04-06
Attending: EMERGENCY MEDICINE | Admitting: EMERGENCY MEDICINE
Payer: MEDICARE

## 2022-03-31 DIAGNOSIS — I63.9 ACUTE CVA (CEREBROVASCULAR ACCIDENT): Primary | ICD-10-CM

## 2022-03-31 DIAGNOSIS — R10.9 ABDOMINAL PAIN: ICD-10-CM

## 2022-03-31 DIAGNOSIS — J96.01 ACUTE RESPIRATORY FAILURE WITH HYPOXIA: ICD-10-CM

## 2022-03-31 DIAGNOSIS — R07.9 CHEST PAIN: ICD-10-CM

## 2022-03-31 DIAGNOSIS — I63.9 CEREBROVASCULAR ACCIDENT (CVA), UNSPECIFIED MECHANISM: ICD-10-CM

## 2022-03-31 DIAGNOSIS — R41.82 AMS (ALTERED MENTAL STATUS): ICD-10-CM

## 2022-03-31 DIAGNOSIS — I48.4 ATYPICAL ATRIAL FLUTTER: ICD-10-CM

## 2022-03-31 DIAGNOSIS — J90 PLEURAL EFFUSION: ICD-10-CM

## 2022-03-31 DIAGNOSIS — R06.02 SOB (SHORTNESS OF BREATH): ICD-10-CM

## 2022-03-31 DIAGNOSIS — I48.92 ATRIAL FLUTTER: ICD-10-CM

## 2022-03-31 DIAGNOSIS — R53.1 WEAKNESS: ICD-10-CM

## 2022-03-31 DIAGNOSIS — R91.8 MASS OF LEFT LUNG: ICD-10-CM

## 2022-03-31 PROBLEM — R79.89 TROPONIN LEVEL ELEVATED: Status: ACTIVE | Noted: 2022-03-31

## 2022-03-31 PROBLEM — J18.9 PNEUMONIA DUE TO INFECTIOUS ORGANISM: Status: ACTIVE | Noted: 2022-03-31

## 2022-03-31 PROBLEM — N30.90 CYSTITIS: Status: ACTIVE | Noted: 2022-03-31

## 2022-03-31 PROBLEM — K59.01 SLOW TRANSIT CONSTIPATION: Status: ACTIVE | Noted: 2022-03-31

## 2022-03-31 LAB
ALBUMIN SERPL BCP-MCNC: 3.2 G/DL (ref 3.5–5.2)
ALP SERPL-CCNC: 141 U/L (ref 55–135)
ALT SERPL W/O P-5'-P-CCNC: 8 U/L (ref 10–44)
ANION GAP SERPL CALC-SCNC: 13 MMOL/L (ref 8–16)
AST SERPL-CCNC: 27 U/L (ref 10–40)
BACTERIA #/AREA URNS HPF: ABNORMAL /HPF
BASOPHILS # BLD AUTO: 0.08 K/UL (ref 0–0.2)
BASOPHILS NFR BLD: 0.6 % (ref 0–1.9)
BILIRUB SERPL-MCNC: 1.4 MG/DL (ref 0.1–1)
BILIRUB UR QL STRIP: NEGATIVE
BNP SERPL-MCNC: 177 PG/ML (ref 0–99)
BUN SERPL-MCNC: 18 MG/DL (ref 8–23)
CALCIUM SERPL-MCNC: 8.6 MG/DL (ref 8.7–10.5)
CHLORIDE SERPL-SCNC: 106 MMOL/L (ref 95–110)
CK SERPL-CCNC: 56 U/L (ref 20–180)
CLARITY UR: CLEAR
CO2 SERPL-SCNC: 21 MMOL/L (ref 23–29)
COLOR UR: YELLOW
CREAT SERPL-MCNC: 1.2 MG/DL (ref 0.5–1.4)
CTP QC/QA: YES
CTP QC/QA: YES
DIFFERENTIAL METHOD: ABNORMAL
EOSINOPHIL # BLD AUTO: 0.2 K/UL (ref 0–0.5)
EOSINOPHIL NFR BLD: 1.4 % (ref 0–8)
ERYTHROCYTE [DISTWIDTH] IN BLOOD BY AUTOMATED COUNT: 15.6 % (ref 11.5–14.5)
EST. GFR  (AFRICAN AMERICAN): 48 ML/MIN/1.73 M^2
EST. GFR  (NON AFRICAN AMERICAN): 42 ML/MIN/1.73 M^2
GLUCOSE SERPL-MCNC: 109 MG/DL (ref 70–110)
GLUCOSE UR QL STRIP: NEGATIVE
HCT VFR BLD AUTO: 40 % (ref 37–48.5)
HGB BLD-MCNC: 12.6 G/DL (ref 12–16)
HGB UR QL STRIP: NEGATIVE
IMM GRANULOCYTES # BLD AUTO: 0.04 K/UL (ref 0–0.04)
IMM GRANULOCYTES NFR BLD AUTO: 0.3 % (ref 0–0.5)
KETONES UR QL STRIP: NEGATIVE
LACTATE SERPL-SCNC: 1.2 MMOL/L (ref 0.5–2.2)
LEUKOCYTE ESTERASE UR QL STRIP: ABNORMAL
LYMPHOCYTES # BLD AUTO: 2.1 K/UL (ref 1–4.8)
LYMPHOCYTES NFR BLD: 15.3 % (ref 18–48)
MCH RBC QN AUTO: 30.9 PG (ref 27–31)
MCHC RBC AUTO-ENTMCNC: 31.5 G/DL (ref 32–36)
MCV RBC AUTO: 98 FL (ref 82–98)
MICROSCOPIC COMMENT: ABNORMAL
MONOCYTES # BLD AUTO: 2 K/UL (ref 0.3–1)
MONOCYTES NFR BLD: 15 % (ref 4–15)
NEUTROPHILS # BLD AUTO: 9.1 K/UL (ref 1.8–7.7)
NEUTROPHILS NFR BLD: 67.4 % (ref 38–73)
NITRITE UR QL STRIP: POSITIVE
NRBC BLD-RTO: 0 /100 WBC
PH UR STRIP: 6 [PH] (ref 5–8)
PLATELET # BLD AUTO: 348 K/UL (ref 150–450)
PMV BLD AUTO: 9.3 FL (ref 9.2–12.9)
POC MOLECULAR INFLUENZA A AGN: NEGATIVE
POC MOLECULAR INFLUENZA B AGN: NEGATIVE
POTASSIUM SERPL-SCNC: 4.2 MMOL/L (ref 3.5–5.1)
PROT SERPL-MCNC: 7 G/DL (ref 6–8.4)
PROT UR QL STRIP: NEGATIVE
RBC # BLD AUTO: 4.08 M/UL (ref 4–5.4)
SARS-COV-2 RDRP RESP QL NAA+PROBE: NEGATIVE
SODIUM SERPL-SCNC: 140 MMOL/L (ref 136–145)
SP GR UR STRIP: >=1.03 (ref 1–1.03)
TROPONIN I SERPL DL<=0.01 NG/ML-MCNC: 0.05 NG/ML (ref 0–0.03)
URN SPEC COLLECT METH UR: ABNORMAL
UROBILINOGEN UR STRIP-ACNC: NEGATIVE EU/DL
WBC # BLD AUTO: 13.44 K/UL (ref 3.9–12.7)
WBC #/AREA URNS HPF: 2 /HPF (ref 0–5)

## 2022-03-31 PROCEDURE — 94640 AIRWAY INHALATION TREATMENT: CPT

## 2022-03-31 PROCEDURE — 82550 ASSAY OF CK (CPK): CPT | Performed by: EMERGENCY MEDICINE

## 2022-03-31 PROCEDURE — 27000221 HC OXYGEN, UP TO 24 HOURS

## 2022-03-31 PROCEDURE — 83605 ASSAY OF LACTIC ACID: CPT | Performed by: EMERGENCY MEDICINE

## 2022-03-31 PROCEDURE — 87040 BLOOD CULTURE FOR BACTERIA: CPT | Mod: 59 | Performed by: EMERGENCY MEDICINE

## 2022-03-31 PROCEDURE — 63600175 PHARM REV CODE 636 W HCPCS: Performed by: EMERGENCY MEDICINE

## 2022-03-31 PROCEDURE — 96374 THER/PROPH/DIAG INJ IV PUSH: CPT

## 2022-03-31 PROCEDURE — 93010 EKG 12-LEAD: ICD-10-PCS | Mod: ,,, | Performed by: INTERNAL MEDICINE

## 2022-03-31 PROCEDURE — 27000207 HC ISOLATION

## 2022-03-31 PROCEDURE — 96372 THER/PROPH/DIAG INJ SC/IM: CPT | Performed by: EMERGENCY MEDICINE

## 2022-03-31 PROCEDURE — 21400001 HC TELEMETRY ROOM

## 2022-03-31 PROCEDURE — 25000003 PHARM REV CODE 250: Performed by: EMERGENCY MEDICINE

## 2022-03-31 PROCEDURE — 82803 BLOOD GASES ANY COMBINATION: CPT

## 2022-03-31 PROCEDURE — 93010 ELECTROCARDIOGRAM REPORT: CPT | Mod: 76,,, | Performed by: INTERNAL MEDICINE

## 2022-03-31 PROCEDURE — 25000003 PHARM REV CODE 250: Performed by: INTERNAL MEDICINE

## 2022-03-31 PROCEDURE — U0002 COVID-19 LAB TEST NON-CDC: HCPCS | Performed by: EMERGENCY MEDICINE

## 2022-03-31 PROCEDURE — 99900035 HC TECH TIME PER 15 MIN (STAT)

## 2022-03-31 PROCEDURE — 99291 CRITICAL CARE FIRST HOUR: CPT | Mod: 25

## 2022-03-31 PROCEDURE — 93005 ELECTROCARDIOGRAM TRACING: CPT

## 2022-03-31 PROCEDURE — 99223 1ST HOSP IP/OBS HIGH 75: CPT | Mod: ,,, | Performed by: INTERNAL MEDICINE

## 2022-03-31 PROCEDURE — 84484 ASSAY OF TROPONIN QUANT: CPT | Performed by: EMERGENCY MEDICINE

## 2022-03-31 PROCEDURE — 25000242 PHARM REV CODE 250 ALT 637 W/ HCPCS: Performed by: EMERGENCY MEDICINE

## 2022-03-31 PROCEDURE — 93010 ELECTROCARDIOGRAM REPORT: CPT | Mod: ,,, | Performed by: INTERNAL MEDICINE

## 2022-03-31 PROCEDURE — 63600175 PHARM REV CODE 636 W HCPCS

## 2022-03-31 PROCEDURE — 85025 COMPLETE CBC W/AUTO DIFF WBC: CPT | Performed by: EMERGENCY MEDICINE

## 2022-03-31 PROCEDURE — 36600 WITHDRAWAL OF ARTERIAL BLOOD: CPT

## 2022-03-31 PROCEDURE — 99292 CRITICAL CARE ADDL 30 MIN: CPT

## 2022-03-31 PROCEDURE — 99223 PR INITIAL HOSPITAL CARE,LEVL III: ICD-10-PCS | Mod: ,,, | Performed by: INTERNAL MEDICINE

## 2022-03-31 PROCEDURE — 80053 COMPREHEN METABOLIC PANEL: CPT | Performed by: EMERGENCY MEDICINE

## 2022-03-31 PROCEDURE — 82800 BLOOD PH: CPT

## 2022-03-31 PROCEDURE — 81000 URINALYSIS NONAUTO W/SCOPE: CPT | Performed by: EMERGENCY MEDICINE

## 2022-03-31 PROCEDURE — 87502 INFLUENZA DNA AMP PROBE: CPT

## 2022-03-31 PROCEDURE — 83880 ASSAY OF NATRIURETIC PEPTIDE: CPT | Performed by: EMERGENCY MEDICINE

## 2022-03-31 RX ORDER — METOPROLOL TARTRATE 50 MG/1
50 TABLET ORAL 2 TIMES DAILY
Status: DISCONTINUED | OUTPATIENT
Start: 2022-03-31 | End: 2022-04-01

## 2022-03-31 RX ORDER — DILTIAZEM HCL 1 MG/ML
0-15 INJECTION, SOLUTION INTRAVENOUS CONTINUOUS
Status: DISCONTINUED | OUTPATIENT
Start: 2022-03-31 | End: 2022-03-31

## 2022-03-31 RX ORDER — DILTIAZEM HYDROCHLORIDE 5 MG/ML
15 INJECTION INTRAVENOUS
Status: COMPLETED | OUTPATIENT
Start: 2022-03-31 | End: 2022-03-31

## 2022-03-31 RX ORDER — GABAPENTIN 100 MG/1
100 CAPSULE ORAL 3 TIMES DAILY
Status: DISCONTINUED | OUTPATIENT
Start: 2022-03-31 | End: 2022-04-04

## 2022-03-31 RX ORDER — CHLORPROMAZINE HYDROCHLORIDE 25 MG/ML
25 INJECTION INTRAMUSCULAR
Status: COMPLETED | OUTPATIENT
Start: 2022-03-31 | End: 2022-03-31

## 2022-03-31 RX ORDER — CETIRIZINE HYDROCHLORIDE 5 MG/1
5 TABLET ORAL NIGHTLY
Status: ON HOLD | COMMUNITY
End: 2022-04-06 | Stop reason: HOSPADM

## 2022-03-31 RX ORDER — NALOXONE HCL 0.4 MG/ML
0.4 VIAL (ML) INJECTION
Status: DISCONTINUED | OUTPATIENT
Start: 2022-04-01 | End: 2022-04-06 | Stop reason: HOSPADM

## 2022-03-31 RX ORDER — LEVOFLOXACIN 5 MG/ML
750 INJECTION, SOLUTION INTRAVENOUS
Status: COMPLETED | OUTPATIENT
Start: 2022-03-31 | End: 2022-03-31

## 2022-03-31 RX ORDER — METOPROLOL TARTRATE 50 MG/1
50 TABLET ORAL
Status: COMPLETED | OUTPATIENT
Start: 2022-03-31 | End: 2022-03-31

## 2022-03-31 RX ORDER — MORPHINE SULFATE 4 MG/ML
2 INJECTION, SOLUTION INTRAMUSCULAR; INTRAVENOUS EVERY 6 HOURS PRN
Status: DISCONTINUED | OUTPATIENT
Start: 2022-03-31 | End: 2022-03-31

## 2022-03-31 RX ORDER — BISACODYL 5 MG
10 TABLET, DELAYED RELEASE (ENTERIC COATED) ORAL ONCE
Status: COMPLETED | OUTPATIENT
Start: 2022-03-31 | End: 2022-03-31

## 2022-03-31 RX ORDER — ONDANSETRON 2 MG/ML
4 INJECTION INTRAMUSCULAR; INTRAVENOUS
Status: DISPENSED | OUTPATIENT
Start: 2022-03-31 | End: 2022-03-31

## 2022-03-31 RX ORDER — CYANOCOBALAMIN 1000 UG/ML
1000 INJECTION, SOLUTION INTRAMUSCULAR; SUBCUTANEOUS ONCE
Status: COMPLETED | OUTPATIENT
Start: 2022-03-31 | End: 2022-03-31

## 2022-03-31 RX ORDER — FUROSEMIDE 10 MG/ML
60 INJECTION INTRAMUSCULAR; INTRAVENOUS ONCE
Status: COMPLETED | OUTPATIENT
Start: 2022-04-01 | End: 2022-03-31

## 2022-03-31 RX ORDER — LEVOTHYROXINE SODIUM 112 UG/1
112 TABLET ORAL
Status: DISCONTINUED | OUTPATIENT
Start: 2022-04-01 | End: 2022-04-04

## 2022-03-31 RX ORDER — SODIUM CHLORIDE 9 MG/ML
INJECTION, SOLUTION INTRAVENOUS CONTINUOUS
Status: DISCONTINUED | OUTPATIENT
Start: 2022-03-31 | End: 2022-04-02

## 2022-03-31 RX ORDER — RIVAROXABAN 20 MG/1
20 TABLET, FILM COATED ORAL DAILY
Status: ON HOLD | COMMUNITY
Start: 2022-03-29 | End: 2022-04-06 | Stop reason: HOSPADM

## 2022-03-31 RX ORDER — LEVOTHYROXINE SODIUM 112 UG/1
112 TABLET ORAL
Status: ON HOLD | COMMUNITY
End: 2022-04-06 | Stop reason: HOSPADM

## 2022-03-31 RX ORDER — LEVOFLOXACIN 5 MG/ML
750 INJECTION, SOLUTION INTRAVENOUS
Status: DISCONTINUED | OUTPATIENT
Start: 2022-04-02 | End: 2022-04-01

## 2022-03-31 RX ORDER — FUROSEMIDE 10 MG/ML
INJECTION INTRAMUSCULAR; INTRAVENOUS
Status: COMPLETED
Start: 2022-03-31 | End: 2022-03-31

## 2022-03-31 RX ORDER — IPRATROPIUM BROMIDE AND ALBUTEROL SULFATE 2.5; .5 MG/3ML; MG/3ML
3 SOLUTION RESPIRATORY (INHALATION)
Status: DISCONTINUED | OUTPATIENT
Start: 2022-04-01 | End: 2022-04-04 | Stop reason: SDUPTHER

## 2022-03-31 RX ORDER — POLYETHYLENE GLYCOL 3350 17 G/17G
17 POWDER, FOR SOLUTION ORAL DAILY
Status: DISCONTINUED | OUTPATIENT
Start: 2022-03-31 | End: 2022-04-04

## 2022-03-31 RX ORDER — NALOXONE HCL 0.4 MG/ML
VIAL (ML) INJECTION
Status: COMPLETED
Start: 2022-03-31 | End: 2022-03-31

## 2022-03-31 RX ORDER — IPRATROPIUM BROMIDE AND ALBUTEROL SULFATE 2.5; .5 MG/3ML; MG/3ML
3 SOLUTION RESPIRATORY (INHALATION)
Status: DISCONTINUED | OUTPATIENT
Start: 2022-03-31 | End: 2022-04-02

## 2022-03-31 RX ORDER — DIPHENHYDRAMINE HCL 25 MG
25 CAPSULE ORAL
Status: COMPLETED | OUTPATIENT
Start: 2022-03-31 | End: 2022-03-31

## 2022-03-31 RX ORDER — AMOXICILLIN 250 MG
2 CAPSULE ORAL DAILY
Status: DISCONTINUED | OUTPATIENT
Start: 2022-03-31 | End: 2022-04-04

## 2022-03-31 RX ADMIN — MORPHINE SULFATE 2 MG: 4 INJECTION, SOLUTION INTRAMUSCULAR; INTRAVENOUS at 06:03

## 2022-03-31 RX ADMIN — BISACODYL 10 MG: 5 TABLET, COATED ORAL at 09:03

## 2022-03-31 RX ADMIN — GABAPENTIN 100 MG: 100 CAPSULE ORAL at 09:03

## 2022-03-31 RX ADMIN — DIPHENHYDRAMINE HYDROCHLORIDE 25 MG: 25 CAPSULE ORAL at 10:03

## 2022-03-31 RX ADMIN — Medication 0.4 MG: at 11:03

## 2022-03-31 RX ADMIN — POLYETHYLENE GLYCOL 3350 17 G: 17 POWDER, FOR SOLUTION ORAL at 09:03

## 2022-03-31 RX ADMIN — Medication 5 MG/HR: at 12:03

## 2022-03-31 RX ADMIN — LEVOFLOXACIN 750 MG: 750 INJECTION, SOLUTION INTRAVENOUS at 02:03

## 2022-03-31 RX ADMIN — IPRATROPIUM BROMIDE AND ALBUTEROL SULFATE 3 ML: 2.5; .5 SOLUTION RESPIRATORY (INHALATION) at 09:03

## 2022-03-31 RX ADMIN — DILTIAZEM HYDROCHLORIDE 15 MG: 5 INJECTION INTRAVENOUS at 12:03

## 2022-03-31 RX ADMIN — AMIODARONE HYDROCHLORIDE 1 MG/MIN: 1.8 INJECTION, SOLUTION INTRAVENOUS at 04:03

## 2022-03-31 RX ADMIN — METOPROLOL TARTRATE 50 MG: 50 TABLET, FILM COATED ORAL at 07:03

## 2022-03-31 RX ADMIN — FUROSEMIDE 60 MG: 10 INJECTION, SOLUTION INTRAMUSCULAR; INTRAVENOUS at 11:03

## 2022-03-31 RX ADMIN — SODIUM CHLORIDE: 0.9 INJECTION, SOLUTION INTRAVENOUS at 08:03

## 2022-03-31 RX ADMIN — AMIODARONE HYDROCHLORIDE 150 MG: 1.5 INJECTION, SOLUTION INTRAVENOUS at 03:03

## 2022-03-31 RX ADMIN — FUROSEMIDE 60 MG: 10 INJECTION INTRAMUSCULAR; INTRAVENOUS at 11:03

## 2022-03-31 RX ADMIN — CHLORPROMAZINE HYDROCHLORIDE 25 MG: 25 INJECTION INTRAMUSCULAR at 09:03

## 2022-03-31 RX ADMIN — DOCUSATE SODIUM AND SENNOSIDES 2 TABLET: 8.6; 5 TABLET, FILM COATED ORAL at 09:03

## 2022-03-31 RX ADMIN — AMIODARONE HYDROCHLORIDE 0.5 MG/MIN: 1.8 INJECTION, SOLUTION INTRAVENOUS at 08:03

## 2022-03-31 RX ADMIN — NALXONE HYDROCHLORIDE 0.4 MG: 0.4 INJECTION INTRAMUSCULAR; INTRAVENOUS; SUBCUTANEOUS at 11:03

## 2022-03-31 RX ADMIN — CYANOCOBALAMIN 1000 MCG: 1000 INJECTION INTRAMUSCULAR; SUBCUTANEOUS at 09:03

## 2022-03-31 NOTE — SUBJECTIVE & OBJECTIVE
Past Medical History:   Diagnosis Date    Anticoagulant long-term use     Asthma     Debility     Gait apraxia     Gout     Hypertension     NPH (normal pressure hydrocephalus)     Stroke     Thyroid disease        Past Surgical History:   Procedure Laterality Date    CAROTID ENDARTERECTOMY         Review of patient's allergies indicates:   Allergen Reactions    Food color red [red food color (bulk)] Nausea And Vomiting    Sulfa (sulfonamide antibiotics)      Other reaction(s): Unknown    Pcn [penicillins] Rash       No current facility-administered medications on file prior to encounter.     Current Outpatient Medications on File Prior to Encounter   Medication Sig    albuterol (PROVENTIL/VENTOLIN HFA) 90 mcg/actuation inhaler Inhale 2 puffs into the lungs every 4 (four) hours as needed for Wheezing. Take 2 puffs of the lungs every 4 hr as needed for wheezing or shortness of breath    budesonide-formoterol 160-4.5 mcg (SYMBICORT) 160-4.5 mcg/actuation HFAA Symbicort Take No date recorded HFA aerosol inhaler No frequency recorded No route recorded No set duration recorded No set duration amount recorded active 160-4.5 mcg/actuation    cetirizine (ZYRTEC) 5 MG tablet Take 5 mg by mouth every evening.    gabapentin (NEURONTIN) 100 MG capsule Take 100 mg by mouth 3 (three) times daily.    levalbuterol (XOPENEX) 0.63 mg/3 mL nebulizer solution Take 3 mLs (0.63 mg total) by nebulization 3 (three) times daily as needed for Wheezing. Rescue    levothyroxine (SYNTHROID) 112 MCG tablet Take 112 mcg by mouth before breakfast.    XARELTO 20 mg Tab Take 20 mg by mouth once daily.    ipratropium (ATROVENT) 0.02 % nebulizer solution Take 2.5 mLs (500 mcg total) by nebulization every 6 (six) hours. Rescue    [DISCONTINUED] allopurinol (ZYLOPRIM) 300 MG tablet Take 300 mg by mouth once daily.    [DISCONTINUED] amiodarone (PACERONE) 200 MG Tab Take 1 tablet (200 mg total) by mouth 2 (two) times daily.    [DISCONTINUED] aspirin  (ECOTRIN) 81 MG EC tablet Take 1 tablet (81 mg total) by mouth once daily.    [DISCONTINUED] atorvastatin (LIPITOR) 20 MG tablet Take 20 mg by mouth once daily.    [DISCONTINUED] ciprofloxacin HCl (CIPRO) 500 MG tablet TAKE 1 TABLET BY MOUTH EVERY 12 HOURS FOR 10 DAYS    [DISCONTINUED] diltiaZEM (CARDIZEM CD) 120 MG Cp24 Take 1 capsule (120 mg total) by mouth once daily.    [DISCONTINUED] guaiFENesin (MUCINEX) 600 mg 12 hr tablet Take 1 tablet (600 mg total) by mouth 2 (two) times daily.    [DISCONTINUED] levothyroxine (SYNTHROID) 112 MCG tablet Take 1 tablet (112 mcg total) by mouth once daily.    [DISCONTINUED] methylPREDNISolone (MEDROL DOSEPACK) 4 mg tablet Take as directed on the package.    [DISCONTINUED] pantoprazole (PROTONIX) 40 MG tablet Take 1 tablet (40 mg total) by mouth once daily.    [DISCONTINUED] rivaroxaban (XARELTO) 15 mg Tab Take 1 tablet (15 mg total) by mouth daily with dinner or evening meal.    [DISCONTINUED] traMADoL (ULTRAM) 50 mg tablet Take 1 tablet (50 mg total) by mouth 4 (four) times daily.     Family History    None       Tobacco Use    Smoking status: Former Smoker    Smokeless tobacco: Never Used   Substance and Sexual Activity    Alcohol use: No    Drug use: Never    Sexual activity: Not on file     Review of Systems   Constitutional: Negative for decreased appetite, diaphoresis, fever, malaise/fatigue and night sweats.   HENT:  Negative for nosebleeds.    Eyes:  Negative for blurred vision and double vision.   Cardiovascular:  Negative for chest pain, claudication, dyspnea on exertion, irregular heartbeat, leg swelling, near-syncope, orthopnea, palpitations, paroxysmal nocturnal dyspnea and syncope.   Respiratory:  Negative for cough, shortness of breath, sleep disturbances due to breathing, snoring, sputum production and wheezing.    Endocrine: Negative for cold intolerance and polyuria.   Hematologic/Lymphatic: Does not bruise/bleed easily.   Skin:  Negative for rash.    Musculoskeletal:  Negative for back pain, falls, joint pain, joint swelling and neck pain.        Leg pain on right   Gastrointestinal:  Negative for abdominal pain, heartburn, nausea and vomiting.   Genitourinary:  Negative for dysuria, frequency and hematuria.   Neurological:  Negative for difficulty with concentration, dizziness, focal weakness, headaches, light-headedness, numbness, seizures and weakness.   Psychiatric/Behavioral:  Negative for depression, memory loss and substance abuse. The patient does not have insomnia.    Allergic/Immunologic: Negative for HIV exposure and hives.   Objective:     Vital Signs (Most Recent):  Temp: 98.8 °F (37.1 °C) (03/31/22 0857)  Pulse: (!) 128 (03/31/22 1555)  Resp: (!) 22 (03/31/22 1556)  BP: 132/63 (03/31/22 1555)  SpO2: (!) 94 % (03/31/22 1500)   Vital Signs (24h Range):  Temp:  [98.8 °F (37.1 °C)] 98.8 °F (37.1 °C)  Pulse:  [108-178] 128  Resp:  [20-30] 22  SpO2:  [84 %-96 %] 94 %  BP: (132-162)/(63-95) 132/63        There is no height or weight on file to calculate BMI.    SpO2: (!) 94 %  O2 Device (Oxygen Therapy): nasal cannula      Intake/Output Summary (Last 24 hours) at 3/31/2022 1602  Last data filed at 3/31/2022 1500  Gross per 24 hour   Intake 12.19 ml   Output --   Net 12.19 ml       Lines/Drains/Airways       Drain  Duration             Female External Urinary Catheter 03/31/22 1000 <1 day              Peripheral Intravenous Line  Duration                  Peripheral IV - Single Lumen 03/31/22 1000 22 G Right Forearm <1 day         Peripheral IV - Single Lumen 03/31/22 1237 20 G Left Antecubital <1 day                    Physical Exam  HENT:      Head: Normocephalic.   Eyes:      Pupils: Pupils are equal, round, and reactive to light.   Neck:      Thyroid: No thyromegaly.      Vascular: Normal carotid pulses. No carotid bruit or JVD.   Cardiovascular:      Rate and Rhythm: Tachycardia present. Rhythm irregularly irregular. No extrasystoles are present.      Chest Wall: PMI is not displaced.      Pulses: Normal pulses.      Heart sounds: Normal heart sounds. No murmur heard.    No gallop. No S3 sounds.   Pulmonary:      Effort: No respiratory distress.      Breath sounds: Normal breath sounds. No stridor.   Abdominal:      General: Bowel sounds are normal.      Palpations: Abdomen is soft.      Tenderness: There is no abdominal tenderness. There is no rebound.   Musculoskeletal:         General: Swelling present. Normal range of motion.   Skin:     Findings: No rash.   Neurological:      Mental Status: She is alert and oriented to person, place, and time.   Psychiatric:         Behavior: Behavior normal.       Significant Labs:   Recent Lab Results         03/31/22  1242   03/31/22  1237   03/31/22  1030        POC Molecular Influenza A Ag Negative           POC Molecular Influenza B Ag Negative           Albumin     3.2       Alkaline Phosphatase     141       ALT     8       Anion Gap     13       AST     27       Baso #     0.08       Basophil %     0.6       BILIRUBIN TOTAL     1.4  Comment: For infants and newborns, interpretation of results should be based  on gestational age, weight and in agreement with clinical  observations.    Premature Infant recommended reference ranges:  Up to 24 hours.............<8.0 mg/dL  Up to 48 hours............<12.0 mg/dL  3-5 days..................<15.0 mg/dL  6-29 days.................<15.0 mg/dL         BNP   177  Comment: Values of less than 100 pg/ml are consistent with non-CHF populations.         BUN     18       Calcium     8.6       Chloride     106       CO2     21       CPK   56         Creatinine     1.2       Differential Method     Automated       eGFR if      48       eGFR if non      42  Comment: Calculation used to obtain the estimated glomerular filtration  rate (eGFR) is the CKD-EPI equation.          Eos #     0.2       Eosinophil %     1.4       Glucose     109       Gran #  (ANC)     9.1       Gran %     67.4       Hematocrit     40.0       Hemoglobin     12.6       Immature Grans (Abs)     0.04  Comment: Mild elevation in immature granulocytes is non specific and   can be seen in a variety of conditions including stress response,   acute inflammation, trauma and pregnancy. Correlation with other   laboratory and clinical findings is essential.         Immature Granulocytes     0.3       Lactate, [place]   1.2  Comment: Falsely low lactic acid results can be found in samples   containing >=13.0 mg/dL total bilirubin and/or >=3.5 mg/dL   direct bilirubin.           Lymph #     2.1       Lymph %     15.3       MCH     30.9       MCHC     31.5       MCV     98       Mono #     2.0       Mono %     15.0       MPV     9.3       nRBC     0       Platelets     348       Potassium     4.2       PROTEIN TOTAL     7.0        Acceptable Yes            Yes           RBC     4.08       RDW     15.6       SARS-CoV-2 RNA, Amplification, Qual Negative           Sodium     140       Troponin I   0.050  Comment: The reference interval for Troponin I represents the 99th percentile   cutoff   for our facility and is consistent with 3rd generation assay   performance.           WBC     13.44               Significant Imaging: EKG:

## 2022-03-31 NOTE — TELEPHONE ENCOUNTER
Pt's daughter reports pt had a UTI was taking Cipro, has been done with it a week ago Monday, but has dealt with nausea ever since, got a phenergan shot yesterday in MD office and got prescribed zofran that she has been taking, pt has been in bed all day with weakness (Daughter states this is not completely abnormal due to pt's history of stroke on Rt side), muscle aches when touched (which is new for pt), and stomach spasms that are not painful, just annoying, Pt advised to see a MD within 2 weeks per protocol, [facility] kirill offered and accepted by daughter at this time. Daughter/pt encouraged to call back with any worsening symptoms or questions and verbalized understanding.    Reason for Disposition   [1] MILD pain (e.g., does not interfere with normal activities) AND [2] present > 7 days    Additional Information   Negative: Shock suspected (e.g., cold/pale/clammy skin, too weak to stand, low BP, rapid pulse)   Negative: Difficult to awaken or acting confused (e.g., disoriented, slurred speech)   Negative: Sounds like a life-threatening emergency to the triager   Negative: Dark (cola colored) or red-colored urine   Negative: [1] Drinking very little AND [2] dehydration suspected (e.g., no urine > 12 hours, very dry mouth, very lightheaded)   Negative: Patient sounds very sick or weak to the triager   Negative: [1] SEVERE pain (e.g., excruciating, unable to do any normal activities) AND [2] not improved 2 hours after pain medicine   Negative: [1] SEVERE pain AND [2] taking a statin medicine (a lipid or cholesterol lowering drug)   Negative: Fever > 104 F (40 C)   Negative: [1] Fever > 101 F (38.3 C) AND [2] age > 60 years   Negative: [1] Fever > 100.0 F (37.8 C) AND [2] bedridden (e.g., nursing home patient, CVA, chronic illness, recovering from surgery)   Negative: [1] Fever > 100.0 F (37.8 C) AND [2] indwelling urinary catheter (e.g., [place], [place])   Negative: [1] Fever > 100.0 F (37.8 C) AND [2]  diabetes mellitus or weak immune system (e.g., HIV positive, cancer chemo, splenectomy, organ transplant, chronic steroids)   Negative: Fever present > 3 days (72 hours)   Negative: [1] Muscle aches are unexplained AND [2] occur within 1 month of a tick bite   Negative: Diabetes mellitus or weak immune system (e.g., HIV positive, cancer chemo, splenectomy, organ transplant, chronic steroids)   Negative: [1] MODERATE pain (e.g., interferes with normal activities) AND [2] present > 3 days   Negative: [1] MILD or MODERATE muscle aches or pain AND [2] taking a statin medicine (a lipid or cholesterol lowering drug)   Negative: [1] Age > 50 AND [2] bilateral shoulder pains present > 2 weeks    Protocols used: MUSCLE ACHES AND BODY PAIN-A-AH

## 2022-03-31 NOTE — ED NOTES
Assumed care of patient at this time pt moved from Q track to room 1.  Pt present for abdominal pain and back spasms, pt found to have a new onset of AFIB with RVR. Pt is AAOX4, skin is w/d/i, resp e/u, pt has hx of stroke with right sided deficits and speech deficits noted. wctm for further plan of care and assessment.

## 2022-03-31 NOTE — HPI
87 yo female, cardiology consult for afib/AFL with RVR  PMH PAF on xeratlo, sthma, HTN, h/o CVA with residual right-sided weakness, s/p CEA  Went to ER deu to leg pain and found afl with RVR in ER and started Cardizem gtt. No wHR at 175 bpm. Denied chest pain dyspnea palpitation and dizziness  Echo in  normal EF   EKG today A flutter  Troponin 0.05 and   Cr 1.2 and HGB 12.6  CXR left PNA  Abd CT showed left pleural effusion, gallbladder stone and mild diverticulosis

## 2022-03-31 NOTE — HPI
Ana Castañeda is a 86 y.o. 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 The pain is described as crampy/spasms. No associated NVD, fever or chills. She had a UTI which was treated with oral Cipro 2 weeks ago. She denies any CP, SOB, dizziness, weakness, numbness, and all other sxs at this time. daughter states that when she has been feeling tired and not eating drinking much as well for last few days nidhi after she finished her cipro dose and her R sided weakness has got worse. In the ER, initial VSS, Afebrile, Sats 95% on RA. CXr showed Left infrahilar pneumonia. On CT Abd/Pelvis Renal Stone showed moderate left pleural effusion with infiltrate/consolidation left lung base.  Possible perihilar lung mass. Dedicated CT chest would be helpful, if clinically warranted.  2. Hypodensities within the liver most likely representing cysts.  3. Punctate gallstones or sludge within the gallbladder.  4. Very small hiatal hernia.  Mild diverticulosis.  5. Hysterectomy.    She was also found to be in A Fluttter w - she was given IV Cardizem 15 mg bolus and then started on titrating Cardizem gtt and hence admitted to ICU initially. Cards evaluated the pt and d/hira Cardizem and started her on Amiodarone gtt after bolus and also started metoprol with improved heart rate. Pt felt much better and was downgraded to Tele. She also received IV Levaquin 750 mg in the ER.     She had a similar admission here in 2019 when she was admitted to ICU on Bipap, with acute hypoxemic resp failure sec to LLL Pneumonia and Afib w RVR and Lactic Acidosis.

## 2022-03-31 NOTE — ED NOTES
Assumed care of patient at 1605. Report received from GONZALO Buckley.   Upon arriving in room, IV to L AC noted to be infiltrated. IV removed.   Nasal cannula was not in pt's nares; cannula moved to correct position.   Pt changed into hospital gown.   Pt in NAD.

## 2022-03-31 NOTE — ASSESSMENT & PLAN NOTE
AFIb/AFL with RVR    -d/c cardizem  -start amio bouls and gtt  -add metoprol as needed for high BP  -continue xarelto 20 mg daily and pt states that she took Xarelto faithfully and only off yesterday and today  -keep NPO after mn and consider NOMAN/DCCv if still afib with RVR in AM  -check TSH

## 2022-03-31 NOTE — CONSULTS
[facility] - Emergency Dept.  Cardiology  Consult Note    Patient Name: Ana Castañeda  MRN: [id number]  Admission Date: 3/31/2022  Hospital Length of Stay: 0 days  Code Status: Prior   Attending Provider: No att. providers found   Consulting Provider: Sundeep Molina MD  Primary Care Physician: Stephen Tarango MD  Principal Problem:<principal problem not specified>    Patient information was obtained from patient and ER records.     Inpatient consult to Cardiology  Consult performed by: Sundeep Molina MD  Consult ordered by: Rikki Serrato MD        Subjective:         HPI:   87 yo female, cardiology consult for afib/AFL with RVR  PMH PAF on xeratlo, sthma, HTN, h/o CVA with residual right-sided weakness, s/p CEA  Went to ER deu to leg pain and found afl with RVR in ER and started Cardizem gtt. No wHR at 175 bpm. Denied chest pain dyspnea palpitation and dizziness  Echo in  normal EF   EKG today A flutter  Troponin 0.05 and   Cr 1.2 and HGB 12.6  CXR left PNA  Abd CT showed left pleural effusion, gallbladder stone and mild diverticulosis        Past Medical History:   Diagnosis Date    Anticoagulant long-term use     Asthma     Debility     Gait apraxia     Gout     Hypertension     NPH (normal pressure hydrocephalus)     Stroke     Thyroid disease        Past Surgical History:   Procedure Laterality Date    CAROTID ENDARTERECTOMY         Review of patient's allergies indicates:   Allergen Reactions    Food color red [red food color (bulk)] Nausea And Vomiting    Sulfa (sulfonamide antibiotics)      Other reaction(s): Unknown    Pcn [penicillins] Rash       No current facility-administered medications on file prior to encounter.     Current Outpatient Medications on File Prior to Encounter   Medication Sig    albuterol (PROVENTIL/VENTOLIN HFA) 90 mcg/actuation inhaler Inhale 2 puffs into the lungs every 4 (four) hours as needed for Wheezing. Take 2 puffs of the lungs every 4 hr as needed for wheezing  or shortness of breath    budesonide-formoterol 160-4.5 mcg (SYMBICORT) 160-4.5 mcg/actuation HFAA Symbicort Take No date recorded HFA aerosol inhaler No frequency recorded No route recorded No set duration recorded No set duration amount recorded active 160-4.5 mcg/actuation    cetirizine (ZYRTEC) 5 MG tablet Take 5 mg by mouth every evening.    gabapentin (NEURONTIN) 100 MG capsule Take 100 mg by mouth 3 (three) times daily.    levalbuterol (XOPENEX) 0.63 mg/3 mL nebulizer solution Take 3 mLs (0.63 mg total) by nebulization 3 (three) times daily as needed for Wheezing. Rescue    levothyroxine (SYNTHROID) 112 MCG tablet Take 112 mcg by mouth before breakfast.    XARELTO 20 mg Tab Take 20 mg by mouth once daily.    ipratropium (ATROVENT) 0.02 % nebulizer solution Take 2.5 mLs (500 mcg total) by nebulization every 6 (six) hours. Rescue    [DISCONTINUED] allopurinol (ZYLOPRIM) 300 MG tablet Take 300 mg by mouth once daily.    [DISCONTINUED] amiodarone (PACERONE) 200 MG Tab Take 1 tablet (200 mg total) by mouth 2 (two) times daily.    [DISCONTINUED] aspirin (ECOTRIN) 81 MG EC tablet Take 1 tablet (81 mg total) by mouth once daily.    [DISCONTINUED] atorvastatin (LIPITOR) 20 MG tablet Take 20 mg by mouth once daily.    [DISCONTINUED] ciprofloxacin HCl (CIPRO) 500 MG tablet TAKE 1 TABLET BY MOUTH EVERY 12 HOURS FOR 10 DAYS    [DISCONTINUED] diltiaZEM (CARDIZEM CD) 120 MG Cp24 Take 1 capsule (120 mg total) by mouth once daily.    [DISCONTINUED] guaiFENesin (MUCINEX) 600 mg 12 hr tablet Take 1 tablet (600 mg total) by mouth 2 (two) times daily.    [DISCONTINUED] levothyroxine (SYNTHROID) 112 MCG tablet Take 1 tablet (112 mcg total) by mouth once daily.    [DISCONTINUED] methylPREDNISolone (MEDROL DOSEPACK) 4 mg tablet Take as directed on the package.    [DISCONTINUED] pantoprazole (PROTONIX) 40 MG tablet Take 1 tablet (40 mg total) by mouth once daily.    [DISCONTINUED] rivaroxaban (XARELTO) 15 mg Tab  Take 1 tablet (15 mg total) by mouth daily with dinner or evening meal.    [DISCONTINUED] traMADoL (ULTRAM) 50 mg tablet Take 1 tablet (50 mg total) by mouth 4 (four) times daily.     Family History    None       Tobacco Use    Smoking status: Former Smoker    Smokeless tobacco: Never Used   Substance and Sexual Activity    Alcohol use: No    Drug use: Never    Sexual activity: Not on file     Review of Systems   Constitutional: Negative for decreased appetite, diaphoresis, fever, malaise/fatigue and night sweats.   HENT:  Negative for nosebleeds.    Eyes:  Negative for blurred vision and double vision.   Cardiovascular:  Negative for chest pain, claudication, dyspnea on exertion, irregular heartbeat, leg swelling, near-syncope, orthopnea, palpitations, paroxysmal nocturnal dyspnea and syncope.   Respiratory:  Negative for cough, shortness of breath, sleep disturbances due to breathing, snoring, sputum production and wheezing.    Endocrine: Negative for cold intolerance and polyuria.   Hematologic/Lymphatic: Does not bruise/bleed easily.   Skin:  Negative for rash.   Musculoskeletal:  Negative for back pain, falls, joint pain, joint swelling and neck pain.        Leg pain on right   Gastrointestinal:  Negative for abdominal pain, heartburn, nausea and vomiting.   Genitourinary:  Negative for dysuria, frequency and hematuria.   Neurological:  Negative for difficulty with concentration, dizziness, focal weakness, headaches, light-headedness, numbness, seizures and weakness.   Psychiatric/Behavioral:  Negative for depression, memory loss and substance abuse. The patient does not have insomnia.    Allergic/Immunologic: Negative for HIV exposure and hives.   Objective:     Vital Signs (Most Recent):  Temp: 98.8 °F (37.1 °C) (03/31/22 0857)  Pulse: (!) 128 (03/31/22 1555)  Resp: (!) 22 (03/31/22 1556)  BP: 132/63 (03/31/22 1555)  SpO2: (!) 94 % (03/31/22 1500)   Vital Signs (24h Range):  Temp:  [98.8 °F (37.1 °C)]  98.8 °F (37.1 °C)  Pulse:  [108-178] 128  Resp:  [20-30] 22  SpO2:  [84 %-96 %] 94 %  BP: (132-162)/(63-95) 132/63        There is no height or weight on file to calculate BMI.    SpO2: (!) 94 %  O2 Device (Oxygen Therapy): nasal cannula      Intake/Output Summary (Last 24 hours) at 3/31/2022 1602  Last data filed at 3/31/2022 1500  Gross per 24 hour   Intake 12.19 ml   Output --   Net 12.19 ml       Lines/Drains/Airways       Drain  Duration             Female External Urinary Catheter 03/31/22 1000 <1 day              Peripheral Intravenous Line  Duration                  Peripheral IV - Single Lumen 03/31/22 1000 22 G Right Forearm <1 day         Peripheral IV - Single Lumen 03/31/22 1237 20 G Left Antecubital <1 day                    Physical Exam  HENT:      Head: Normocephalic.   Eyes:      Pupils: Pupils are equal, round, and reactive to light.   Neck:      Thyroid: No thyromegaly.      Vascular: Normal carotid pulses. No carotid bruit or JVD.   Cardiovascular:      Rate and Rhythm: Tachycardia present. Rhythm irregularly irregular. No extrasystoles are present.     Chest Wall: PMI is not displaced.      Pulses: Normal pulses.      Heart sounds: Normal heart sounds. No murmur heard.    No gallop. No S3 sounds.   Pulmonary:      Effort: No respiratory distress.      Breath sounds: Normal breath sounds. No stridor.   Abdominal:      General: Bowel sounds are normal.      Palpations: Abdomen is soft.      Tenderness: There is no abdominal tenderness. There is no rebound.   Musculoskeletal:         General: Swelling present. Normal range of motion.   Skin:     Findings: No rash.   Neurological:      Mental Status: She is alert and oriented to person, place, and time.   Psychiatric:         Behavior: Behavior normal.       Significant Labs:   Recent Lab Results         03/31/22  1242   03/31/22  1237   03/31/22  1030        POC Molecular Influenza A Ag Negative           POC Molecular Influenza B Ag Negative            Albumin     3.2       Alkaline Phosphatase     141       ALT     8       Anion Gap     13       AST     27       Baso #     0.08       Basophil %     0.6       BILIRUBIN TOTAL     1.4  Comment: For infants and newborns, interpretation of results should be based  on gestational age, weight and in agreement with clinical  observations.    Premature Infant recommended reference ranges:  Up to 24 hours.............<8.0 mg/dL  Up to 48 hours............<12.0 mg/dL  3-5 days..................<15.0 mg/dL  6-29 days.................<15.0 mg/dL         BNP   177  Comment: Values of less than 100 pg/ml are consistent with non-CHF populations.         BUN     18       Calcium     8.6       Chloride     106       CO2     21       CPK   56         Creatinine     1.2       Differential Method     Automated       eGFR if      48       eGFR if non      42  Comment: Calculation used to obtain the estimated glomerular filtration  rate (eGFR) is the CKD-EPI equation.          Eos #     0.2       Eosinophil %     1.4       Glucose     109       Gran # (ANC)     9.1       Gran %     67.4       Hematocrit     40.0       Hemoglobin     12.6       Immature Grans (Abs)     0.04  Comment: Mild elevation in immature granulocytes is non specific and   can be seen in a variety of conditions including stress response,   acute inflammation, trauma and pregnancy. Correlation with other   laboratory and clinical findings is essential.         Immature Granulocytes     0.3       Lactate, [place]   1.2  Comment: Falsely low lactic acid results can be found in samples   containing >=13.0 mg/dL total bilirubin and/or >=3.5 mg/dL   direct bilirubin.           Lymph #     2.1       Lymph %     15.3       MCH     30.9       MCHC     31.5       MCV     98       Mono #     2.0       Mono %     15.0       MPV     9.3       nRBC     0       Platelets     348       Potassium     4.2       PROTEIN TOTAL     7.0       Quality  Control Acceptable Yes            Yes           RBC     4.08       RDW     15.6       SARS-CoV-2 RNA, Amplification, Qual Negative           Sodium     140       Troponin I   0.050  Comment: The reference interval for Troponin I represents the 99th percentile   cutoff   for our facility and is consistent with 3rd generation assay   performance.           WBC     13.44               Significant Imaging: EKG:      Assessment and Plan:     Troponin level elevated  Demand ischemia >>> NSTEMI/ACS    rx for AFL with RVR    Atrial flutter with RVR  AFIb/AFL with RVR    -d/c cardizem  -start amio bouls and gtt  -add metoprol as needed for high BP  -continue xarelto 20 mg daily and pt states that she took Xarelto faithfully and only off yesterday and today  -keep NPO after mn and consider NOMAN/DCCv if still afib with RVR in AM  -check TSH    Weakness  F/u with HM    Essential hypertension  See above note    PAF (paroxysmal atrial fibrillation)  See above note        VTE Risk Mitigation (From admission, onward)    None          Thank you for your consult. I will follow-up with patient. Please contact us if you have any additional questions.    Sundeep Molina MD  Cardiology   [facility] - Emergency Dept.     with patient

## 2022-03-31 NOTE — ED PROVIDER NOTES
SCRIBE #1 NOTE: I, Wilson Spangler, am scribing for, and in the presence of, Rikki Serrato MD. I have scribed the entire note.      History     Chief Complaint   Patient presents with    Abdominal Pain     Abdominal pain/spasms     Review of patient's allergies indicates:   Allergen Reactions    Food color red [red food color (bulk)] Nausea And Vomiting    Sulfa (sulfonamide antibiotics)      Other reaction(s): Unknown    Pcn [penicillins] Rash         History of Present Illness     HPI    3/31/2022, 9:19 AM  History obtained from the patient      History of Present Illness: Ana Castañeda is a 86 y.o. female patient with a PMHx of asthma, HTN, and stroke, who presents to the Emergency Department for evaluation of generalized abdominal pain which onset gradually 2 days ago.  The pain is described as spasms. Symptoms are constant and moderate in severity. No mitigating or exacerbating factors reported. No associated symptoms reported. Patient denies any fever, chills, N/V, CP, SOB, dizziness, weakness, numbness, and all other sxs at this time. No prior Tx reported. No further complaints or concerns at this time.      Arrival mode: Ambulance Service    PCP: Stephen Tarango MD        Past Medical History:  Past Medical History:   Diagnosis Date    Anticoagulant long-term use     Asthma     Debility     Gait apraxia     Gout     Hypertension     NPH (normal pressure hydrocephalus)     Stroke     Thyroid disease        Past Surgical History:  Past Surgical History:   Procedure Laterality Date    CAROTID ENDARTERECTOMY           Family History:  No family history on file.    Social History:  Social History     Tobacco Use    Smoking status: Former Smoker    Smokeless tobacco: Never Used   Substance and Sexual Activity    Alcohol use: No    Drug use: Never    Sexual activity: Not on file        Review of Systems     Review of Systems   Constitutional: Negative for fever.   HENT: Negative for congestion  and sore throat.    Respiratory: Negative for cough and shortness of breath.    Cardiovascular: Negative for chest pain.   Gastrointestinal: Positive for abdominal pain (generalized). Negative for diarrhea, nausea and vomiting.   Genitourinary: Negative for dysuria.   Musculoskeletal: Negative for back pain.   Skin: Negative for rash.   Neurological: Negative for dizziness, weakness and numbness.   Hematological: Does not bruise/bleed easily.   All other systems reviewed and are negative.     Physical Exam     Initial Vitals [03/31/22 0857]   BP Pulse Resp Temp SpO2   (!) 153/88 110 20 98.8 °F (37.1 °C) 95 %      MAP       --          Physical Exam  Nursing Notes and Vital Signs Reviewed.  Constitutional: Patient is in no acute distress. Well-developed and well-nourished.  Head: Atraumatic. Normocephalic.  Eyes: EOM intact. Conjunctivae are not pale. No scleral icterus.  ENT: Mucous membranes are moist. Oropharynx is clear and symmetric.    Neck: Supple. Full ROM. No lymphadenopathy.  Cardiovascular: Regular rate. Regular rhythm. No murmurs, rubs, or gallops. Distal pulses are 2+ and symmetric.  Pulmonary/Chest: No respiratory distress. Clear to auscultation bilaterally. No wheezing or rales.  Abdominal: Soft and non-distended.  There is no tenderness.  No rebound, guarding, or rigidity.  Musculoskeletal: Moves all extremities. No obvious deformities. No edema.  Skin: Warm and dry.  Neurological:  Alert, awake, and appropriate.  Normal speech.  No acute focal neurological deficits are appreciated.  Psychiatric: Normal affect. Good eye contact. Appropriate in content.     ED Course   Critical Care    Date/Time: 3/31/2022 1:13 PM  Performed by: Rikki Serrato MD  Authorized by: Rikki Serrato MD   Direct patient critical care time: 55 minutes  Additional history critical care time: 10 minutes  Ordering / reviewing critical care time: 5 minutes  Documentation critical care time: 8 minutes  Consulting other  physicians critical care time: 12 minutes  Total critical care time (exclusive of procedural time) : 90 minutes  Critical care time was exclusive of separately billable procedures and treating other patients and teaching time.  Critical care was necessary to treat or prevent imminent or life-threatening deterioration of the following conditions: a-flutter, weakness.  Critical care was time spent personally by me on the following activities: blood draw for specimens, development of treatment plan with patient or surrogate, discussions with consultants, interpretation of cardiac output measurements, evaluation of patient's response to treatment, examination of patient, obtaining history from patient or surrogate, ordering and performing treatments and interventions, ordering and review of laboratory studies, ordering and review of radiographic studies, pulse oximetry, re-evaluation of patient's condition and review of old charts.        ED Vital Signs:  Vitals:    03/31/22 0857 03/31/22 1030 03/31/22 1230   BP: (!) 153/88 (!) 148/80 (!) 133/95   Pulse: 110 108 (!) 175   Resp: 20 20 20   Temp: 98.8 °F (37.1 °C)     TempSrc: Oral     SpO2: 95% 96% (!) 93%     Abnormal Lab Results:  Labs Reviewed   CBC W/ AUTO DIFFERENTIAL - Abnormal; Notable for the following components:       Result Value    WBC 13.44 (*)     MCHC 31.5 (*)     RDW 15.6 (*)     Gran # (ANC) 9.1 (*)     Mono # 2.0 (*)     Lymph % 15.3 (*)     All other components within normal limits   COMPREHENSIVE METABOLIC PANEL - Abnormal; Notable for the following components:    CO2 21 (*)     Calcium 8.6 (*)     Albumin 3.2 (*)     Total Bilirubin 1.4 (*)     Alkaline Phosphatase 141 (*)     ALT 8 (*)     eGFR if  48 (*)     eGFR if non  42 (*)     All other components within normal limits   B-TYPE NATRIURETIC PEPTIDE - Abnormal; Notable for the following components:     (*)     All other components within normal limits    TROPONIN I - Abnormal; Notable for the following components:    Troponin I 0.050 (*)     All other components within normal limits   SARS-COV-2 RDRP GENE - Normal    Narrative:     This test utilizes isothermal nucleic acid amplification   technology to detect the SARS-CoV-2 RdRp nucleic acid segment.   The analytical sensitivity (limit of detection) is 125 genome   equivalents/mL.   A POSITIVE result implies infection with the SARS-CoV-2 virus;   the patient is presumed to be contagious.     A NEGATIVE result means that SARS-CoV-2 nucleic acids are not   present above the limit of detection. A NEGATIVE result should be   treated as presumptive. It does not rule out the possibility of   COVID-19 and should not be the sole basis for treatment decisions.   If COVID-19 is strongly suspected based on clinical and exposure   history, re-testing using an alternate molecular assay should be   considered.   This test is only for use under the Food and Drug   Administration s Emergency Use Authorization (EUA).   Commercial kits are provided by [employer].   Performance characteristics of the EUA have been independently   verified by [facility] Department of   Pathology and Laboratory Medicine.   _________________________________________________________________   The authorized Fact Sheet for Healthcare Providers and the authorized Fact   Sheet for Patients of the ID NOW COVID-19 are available on the FDA   website:     https://www.fda.gov/media/186711/download  https://www.fda.gov/media/160085/download           POCT INFLUENZA A/B MOLECULAR - Normal   CULTURE, BLOOD   CULTURE, BLOOD   CK   LACTIC ACID, PLASMA   URINALYSIS, REFLEX TO URINE CULTURE        All Lab Results:  Results for orders placed or performed during the hospital encounter of 03/31/22   CBC Auto Differential   Result Value Ref Range    WBC 13.44 (H) 3.90 - 12.70 K/uL    RBC 4.08 4.00 - 5.40 M/uL    Hemoglobin 12.6 12.0 - 16.0 g/dL     Hematocrit 40.0 37.0 - 48.5 %    MCV 98 82 - 98 fL    MCH 30.9 27.0 - 31.0 pg    MCHC 31.5 (L) 32.0 - 36.0 g/dL    RDW 15.6 (H) 11.5 - 14.5 %    Platelets 348 150 - 450 K/uL    MPV 9.3 9.2 - 12.9 fL    Immature Granulocytes 0.3 0.0 - 0.5 %    Gran # (ANC) 9.1 (H) 1.8 - 7.7 K/uL    Immature Grans (Abs) 0.04 0.00 - 0.04 K/uL    Lymph # 2.1 1.0 - 4.8 K/uL    Mono # 2.0 (H) 0.3 - 1.0 K/uL    Eos # 0.2 0.0 - 0.5 K/uL    Baso # 0.08 0.00 - 0.20 K/uL    nRBC 0 0 /100 WBC    Gran % 67.4 38.0 - 73.0 %    Lymph % 15.3 (L) 18.0 - 48.0 %    Mono % 15.0 4.0 - 15.0 %    Eosinophil % 1.4 0.0 - 8.0 %    Basophil % 0.6 0.0 - 1.9 %    Differential Method Automated    Comprehensive Metabolic Panel   Result Value Ref Range    Sodium 140 136 - 145 mmol/L    Potassium 4.2 3.5 - 5.1 mmol/L    Chloride 106 95 - 110 mmol/L    CO2 21 (L) 23 - 29 mmol/L    Glucose 109 70 - 110 mg/dL    BUN 18 8 - 23 mg/dL    Creatinine 1.2 0.5 - 1.4 mg/dL    Calcium 8.6 (L) 8.7 - 10.5 mg/dL    Total Protein 7.0 6.0 - 8.4 g/dL    Albumin 3.2 (L) 3.5 - 5.2 g/dL    Total Bilirubin 1.4 (H) 0.1 - 1.0 mg/dL    Alkaline Phosphatase 141 (H) 55 - 135 U/L    AST 27 10 - 40 U/L    ALT 8 (L) 10 - 44 U/L    Anion Gap 13 8 - 16 mmol/L    eGFR if African American 48 (A) >60 mL/min/1.73 m^2    eGFR if non African American 42 (A) >60 mL/min/1.73 m^2   BNP   Result Value Ref Range     (H) 0 - 99 pg/mL   CK   Result Value Ref Range    CPK 56 20 - 180 U/L   Troponin I   Result Value Ref Range    Troponin I 0.050 (H) 0.000 - 0.026 ng/mL   Lactic acid, plasma   Result Value Ref Range    Lactate (Lactic Acid) 1.2 0.5 - 2.2 mmol/L   POCT COVID-19 Rapid Screening   Result Value Ref Range    POC Rapid COVID Negative Negative     Acceptable Yes    POCT Influenza A/B Molecular   Result Value Ref Range    POC Molecular Influenza A Ag Negative Negative, Not Reported    POC Molecular Influenza B Ag Negative Negative, Not Reported     Acceptable Yes       Imaging Results:  Imaging Results          X-Ray Chest AP Portable (Final result)  Result time 03/31/22 12:07:40    Final result by Milan Tristan MD (03/31/22 12:07:40)                 Impression:      Left infrahilar opacity most likely representing pneumonia.      Electronically signed by: Milan Tristan  Date:    03/31/2022  Time:    12:07             Narrative:    EXAMINATION:  XR CHEST AP PORTABLE    CLINICAL HISTORY:  sob;  shortness of breath.    COMPARISON:  07/24/2021    FINDINGS:  7.7 cm opacity left infrahilar region, most consistent with pneumonia, new since prior exam.  Right lung clear.  Heart size within normal limits.                               CT Renal Stone Study ABD Pelvis WO (Final result)  Result time 03/31/22 11:19:15    Final result by Milan Tristan MD (03/31/22 11:19:15)                 Impression:      1. Moderate left pleural effusion with infiltrate/consolidation left lung base.  Possible perihilar lung mass.  Dedicated CT chest would be helpful, if clinically warranted.  2. Hypodensities within the liver most likely representing cysts.  3. Punctate gallstones or sludge within the gallbladder.  4. Very small hiatal hernia.  Mild diverticulosis.  5. Hysterectomy.  All CT scans at this facility are performed  using dose modulation techniques as appropriate to performed exam including the following:  automated exposure control; adjustment of mA and/or kV according to the patients size (this includes techniques or standardized protocols for targeted exams where dose is matched to indication/reason for exam: i.e. extremities or head);  iterative reconstruction technique.      Electronically signed by: Milan Tristan  Date:    03/31/2022  Time:    11:19             Narrative:    EXAMINATION:  CT RENAL STONE STUDY ABD PELVIS WO    CLINICAL HISTORY:  Flank pain, kidney stone suspected;.    TECHNIQUE:  Low dose axial images, sagittal and coronal reformations were  obtained from the lung bases to the pubic symphysis.    COMPARISON:  None    FINDINGS:  Moderate size left pleural effusion with infiltrate and consolidation or possible lung mass in the perihilar region measuring 2.3 cm.  Right lung base clear.    2.1 cm hypodensity anterior and inferior liver adjacent the gallbladder fossa.  Finding likely represents a cyst (series 2, image 38).  13 mm hypodensity dome of the junction of the right lobe of the liver could represent small cyst (series 2, image 24).  Punctate gallstones are sludge layering within the lumen of the gallbladder.    The spleen is normal in size and appearance.  The pancreas is normal.  The adrenal glands are normal.  The aorta and IVC are normal.  No retroperitoneal adenopathy.    Left kidney and left ureter are normal.  Right kidney and right ureter are normal.    Very small hiatal hernia.  The small intestine is normal.  Appendix not visualized.  No inflammatory changes in the region of the appendix.  Few diverticuli sigmoid colon.  Remaining colon is normal.    The pelvis demonstrates normal bladder.  Uterus absent.  Adnexal regions are normal.    Lower chest and abdominal wall soft tissues are normal.  Degenerative changes of the spine.  No suspicious osseous lesions.                               X-Ray Abdomen Flat And Erect (Final result)  Result time 03/31/22 09:52:35    Final result by Milan Tristan MD (03/31/22 09:52:35)                 Impression:      1. Nonobstructive bowel gas pattern.  Moderate stool right colon.      Electronically signed by: Milan Tristan  Date:    03/31/2022  Time:    09:52             Narrative:    EXAMINATION:  XR ABDOMEN FLAT AND ERECT    CLINICAL HISTORY:  Unspecified abdominal pain    COMPARISON:  None    FINDINGS:  Supine and upright views of the abdomen demonstrate no abnormal bowel dilatation.  No significant air-fluid levels.  Moderate stool right colon.No abnormal soft tissue calcifications.   Regional bones are unremarkable.                               The EKG was ordered, reviewed, and independently interpreted by the ED provider.  Interpretation time: 11:45  Rate: 170 BPM  Rhythm: Atrial flutter with variable A-V block.  Interpretation: ST depression, consider subendocardial injury. Nonspecific T wave abnormality. No STEMI.           The Emergency Provider reviewed the vital signs and test results, which are outlined above.     ED Discussion     11:40 AM: Pt states that she has been experiencing worsening weakness for the last week. She used to be able to walk but now has difficulty with this due to SOB.    1:24 PM: Discussed case with Hannah Gaona NP ([facility]). Dr. Jones agrees with current care and management of pt and accepts admission.   Admitting Service: [facility]  Admitting Physician: Dr. Jones  Admit to: ICU    1:24 PM: Re-evaluated pt. I have discussed test results, shared treatment plan, and the need for admission with patient and family at bedside. Pt and family express understanding at this time and agree with all information. All questions answered. Pt and family have no further questions or concerns at this time. Pt is ready for admit.       Medical Decision Making:   Clinical Tests:   Lab Tests: Ordered and Reviewed  Radiological Study: Ordered and Reviewed  Medical Tests: Ordered and Reviewed           ED Medication(s):  Medications   ondansetron injection 4 mg (4 mg Intravenous Not Given 3/31/22 0945)   levoFLOXacin 750 mg/150 mL IVPB 750 mg (has no administration in time range)   diltiaZEM 125 mg in D5W 125 mL infusion (5 mg/hr Intravenous New Bag 3/31/22 1235)   chlorproMAZINE injection 25 mg (25 mg Intramuscular Given 3/31/22 0937)   diphenhydrAMINE capsule 25 mg (25 mg Oral Given 3/31/22 1007)   diltiaZEM injection 15 mg (15 mg Intravenous Given 3/31/22 1227)       New Prescriptions    No medications on file               Scribe Attestation:   Scribe #1: I  performed the above scribed service and the documentation accurately describes the services I performed. I attest to the accuracy of the note.     Attending:   Physician Attestation Statement for Scribe #1: I, Rikki Serrato MD, personally performed the services described in this documentation, as scribed by Wilson Spangler, in my presence, and it is both accurate and complete.          Clinical Impression       ICD-10-CM ICD-9-CM   1. Atypical atrial flutter  I48.4 427.32   2. Abdominal pain  R10.9 789.00   3. SOB (shortness of breath)  R06.02 786.05   4. Weakness  R53.1 780.79       Disposition:   Disposition: Admitted  Condition: Critical         Rikki Serrato MD  03/31/22 4989

## 2022-03-31 NOTE — PHARMACY MED REC
"Admission Medication History     The home medication history was taken by Jim Matt.    You may go to "Admission" then "Reconcile Home Medications" tabs to review and/or act upon these items.      The home medication list has been updated by the Pharmacy department.    Please read ALL comments highlighted in yellow.    Please address this information as you see fit.     Feel free to contact us if you have any questions or require assistance.      The medications listed below were removed from the home medication list. Please reorder if appropriate:  Patient reports no longer taking the following medication(s):   ALLLOPURINOL 300MG   PACERONE 200MG   ASPIRIN 81MG   LIPITOR 20MG   CARDIZEM CD 120MG   MEDROL DOSEPACK   TRAMADOL 50MG    Medications listed below were obtained from: Patient/family: DAUGHTER  (Not in a hospital admission)        Jim Matt  [id number]    Current Outpatient Medications on File Prior to Encounter   Medication Sig Dispense Refill Last Dose    albuterol (PROVENTIL/VENTOLIN HFA) 90 mcg/actuation inhaler Inhale 2 puffs into the lungs every 4 (four) hours as needed for Wheezing. Take 2 puffs of the lungs every 4 hr as needed for wheezing or shortness of breath 3.5 g 0 Past Week at Unknown time    budesonide-formoterol 160-4.5 mcg (SYMBICORT) 160-4.5 mcg/actuation HFAA Symbicort Take No date recorded HFA aerosol inhaler No frequency recorded No route recorded No set duration recorded No set duration amount recorded active 160-4.5 mcg/actuation   Past Week at Unknown time    cetirizine (ZYRTEC) 5 MG tablet Take 5 mg by mouth every evening.   Past Week at Unknown time    gabapentin (NEURONTIN) 100 MG capsule Take 100 mg by mouth 3 (three) times daily.   3/30/2022 at Unknown time    levalbuterol (XOPENEX) 0.63 mg/3 mL nebulizer solution Take 3 mLs (0.63 mg total) by nebulization 3 (three) times daily as needed for Wheezing. Rescue  0 Past Month at Unknown time    " levothyroxine (SYNTHROID) 112 MCG tablet Take 112 mcg by mouth before breakfast.   Past Week at Unknown time    XARELTO 20 mg Tab Take 20 mg by mouth once daily.   Past Week at Unknown time    ipratropium (ATROVENT) 0.02 % nebulizer solution Take 2.5 mLs (500 mcg total) by nebulization every 6 (six) hours. Rescue 1 Box 0                          .

## 2022-04-01 PROBLEM — D64.9 NORMOCYTIC ANEMIA: Status: ACTIVE | Noted: 2022-04-01

## 2022-04-01 PROBLEM — K44.9 HIATAL HERNIA: Status: ACTIVE | Noted: 2022-04-01

## 2022-04-01 PROBLEM — I65.29 CAROTID ARTERY OCCLUSION: Status: ACTIVE | Noted: 2022-04-01

## 2022-04-01 PROBLEM — J90 PLEURAL EFFUSION ON LEFT: Status: ACTIVE | Noted: 2022-04-01

## 2022-04-01 PROBLEM — Z86.73 HISTORY OF CVA IN ADULTHOOD: Status: ACTIVE | Noted: 2022-04-01

## 2022-04-01 PROBLEM — N17.9 AKI (ACUTE KIDNEY INJURY): Status: ACTIVE | Noted: 2022-04-01

## 2022-04-01 PROBLEM — K76.9 LIVER LESION: Status: ACTIVE | Noted: 2022-04-01

## 2022-04-01 PROBLEM — I63.9 ACUTE CVA (CEREBROVASCULAR ACCIDENT): Status: ACTIVE | Noted: 2022-04-01

## 2022-04-01 PROBLEM — R17 ELEVATED BILIRUBIN: Status: ACTIVE | Noted: 2022-04-01

## 2022-04-01 PROBLEM — R53.81 DEBILITY: Status: ACTIVE | Noted: 2022-04-01

## 2022-04-01 PROBLEM — E88.09 HYPOALBUMINEMIA: Status: ACTIVE | Noted: 2022-04-01

## 2022-04-01 PROBLEM — I65.22 LEFT CAROTID ARTERY OCCLUSION: Status: ACTIVE | Noted: 2022-04-01

## 2022-04-01 PROBLEM — E87.5 HYPERKALEMIA: Status: ACTIVE | Noted: 2022-04-01

## 2022-04-01 PROBLEM — R91.8 MASS OF LEFT LUNG: Status: ACTIVE | Noted: 2022-04-01

## 2022-04-01 LAB
ALBUMIN SERPL BCP-MCNC: 2.8 G/DL (ref 3.5–5.2)
ALLENS TEST: ABNORMAL
ALP SERPL-CCNC: 120 U/L (ref 55–135)
ALT SERPL W/O P-5'-P-CCNC: 8 U/L (ref 10–44)
ANION GAP SERPL CALC-SCNC: 12 MMOL/L (ref 8–16)
ANION GAP SERPL CALC-SCNC: 14 MMOL/L (ref 8–16)
AORTIC ROOT ANNULUS: 2.92 CM
ASCENDING AORTA: 2.49 CM
AST SERPL-CCNC: 25 U/L (ref 10–40)
AV INDEX (PROSTH): 0.46
AV MEAN GRADIENT: 6 MMHG
AV PEAK GRADIENT: 9 MMHG
AV VALVE AREA: 1.12 CM2
AV VELOCITY RATIO: 0.44
BASOPHILS # BLD AUTO: 0.03 K/UL (ref 0–0.2)
BASOPHILS # BLD AUTO: 0.07 K/UL (ref 0–0.2)
BASOPHILS NFR BLD: 0.2 % (ref 0–1.9)
BASOPHILS NFR BLD: 0.5 % (ref 0–1.9)
BILIRUB SERPL-MCNC: 1 MG/DL (ref 0.1–1)
BILIRUB UR QL STRIP: NEGATIVE
BSA FOR ECHO PROCEDURE: 1.92 M2
BUN SERPL-MCNC: 21 MG/DL (ref 8–23)
BUN SERPL-MCNC: 23 MG/DL (ref 8–23)
CALCIUM SERPL-MCNC: 8.7 MG/DL (ref 8.7–10.5)
CALCIUM SERPL-MCNC: 8.8 MG/DL (ref 8.7–10.5)
CHLORIDE SERPL-SCNC: 105 MMOL/L (ref 95–110)
CHLORIDE SERPL-SCNC: 106 MMOL/L (ref 95–110)
CLARITY UR: CLEAR
CO2 SERPL-SCNC: 16 MMOL/L (ref 23–29)
CO2 SERPL-SCNC: 18 MMOL/L (ref 23–29)
COLOR UR: YELLOW
CREAT SERPL-MCNC: 1.6 MG/DL (ref 0.5–1.4)
CREAT SERPL-MCNC: 1.8 MG/DL (ref 0.5–1.4)
CV ECHO LV RWT: 0.83 CM
DELSYS: ABNORMAL
DIFFERENTIAL METHOD: ABNORMAL
DIFFERENTIAL METHOD: ABNORMAL
DOP CALC AO PEAK VEL: 1.5 M/S
DOP CALC AO VTI: 39.7 CM
DOP CALC LVOT AREA: 2.4 CM2
DOP CALC LVOT DIAMETER: 1.76 CM
DOP CALC LVOT PEAK VEL: 0.66 M/S
DOP CALC LVOT STROKE VOLUME: 44.5 CM3
DOP CALC RVOT PEAK VEL: 0.54 M/S
DOP CALC RVOT VTI: 14.1 CM
DOP CALCLVOT PEAK VEL VTI: 18.3 CM
E WAVE DECELERATION TIME: 176.13 MSEC
E/A RATIO: 1.24
E/E' RATIO: 11.75 M/S
ECHO EF ESTIMATED: 70 %
ECHO LV POSTERIOR WALL: 1.28 CM (ref 0.6–1.1)
EJECTION FRACTION: 50 %
EOSINOPHIL # BLD AUTO: 0 K/UL (ref 0–0.5)
EOSINOPHIL # BLD AUTO: 0.1 K/UL (ref 0–0.5)
EOSINOPHIL NFR BLD: 0.2 % (ref 0–8)
EOSINOPHIL NFR BLD: 0.6 % (ref 0–8)
ERYTHROCYTE [DISTWIDTH] IN BLOOD BY AUTOMATED COUNT: 15.4 % (ref 11.5–14.5)
ERYTHROCYTE [DISTWIDTH] IN BLOOD BY AUTOMATED COUNT: 15.6 % (ref 11.5–14.5)
ERYTHROCYTE [SEDIMENTATION RATE] IN BLOOD BY WESTERGREN METHOD: 10 MM/H
EST. GFR  (AFRICAN AMERICAN): 29 ML/MIN/1.73 M^2
EST. GFR  (AFRICAN AMERICAN): 34 ML/MIN/1.73 M^2
EST. GFR  (NON AFRICAN AMERICAN): 25 ML/MIN/1.73 M^2
EST. GFR  (NON AFRICAN AMERICAN): 29 ML/MIN/1.73 M^2
FIO2: 32
FLOW: 3
FRACTIONAL SHORTENING: 38 % (ref 28–44)
GLUCOSE SERPL-MCNC: 108 MG/DL (ref 70–110)
GLUCOSE SERPL-MCNC: 132 MG/DL (ref 70–110)
GLUCOSE UR QL STRIP: NEGATIVE
HCO3 UR-SCNC: 22.8 MMOL/L (ref 24–28)
HCT VFR BLD AUTO: 33.7 % (ref 37–48.5)
HCT VFR BLD AUTO: 36.1 % (ref 37–48.5)
HGB BLD-MCNC: 11 G/DL (ref 12–16)
HGB BLD-MCNC: 11.6 G/DL (ref 12–16)
HGB UR QL STRIP: NEGATIVE
IMM GRANULOCYTES # BLD AUTO: 0.07 K/UL (ref 0–0.04)
IMM GRANULOCYTES # BLD AUTO: 0.08 K/UL (ref 0–0.04)
IMM GRANULOCYTES NFR BLD AUTO: 0.5 % (ref 0–0.5)
IMM GRANULOCYTES NFR BLD AUTO: 0.6 % (ref 0–0.5)
INTERVENTRICULAR SEPTUM: 1.42 CM (ref 0.6–1.1)
IVC DIAMETER: 1.63 CM
IVRT: 57.09 MSEC
KETONES UR QL STRIP: NEGATIVE
LA MAJOR: 3.3 CM
LA MINOR: 2.75 CM
LA WIDTH: 2.75 CM
LACTATE SERPL-SCNC: 1.8 MMOL/L (ref 0.5–2.2)
LEFT ATRIUM SIZE: 2.78 CM
LEFT ATRIUM VOLUME INDEX: 10.3 ML/M2
LEFT ATRIUM VOLUME: 19.49 CM3
LEFT INTERNAL DIMENSION IN SYSTOLE: 1.93 CM (ref 2.1–4)
LEFT VENTRICLE DIASTOLIC VOLUME INDEX: 20.1 ML/M2
LEFT VENTRICLE DIASTOLIC VOLUME: 37.98 ML
LEFT VENTRICLE MASS INDEX: 73 G/M2
LEFT VENTRICLE SYSTOLIC VOLUME INDEX: 6.1 ML/M2
LEFT VENTRICLE SYSTOLIC VOLUME: 11.55 ML
LEFT VENTRICULAR INTERNAL DIMENSION IN DIASTOLE: 3.1 CM (ref 3.5–6)
LEFT VENTRICULAR MASS: 138.15 G
LEUKOCYTE ESTERASE UR QL STRIP: NEGATIVE
LV LATERAL E/E' RATIO: 11.75 M/S
LV SEPTAL E/E' RATIO: 11.75 M/S
LVOT MG: 1.05 MMHG
LVOT MV: 0.49 CM/S
LYMPHOCYTES # BLD AUTO: 1 K/UL (ref 1–4.8)
LYMPHOCYTES # BLD AUTO: 1.1 K/UL (ref 1–4.8)
LYMPHOCYTES NFR BLD: 7.2 % (ref 18–48)
LYMPHOCYTES NFR BLD: 8.2 % (ref 18–48)
MCH RBC QN AUTO: 31.1 PG (ref 27–31)
MCH RBC QN AUTO: 31.3 PG (ref 27–31)
MCHC RBC AUTO-ENTMCNC: 32.1 G/DL (ref 32–36)
MCHC RBC AUTO-ENTMCNC: 32.6 G/DL (ref 32–36)
MCV RBC AUTO: 95 FL (ref 82–98)
MCV RBC AUTO: 97 FL (ref 82–98)
MODE: ABNORMAL
MONOCYTES # BLD AUTO: 1.4 K/UL (ref 0.3–1)
MONOCYTES # BLD AUTO: 1.7 K/UL (ref 0.3–1)
MONOCYTES NFR BLD: 11.1 % (ref 4–15)
MONOCYTES NFR BLD: 12.5 % (ref 4–15)
MV PEAK A VEL: 0.76 M/S
MV PEAK E VEL: 0.94 M/S
MV STENOSIS PRESSURE HALF TIME: 51.08 MS
MV VALVE AREA P 1/2 METHOD: 4.31 CM2
NEUTROPHILS # BLD AUTO: 10.2 K/UL (ref 1.8–7.7)
NEUTROPHILS # BLD AUTO: 10.4 K/UL (ref 1.8–7.7)
NEUTROPHILS NFR BLD: 78.6 % (ref 38–73)
NEUTROPHILS NFR BLD: 79.8 % (ref 38–73)
NITRITE UR QL STRIP: NEGATIVE
NRBC BLD-RTO: 0 /100 WBC
NRBC BLD-RTO: 0 /100 WBC
PCO2 BLDA: 36.3 MMHG (ref 35–45)
PH SMN: 7.41 [PH] (ref 7.35–7.45)
PH UR STRIP: 6 [PH] (ref 5–8)
PISA MRMAX VEL: 5.3 M/S
PISA TR MAX VEL: 3.15 M/S
PLATELET # BLD AUTO: 264 K/UL (ref 150–450)
PLATELET # BLD AUTO: 276 K/UL (ref 150–450)
PMV BLD AUTO: 9.1 FL (ref 9.2–12.9)
PMV BLD AUTO: 9.4 FL (ref 9.2–12.9)
PO2 BLDA: 86 MMHG (ref 80–100)
POC BE: -2 MMOL/L
POC SATURATED O2: 97 % (ref 95–100)
POCT GLUCOSE: 116 MG/DL (ref 70–110)
POTASSIUM SERPL-SCNC: 5 MMOL/L (ref 3.5–5.1)
POTASSIUM SERPL-SCNC: 5.2 MMOL/L (ref 3.5–5.1)
PROCALCITONIN SERPL IA-MCNC: 0.11 NG/ML
PROT SERPL-MCNC: 6.8 G/DL (ref 6–8.4)
PROT UR QL STRIP: NEGATIVE
PV MEAN GRADIENT: 0.67 MMHG
RA MAJOR: 3.26 CM
RA PRESSURE: 8 MMHG
RA WIDTH: 2.58 CM
RBC # BLD AUTO: 3.54 M/UL (ref 4–5.4)
RBC # BLD AUTO: 3.71 M/UL (ref 4–5.4)
SAMPLE: ABNORMAL
SINUS: 2.75 CM
SITE: ABNORMAL
SODIUM SERPL-SCNC: 134 MMOL/L (ref 136–145)
SODIUM SERPL-SCNC: 137 MMOL/L (ref 136–145)
SP GR UR STRIP: >=1.03 (ref 1–1.03)
STJ: 2.46 CM
TDI LATERAL: 0.08 M/S
TDI SEPTAL: 0.08 M/S
TDI: 0.08 M/S
TR MAX PG: 40 MMHG
TRICUSPID ANNULAR PLANE SYSTOLIC EXCURSION: 1.82 CM
TROPONIN I SERPL DL<=0.01 NG/ML-MCNC: 0.1 NG/ML (ref 0–0.03)
TV REST PULMONARY ARTERY PRESSURE: 48 MMHG
URN SPEC COLLECT METH UR: ABNORMAL
UROBILINOGEN UR STRIP-ACNC: NEGATIVE EU/DL
WBC # BLD AUTO: 12.8 K/UL (ref 3.9–12.7)
WBC # BLD AUTO: 13.19 K/UL (ref 3.9–12.7)

## 2022-04-01 PROCEDURE — 27000221 HC OXYGEN, UP TO 24 HOURS

## 2022-04-01 PROCEDURE — 25000003 PHARM REV CODE 250: Performed by: NURSE PRACTITIONER

## 2022-04-01 PROCEDURE — G0426 PR INPT TELEHEALTH CONSULT 50M: ICD-10-PCS | Mod: 95,G0,, | Performed by: STUDENT IN AN ORGANIZED HEALTH CARE EDUCATION/TRAINING PROGRAM

## 2022-04-01 PROCEDURE — 84145 PROCALCITONIN (PCT): CPT | Performed by: NURSE PRACTITIONER

## 2022-04-01 PROCEDURE — 85025 COMPLETE CBC W/AUTO DIFF WBC: CPT | Mod: 91 | Performed by: EMERGENCY MEDICINE

## 2022-04-01 PROCEDURE — 81003 URINALYSIS AUTO W/O SCOPE: CPT | Performed by: NURSE PRACTITIONER

## 2022-04-01 PROCEDURE — 94761 N-INVAS EAR/PLS OXIMETRY MLT: CPT

## 2022-04-01 PROCEDURE — 85025 COMPLETE CBC W/AUTO DIFF WBC: CPT | Performed by: NURSE PRACTITIONER

## 2022-04-01 PROCEDURE — 36415 COLL VENOUS BLD VENIPUNCTURE: CPT | Performed by: NURSE PRACTITIONER

## 2022-04-01 PROCEDURE — G0426 INPT/ED TELECONSULT50: HCPCS | Mod: 95,G0,, | Performed by: STUDENT IN AN ORGANIZED HEALTH CARE EDUCATION/TRAINING PROGRAM

## 2022-04-01 PROCEDURE — 99233 SBSQ HOSP IP/OBS HIGH 50: CPT | Mod: 25,,, | Performed by: INTERNAL MEDICINE

## 2022-04-01 PROCEDURE — 99223 PR INITIAL HOSPITAL CARE,LEVL III: ICD-10-PCS | Mod: ,,, | Performed by: INTERNAL MEDICINE

## 2022-04-01 PROCEDURE — 25000242 PHARM REV CODE 250 ALT 637 W/ HCPCS: Performed by: EMERGENCY MEDICINE

## 2022-04-01 PROCEDURE — 94640 AIRWAY INHALATION TREATMENT: CPT

## 2022-04-01 PROCEDURE — 63600175 PHARM REV CODE 636 W HCPCS: Performed by: FAMILY MEDICINE

## 2022-04-01 PROCEDURE — 83605 ASSAY OF LACTIC ACID: CPT | Performed by: NURSE PRACTITIONER

## 2022-04-01 PROCEDURE — 80053 COMPREHEN METABOLIC PANEL: CPT | Performed by: NURSE PRACTITIONER

## 2022-04-01 PROCEDURE — 94664 DEMO&/EVAL PT USE INHALER: CPT

## 2022-04-01 PROCEDURE — 99223 1ST HOSP IP/OBS HIGH 75: CPT | Mod: ,,, | Performed by: INTERNAL MEDICINE

## 2022-04-01 PROCEDURE — 84484 ASSAY OF TROPONIN QUANT: CPT | Performed by: PHYSICIAN ASSISTANT

## 2022-04-01 PROCEDURE — 36415 COLL VENOUS BLD VENIPUNCTURE: CPT | Performed by: PHYSICIAN ASSISTANT

## 2022-04-01 PROCEDURE — 25000003 PHARM REV CODE 250: Performed by: EMERGENCY MEDICINE

## 2022-04-01 PROCEDURE — 99233 PR SUBSEQUENT HOSPITAL CARE,LEVL III: ICD-10-PCS | Mod: 25,,, | Performed by: INTERNAL MEDICINE

## 2022-04-01 PROCEDURE — 36415 COLL VENOUS BLD VENIPUNCTURE: CPT | Performed by: EMERGENCY MEDICINE

## 2022-04-01 PROCEDURE — 99900035 HC TECH TIME PER 15 MIN (STAT)

## 2022-04-01 PROCEDURE — 80048 BASIC METABOLIC PNL TOTAL CA: CPT | Performed by: EMERGENCY MEDICINE

## 2022-04-01 PROCEDURE — 21400001 HC TELEMETRY ROOM

## 2022-04-01 RX ORDER — MORPHINE SULFATE 2 MG/ML
2 INJECTION, SOLUTION INTRAMUSCULAR; INTRAVENOUS EVERY 6 HOURS PRN
Status: DISCONTINUED | OUTPATIENT
Start: 2022-04-01 | End: 2022-04-01

## 2022-04-01 RX ORDER — ASPIRIN 81 MG/1
81 TABLET ORAL DAILY
Status: DISCONTINUED | OUTPATIENT
Start: 2022-04-02 | End: 2022-04-01

## 2022-04-01 RX ORDER — BISACODYL 10 MG
10 SUPPOSITORY, RECTAL RECTAL ONCE
Status: COMPLETED | OUTPATIENT
Start: 2022-04-01 | End: 2022-04-01

## 2022-04-01 RX ORDER — LABETALOL HYDROCHLORIDE 5 MG/ML
10 INJECTION, SOLUTION INTRAVENOUS
Status: DISCONTINUED | OUTPATIENT
Start: 2022-04-01 | End: 2022-04-06 | Stop reason: HOSPADM

## 2022-04-01 RX ORDER — ATORVASTATIN CALCIUM 40 MG/1
40 TABLET, FILM COATED ORAL NIGHTLY
Status: DISCONTINUED | OUTPATIENT
Start: 2022-04-01 | End: 2022-04-01

## 2022-04-01 RX ORDER — SODIUM CHLORIDE 0.9 % (FLUSH) 0.9 %
10 SYRINGE (ML) INJECTION
Status: DISCONTINUED | OUTPATIENT
Start: 2022-04-01 | End: 2022-04-06 | Stop reason: HOSPADM

## 2022-04-01 RX ORDER — ENOXAPARIN SODIUM 100 MG/ML
40 INJECTION SUBCUTANEOUS EVERY 12 HOURS
Status: DISCONTINUED | OUTPATIENT
Start: 2022-04-01 | End: 2022-04-01

## 2022-04-01 RX ORDER — TRAMADOL HYDROCHLORIDE 50 MG/1
50 TABLET ORAL EVERY 6 HOURS PRN
Status: DISCONTINUED | OUTPATIENT
Start: 2022-04-01 | End: 2022-04-01

## 2022-04-01 RX ORDER — ATORVASTATIN CALCIUM 40 MG/1
40 TABLET, FILM COATED ORAL NIGHTLY
Status: DISCONTINUED | OUTPATIENT
Start: 2022-04-01 | End: 2022-04-04

## 2022-04-01 RX ORDER — SODIUM CHLORIDE 9 MG/ML
INJECTION, SOLUTION INTRAVENOUS CONTINUOUS
Status: DISCONTINUED | OUTPATIENT
Start: 2022-04-01 | End: 2022-04-01

## 2022-04-01 RX ORDER — MORPHINE SULFATE 2 MG/ML
1 INJECTION, SOLUTION INTRAMUSCULAR; INTRAVENOUS EVERY 6 HOURS PRN
Status: DISCONTINUED | OUTPATIENT
Start: 2022-04-01 | End: 2022-04-02

## 2022-04-01 RX ORDER — CLOPIDOGREL BISULFATE 75 MG/1
75 TABLET ORAL DAILY
Status: DISCONTINUED | OUTPATIENT
Start: 2022-04-02 | End: 2022-04-01

## 2022-04-01 RX ORDER — NAPROXEN SODIUM 220 MG/1
81 TABLET, FILM COATED ORAL DAILY
Status: DISCONTINUED | OUTPATIENT
Start: 2022-04-02 | End: 2022-04-04

## 2022-04-01 RX ADMIN — SODIUM CHLORIDE: 0.9 INJECTION, SOLUTION INTRAVENOUS at 04:04

## 2022-04-01 RX ADMIN — ATORVASTATIN CALCIUM 40 MG: 40 TABLET, FILM COATED ORAL at 08:04

## 2022-04-01 RX ADMIN — GABAPENTIN 100 MG: 100 CAPSULE ORAL at 04:04

## 2022-04-01 RX ADMIN — IPRATROPIUM BROMIDE AND ALBUTEROL SULFATE 3 ML: 2.5; .5 SOLUTION RESPIRATORY (INHALATION) at 03:04

## 2022-04-01 RX ADMIN — MORPHINE SULFATE 2 MG: 2 INJECTION, SOLUTION INTRAMUSCULAR; INTRAVENOUS at 04:04

## 2022-04-01 RX ADMIN — BISACODYL 10 MG: 10 SUPPOSITORY RECTAL at 05:04

## 2022-04-01 RX ADMIN — IPRATROPIUM BROMIDE AND ALBUTEROL SULFATE 3 ML: 2.5; .5 SOLUTION RESPIRATORY (INHALATION) at 08:04

## 2022-04-01 RX ADMIN — GABAPENTIN 100 MG: 100 CAPSULE ORAL at 08:04

## 2022-04-01 NOTE — SIGNIFICANT EVENT
I called and spoke to Dr. Snow and updated him on patient's status and  consult . Will do Stat MRI brain and hold xarelto for now.

## 2022-04-01 NOTE — ACP (ADVANCE CARE PLANNING)
Swer met with patient, spouse, Elsi, and daughter, Kimberly, at the bedside regarding healthcare power of . Family states that have documentation prior to patient's hospitalization, designating, pt's daughter, Kimberly Lr, as decision maker in the event that patient is unable to do so. Patient also a DNR. Swer notified MD to update in chart.     Swer placed HCPOA in pt's chart.

## 2022-04-01 NOTE — HOSPITAL COURSE
O2 sat 96% on room air.  Afebrile.  CT abdomen pelvis was reviewed.  I was consulted for left pleural effusion?  Left lung mass.  4/3 O2 sat 95% , afebrile , transferred to ICU for RVR , esmolol gtt , lethargic

## 2022-04-01 NOTE — ASSESSMENT & PLAN NOTE
As seen on CT Abd Renal stones with mild stranding- should be covered by Levaquin  Treat with Dulcolax and Mag Citrate   Start pericolace and miralax

## 2022-04-01 NOTE — ED NOTES
Secure chat message sent to Dr Molina and Dr Jones about ICU vs Tele status and pt's HR continuing to convert in and out of a flutter with RVR. Dr Molina states pt can be downgraded to tele status and to start her on Metoprolol po 50mg BID.

## 2022-04-01 NOTE — ASSESSMENT & PLAN NOTE
3/31Mild, residual  Should be covered by Levaquin  4/01 UA negative- UTI resolved, Levaquin discontinued

## 2022-04-01 NOTE — PLAN OF CARE
[facility] - Telemetry (Hospital)  Initial Discharge Assessment       Primary Care Provider: Stephen Tarango MD    Admission Diagnosis: Atrial flutter [I48.92]  Weakness [R53.1]  SOB (shortness of breath) [R06.02]  Atypical atrial flutter [I48.4]  Abdominal pain [R10.9]    Admission Date: 3/31/2022  Expected Discharge Date:     Discharge Barriers Identified: None    Payor: MEDICARE / Plan: MEDICARE PART A & B / Product Type: Government /     Extended Emergency Contact Information  Primary Emergency Contact: Elsi Castañeda  Address: [address] [place] [address] [facility]  Home Phone: [phone]  Relation: Spouse  Secondary Emergency Contact: JeffreylettyYessenia hdezyn  Mobile Phone: [phone]  Relation: Daughter    Discharge Plan A: Home with family, Home Health  Discharge Plan B: Other (Possible placement)    No Pharmacies Listed    Initial Assessment (most recent)     Adult Discharge Assessment - 04/01/22 1415        Discharge Assessment    Assessment Type Discharge Planning Assessment     Confirmed/corrected address, phone number and insurance Yes     Confirmed Demographics Correct on Facesheet     Source of Information patient     Communicated JOSE with patient/caregiver Date not available/Unable to determine     Reason For Admission Atrial flutter with RVR     Lives With spouse     Facility Arrived From: Home     Do you expect to return to your current living situation? Yes     Do you have help at home or someone to help you manage your care at home? Yes     Who are your caregiver(s) and their phone number(s)? Pt's spouse, Elsi and daughterWendy     Prior to hospitilization cognitive status: Unable to Assess     Current cognitive status: Unable to Assess     Walking or Climbing Stairs Difficulty ambulation difficulty, requires equipment     Mobility Management walker and w/c mostly     Dressing/Bathing Difficulty bathing difficulty, requires equipment;bathing difficulty,  assistance 1 person     Dressing/Bathing Management BSC, DTR assists     Equipment Currently Used at Home walker, rolling;wheelchair;bedside commode     Readmission within 30 days? No     Patient currently being followed by outpatient case management? No     Do you currently have service(s) that help you manage your care at home? No     Do you take prescription medications? Yes     Do you have prescription coverage? Yes     Do you have any problems affording any of your prescribed medications? No     Who is going to help you get home at discharge? Dependent on hospital progress     How do you get to doctors appointments? family or friend will provide     Are you on dialysis? No     Do you take coumadin? No     Discharge Plan A Home with family;Home Health     Discharge Plan B Other   Possible placement    DME Needed Upon Discharge  none     Discharge Plan discussed with: Adult children     Discharge Barriers Identified None               Jay spoke with patient's daughter, Wendy, to complete discharge assessment. Wendy reports that patient and her spouse live together alone. Wendy is pt's primary caregiver. Wendy reports that she is in the home dailys (morning or morning and night) to assist with ADLs. Patient primarily uses a wheelchair to assist with ambulation. Patient also has a walker and BSC. Family reports having home health in the past and would be interested     CM needs dependent on hospital progress. Jay provided Wendy with SW contact information.   Miker to continue following.

## 2022-04-01 NOTE — ASSESSMENT & PLAN NOTE
4/01 As noted on Carotid ultrasound  Will repeat BMP in am and monitor renal status- Plan CTA of head and neck once creatinine allowable  Add ASA and Statin   Continue plavix

## 2022-04-01 NOTE — PLAN OF CARE
Pt Disoriented x4. lethargic  Pt remains free of injuries and falls; fall precaution in place   SR on tele monitor. HR 60's  IV saline locked; intact  blood sugar spot checked WNL,   Very little urine output. bladder scan shows only 50mL.   straight cathed for urine specimen to check for UTI.   Bed low, side rails up x2, non slip socks in use, call light in reach   Reminded to call for assistance  Hourly rounding complete will continue to monitor

## 2022-04-01 NOTE — ASSESSMENT & PLAN NOTE
3/31 Pt with hx of P Afib, was in and out of Afin/ flutter, treated initially with Cardizem Gtt and then changed to Amiodarone and Metoprolo added with good effect  Pt already on long term xarelto  Continue  Cards consulted and following  4/01 Telemetry  Continue BBlockade  Discussed with Neurology- Hold Xarelto for now, no Iv heparin drip at this time

## 2022-04-01 NOTE — PT/OT/SLP PROGRESS
Physical Therapy      Patient Name:  Ana Castañeda   MRN:  [id number]    P.T. KLAUS INITIATED THIS AM VIA CHART REVIEW, ATTEMPTED COMPLETION OF EVAL 2 TIMES THIS AM AND PT UNAVAILABLE, 1ST TIME IN CT, 2ND TIME IN FAMILY CONFERENCE WITH NP, WILL ASSESS PT NEXT VISIT    Catherine Rocha, PT  4/1/2022  [id number], [id number]

## 2022-04-01 NOTE — NURSING
EKG NS 65 HR. Amio discontinued. ABG WNL  Pt still lethargic, disoriented. Respirations normal. Jolie maurer, NP aware. No new orders at this time   Bed low, side rails up x2, non slip socks in use, call light in reach   Reminded to call for assistance.Hourly rounding complete will continue to monitor

## 2022-04-01 NOTE — CONSULTS
[facility] - Telemetry ([facility])  Pulmonology  Consult Note    Patient Name: Ana Castañeda  MRN: [id number]  Admission Date: 3/31/2022  Hospital Length of Stay: 1 days  Code Status: DNR  Attending Physician: Obed Vargas MD  Primary Care Provider: Stephen Tarango MD   Principal Problem: Atrial flutter    [unfilled]  Subjective:     HPI:  86-year-old female patient medical history of Anticoagulant long-term use, Asthma, Debility, Gait apraxia, Gout, Hypertension, NPH (normal pressure hydrocephalus), Stroke, and Thyroid disease admitted for few days history of abdominal pain complicating a recent history of UTI treated by Cipro.  No nausea vomiting no diarrhea.        Past Medical History:   Diagnosis Date    Anticoagulant long-term use     Asthma     Debility     Gait apraxia     Gout     Hypertension     NPH (normal pressure hydrocephalus)     Stroke     Thyroid disease        Past Surgical History:   Procedure Laterality Date    CAROTID ENDARTERECTOMY         Review of patient's allergies indicates:   Allergen Reactions    Food color red [red food color (bulk)] Nausea And Vomiting    Sulfa (sulfonamide antibiotics)      Other reaction(s): Unknown    Pcn [penicillins] Rash       Family History    None       Tobacco Use    Smoking status: Former Smoker    Smokeless tobacco: Never Used   Substance and Sexual Activity    Alcohol use: No    Drug use: Never    Sexual activity: Not on file         Review of Systems   Constitutional:  Positive for fatigue. Negative for chills and fever.   HENT:  Negative for nosebleeds.    Eyes:  Negative for discharge.   Respiratory:  Positive for cough and shortness of breath. Negative for choking.    Cardiovascular:  Negative for chest pain.   Gastrointestinal:  Positive for abdominal pain. Negative for blood in stool.   Endocrine: Negative for cold intolerance.   Genitourinary:  Positive for dysuria. Negative for hematuria.   Musculoskeletal:  Positive for arthralgias. Negative for neck  stiffness.   Skin:  Negative for rash.   Allergic/Immunologic: Negative for immunocompromised state.   Neurological:  Positive for weakness. Negative for seizures.        History of stroke    Left-sided weakness   Hematological:  Negative for adenopathy.   Psychiatric/Behavioral:  Negative for behavioral problems.    Objective:     Vital Signs (Most Recent):  Temp: 97.7 °F (36.5 °C) (04/01/22 1128)  Pulse: 73 (04/01/22 1128)  Resp: 18 (04/01/22 1128)  BP: 130/62 (04/01/22 1128)  SpO2: 96 % (04/01/22 1128) Vital Signs (24h Range):  Temp:  [97 °F (36.1 °C)-98.7 °F (37.1 °C)] 97.7 °F (36.5 °C)  Pulse:  [] 73  Resp:  [16-28] 18  SpO2:  [94 %-100 %] 96 %  BP: ()/() 130/62     Weight: 78.9 kg (174 lb)  Body mass index is 28.08 kg/m².      Intake/Output Summary (Last 24 hours) at 4/1/2022 1542  Last data filed at 3/31/2022 1751  Gross per 24 hour   Intake 251.32 ml   Output --   Net 251.32 ml       Physical Exam  Vitals and nursing note reviewed.   Constitutional:       General: She is not in acute distress.     Appearance: She is well-developed. She is ill-appearing.   HENT:      Head: Normocephalic and atraumatic.   Cardiovascular:      Rate and Rhythm: Normal rate and regular rhythm.   Pulmonary:      Effort: Pulmonary effort is normal.   Abdominal:      Palpations: Abdomen is soft.      Tenderness: There is no abdominal tenderness.   Musculoskeletal:         General: No signs of injury.      Cervical back: Neck supple.   Skin:     General: Skin is warm.   Neurological:      Mental Status: She is alert. Mental status is at baseline.      Comments: Left-sided weakness       Vents:  Oxygen Concentration (%): 28 (03/31/22 2153)    Lines/Drains/Airways       Drain  Duration             Female External Urinary Catheter 03/31/22 1000 1 day              Peripheral Intravenous Line  Duration                  Peripheral IV - Single Lumen 03/31/22 1630 18 G Right Antecubital <1 day                    Significant  Labs:    CBC/Anemia Profile:  Recent Labs   Lab 03/31/22  1030 04/01/22  0012 04/01/22  0356   WBC 13.44* 13.19* 12.80*   HGB 12.6 11.6* 11.0*   HCT 40.0 36.1* 33.7*    276 264   MCV 98 97 95   RDW 15.6* 15.6* 15.4*        Chemistries:  Recent Labs   Lab 03/31/22  1030 04/01/22  0011 04/01/22  0356    134* 137   K 4.2 5.0 5.2*    106 105   CO2 21* 16* 18*   BUN 18 21 23   CREATININE 1.2 1.6* 1.8*   CALCIUM 8.6* 8.8 8.7   ALBUMIN 3.2* 2.8*  --    PROT 7.0 6.8  --    BILITOT 1.4* 1.0  --    ALKPHOS 141* 120  --    ALT 8* 8*  --    AST 27 25  --      EKG 03/31/2022  Normal sinus rhythm   Prolonged QT   Abnormal ECG   When compared with ECG of 31-MAR-2022 11:45,   Sinus rhythm has replaced Atrial flutter   Vent. rate has decreased  BPM   ST no longer depressed in Inferior leads   ST no longer depressed in Anterior-lateral leads   T wave inversion no longer evident in Inferior leads   T wave inversion no longer evident in Anterior-lateral leads        2D echo 04/01/2022    Concentric remodeling and low normal systolic function.  Intermediate central venous pressure (8 mmHg).  The estimated PA systolic pressure is 48 mmHg.  The estimated ejection fraction is 50%.  Indeterminate left ventricular diastolic function.  With normal right ventricular systolic function.  Significant Imaging:     MRI of the brain 04/01/2022    Multifocal acute ischemic changes/infarctions including the left cerebellar hemisphere, the occipital lobes bilaterally, the perisylvian white matter and cortex on the right side, the right and left parietal lobes and the right and left frontal lobes.  No evidence of a mass or bleed.  Extensive small vessel disease.  Marked atrophy.      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  CTA could be utilized for further evaluation.     Retrograde flow noted within the mid left ICA.     Less than 50%  stenosis of the right carotid bifurcation.    Chest x-ray 03/31/2022  COMPARISON:  Prior     FINDINGS:  No definite change compared to the prior examination obtained earlier today     Impression:     Stable findings with left lung opacity.           CT renal stone study 03/31/2022      FINDINGS:  Moderate size left pleural effusion with infiltrate and consolidation or possible lung mass in the perihilar region measuring 2.3 cm.  Right lung base clear.     2.1 cm hypodensity anterior and inferior liver adjacent the gallbladder fossa.  Finding likely represents a cyst (series 2, image 38).  13 mm hypodensity dome of the junction of the right lobe of the liver could represent small cyst (series 2, image 24).  Punctate gallstones are sludge layering within the lumen of the gallbladder.     The spleen is normal in size and appearance.  The pancreas is normal.  The adrenal glands are normal.  The aorta and IVC are normal.  No retroperitoneal adenopathy.     Left kidney and left ureter are normal.  Right kidney and right ureter are normal.     Very small hiatal hernia.  The small intestine is normal.  Appendix not visualized.  No inflammatory changes in the region of the appendix.  Few diverticuli sigmoid colon.  Remaining colon is normal.     The pelvis demonstrates normal bladder.  Uterus absent.  Adnexal regions are normal.     Lower chest and abdominal wall soft tissues are normal.  Degenerative changes of the spine.  No suspicious osseous lesions.     Impression:     1. Moderate left pleural effusion with infiltrate/consolidation left lung base.  Possible perihilar lung mass.  Dedicated CT chest would be helpful, if clinically warranted.  2. Hypodensities within the liver most likely representing cysts.  3. Punctate gallstones or sludge within the gallbladder.  4. Very small hiatal hernia.  Mild diverticulosis.  5. Hysterectomy.              ABG  Recent Labs   Lab 03/31/22  2359   PH 7.406   PO2 86   PCO2 36.3   HCO3  22.8*   BE -2     Assessment/Plan:     * Atrial flutter with RVR  On beta-blocker.  As outpatient on Xarelto.  Now on hold due to acute stroke.    Pleural effusion on left  Possibly malignant/para malignant.  On Xarelto as outpatient now on hold.  I would benefit from diagnostic thoracentesis.  No urgency for thoracentesis now.  Treat acute CVA.    Mass of left lung  No prior CT scan to compare.  Malignancy most likely.  Former smoker.  Elective outpatient workup once patient is stable from cardiac/neurology standpoint.    Liver lesion  Rule out Mets.  Active workup as outpatient.    PAF (paroxysmal atrial fibrillation)  Beta-blocker.  Off anticoagulation now due to acute ischemic stroke.  Neurology follow-up.  Carotid stenosis noted.  Overall poor prognosis.    Consider palliative care evaluation.  DNR     Thank you for your consult. I will follow-up with patient. Please contact us if you have any additional questions.     Zuly Hull MD  Pulmonology  [facility] - Telemetry ([facility])

## 2022-04-01 NOTE — SUBJECTIVE & OBJECTIVE
Review of Systems   Unable to perform ROS: Mental status change   Objective:     Vital Signs (Most Recent):  Temp: 97.7 °F (36.5 °C) (04/01/22 1128)  Pulse: 73 (04/01/22 1128)  Resp: 18 (04/01/22 1128)  BP: 130/62 (04/01/22 1128)  SpO2: 96 % (04/01/22 1128) Vital Signs (24h Range):  Temp:  [97 °F (36.1 °C)-98.7 °F (37.1 °C)] 97.7 °F (36.5 °C)  Pulse:  [] 73  Resp:  [16-30] 18  SpO2:  [93 %-100 %] 96 %  BP: ()/() 130/62     Weight: 78.9 kg (174 lb)  Body mass index is 28.08 kg/m².     SpO2: 96 %  O2 Device (Oxygen Therapy): nasal cannula      Intake/Output Summary (Last 24 hours) at 4/1/2022 1358  Last data filed at 3/31/2022 1751  Gross per 24 hour   Intake 263.51 ml   Output --   Net 263.51 ml       Lines/Drains/Airways       Drain  Duration             Female External Urinary Catheter 03/31/22 1000 1 day              Peripheral Intravenous Line  Duration                  Peripheral IV - Single Lumen 03/31/22 1630 18 G Right Antecubital <1 day                    Physical Exam  Vitals and nursing note reviewed.   Constitutional:       General: She is not in acute distress.     Appearance: She is well-developed. She is not diaphoretic.      Comments: On supplemental O2   HENT:      Head: Normocephalic and atraumatic.   Eyes:      General:         Right eye: No discharge.         Left eye: No discharge.      Pupils: Pupils are equal, round, and reactive to light.   Neck:      Thyroid: No thyromegaly.      Vascular: No JVD.      Trachea: No tracheal deviation.   Cardiovascular:      Rate and Rhythm: Normal rate and regular rhythm.      Heart sounds: Normal heart sounds, S1 normal and S2 normal. No murmur heard.     Comments: SR during exam  Pulmonary:      Effort: Pulmonary effort is normal. No respiratory distress.      Breath sounds: No wheezing.      Comments: Diminished BS at bases L > R  Abdominal:      General: There is no distension.      Palpations: Abdomen is soft.      Tenderness: There  is no rebound.   Musculoskeletal:      Cervical back: Neck supple.      Right lower leg: No edema.      Left lower leg: No edema.   Skin:     General: Skin is warm and dry.      Findings: No erythema.   Neurological:      Mental Status: She is alert.      Comments: Confused, lethargic       Significant Labs: CMP   Recent Labs   Lab 03/31/22  1030 04/01/22  0011 04/01/22  0356    134* 137   K 4.2 5.0 5.2*    106 105   CO2 21* 16* 18*    132* 108   BUN 18 21 23   CREATININE 1.2 1.6* 1.8*   CALCIUM 8.6* 8.8 8.7   PROT 7.0 6.8  --    ALBUMIN 3.2* 2.8*  --    BILITOT 1.4* 1.0  --    ALKPHOS 141* 120  --    AST 27 25  --    ALT 8* 8*  --    ANIONGAP 13 12 14   ESTGFRAFRICA 48* 34* 29*   EGFRNONAA 42* 29* 25*   , CBC   Recent Labs   Lab 03/31/22  1030 04/01/22  0012 04/01/22  0356   WBC 13.44* 13.19* 12.80*   HGB 12.6 11.6* 11.0*   HCT 40.0 36.1* 33.7*    276 264   , INR No results for input(s): INR, PROTIME in the last 48 hours., Troponin   Recent Labs   Lab 03/31/22  1237 04/01/22  0356   TROPONINI 0.050* 0.100*   , and All pertinent lab results from the last 24 hours have been reviewed.    Significant Imaging: Echocardiogram: Transthoracic echo (TTE) complete (Cupid Only):   Results for orders placed or performed during the hospital encounter of 03/31/22   Echo   Result Value Ref Range    BSA 1.92 m2    TDI SEPTAL 0.08 m/s    LV LATERAL E/E' RATIO 11.75 m/s    LV SEPTAL E/E' RATIO 11.75 m/s    LA WIDTH 2.75 cm    IVC diameter 1.63 cm    Left Ventricular Outflow Tract Mean Velocity 0.62194746932 cm/s    Left Ventricular Outflow Tract Mean Gradient 1.05 mmHg    TDI LATERAL 0.08 m/s    LVIDd 3.10 (A) 3.5 - 6.0 cm    IVS 1.42 (A) 0.6 - 1.1 cm    Posterior Wall 1.28 (A) 0.6 - 1.1 cm    Ao root annulus 2.92 cm    LVIDs 1.93 (A) 2.1 - 4.0 cm    FS 38 28 - 44 %    LA volume 19.49 cm3    Sinus 2.75 cm    STJ 2.46 cm    Ascending aorta 2.49 cm    LV mass 138.15 g    LA size 2.78 cm    TAPSE 1.82 cm     Left Ventricle Relative Wall Thickness 0.83 cm    AV mean gradient 6 mmHg    AV valve area 1.12 cm2    AV Velocity Ratio 0.44     AV index (prosthetic) 0.46     MV valve area p 1/2 method 4.31 cm2    E/A ratio 1.24     Mean e' 0.08 m/s    E wave deceleration time 176.62466852815819 msec    IVRT 57.721512376890035 msec    LVOT diameter 1.76 cm    LVOT area 2.4 cm2    LVOT peak messi 0.66 m/s    LVOT peak VTI 18.30 cm    Ao peak messi 1.50 m/s    Ao VTI 39.7 cm    RVOT peak messi 0.54 m/s    RVOT peak VTI 14.1 cm    Mr max messi 5.30 m/s    LVOT stroke volume 44.50 cm3    AV peak gradient 9 mmHg    PV mean gradient 0.67 mmHg    E/E' ratio 11.75 m/s    MV Peak E Messi 0.94 m/s    TR Max Messi 3.15 m/s    MV stenosis pressure 1/2 time 51.940288627 ms    MV Peak A Messi 0.76 m/s    LV Systolic Volume 11.55 mL    LV Systolic Volume Index 6.1 mL/m2    LV Diastolic Volume 37.98 mL    LV Diastolic Volume Index 20.10 mL/m2    LA Volume Index 10.3 mL/m2    LV Mass Index 73 g/m2    Echo EF Estimated 70 %    RA Major Axis 3.26 cm    Left Atrium Minor Axis 2.75 cm    Left Atrium Major Axis 3.30 cm    Triscuspid Valve Regurgitation Peak Gradient 40 mmHg    RA Width 2.58 cm    Right Atrial Pressure (from IVC) 8 mmHg    EF 50 %    TV rest pulmonary artery pressure 48 mmHg    Narrative    · Concentric remodeling and low normal systolic function.  · Intermediate central venous pressure (8 mmHg).  · The estimated PA systolic pressure is 48 mmHg.  · The estimated ejection fraction is 50%.  · Indeterminate left ventricular diastolic function.  · With normal right ventricular systolic function.      , EKG: Reviewed, and X-Ray: CXR: X-Ray Chest 1 View (CXR):   Results for orders placed or performed during the hospital encounter of 03/31/22   X-Ray Chest 1 View    Narrative    EXAMINATION:  XR CHEST 1 VIEW    CLINICAL HISTORY:  Altered mental status, unspecified    TECHNIQUE:  Single frontal view of the chest was  performed.    COMPARISON:  Prior    FINDINGS:  No definite change compared to the prior examination obtained earlier today      Impression    Stable findings with left lung opacity.  Please see that report      Electronically signed by: Gianluca Wyatt  Date:    03/31/2022  Time:    23:53    and X-Ray Chest PA and Lateral (CXR): No results found for this visit on 03/31/22.

## 2022-04-01 NOTE — H&P
[facility] Medicine  History & Physical    Patient Name: Ana Castañeda  MRN: [id number]  Patient Class: IP- Inpatient  Admission Date: 3/31/2022  Attending Physician: Tiffanie Jones MD   Primary Care Provider: Stephen Tarango MD         Patient information was obtained from patient, relative(s), past medical records and ER records.     Subjective:     Principal Problem:Atrial flutter    Chief Complaint:   Chief Complaint   Patient presents with    Abdominal Pain     Abdominal pain/spasms        HPI: Ana Castañeda is a 86 y.o. 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 The pain is described as crampy/spasms. No associated NVD, fever or chills. She had a UTI which was treated with oral Cipro 2 weeks ago. She denies any CP, SOB, dizziness, weakness, numbness, and all other sxs at this time. daughter states that when she has been feeling tired and not eating drinking much as well for last few days nidhi after she finished her cipro dose and her R sided weakness has got worse. In the ER, initial VSS, Afebrile, Sats 95% on RA. CXr showed Left infrahilar pneumonia. On CT Abd/Pelvis Renal Stone showed moderate left pleural effusion with infiltrate/consolidation left lung base.  Possible perihilar lung mass. Dedicated CT chest would be helpful, if clinically warranted.  2. Hypodensities within the liver most likely representing cysts.  3. Punctate gallstones or sludge within the gallbladder.  4. Very small hiatal hernia.  Mild diverticulosis.  5. Hysterectomy.    She was also found to be in A Fluttter w - she was given IV Cardizem 15 mg bolus and then started on titrating Cardizem gtt and hence admitted to ICU initially. Cards evaluated the pt and d/hira Cardizem and  started her on Amiodarone gtt after bolus and also started metoprol with improved heart rate. Pt felt much better and was downgraded to Tele. She also received IV Levaquin 750 mg in the ER.     She had a similar admission here in 2019 when she was admitted to ICU on Bipap, with acute hypoxemic resp failure sec to LLL Pneumonia and Afib w RVR and Lactic Acidosis.         Past Medical History:   Diagnosis Date    Anticoagulant long-term use     Asthma     Debility     Gait apraxia     Gout     Hypertension     NPH (normal pressure hydrocephalus)     Stroke     Thyroid disease        Past Surgical History:   Procedure Laterality Date    CAROTID ENDARTERECTOMY         Review of patient's allergies indicates:   Allergen Reactions    Food color red [red food color (bulk)] Nausea And Vomiting    Sulfa (sulfonamide antibiotics)      Other reaction(s): Unknown    Pcn [penicillins] Rash       No current facility-administered medications on file prior to encounter.     Current Outpatient Medications on File Prior to Encounter   Medication Sig    albuterol (PROVENTIL/VENTOLIN HFA) 90 mcg/actuation inhaler Inhale 2 puffs into the lungs every 4 (four) hours as needed for Wheezing. Take 2 puffs of the lungs every 4 hr as needed for wheezing or shortness of breath    budesonide-formoterol 160-4.5 mcg (SYMBICORT) 160-4.5 mcg/actuation HFAA Symbicort Take No date recorded HFA aerosol inhaler No frequency recorded No route recorded No set duration recorded No set duration amount recorded active 160-4.5 mcg/actuation    cetirizine (ZYRTEC) 5 MG tablet Take 5 mg by mouth every evening.    gabapentin (NEURONTIN) 100 MG capsule Take 100 mg by mouth 3 (three) times daily.    levalbuterol (XOPENEX) 0.63 mg/3 mL nebulizer solution Take 3 mLs (0.63 mg total) by nebulization 3 (three) times daily as needed for Wheezing. Rescue    levothyroxine (SYNTHROID) 112 MCG tablet Take 112 mcg by mouth before breakfast.    XARELTO 20  mg Tab Take 20 mg by mouth once daily.    ipratropium (ATROVENT) 0.02 % nebulizer solution Take 2.5 mLs (500 mcg total) by nebulization every 6 (six) hours. Rescue    [DISCONTINUED] allopurinol (ZYLOPRIM) 300 MG tablet Take 300 mg by mouth once daily.    [DISCONTINUED] amiodarone (PACERONE) 200 MG Tab Take 1 tablet (200 mg total) by mouth 2 (two) times daily.    [DISCONTINUED] aspirin (ECOTRIN) 81 MG EC tablet Take 1 tablet (81 mg total) by mouth once daily.    [DISCONTINUED] atorvastatin (LIPITOR) 20 MG tablet Take 20 mg by mouth once daily.    [DISCONTINUED] ciprofloxacin HCl (CIPRO) 500 MG tablet TAKE 1 TABLET BY MOUTH EVERY 12 HOURS FOR 10 DAYS    [DISCONTINUED] diltiaZEM (CARDIZEM CD) 120 MG Cp24 Take 1 capsule (120 mg total) by mouth once daily.    [DISCONTINUED] guaiFENesin (MUCINEX) 600 mg 12 hr tablet Take 1 tablet (600 mg total) by mouth 2 (two) times daily.    [DISCONTINUED] levothyroxine (SYNTHROID) 112 MCG tablet Take 1 tablet (112 mcg total) by mouth once daily.    [DISCONTINUED] methylPREDNISolone (MEDROL DOSEPACK) 4 mg tablet Take as directed on the package.    [DISCONTINUED] pantoprazole (PROTONIX) 40 MG tablet Take 1 tablet (40 mg total) by mouth once daily.    [DISCONTINUED] rivaroxaban (XARELTO) 15 mg Tab Take 1 tablet (15 mg total) by mouth daily with dinner or evening meal.    [DISCONTINUED] traMADoL (ULTRAM) 50 mg tablet Take 1 tablet (50 mg total) by mouth 4 (four) times daily.     Family History    None       Tobacco Use    Smoking status: Former Smoker    Smokeless tobacco: Never Used   Substance and Sexual Activity    Alcohol use: No    Drug use: Never    Sexual activity: Not on file     Review of Systems   Constitutional:  Positive for activity change, appetite change and fatigue. Negative for diaphoresis and fever.   HENT: Negative.     Eyes: Negative.    Respiratory:  Positive for shortness of breath. Negative for chest tightness, wheezing and stridor.     Cardiovascular:  Positive for palpitations. Negative for chest pain and leg swelling.   Gastrointestinal:  Positive for abdominal pain, constipation and nausea. Negative for abdominal distention, diarrhea, rectal pain and vomiting.   Endocrine: Negative.    Genitourinary: Negative.    Musculoskeletal:  Positive for gait problem.   Skin: Negative.    Allergic/Immunologic: Negative.    Neurological:  Positive for weakness. Negative for facial asymmetry, light-headedness, numbness and headaches.   Hematological: Negative.    Psychiatric/Behavioral:  The patient is nervous/anxious.    Objective:     Vital Signs (Most Recent):  Temp: 98.1 °F (36.7 °C) (03/31/22 1958)  Pulse: (!) 58 (03/31/22 2028)  Resp: 19 (03/31/22 1958)  BP: (!) 97/48 (03/31/22 2028)  SpO2: 97 % (03/31/22 1958)   Vital Signs (24h Range):  Temp:  [98.1 °F (36.7 °C)-98.8 °F (37.1 °C)] 98.1 °F (36.7 °C)  Pulse:  [] 58  Resp:  [18-30] 19  SpO2:  [93 %-100 %] 97 %  BP: ()/() 97/48     Weight: 79.3 kg (174 lb 13.2 oz)  Body mass index is 27.38 kg/m².    Physical Exam  Vitals and nursing note reviewed.   Constitutional:       Appearance: Normal appearance. She is obese. She is ill-appearing.   HENT:      Head: Normocephalic and atraumatic.      Right Ear: External ear normal.      Left Ear: External ear normal.      Nose: Nose normal.      Mouth/Throat:      Mouth: Mucous membranes are dry.      Pharynx: No oropharyngeal exudate or posterior oropharyngeal erythema.   Eyes:      General: No scleral icterus.     Extraocular Movements: Extraocular movements intact.      Conjunctiva/sclera: Conjunctivae normal.      Pupils: Pupils are equal, round, and reactive to light.   Cardiovascular:      Rate and Rhythm: Tachycardia present. Rhythm irregular.      Pulses: Normal pulses.      Heart sounds: Murmur heard.     No friction rub. No gallop.   Pulmonary:      Effort: Pulmonary effort is normal. No respiratory distress.      Breath sounds: Rales  present. No wheezing or rhonchi.   Chest:      Chest wall: No tenderness.   Abdominal:      General: Abdomen is flat. Bowel sounds are normal. There is no distension.      Palpations: There is no mass.      Tenderness: There is no abdominal tenderness. There is no guarding.      Hernia: No hernia is present.   Genitourinary:     Comments: deferred  Musculoskeletal:         General: No swelling or deformity.      Cervical back: Normal range of motion and neck supple. No rigidity or tenderness.      Right lower leg: No edema.      Left lower leg: No edema.   Skin:     General: Skin is warm and dry.      Capillary Refill: Capillary refill takes 2 to 3 seconds.      Coloration: Skin is not jaundiced or pale.      Findings: No bruising, erythema or lesion.   Neurological:      General: No focal deficit present.      Mental Status: She is alert and oriented to person, place, and time.      Motor: Weakness present.   Psychiatric:         Mood and Affect: Mood normal.         Behavior: Behavior normal.       CRANIAL NERVES     CN III, IV, VI   Pupils are equal, round, and reactive to light.     Results for orders placed or performed during the hospital encounter of 03/31/22   CBC Auto Differential   Result Value Ref Range    WBC 13.44 (H) 3.90 - 12.70 K/uL    RBC 4.08 4.00 - 5.40 M/uL    Hemoglobin 12.6 12.0 - 16.0 g/dL    Hematocrit 40.0 37.0 - 48.5 %    MCV 98 82 - 98 fL    MCH 30.9 27.0 - 31.0 pg    MCHC 31.5 (L) 32.0 - 36.0 g/dL    RDW 15.6 (H) 11.5 - 14.5 %    Platelets 348 150 - 450 K/uL    MPV 9.3 9.2 - 12.9 fL    Immature Granulocytes 0.3 0.0 - 0.5 %    Gran # (ANC) 9.1 (H) 1.8 - 7.7 K/uL    Immature Grans (Abs) 0.04 0.00 - 0.04 K/uL    Lymph # 2.1 1.0 - 4.8 K/uL    Mono # 2.0 (H) 0.3 - 1.0 K/uL    Eos # 0.2 0.0 - 0.5 K/uL    Baso # 0.08 0.00 - 0.20 K/uL    nRBC 0 0 /100 WBC    Gran % 67.4 38.0 - 73.0 %    Lymph % 15.3 (L) 18.0 - 48.0 %    Mono % 15.0 4.0 - 15.0 %    Eosinophil % 1.4 0.0 - 8.0 %    Basophil % 0.6  0.0 - 1.9 %    Differential Method Automated    Comprehensive Metabolic Panel   Result Value Ref Range    Sodium 140 136 - 145 mmol/L    Potassium 4.2 3.5 - 5.1 mmol/L    Chloride 106 95 - 110 mmol/L    CO2 21 (L) 23 - 29 mmol/L    Glucose 109 70 - 110 mg/dL    BUN 18 8 - 23 mg/dL    Creatinine 1.2 0.5 - 1.4 mg/dL    Calcium 8.6 (L) 8.7 - 10.5 mg/dL    Total Protein 7.0 6.0 - 8.4 g/dL    Albumin 3.2 (L) 3.5 - 5.2 g/dL    Total Bilirubin 1.4 (H) 0.1 - 1.0 mg/dL    Alkaline Phosphatase 141 (H) 55 - 135 U/L    AST 27 10 - 40 U/L    ALT 8 (L) 10 - 44 U/L    Anion Gap 13 8 - 16 mmol/L    eGFR if African American 48 (A) >60 mL/min/1.73 m^2    eGFR if non African American 42 (A) >60 mL/min/1.73 m^2   Urinalysis, Reflex to Urine Culture Urine, Clean Catch    Specimen: Urine   Result Value Ref Range    Specimen UA Urine, Clean Catch     Color, UA Yellow Yellow, Straw, Jennifer    Appearance, UA Clear Clear    pH, UA 6.0 5.0 - 8.0    Specific Gravity, UA >=1.030 (A) 1.005 - 1.030    Protein, UA Negative Negative    Glucose, UA Negative Negative    Ketones, UA Negative Negative    Bilirubin (UA) Negative Negative    Occult Blood UA Negative Negative    Nitrite, UA Positive (A) Negative    Urobilinogen, UA Negative <2.0 EU/dL    Leukocytes, UA Trace (A) Negative   BNP   Result Value Ref Range     (H) 0 - 99 pg/mL   CK   Result Value Ref Range    CPK 56 20 - 180 U/L   Troponin I   Result Value Ref Range    Troponin I 0.050 (H) 0.000 - 0.026 ng/mL   Lactic acid, plasma   Result Value Ref Range    Lactate (Lactic Acid) 1.2 0.5 - 2.2 mmol/L   Urinalysis Microscopic   Result Value Ref Range    WBC, UA 2 0 - 5 /hpf    Bacteria Few (A) None-Occ /hpf    Microscopic Comment SEE COMMENT    POCT COVID-19 Rapid Screening   Result Value Ref Range    POC Rapid COVID Negative Negative     Acceptable Yes    POCT Influenza A/B Molecular   Result Value Ref Range    POC Molecular Influenza A Ag Negative Negative, Not Reported     POC Molecular Influenza B Ag Negative Negative, Not Reported     Acceptable Yes         Significant Labs: All pertinent labs within the past 24 hours have been reviewed.    Imaging Results              X-Ray Chest AP Portable (Final result)  Result time 03/31/22 12:07:40      Final result by Milan Tristan MD (03/31/22 12:07:40)                   Impression:      Left infrahilar opacity most likely representing pneumonia.      Electronically signed by: Milan Tristan  Date:    03/31/2022  Time:    12:07               Narrative:    EXAMINATION:  XR CHEST AP PORTABLE    CLINICAL HISTORY:  sob;  shortness of breath.    COMPARISON:  07/24/2021    FINDINGS:  7.7 cm opacity left infrahilar region, most consistent with pneumonia, new since prior exam.  Right lung clear.  Heart size within normal limits.                                       CT Renal Stone Study ABD Pelvis WO (Final result)  Result time 03/31/22 11:19:15      Final result by Milan Tristan MD (03/31/22 11:19:15)                   Impression:      1. Moderate left pleural effusion with infiltrate/consolidation left lung base.  Possible perihilar lung mass.  Dedicated CT chest would be helpful, if clinically warranted.  2. Hypodensities within the liver most likely representing cysts.  3. Punctate gallstones or sludge within the gallbladder.  4. Very small hiatal hernia.  Mild diverticulosis.  5. Hysterectomy.  All CT scans at this facility are performed  using dose modulation techniques as appropriate to performed exam including the following:  automated exposure control; adjustment of mA and/or kV according to the patients size (this includes techniques or standardized protocols for targeted exams where dose is matched to indication/reason for exam: i.e. extremities or head);  iterative reconstruction technique.      Electronically signed by: Milan Tristan  Date:    03/31/2022  Time:    11:19                Narrative:    EXAMINATION:  CT RENAL STONE STUDY ABD PELVIS WO    CLINICAL HISTORY:  Flank pain, kidney stone suspected;.    TECHNIQUE:  Low dose axial images, sagittal and coronal reformations were obtained from the lung bases to the pubic symphysis.    COMPARISON:  None    FINDINGS:  Moderate size left pleural effusion with infiltrate and consolidation or possible lung mass in the perihilar region measuring 2.3 cm.  Right lung base clear.    2.1 cm hypodensity anterior and inferior liver adjacent the gallbladder fossa.  Finding likely represents a cyst (series 2, image 38).  13 mm hypodensity dome of the junction of the right lobe of the liver could represent small cyst (series 2, image 24).  Punctate gallstones are sludge layering within the lumen of the gallbladder.    The spleen is normal in size and appearance.  The pancreas is normal.  The adrenal glands are normal.  The aorta and IVC are normal.  No retroperitoneal adenopathy.    Left kidney and left ureter are normal.  Right kidney and right ureter are normal.    Very small hiatal hernia.  The small intestine is normal.  Appendix not visualized.  No inflammatory changes in the region of the appendix.  Few diverticuli sigmoid colon.  Remaining colon is normal.    The pelvis demonstrates normal bladder.  Uterus absent.  Adnexal regions are normal.    Lower chest and abdominal wall soft tissues are normal.  Degenerative changes of the spine.  No suspicious osseous lesions.                                       X-Ray Abdomen Flat And Erect (Final result)  Result time 03/31/22 09:52:35      Final result by Milan Tristan MD (03/31/22 09:52:35)                   Impression:      1. Nonobstructive bowel gas pattern.  Moderate stool right colon.      Electronically signed by: Milan Tristan  Date:    03/31/2022  Time:    09:52               Narrative:    EXAMINATION:  XR ABDOMEN FLAT AND ERECT    CLINICAL HISTORY:  Unspecified abdominal  pain    COMPARISON:  None    FINDINGS:  Supine and upright views of the abdomen demonstrate no abnormal bowel dilatation.  No significant air-fluid levels.  Moderate stool right colon.No abnormal soft tissue calcifications.  Regional bones are unremarkable.                                       Significant Imaging: I have reviewed all pertinent imaging results/findings within the past 24 hours.    Assessment/Plan:     * Atrial flutter with RVR  Pt with hx of P Afib, was in and out of Afin/ flutter, treated initially with Cardizem Gtt and then changed to Amiodarone and Metoprolo added with good effect  Pt already on long term xarelto  Continue  Cards consulted and following      Pneumonia due to infectious organism  LLL Pneumonia with Parapneumonic effusion seen on Renal CT  R.o Infrahilar mass with CT Chest later  Started on Levaquin and continued on O2, Nebs,   Add IS and Acapella  Pulmonary Consult in am      Cystitis  Mild, residual  Should be covered by Levaquin      Troponin level elevated  Seen by Cards- Demand ischemia- no ACS      Slow transit constipation  As seen on CT Abd Renal stones with mild stranding- should be covered by Levaquin  Treat with Dulcolax and Mag Citrate   Start pericolace and miralax      Weakness from old CVA  Exacerbated by mild Dehydration, start IVF  PT/OT eval in am  inj b12 IM      PAF (paroxysmal atrial fibrillation)  See above        VTE Risk Mitigation (From admission, onward)         Ordered     rivaroxaban tablet 15 mg  with dinner         03/31/22 2035                   Tiffanie Jones MD  Department of Hospital Medicine   [facility] Telemetry ([facility])

## 2022-04-01 NOTE — NURSING
Pt assessed. Increased lethargy and drowsiness. responding to painful stimuli. Not following commands or answering questions. Daughter at bedside stated this is not her baseline and this began after morphine administration in ED. Vitals WNL pt tachypneic and wheezing. body twitching/jerking. Charge Nurse Kelli called to bedside. Jolie maurer NP notified narcan ordered and administered. Mild response to narcan. Pt eyes open still lethargic but able to stay awake, delayed in answering questions disoriented x4. respiratory at bedside for EKG, ABG, chest xray and labs ordered. Fluids discontinued. One time dose lasix 60mg. POC reviewed with daughter, she verbalized understanding   Pt remains free of injuries and falls; fall precaution in place   Bed low, side rails up x2, non slip socks in use, call light in reach   Reminded to call for assistance  Hourly rounding complete will continue to monitor

## 2022-04-01 NOTE — PROGRESS NOTES
Pharmacist Renal Dose Adjustment Note    Ana Castañeda is a 86 y.o. female being treated with the medication levofloxacin    Patient Data:    Vital Signs (Most Recent):  Temp: 98.1 °F (36.7 °C) (03/31/22 1958)  Pulse: (!) 58 (03/31/22 2028)  Resp: 19 (03/31/22 1958)  BP: (!) 97/48 (03/31/22 2028)  SpO2: 97 % (03/31/22 1958)   Vital Signs (72h Range):  Temp:  [98.1 °F (36.7 °C)-98.8 °F (37.1 °C)]   Pulse:  []   Resp:  [18-30]   BP: ()/()   SpO2:  [93 %-100 %]      Recent Labs   Lab 03/31/22  1030   CREATININE 1.2     Serum creatinine: 1.2 mg/dL 03/31/22 1030  Estimated creatinine clearance: 37.8 mL/min    Levofloxacin 500 mg QD will be changed to levofloxacin 750 mg Q48H for CrCl 20-49 mL/min.    Pharmacist's Name: Shirlene Zavala  Pharmacist's Extension: [phone]

## 2022-04-01 NOTE — SUBJECTIVE & OBJECTIVE
Past Medical History:   Diagnosis Date    Anticoagulant long-term use     Asthma     Debility     Gait apraxia     Gout     Hypertension     NPH (normal pressure hydrocephalus)     Stroke     Thyroid disease        Past Surgical History:   Procedure Laterality Date    CAROTID ENDARTERECTOMY         Review of patient's allergies indicates:   Allergen Reactions    Food color red [red food color (bulk)] Nausea And Vomiting    Sulfa (sulfonamide antibiotics)      Other reaction(s): Unknown    Pcn [penicillins] Rash       Family History    None       Tobacco Use    Smoking status: Former Smoker    Smokeless tobacco: Never Used   Substance and Sexual Activity    Alcohol use: No    Drug use: Never    Sexual activity: Not on file         Review of Systems   Constitutional:  Positive for fatigue. Negative for chills and fever.   HENT:  Negative for nosebleeds.    Eyes:  Negative for discharge.   Respiratory:  Positive for cough and shortness of breath. Negative for choking.    Cardiovascular:  Negative for chest pain.   Gastrointestinal:  Positive for abdominal pain. Negative for blood in stool.   Endocrine: Negative for cold intolerance.   Genitourinary:  Positive for dysuria. Negative for hematuria.   Musculoskeletal:  Positive for arthralgias. Negative for neck stiffness.   Skin:  Negative for rash.   Allergic/Immunologic: Negative for immunocompromised state.   Neurological:  Positive for weakness. Negative for seizures.        History of stroke    Left-sided weakness   Hematological:  Negative for adenopathy.   Psychiatric/Behavioral:  Negative for behavioral problems.    Objective:     Vital Signs (Most Recent):  Temp: 97.7 °F (36.5 °C) (04/01/22 1128)  Pulse: 73 (04/01/22 1128)  Resp: 18 (04/01/22 1128)  BP: 130/62 (04/01/22 1128)  SpO2: 96 % (04/01/22 1128) Vital Signs (24h Range):  Temp:  [97 °F (36.1 °C)-98.7 °F (37.1 °C)] 97.7 °F (36.5 °C)  Pulse:  [] 73  Resp:  [16-28] 18  SpO2:  [94 %-100 %] 96 %  BP:  ()/() 130/62     Weight: 78.9 kg (174 lb)  Body mass index is 28.08 kg/m².      Intake/Output Summary (Last 24 hours) at 4/1/2022 1542  Last data filed at 3/31/2022 1751  Gross per 24 hour   Intake 251.32 ml   Output --   Net 251.32 ml       Physical Exam  Vitals and nursing note reviewed.   Constitutional:       General: She is not in acute distress.     Appearance: She is well-developed. She is ill-appearing.   HENT:      Head: Normocephalic and atraumatic.   Cardiovascular:      Rate and Rhythm: Normal rate and regular rhythm.   Pulmonary:      Effort: Pulmonary effort is normal.   Abdominal:      Palpations: Abdomen is soft.      Tenderness: There is no abdominal tenderness.   Musculoskeletal:         General: No signs of injury.      Cervical back: Neck supple.   Skin:     General: Skin is warm.   Neurological:      Mental Status: She is alert. Mental status is at baseline.      Comments: Left-sided weakness       Vents:  Oxygen Concentration (%): 28 (03/31/22 2153)    Lines/Drains/Airways       Drain  Duration             Female External Urinary Catheter 03/31/22 1000 1 day              Peripheral Intravenous Line  Duration                  Peripheral IV - Single Lumen 03/31/22 1630 18 G Right Antecubital <1 day                    Significant Labs:    CBC/Anemia Profile:  Recent Labs   Lab 03/31/22  1030 04/01/22  0012 04/01/22  0356   WBC 13.44* 13.19* 12.80*   HGB 12.6 11.6* 11.0*   HCT 40.0 36.1* 33.7*    276 264   MCV 98 97 95   RDW 15.6* 15.6* 15.4*        Chemistries:  Recent Labs   Lab 03/31/22  1030 04/01/22  0011 04/01/22  0356    134* 137   K 4.2 5.0 5.2*    106 105   CO2 21* 16* 18*   BUN 18 21 23   CREATININE 1.2 1.6* 1.8*   CALCIUM 8.6* 8.8 8.7   ALBUMIN 3.2* 2.8*  --    PROT 7.0 6.8  --    BILITOT 1.4* 1.0  --    ALKPHOS 141* 120  --    ALT 8* 8*  --    AST 27 25  --      EKG 03/31/2022  Normal sinus rhythm   Prolonged QT   Abnormal ECG   When compared with ECG of  31-MAR-2022 11:45,   Sinus rhythm has replaced Atrial flutter   Vent. rate has decreased  BPM   ST no longer depressed in Inferior leads   ST no longer depressed in Anterior-lateral leads   T wave inversion no longer evident in Inferior leads   T wave inversion no longer evident in Anterior-lateral leads        2D echo 04/01/2022    Concentric remodeling and low normal systolic function.  Intermediate central venous pressure (8 mmHg).  The estimated PA systolic pressure is 48 mmHg.  The estimated ejection fraction is 50%.  Indeterminate left ventricular diastolic function.  With normal right ventricular systolic function.  Significant Imaging:     MRI of the brain 04/01/2022    Multifocal acute ischemic changes/infarctions including the left cerebellar hemisphere, the occipital lobes bilaterally, the perisylvian white matter and cortex on the right side, the right and left parietal lobes and the right and left frontal lobes.  No evidence of a mass or bleed.  Extensive small vessel disease.  Marked atrophy.      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  CTA could be utilized for further evaluation.     Retrograde flow noted within the mid left ICA.     Less than 50% stenosis of the right carotid bifurcation.    Chest x-ray 03/31/2022  COMPARISON:  Prior     FINDINGS:  No definite change compared to the prior examination obtained earlier today     Impression:     Stable findings with left lung opacity.           CT renal stone study 03/31/2022      FINDINGS:  Moderate size left pleural effusion with infiltrate and consolidation or possible lung mass in the perihilar region measuring 2.3 cm.  Right lung base clear.     2.1 cm hypodensity anterior and inferior liver adjacent the gallbladder fossa.  Finding likely represents a cyst (series 2, image 38).  13 mm hypodensity dome of the junction of the right lobe of the liver could  represent small cyst (series 2, image 24).  Punctate gallstones are sludge layering within the lumen of the gallbladder.     The spleen is normal in size and appearance.  The pancreas is normal.  The adrenal glands are normal.  The aorta and IVC are normal.  No retroperitoneal adenopathy.     Left kidney and left ureter are normal.  Right kidney and right ureter are normal.     Very small hiatal hernia.  The small intestine is normal.  Appendix not visualized.  No inflammatory changes in the region of the appendix.  Few diverticuli sigmoid colon.  Remaining colon is normal.     The pelvis demonstrates normal bladder.  Uterus absent.  Adnexal regions are normal.     Lower chest and abdominal wall soft tissues are normal.  Degenerative changes of the spine.  No suspicious osseous lesions.     Impression:     1. Moderate left pleural effusion with infiltrate/consolidation left lung base.  Possible perihilar lung mass.  Dedicated CT chest would be helpful, if clinically warranted.  2. Hypodensities within the liver most likely representing cysts.  3. Punctate gallstones or sludge within the gallbladder.  4. Very small hiatal hernia.  Mild diverticulosis.  5. Hysterectomy.

## 2022-04-01 NOTE — ASSESSMENT & PLAN NOTE
Beta-blocker.  Off anticoagulation now due to acute ischemic stroke.  Neurology follow-up.  Carotid stenosis noted.  Overall poor prognosis.

## 2022-04-01 NOTE — ASSESSMENT & PLAN NOTE
3/31 LLL Pneumonia with Parapneumonic effusion seen on Renal CT  R.o Infrahilar mass with CT Chest later  Started on Levaquin and continued on O2, Nebs,   Add IS and Acapella  Consult Pulmonology in am

## 2022-04-01 NOTE — SUBJECTIVE & OBJECTIVE
Past Medical History:   Diagnosis Date    Anticoagulant long-term use     Asthma     Debility     Gait apraxia     Gout     Hypertension     NPH (normal pressure hydrocephalus)     Stroke     Thyroid disease        Past Surgical History:   Procedure Laterality Date    CAROTID ENDARTERECTOMY         Review of patient's allergies indicates:   Allergen Reactions    Food color red [red food color (bulk)] Nausea And Vomiting    Sulfa (sulfonamide antibiotics)      Other reaction(s): Unknown    Pcn [penicillins] Rash       No current facility-administered medications on file prior to encounter.     Current Outpatient Medications on File Prior to Encounter   Medication Sig    albuterol (PROVENTIL/VENTOLIN HFA) 90 mcg/actuation inhaler Inhale 2 puffs into the lungs every 4 (four) hours as needed for Wheezing. Take 2 puffs of the lungs every 4 hr as needed for wheezing or shortness of breath    budesonide-formoterol 160-4.5 mcg (SYMBICORT) 160-4.5 mcg/actuation HFAA Symbicort Take No date recorded HFA aerosol inhaler No frequency recorded No route recorded No set duration recorded No set duration amount recorded active 160-4.5 mcg/actuation    cetirizine (ZYRTEC) 5 MG tablet Take 5 mg by mouth every evening.    gabapentin (NEURONTIN) 100 MG capsule Take 100 mg by mouth 3 (three) times daily.    levalbuterol (XOPENEX) 0.63 mg/3 mL nebulizer solution Take 3 mLs (0.63 mg total) by nebulization 3 (three) times daily as needed for Wheezing. Rescue    levothyroxine (SYNTHROID) 112 MCG tablet Take 112 mcg by mouth before breakfast.    XARELTO 20 mg Tab Take 20 mg by mouth once daily.    ipratropium (ATROVENT) 0.02 % nebulizer solution Take 2.5 mLs (500 mcg total) by nebulization every 6 (six) hours. Rescue    [DISCONTINUED] allopurinol (ZYLOPRIM) 300 MG tablet Take 300 mg by mouth once daily.    [DISCONTINUED] amiodarone (PACERONE) 200 MG Tab Take 1 tablet (200 mg total) by mouth 2 (two) times daily.    [DISCONTINUED] aspirin  (ECOTRIN) 81 MG EC tablet Take 1 tablet (81 mg total) by mouth once daily.    [DISCONTINUED] atorvastatin (LIPITOR) 20 MG tablet Take 20 mg by mouth once daily.    [DISCONTINUED] ciprofloxacin HCl (CIPRO) 500 MG tablet TAKE 1 TABLET BY MOUTH EVERY 12 HOURS FOR 10 DAYS    [DISCONTINUED] diltiaZEM (CARDIZEM CD) 120 MG Cp24 Take 1 capsule (120 mg total) by mouth once daily.    [DISCONTINUED] guaiFENesin (MUCINEX) 600 mg 12 hr tablet Take 1 tablet (600 mg total) by mouth 2 (two) times daily.    [DISCONTINUED] levothyroxine (SYNTHROID) 112 MCG tablet Take 1 tablet (112 mcg total) by mouth once daily.    [DISCONTINUED] methylPREDNISolone (MEDROL DOSEPACK) 4 mg tablet Take as directed on the package.    [DISCONTINUED] pantoprazole (PROTONIX) 40 MG tablet Take 1 tablet (40 mg total) by mouth once daily.    [DISCONTINUED] rivaroxaban (XARELTO) 15 mg Tab Take 1 tablet (15 mg total) by mouth daily with dinner or evening meal.    [DISCONTINUED] traMADoL (ULTRAM) 50 mg tablet Take 1 tablet (50 mg total) by mouth 4 (four) times daily.     Family History    None       Tobacco Use    Smoking status: Former Smoker    Smokeless tobacco: Never Used   Substance and Sexual Activity    Alcohol use: No    Drug use: Never    Sexual activity: Not on file     Review of Systems   Constitutional:  Positive for activity change, appetite change and fatigue. Negative for diaphoresis and fever.   HENT: Negative.     Eyes: Negative.    Respiratory:  Positive for shortness of breath. Negative for chest tightness, wheezing and stridor.    Cardiovascular:  Positive for palpitations. Negative for chest pain and leg swelling.   Gastrointestinal:  Positive for abdominal pain, constipation and nausea. Negative for abdominal distention, diarrhea, rectal pain and vomiting.   Endocrine: Negative.    Genitourinary: Negative.    Musculoskeletal:  Positive for gait problem.   Skin: Negative.    Allergic/Immunologic: Negative.    Neurological:  Positive for  weakness. Negative for facial asymmetry, light-headedness, numbness and headaches.   Hematological: Negative.    Psychiatric/Behavioral:  The patient is nervous/anxious.    Objective:     Vital Signs (Most Recent):  Temp: 98.1 °F (36.7 °C) (03/31/22 1958)  Pulse: (!) 58 (03/31/22 2028)  Resp: 19 (03/31/22 1958)  BP: (!) 97/48 (03/31/22 2028)  SpO2: 97 % (03/31/22 1958)   Vital Signs (24h Range):  Temp:  [98.1 °F (36.7 °C)-98.8 °F (37.1 °C)] 98.1 °F (36.7 °C)  Pulse:  [] 58  Resp:  [18-30] 19  SpO2:  [93 %-100 %] 97 %  BP: ()/() 97/48     Weight: 79.3 kg (174 lb 13.2 oz)  Body mass index is 27.38 kg/m².    Physical Exam  Vitals and nursing note reviewed.   Constitutional:       Appearance: Normal appearance. She is obese. She is ill-appearing.   HENT:      Head: Normocephalic and atraumatic.      Right Ear: External ear normal.      Left Ear: External ear normal.      Nose: Nose normal.      Mouth/Throat:      Mouth: Mucous membranes are dry.      Pharynx: No oropharyngeal exudate or posterior oropharyngeal erythema.   Eyes:      General: No scleral icterus.     Extraocular Movements: Extraocular movements intact.      Conjunctiva/sclera: Conjunctivae normal.      Pupils: Pupils are equal, round, and reactive to light.   Cardiovascular:      Rate and Rhythm: Tachycardia present. Rhythm irregular.      Pulses: Normal pulses.      Heart sounds: Murmur heard.     No friction rub. No gallop.   Pulmonary:      Effort: Pulmonary effort is normal. No respiratory distress.      Breath sounds: Rales present. No wheezing or rhonchi.   Chest:      Chest wall: No tenderness.   Abdominal:      General: Abdomen is flat. Bowel sounds are normal. There is no distension.      Palpations: There is no mass.      Tenderness: There is no abdominal tenderness. There is no guarding.      Hernia: No hernia is present.   Genitourinary:     Comments: deferred  Musculoskeletal:         General: No swelling or deformity.       Cervical back: Normal range of motion and neck supple. No rigidity or tenderness.      Right lower leg: No edema.      Left lower leg: No edema.   Skin:     General: Skin is warm and dry.      Capillary Refill: Capillary refill takes 2 to 3 seconds.      Coloration: Skin is not jaundiced or pale.      Findings: No bruising, erythema or lesion.   Neurological:      General: No focal deficit present.      Mental Status: She is alert and oriented to person, place, and time.      Motor: Weakness present.   Psychiatric:         Mood and Affect: Mood normal.         Behavior: Behavior normal.       CRANIAL NERVES     CN III, IV, VI   Pupils are equal, round, and reactive to light.     Results for orders placed or performed during the hospital encounter of 03/31/22   CBC Auto Differential   Result Value Ref Range    WBC 13.44 (H) 3.90 - 12.70 K/uL    RBC 4.08 4.00 - 5.40 M/uL    Hemoglobin 12.6 12.0 - 16.0 g/dL    Hematocrit 40.0 37.0 - 48.5 %    MCV 98 82 - 98 fL    MCH 30.9 27.0 - 31.0 pg    MCHC 31.5 (L) 32.0 - 36.0 g/dL    RDW 15.6 (H) 11.5 - 14.5 %    Platelets 348 150 - 450 K/uL    MPV 9.3 9.2 - 12.9 fL    Immature Granulocytes 0.3 0.0 - 0.5 %    Gran # (ANC) 9.1 (H) 1.8 - 7.7 K/uL    Immature Grans (Abs) 0.04 0.00 - 0.04 K/uL    Lymph # 2.1 1.0 - 4.8 K/uL    Mono # 2.0 (H) 0.3 - 1.0 K/uL    Eos # 0.2 0.0 - 0.5 K/uL    Baso # 0.08 0.00 - 0.20 K/uL    nRBC 0 0 /100 WBC    Gran % 67.4 38.0 - 73.0 %    Lymph % 15.3 (L) 18.0 - 48.0 %    Mono % 15.0 4.0 - 15.0 %    Eosinophil % 1.4 0.0 - 8.0 %    Basophil % 0.6 0.0 - 1.9 %    Differential Method Automated    Comprehensive Metabolic Panel   Result Value Ref Range    Sodium 140 136 - 145 mmol/L    Potassium 4.2 3.5 - 5.1 mmol/L    Chloride 106 95 - 110 mmol/L    CO2 21 (L) 23 - 29 mmol/L    Glucose 109 70 - 110 mg/dL    BUN 18 8 - 23 mg/dL    Creatinine 1.2 0.5 - 1.4 mg/dL    Calcium 8.6 (L) 8.7 - 10.5 mg/dL    Total Protein 7.0 6.0 - 8.4 g/dL    Albumin 3.2 (L) 3.5 - 5.2  g/dL    Total Bilirubin 1.4 (H) 0.1 - 1.0 mg/dL    Alkaline Phosphatase 141 (H) 55 - 135 U/L    AST 27 10 - 40 U/L    ALT 8 (L) 10 - 44 U/L    Anion Gap 13 8 - 16 mmol/L    eGFR if African American 48 (A) >60 mL/min/1.73 m^2    eGFR if non African American 42 (A) >60 mL/min/1.73 m^2   Urinalysis, Reflex to Urine Culture Urine, Clean Catch    Specimen: Urine   Result Value Ref Range    Specimen UA Urine, Clean Catch     Color, UA Yellow Yellow, Straw, Jennifer    Appearance, UA Clear Clear    pH, UA 6.0 5.0 - 8.0    Specific Gravity, UA >=1.030 (A) 1.005 - 1.030    Protein, UA Negative Negative    Glucose, UA Negative Negative    Ketones, UA Negative Negative    Bilirubin (UA) Negative Negative    Occult Blood UA Negative Negative    Nitrite, UA Positive (A) Negative    Urobilinogen, UA Negative <2.0 EU/dL    Leukocytes, UA Trace (A) Negative   BNP   Result Value Ref Range     (H) 0 - 99 pg/mL   CK   Result Value Ref Range    CPK 56 20 - 180 U/L   Troponin I   Result Value Ref Range    Troponin I 0.050 (H) 0.000 - 0.026 ng/mL   Lactic acid, plasma   Result Value Ref Range    Lactate (Lactic Acid) 1.2 0.5 - 2.2 mmol/L   Urinalysis Microscopic   Result Value Ref Range    WBC, UA 2 0 - 5 /hpf    Bacteria Few (A) None-Occ /hpf    Microscopic Comment SEE COMMENT    POCT COVID-19 Rapid Screening   Result Value Ref Range    POC Rapid COVID Negative Negative     Acceptable Yes    POCT Influenza A/B Molecular   Result Value Ref Range    POC Molecular Influenza A Ag Negative Negative, Not Reported    POC Molecular Influenza B Ag Negative Negative, Not Reported     Acceptable Yes         Significant Labs: All pertinent labs within the past 24 hours have been reviewed.    Imaging Results              X-Ray Chest AP Portable (Final result)  Result time 03/31/22 12:07:40      Final result by Milan Tristan MD (03/31/22 12:07:40)                   Impression:      Left infrahilar opacity  most likely representing pneumonia.      Electronically signed by: Milan Tristan  Date:    03/31/2022  Time:    12:07               Narrative:    EXAMINATION:  XR CHEST AP PORTABLE    CLINICAL HISTORY:  sob;  shortness of breath.    COMPARISON:  07/24/2021    FINDINGS:  7.7 cm opacity left infrahilar region, most consistent with pneumonia, new since prior exam.  Right lung clear.  Heart size within normal limits.                                       CT Renal Stone Study ABD Pelvis WO (Final result)  Result time 03/31/22 11:19:15      Final result by Milan Tristan MD (03/31/22 11:19:15)                   Impression:      1. Moderate left pleural effusion with infiltrate/consolidation left lung base.  Possible perihilar lung mass.  Dedicated CT chest would be helpful, if clinically warranted.  2. Hypodensities within the liver most likely representing cysts.  3. Punctate gallstones or sludge within the gallbladder.  4. Very small hiatal hernia.  Mild diverticulosis.  5. Hysterectomy.  All CT scans at this facility are performed  using dose modulation techniques as appropriate to performed exam including the following:  automated exposure control; adjustment of mA and/or kV according to the patients size (this includes techniques or standardized protocols for targeted exams where dose is matched to indication/reason for exam: i.e. extremities or head);  iterative reconstruction technique.      Electronically signed by: Milan Tristan  Date:    03/31/2022  Time:    11:19               Narrative:    EXAMINATION:  CT RENAL STONE STUDY ABD PELVIS WO    CLINICAL HISTORY:  Flank pain, kidney stone suspected;.    TECHNIQUE:  Low dose axial images, sagittal and coronal reformations were obtained from the lung bases to the pubic symphysis.    COMPARISON:  None    FINDINGS:  Moderate size left pleural effusion with infiltrate and consolidation or possible lung mass in the perihilar region measuring 2.3 cm.   Right lung base clear.    2.1 cm hypodensity anterior and inferior liver adjacent the gallbladder fossa.  Finding likely represents a cyst (series 2, image 38).  13 mm hypodensity dome of the junction of the right lobe of the liver could represent small cyst (series 2, image 24).  Punctate gallstones are sludge layering within the lumen of the gallbladder.    The spleen is normal in size and appearance.  The pancreas is normal.  The adrenal glands are normal.  The aorta and IVC are normal.  No retroperitoneal adenopathy.    Left kidney and left ureter are normal.  Right kidney and right ureter are normal.    Very small hiatal hernia.  The small intestine is normal.  Appendix not visualized.  No inflammatory changes in the region of the appendix.  Few diverticuli sigmoid colon.  Remaining colon is normal.    The pelvis demonstrates normal bladder.  Uterus absent.  Adnexal regions are normal.    Lower chest and abdominal wall soft tissues are normal.  Degenerative changes of the spine.  No suspicious osseous lesions.                                       X-Ray Abdomen Flat And Erect (Final result)  Result time 03/31/22 09:52:35      Final result by Milan Tristan MD (03/31/22 09:52:35)                   Impression:      1. Nonobstructive bowel gas pattern.  Moderate stool right colon.      Electronically signed by: Milan Tristan  Date:    03/31/2022  Time:    09:52               Narrative:    EXAMINATION:  XR ABDOMEN FLAT AND ERECT    CLINICAL HISTORY:  Unspecified abdominal pain    COMPARISON:  None    FINDINGS:  Supine and upright views of the abdomen demonstrate no abnormal bowel dilatation.  No significant air-fluid levels.  Moderate stool right colon.No abnormal soft tissue calcifications.  Regional bones are unremarkable.                                       Significant Imaging: I have reviewed all pertinent imaging results/findings within the past 24 hours.

## 2022-04-01 NOTE — ASSESSMENT & PLAN NOTE
Possibly malignant/para malignant.  On Xarelto as outpatient now on hold.  I would benefit from diagnostic thoracentesis.  No urgency for thoracentesis now.  Treat acute CVA.

## 2022-04-01 NOTE — SUBJECTIVE & OBJECTIVE
Interval History:  Ct scan was done with areas of acute CVAs noted. Neurology was consulted. MRI of brain, bilateral carotid ultrasound, and echocardiogram was ordered. Patient was placed NPO. She was given IVF for hydration. PT, OT, and ST were consulted.     Review of Systems   Constitutional:  Positive for activity change and fatigue. Negative for chills and fever.   HENT:  Negative for ear discharge, ear pain and facial swelling.    Eyes:  Negative for pain and redness.   Respiratory:  Negative for cough and shortness of breath.    Cardiovascular:  Negative for chest pain, palpitations and leg swelling.   Gastrointestinal:  Negative for abdominal distention, abdominal pain, blood in stool, constipation, diarrhea, nausea and vomiting.   Endocrine: Negative for polydipsia and polyphagia.   Genitourinary:  Negative for difficulty urinating, dysuria, flank pain and hematuria.   Musculoskeletal:  Positive for gait problem. Negative for neck pain and neck stiffness.   Skin:  Negative for color change.   Allergic/Immunologic: Negative for food allergies.   Neurological:  Positive for speech difficulty and weakness. Negative for seizures.   Hematological:  Does not bruise/bleed easily.   Psychiatric/Behavioral:  Positive for confusion. Negative for agitation, behavioral problems, hallucinations and suicidal ideas. The patient is not nervous/anxious.    Objective:     Vital Signs (Most Recent):  Temp: 97.7 °F (36.5 °C) (04/01/22 1128)  Pulse: 87 (04/01/22 1541)  Resp: 18 (04/01/22 1541)  BP: 130/62 (04/01/22 1128)  SpO2: 95 % (04/01/22 1541) Vital Signs (24h Range):  Temp:  [97 °F (36.1 °C)-98.7 °F (37.1 °C)] 97.7 °F (36.5 °C)  Pulse:  [] 87  Resp:  [16-28] 18  SpO2:  [94 %-100 %] 95 %  BP: ()/() 130/62     Weight: 78.9 kg (174 lb)  Body mass index is 28.08 kg/m².    Intake/Output Summary (Last 24 hours) at 4/1/2022 1608  Last data filed at 3/31/2022 1751  Gross per 24 hour   Intake 151.32 ml   Output  --   Net 151.32 ml      Physical Exam  Constitutional:       General: She is not in acute distress.     Appearance: She is not diaphoretic.      Comments: Lethargic   HENT:      Head: Atraumatic.      Mouth/Throat:      Mouth: Mucous membranes are dry.   Eyes:      General:         Right eye: No discharge.         Left eye: No discharge.      Conjunctiva/sclera: Conjunctivae normal.      Comments: Left pupil > right pupil   Cardiovascular:      Rate and Rhythm: Normal rate and regular rhythm.      Pulses: Normal pulses.   Pulmonary:      Effort: Pulmonary effort is normal. No respiratory distress.      Breath sounds: No rhonchi.      Comments:  diminished  Abdominal:      General: Bowel sounds are normal. There is no distension.      Palpations: Abdomen is soft.      Tenderness: There is no abdominal tenderness. There is no guarding.   Genitourinary:     Comments: Not examined  Musculoskeletal:      Cervical back: Normal range of motion and neck supple. No rigidity or tenderness.      Right lower leg: No edema.      Left lower leg: No edema.   Skin:     General: Skin is warm and dry.      Capillary Refill: Capillary refill takes less than 2 seconds.      Comments: Decreased skin turgor   Neurological:      Comments: Lethargic  Responds appropriately to simple commands at times   Psychiatric:      Comments: Dash and cooperative          Lab Results   Component Value Date    WBC 12.80 (H) 04/01/2022    HGB 11.0 (L) 04/01/2022    HCT 33.7 (L) 04/01/2022    MCV 95 04/01/2022     04/01/2022       BMP  Lab Results   Component Value Date     04/01/2022    K 5.2 (H) 04/01/2022     04/01/2022    CO2 18 (L) 04/01/2022    BUN 23 04/01/2022    CREATININE 1.8 (H) 04/01/2022    CALCIUM 8.7 04/01/2022    ANIONGAP 14 04/01/2022    ESTGFRAFRICA 29 (A) 04/01/2022    EGFRNONAA 25 (A) 04/01/2022

## 2022-04-01 NOTE — HPI
86-year-old female patient medical history of Anticoagulant long-term use, Asthma, Debility, Gait apraxia, Gout, Hypertension, NPH (normal pressure hydrocephalus), Stroke, and Thyroid disease admitted for few days history of abdominal pain complicating a recent history of UTI treated by Cipro.  No nausea vomiting no diarrhea.

## 2022-04-01 NOTE — PROGRESS NOTES
[facility] - Telemetry ([facility]  Progress Note    Patient Name: Ana Castañeda  MRN: [id number]  Patient Class: IP- Inpatient   Admission Date: 3/31/2022  Length of Stay: 1 days  Attending Physician: Obed Vargas MD  Primary Care Provider: Stephen Tarango MD      Subjective:     Principal Problem:Atrial flutter, Acute CVA- multiple    HPI:  Ana Castañeda is a 86 y.o. 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 The pain is described as crampy/spasms. No associated NVD, fever or chills. She had a UTI which was treated with oral Cipro 2 weeks ago. She denies any CP, SOB, dizziness, weakness, numbness, and all other sxs at this time. daughter states that when she has been feeling tired and not eating drinking much as well for last few days nidhi after she finished her cipro dose and her R sided weakness has got worse. In the ER, initial VSS, Afebrile, Sats 95% on RA. CXr showed Left infrahilar pneumonia. On CT Abd/Pelvis Renal Stone showed moderate left pleural effusion with infiltrate/consolidation left lung base.  Possible perihilar lung mass. Dedicated CT chest would be helpful, if clinically warranted.  2. Hypodensities within the liver most likely representing cysts.  3. Punctate gallstones or sludge within the gallbladder.  4. Very small hiatal hernia.  Mild diverticulosis.  5. Hysterectomy.    She was also found to be in A Fluttter w - she was given IV Cardizem 15 mg bolus and then started on titrating Cardizem gtt and hence admitted to ICU initially. Cards evaluated the pt and d/hira Cardizem and started her on Amiodarone gtt after bolus and also started metoprol with improved heart rate. Pt felt much better and was downgraded to Tele. She also received IV Levaquin 750 mg in  the ER.     She had a similar admission here in 2019 when she was admitted to ICU on Bipap, with acute hypoxemic resp failure sec to LLL Pneumonia and Afib w RVR and Lactic Acidosis.         Overview/Hospital Course:  The patient was monitored closely during her stay. She was kept on continuous telemetry monitoring. Patient's heart rate remained stable however she was noted to have intermittent confusion with lethargy. A Ct scan was done with areas of acute CVAs noted. Neurology was consulted. MRI of brain, bilateral carotid ultrasound, and echocardiogram was ordered. Patient was placed NPO. She was given IVF for hydration. PT, OT, and ST were consulted.         Interval History:  Ct scan was done with areas of acute CVAs noted. Neurology was consulted. MRI of brain, bilateral carotid ultrasound, and echocardiogram was ordered. Patient was placed NPO. She was given IVF for hydration. PT, OT, and ST were consulted.     Review of Systems   Constitutional:  Positive for activity change and fatigue. Negative for chills and fever.   HENT:  Negative for ear discharge, ear pain and facial swelling.    Eyes:  Negative for pain and redness.   Respiratory:  Negative for cough and shortness of breath.    Cardiovascular:  Negative for chest pain, palpitations and leg swelling.   Gastrointestinal:  Negative for abdominal distention, abdominal pain, blood in stool, constipation, diarrhea, nausea and vomiting.   Endocrine: Negative for polydipsia and polyphagia.   Genitourinary:  Negative for difficulty urinating, dysuria, flank pain and hematuria.   Musculoskeletal:  Positive for gait problem. Negative for neck pain and neck stiffness.   Skin:  Negative for color change.   Allergic/Immunologic: Negative for food allergies.   Neurological:  Positive for speech difficulty and weakness. Negative for seizures.   Hematological:  Does not bruise/bleed easily.   Psychiatric/Behavioral:  Positive for confusion. Negative for agitation,  behavioral problems, hallucinations and suicidal ideas. The patient is not nervous/anxious.    Objective:     Vital Signs (Most Recent):  Temp: 97.7 °F (36.5 °C) (04/01/22 1128)  Pulse: 87 (04/01/22 1541)  Resp: 18 (04/01/22 1541)  BP: 130/62 (04/01/22 1128)  SpO2: 95 % (04/01/22 1541) Vital Signs (24h Range):  Temp:  [97 °F (36.1 °C)-98.7 °F (37.1 °C)] 97.7 °F (36.5 °C)  Pulse:  [] 87  Resp:  [16-28] 18  SpO2:  [94 %-100 %] 95 %  BP: ()/() 130/62     Weight: 78.9 kg (174 lb)  Body mass index is 28.08 kg/m².    Intake/Output Summary (Last 24 hours) at 4/1/2022 1608  Last data filed at 3/31/2022 1751  Gross per 24 hour   Intake 151.32 ml   Output --   Net 151.32 ml      Physical Exam  Constitutional:       General: She is not in acute distress.     Appearance: She is not diaphoretic.      Comments: Lethargic   HENT:      Head: Atraumatic.      Mouth/Throat:      Mouth: Mucous membranes are dry.   Eyes:      General:         Right eye: No discharge.         Left eye: No discharge.      Conjunctiva/sclera: Conjunctivae normal.      Comments: Left pupil > right pupil   Cardiovascular:      Rate and Rhythm: Normal rate and regular rhythm.      Pulses: Normal pulses.   Pulmonary:      Effort: Pulmonary effort is normal. No respiratory distress.      Breath sounds: No rhonchi.      Comments:  diminished  Abdominal:      General: Bowel sounds are normal. There is no distension.      Palpations: Abdomen is soft.      Tenderness: There is no abdominal tenderness. There is no guarding.   Genitourinary:     Comments: Not examined  Musculoskeletal:      Cervical back: Normal range of motion and neck supple. No rigidity or tenderness.      Right lower leg: No edema.      Left lower leg: No edema.   Skin:     General: Skin is warm and dry.      Capillary Refill: Capillary refill takes less than 2 seconds.      Comments: Decreased skin turgor   Neurological:      Comments: Lethargic  Responds appropriately to  simple commands at times   Psychiatric:      Comments: Dash and cooperative          Lab Results   Component Value Date    WBC 12.80 (H) 04/01/2022    HGB 11.0 (L) 04/01/2022    HCT 33.7 (L) 04/01/2022    MCV 95 04/01/2022     04/01/2022       BMP  Lab Results   Component Value Date     04/01/2022    K 5.2 (H) 04/01/2022     04/01/2022    CO2 18 (L) 04/01/2022    BUN 23 04/01/2022    CREATININE 1.8 (H) 04/01/2022    CALCIUM 8.7 04/01/2022    ANIONGAP 14 04/01/2022    ESTGFRAFRICA 29 (A) 04/01/2022    EGFRNONAA 25 (A) 04/01/2022           Assessment/Plan:      * Atrial flutter with RVR  3/31 Pt with hx of P Afib, was in and out of Afin/ flutter, treated initially with Cardizem Gtt and then changed to Amiodarone and Metoprolo added with good effect  Pt already on long term xarelto  Continue  Cards consulted and following  4/01 Telemetry  Continue BBlockade  Discussed with Neurology- Hold Xarelto for now, no Iv heparin drip at this time      Left carotid artery occlusion  4/01 As noted on Carotid ultrasound  Will repeat BMP in am and monitor renal status- Plan CTA of head and neck once creatinine allowable  Add ASA and Statin   Continue plavix    Pleural effusion on left  4/01 Monitor      Mass of left lung  4/01 Treat pneumonia  Consider CT scan chest when renal status improves      Liver lesion  4/01 Noted on Ct scan  Monitor as Outpatient      Hiatal hernia- Small  4/01 Noted on Ct scan      History of CVA in adulthood  4/01 On Xarelto at home      Elevated bilirubin  4/01 Resolved        Hypoalbuminemia  4/01 Monitor      TARA (acute kidney injury)  4/01 Monitor  Trend BMP  Renal dose all medications  Add IVF        Hyperkalemia  4/01 Monitor      Normocytic anemia  4/01 Add MVI      Debility  4/01 Fall and skin precautions  Turn Q 2 hours  Consult PT and OT        Acute CVA (cerebrovascular accident)- multiple  4/01 Daughter reported Intermittent confusion since 1 pm yesterday   Ct scan of head  "abnormal showing signs of multiple areas of CVA  Daughter reports patient " missed a few doses of Xarelto" in the past few days  Cardiology notified  Neurology consulted- Hold off Iv heparin drip for now, Check MRI brain  CVA pathway initiated      Slow transit constipation  3/31 As seen on CT Abd Renal stones with mild stranding- should be covered by Levaquin  Treat with Dulcolax and Mag Citrate   Start pericolace and miralax  4/01 Add dulcolax suppository      Cystitis  3/31Mild, residual  Should be covered by Levaquin  4/01 UA negative- UTI resolved, Levaquin discontinued      Pneumonia due to infectious organism- Possible Mass  3/31 LLL Pneumonia with Parapneumonic effusion seen on Renal CT  R.o Infrahilar mass with CT Chest later  Started on Levaquin and continued on O2, Nebs,   Add IS and Acapella  Consult Pulmonology in am         Troponin level elevated  3/31 Seen by Cards- Demand ischemia- no ACS      Essential hypertension  4/01 Allow for permissive HTN for now  Prn hydralazine      PAF (paroxysmal atrial fibrillation) with chronic anticoagulation with apixaban  4/01 See above        VTE Risk Mitigation (From admission, onward)         Ordered     IP VTE HIGH RISK PATIENT  Once         04/01/22 1619     Place sequential compression device  Until discontinued         04/01/22 1619     Place HALIMA hose  Until discontinued         04/01/22 1616                Discharge Planning   JOSE:      Code Status: DNR   Is the patient medically ready for discharge?:     Reason for patient still in hospital (select all that apply): Treatment  Discharge Plan A: Home with family, Home Health      Time spent seeing patient( greater than 1/2 spent in direct contact) :  58 minutes    MARIAJOSE Mendoza  Department of Hospital Medicine   [facility] - Telemetry ([facility])  "

## 2022-04-01 NOTE — ASSESSMENT & PLAN NOTE
"4/01 Daughter reported Intermittent confusion since 1 pm yesterday   Ct scan of head abnormal showing signs of multiple areas of CVA  Daughter reports patient " missed a few doses of Xarelto" in the past few days  Cardiology notified  Neurology consulted- Hold off Iv heparin drip for now, Check MRI brain  CVA pathway initiated    "

## 2022-04-01 NOTE — ASSESSMENT & PLAN NOTE
AFIb/AFL with RVR    -d/c cardizem  -start amio bouls and gtt  -add metoprol as needed for high BP  -continue xarelto 20 mg daily and pt states that she took Xarelto faithfully and only off yesterday and today  -keep NPO after mn and consider NOMAN/DCCv if still afib with RVR in AM  -check TSH    4/1/22  -SR this AM  -Amiodarone d/c'd, continue BB  -Xarelto on hold given acute CVA, neurology following

## 2022-04-01 NOTE — ASSESSMENT & PLAN NOTE
No prior CT scan to compare.  Malignancy most likely.  Former smoker.  Elective outpatient workup once patient is stable from cardiac/neurology standpoint.

## 2022-04-01 NOTE — ASSESSMENT & PLAN NOTE
3/31 As seen on CT Abd Renal stones with mild stranding- should be covered by Levaquin  Treat with Dulcolax and Mag Citrate   Start pericolace and miralax  4/01 Add dulcolax suppository

## 2022-04-01 NOTE — SIGNIFICANT EVENT
Wendy Hair Daughter   [phone]     Patient overall condition discussed with daughter at bedside. She states she thinks Mother is a DNR but is going to check with father and her other sister who actually has the medical POA. Patient is currently a Full Code.

## 2022-04-01 NOTE — PT/OT/SLP PROGRESS
Occupational Therapy      Patient Name:  Ana Castañeda   MRN:  [id number]    OT evaluation initiated via chart review. Pt in CT at 0930. Second attempt 1045 NP in room for family conference  . Will follow-up next visit.    EZRA Valencia    4/1/2022

## 2022-04-01 NOTE — ED NOTES
Pt HR noted on monitor as 177. Verified with MD about titration orders for cardizem. Titration orders stand as ordered by MD.

## 2022-04-01 NOTE — ASSESSMENT & PLAN NOTE
Pt with hx of P Afib, was in and out of Afin/ flutter, treated initially with Cardizem Gtt and then changed to Amiodarone and Metoprolo added with good effect  Pt already on long term xarelto  Continue  Cards consulted and following

## 2022-04-01 NOTE — HOSPITAL COURSE
7 yo female, cardiology consult for afib/AFL with RVR  PMH PAF on xeratlo, sthma, HTN, h/o CVA with residual right-sided weakness, s/p CEA  Went to ER deu to leg pain and found afl with RVR in ER and started Cardizem gtt. No wHR at 175 bpm. Denied chest pain dyspnea palpitation and dizziness  Echo in  normal EF   EKG today A flutter  Troponin 0.05 and   Cr 1.2 and HGB 12.6  CXR left PNA  Abd CT showed left pleural effusion, gallbladder stone and mild diverticulosis    4/1/22-Patient seen and examined today, resting in bed. Lethargic/confused. SR during exam. CT of head + for acute infarcts. Neurology following. MRI pending. Creatinine bumped to 1.8, K 5.2. Echo showed EF of 50%.    04/02/2022--SR in AM and developed AFL with 2 to 1 conduction HR at 160 bpm. Brain MRI showed acute CVA. Neurology on board. Pt some confusion and no chest hellen dyspnea and palpitation. Eliquis is on hold per neurology advise.     04/3/22 in afib with RVR and transferred to icu for esmolol gtt and d/c cardizem gtt. The labs reviewed. Xarelto is on hold per neurology recommendation    4/4/22-Patient seen and examined today, resting in bed. Family at bedside. Lethargic. Dyspneic. Remains in afib/flutter with RVR. BP stable, cardizem drip initiated. Assess response. Labs reviewed. Creatinine 2.0.     4/5/22-Patient seen and examined today. Remains lethargic/unresponsive. Converted to SR, HR in 70's during exam, will transition meds to po. Labs reviewed.     4/6/22-Patient seen and examined today, daughter at bedside. Remains lethargic, did not follow commands or respond to questions although daughter reported she opened her eyes earlier this AM while being moved. Converted to afib overnight, amiodarone gtt initiated by ECU, back in SR with HR in 60's during exam. No acute CV status changes. Needs AC on board when ok with neurology. Labs reviewed.

## 2022-04-01 NOTE — HOSPITAL COURSE
The patient was monitored closely during her stay. She was kept on continuous telemetry monitoring. Patient's heart rate remained stable however she was noted to have intermittent confusion with lethargy. A Ct scan was done with areas of acute CVAs noted. Neurology was consulted. MRI of brain, bilateral carotid ultrasound, and echocardiogram was ordered. Patient was placed NPO. She was given IVF for hydration. PT, OT, and ST were consulted.   4/02 Neurology recommended to hold anticoagulation for 4 days post ischemic stroke and start ASA 81 mg daily. Etiology likely hypotension resulting in bilateral ischemic strokes. No intervention for left ICA occlusion and Target  - 160 mmHg to assist with perfusion on the left hemisphere. Ct scan of chest without contrast showed Left lower lobe mass or evidence of consolidation extending to the left hilum.  Suspect left hilar adenopathy with enlarged mediastinal lymph nodes as described.  Mildly prominent left supraclavicular node with questionable abnormal nodes in the left axilla.  Findings are concerning for underlying neoplastic process. Findings discussed with daughter, Wendy. 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 Retrograde flow noted within the mid left ICA. Less than 50% stenosis of the right carotid bifurcation was noted. TARA noted. Continue IVF. Some urinary retention noted and stinson catheter placed. Check retroperitoneal ultrasound. Patient back into afib/aflutter with RVR. Iv cardizem given.   4/3: transferred to ICU for afib RVR. Currently on esmolol drip. Rate not controlled. Will discuss with cardiology on plans.   4/4: still in afib, not controlled on esmolol drip and digoxin push. Cardizem drip started by cardiology. Patient still altered. NG tube for temporary tube feedings. Should condition not improve in the next 1-2 days, family may transition to comfort care.  Metabolic acidosis noted. Will start bicarb drip.   4/5- B ischemic Strokes since admission noted- remains lethargic, unresponsive, on Bicarb gtt. Converted to NSR, Cardiology following. Dr. Cheatham evaluating her for Thoracentesis. WBC 17.5, H/H 9/27, Bun/Cr 48/1.9, PO2 2.0, on Unasyn. CT Chest showed possible Neoplasm. Family reportedly considering comfort care as well if she does not turn around soon.   4/6- Remains lethargic, non verbal, does not follow commands or respond to questions, appears a little more comfortable since yesterday after the Thoracentesis which revealed Parapneumonic effusion. Getting TF via NGT- her breathing appears gasping/increase work of breathing, moribund. Palliative has been consulted as pt not making any progress and they are meeting the family this afternoon. Converted to afib overnight, amiodarone gtt initiated by eICU, back in SR with HR in 60's this am. Cards recommends AC on board when ok with neurology but pt likely heading towards PVS with trach and Peg vs hospice.  Palliative team met with the family (, children) to discuss her condition and prognosis as she was not making any progress an her clinical condition was deteriorating since her B strokes. The family decided to transition to comfort with plan to bring pt home with the assistance of [facility] which was arranged the same day and family wished to her home the same evening. Pt was seen and examined and deemed appropriate for discharge home with [facility].

## 2022-04-01 NOTE — PROGRESS NOTES
[facility] ([facility])  Cardiology  Progress Note    Patient Name: Ana Castañeda  MRN: [id number]  Admission Date: 3/31/2022  Hospital Length of Stay: 1 days  Code Status: Prior   Attending Physician: Obed Vargas MD   Primary Care Physician: Stephen Tarango MD  Expected Discharge Date:   Principal Problem:Atrial flutter    Subjective:   HPI:  85 yo female, cardiology consult for afib/AFL with RVR  PMH PAF on xeratlo, sthma, HTN, h/o CVA with residual right-sided weakness, s/p CEA  Went to ER deu to leg pain and found afl with RVR in ER and started Cardizem gtt. No wHR at 175 bpm. Denied chest pain dyspnea palpitation and dizziness  Echo in  normal EF   EKG today A flutter  Troponin 0.05 and   Cr 1.2 and HGB 12.6  CXR left PNA  Abd CT showed left pleural effusion, gallbladder stone and mild diverticulosis    Hospital Course:   4/1/22-Patient seen and examined today, resting in bed. Lethargic/confused. SR during exam. CT of head + for acute infarcts. Neurology following. MRI pending. Creatinine bumped to 1.8, K 5.2. Echo showed EF of 50%.          Review of Systems   Unable to perform ROS: Mental status change   Objective:     Vital Signs (Most Recent):  Temp: 97.7 °F (36.5 °C) (04/01/22 1128)  Pulse: 73 (04/01/22 1128)  Resp: 18 (04/01/22 1128)  BP: 130/62 (04/01/22 1128)  SpO2: 96 % (04/01/22 1128) Vital Signs (24h Range):  Temp:  [97 °F (36.1 °C)-98.7 °F (37.1 °C)] 97.7 °F (36.5 °C)  Pulse:  [] 73  Resp:  [16-30] 18  SpO2:  [93 %-100 %] 96 %  BP: ()/() 130/62     Weight: 78.9 kg (174 lb)  Body mass index is 28.08 kg/m².     SpO2: 96 %  O2 Device (Oxygen Therapy): nasal cannula      Intake/Output Summary (Last 24 hours) at 4/1/2022 1358  Last data filed at 3/31/2022 1751  Gross per 24 hour   Intake 263.51 ml   Output --   Net 263.51 ml       Lines/Drains/Airways       Drain  Duration             Female External Urinary Catheter 03/31/22 1000 1 day              Peripheral Intravenous  Line  Duration                  Peripheral IV - Single Lumen 03/31/22 1630 18 G Right Antecubital <1 day                    Physical Exam  Vitals and nursing note reviewed.   Constitutional:       General: She is not in acute distress.     Appearance: She is well-developed. She is not diaphoretic.      Comments: On supplemental O2   HENT:      Head: Normocephalic and atraumatic.   Eyes:      General:         Right eye: No discharge.         Left eye: No discharge.      Pupils: Pupils are equal, round, and reactive to light.   Neck:      Thyroid: No thyromegaly.      Vascular: No JVD.      Trachea: No tracheal deviation.   Cardiovascular:      Rate and Rhythm: Normal rate and regular rhythm.      Heart sounds: Normal heart sounds, S1 normal and S2 normal. No murmur heard.     Comments: SR during exam  Pulmonary:      Effort: Pulmonary effort is normal. No respiratory distress.      Breath sounds: No wheezing.      Comments: Diminished BS at bases L > R  Abdominal:      General: There is no distension.      Palpations: Abdomen is soft.      Tenderness: There is no rebound.   Musculoskeletal:      Cervical back: Neck supple.      Right lower leg: No edema.      Left lower leg: No edema.   Skin:     General: Skin is warm and dry.      Findings: No erythema.   Neurological:      Mental Status: She is alert.      Comments: Confused, lethargic       Significant Labs: CMP   Recent Labs   Lab 03/31/22  1030 04/01/22  0011 04/01/22  0356    134* 137   K 4.2 5.0 5.2*    106 105   CO2 21* 16* 18*    132* 108   BUN 18 21 23   CREATININE 1.2 1.6* 1.8*   CALCIUM 8.6* 8.8 8.7   PROT 7.0 6.8  --    ALBUMIN 3.2* 2.8*  --    BILITOT 1.4* 1.0  --    ALKPHOS 141* 120  --    AST 27 25  --    ALT 8* 8*  --    ANIONGAP 13 12 14   ESTGFRAFRICA 48* 34* 29*   EGFRNONAA 42* 29* 25*   , CBC   Recent Labs   Lab 03/31/22  1030 04/01/22  0012 04/01/22  0356   WBC 13.44* 13.19* 12.80*   HGB 12.6 11.6* 11.0*   HCT 40.0 36.1* 33.7*     276 264   , INR No results for input(s): INR, PROTIME in the last 48 hours., Troponin   Recent Labs   Lab 03/31/22  1237 04/01/22  0356   TROPONINI 0.050* 0.100*   , and All pertinent lab results from the last 24 hours have been reviewed.    Significant Imaging: Echocardiogram: Transthoracic echo (TTE) complete (Cupid Only):   Results for orders placed or performed during the hospital encounter of 03/31/22   Echo   Result Value Ref Range    BSA 1.92 m2    TDI SEPTAL 0.08 m/s    LV LATERAL E/E' RATIO 11.75 m/s    LV SEPTAL E/E' RATIO 11.75 m/s    LA WIDTH 2.75 cm    IVC diameter 1.63 cm    Left Ventricular Outflow Tract Mean Velocity 0.34650327485 cm/s    Left Ventricular Outflow Tract Mean Gradient 1.05 mmHg    TDI LATERAL 0.08 m/s    LVIDd 3.10 (A) 3.5 - 6.0 cm    IVS 1.42 (A) 0.6 - 1.1 cm    Posterior Wall 1.28 (A) 0.6 - 1.1 cm    Ao root annulus 2.92 cm    LVIDs 1.93 (A) 2.1 - 4.0 cm    FS 38 28 - 44 %    LA volume 19.49 cm3    Sinus 2.75 cm    STJ 2.46 cm    Ascending aorta 2.49 cm    LV mass 138.15 g    LA size 2.78 cm    TAPSE 1.82 cm    Left Ventricle Relative Wall Thickness 0.83 cm    AV mean gradient 6 mmHg    AV valve area 1.12 cm2    AV Velocity Ratio 0.44     AV index (prosthetic) 0.46     MV valve area p 1/2 method 4.31 cm2    E/A ratio 1.24     Mean e' 0.08 m/s    E wave deceleration time 176.74128750569492 msec    IVRT 57.849609223814898 msec    LVOT diameter 1.76 cm    LVOT area 2.4 cm2    LVOT peak messi 0.66 m/s    LVOT peak VTI 18.30 cm    Ao peak messi 1.50 m/s    Ao VTI 39.7 cm    RVOT peak messi 0.54 m/s    RVOT peak VTI 14.1 cm    Mr max messi 5.30 m/s    LVOT stroke volume 44.50 cm3    AV peak gradient 9 mmHg    PV mean gradient 0.67 mmHg    E/E' ratio 11.75 m/s    MV Peak E Messi 0.94 m/s    TR Max Messi 3.15 m/s    MV stenosis pressure 1/2 time 51.748247880 ms    MV Peak A Messi 0.76 m/s    LV Systolic Volume 11.55 mL    LV Systolic Volume Index 6.1 mL/m2    LV Diastolic Volume 37.98 mL    LV  Diastolic Volume Index 20.10 mL/m2    LA Volume Index 10.3 mL/m2    LV Mass Index 73 g/m2    Echo EF Estimated 70 %    RA Major Axis 3.26 cm    Left Atrium Minor Axis 2.75 cm    Left Atrium Major Axis 3.30 cm    Triscuspid Valve Regurgitation Peak Gradient 40 mmHg    RA Width 2.58 cm    Right Atrial Pressure (from IVC) 8 mmHg    EF 50 %    TV rest pulmonary artery pressure 48 mmHg    Narrative    · Concentric remodeling and low normal systolic function.  · Intermediate central venous pressure (8 mmHg).  · The estimated PA systolic pressure is 48 mmHg.  · The estimated ejection fraction is 50%.  · Indeterminate left ventricular diastolic function.  · With normal right ventricular systolic function.      , EKG: Reviewed, and X-Ray: CXR: X-Ray Chest 1 View (CXR):   Results for orders placed or performed during the hospital encounter of 03/31/22   X-Ray Chest 1 View    Narrative    EXAMINATION:  XR CHEST 1 VIEW    CLINICAL HISTORY:  Altered mental status, unspecified    TECHNIQUE:  Single frontal view of the chest was performed.    COMPARISON:  Prior    FINDINGS:  No definite change compared to the prior examination obtained earlier today      Impression    Stable findings with left lung opacity.  Please see that report      Electronically signed by: Gianluca Wyatt  Date:    03/31/2022  Time:    23:53    and X-Ray Chest PA and Lateral (CXR): No results found for this visit on 03/31/22.    Assessment and Plan:   * Atrial flutter with RVR  AFIb/AFL with RVR    -d/c cardizem  -start amio bouls and gtt  -add metoprol as needed for high BP  -continue xarelto 20 mg daily and pt states that she took Xarelto faithfully and only off yesterday and today  -keep NPO after mn and consider NOMAN/DCCv if still afib with RVR in AM  -check TSH    4/1/22  -SR this AM  -Amiodarone d/c'd, continue BB  -Xarelto on hold given acute CVA, neurology following    TARA (acute kidney injury)  -Mgmt as per hospital medicine    Acute CVA  (cerebrovascular accident)- multiple  -Neurology consulted  -Resume Xarelto if ok from neurology standpoint    Cystitis  -Mgmt as per hospital medicine, on abx    Troponin level elevated  Demand ischemia >>> NSTEMI/ACS    rx for AFL with RVR    Weakness from old CVA  F/u with HM    Essential hypertension  See above note    PAF (paroxysmal atrial fibrillation)  See above note        VTE Risk Mitigation (From admission, onward)    None          Susan Alcocer PA-C  Cardiology  [facility] Tele[facility] (Hospital)

## 2022-04-01 NOTE — ASSESSMENT & PLAN NOTE
LLL Pneumonia with Parapneumonic effusion seen on Renal CT  R.o Infrahilar mass with CT Chest later  Started on Levaquin and continued on O2, Nebs,   Add IS and Acapella  Pulmonary Consult in am

## 2022-04-02 PROBLEM — N30.90 CYSTITIS: Status: RESOLVED | Noted: 2022-03-31 | Resolved: 2022-04-02

## 2022-04-02 PROBLEM — R33.9 URINARY RETENTION: Status: ACTIVE | Noted: 2022-04-02

## 2022-04-02 PROBLEM — E87.5 HYPERKALEMIA: Status: RESOLVED | Noted: 2022-04-01 | Resolved: 2022-04-02

## 2022-04-02 LAB
ALBUMIN SERPL BCP-MCNC: 2.6 G/DL (ref 3.5–5.2)
ALP SERPL-CCNC: 124 U/L (ref 55–135)
ALT SERPL W/O P-5'-P-CCNC: 9 U/L (ref 10–44)
ANION GAP SERPL CALC-SCNC: 14 MMOL/L (ref 8–16)
ANISOCYTOSIS BLD QL SMEAR: SLIGHT
APTT BLDCRRT: 29.4 SEC (ref 21–32)
AST SERPL-CCNC: 31 U/L (ref 10–40)
BASOPHILS # BLD AUTO: 0.04 K/UL (ref 0–0.2)
BASOPHILS # BLD AUTO: 0.05 K/UL (ref 0–0.2)
BASOPHILS NFR BLD: 0.3 % (ref 0–1.9)
BASOPHILS NFR BLD: 0.3 % (ref 0–1.9)
BILIRUB SERPL-MCNC: 1.6 MG/DL (ref 0.1–1)
BUN SERPL-MCNC: 35 MG/DL (ref 8–23)
CALCIUM SERPL-MCNC: 8.1 MG/DL (ref 8.7–10.5)
CHLORIDE SERPL-SCNC: 107 MMOL/L (ref 95–110)
CHOLEST SERPL-MCNC: 170 MG/DL (ref 120–199)
CHOLEST/HDLC SERPL: 4.5 {RATIO} (ref 2–5)
CK MB SERPL-MCNC: 4.3 NG/ML (ref 0.1–6.5)
CK MB SERPL-RTO: 1.4 % (ref 0–5)
CK SERPL-CCNC: 313 U/L (ref 20–180)
CO2 SERPL-SCNC: 18 MMOL/L (ref 23–29)
CREAT SERPL-MCNC: 2.4 MG/DL (ref 0.5–1.4)
CREAT UR-MCNC: 133.4 MG/DL (ref 15–325)
DACRYOCYTES BLD QL SMEAR: ABNORMAL
DIFFERENTIAL METHOD: ABNORMAL
DIFFERENTIAL METHOD: ABNORMAL
EOSINOPHIL # BLD AUTO: 0 K/UL (ref 0–0.5)
EOSINOPHIL # BLD AUTO: 0.1 K/UL (ref 0–0.5)
EOSINOPHIL NFR BLD: 0.2 % (ref 0–8)
EOSINOPHIL NFR BLD: 0.6 % (ref 0–8)
ERYTHROCYTE [DISTWIDTH] IN BLOOD BY AUTOMATED COUNT: 15.3 % (ref 11.5–14.5)
ERYTHROCYTE [DISTWIDTH] IN BLOOD BY AUTOMATED COUNT: 15.9 % (ref 11.5–14.5)
EST. GFR  (AFRICAN AMERICAN): 21 ML/MIN/1.73 M^2
EST. GFR  (NON AFRICAN AMERICAN): 18 ML/MIN/1.73 M^2
ESTIMATED AVG GLUCOSE: 91 MG/DL (ref 68–131)
GLUCOSE SERPL-MCNC: 113 MG/DL (ref 70–110)
HBA1C MFR BLD: 4.8 % (ref 4–5.6)
HCT VFR BLD AUTO: 30.6 % (ref 37–48.5)
HCT VFR BLD AUTO: 33.4 % (ref 37–48.5)
HDLC SERPL-MCNC: 38 MG/DL (ref 40–75)
HDLC SERPL: 22.4 % (ref 20–50)
HGB BLD-MCNC: 10.6 G/DL (ref 12–16)
HGB BLD-MCNC: 9.9 G/DL (ref 12–16)
IMM GRANULOCYTES # BLD AUTO: 0.1 K/UL (ref 0–0.04)
IMM GRANULOCYTES # BLD AUTO: 0.11 K/UL (ref 0–0.04)
IMM GRANULOCYTES NFR BLD AUTO: 0.6 % (ref 0–0.5)
IMM GRANULOCYTES NFR BLD AUTO: 0.7 % (ref 0–0.5)
INR PPP: 1.2 (ref 0.8–1.2)
LDLC SERPL CALC-MCNC: 112 MG/DL (ref 63–159)
LYMPHOCYTES # BLD AUTO: 1.1 K/UL (ref 1–4.8)
LYMPHOCYTES # BLD AUTO: 1.2 K/UL (ref 1–4.8)
LYMPHOCYTES NFR BLD: 7.2 % (ref 18–48)
LYMPHOCYTES NFR BLD: 7.7 % (ref 18–48)
MAGNESIUM SERPL-MCNC: 1.9 MG/DL (ref 1.6–2.6)
MCH RBC QN AUTO: 30.6 PG (ref 27–31)
MCH RBC QN AUTO: 31.1 PG (ref 27–31)
MCHC RBC AUTO-ENTMCNC: 31.7 G/DL (ref 32–36)
MCHC RBC AUTO-ENTMCNC: 32.4 G/DL (ref 32–36)
MCV RBC AUTO: 94 FL (ref 82–98)
MCV RBC AUTO: 98 FL (ref 82–98)
MONOCYTES # BLD AUTO: 2.3 K/UL (ref 0.3–1)
MONOCYTES # BLD AUTO: 2.7 K/UL (ref 0.3–1)
MONOCYTES NFR BLD: 15.6 % (ref 4–15)
MONOCYTES NFR BLD: 15.8 % (ref 4–15)
NEUTROPHILS # BLD AUTO: 10.9 K/UL (ref 1.8–7.7)
NEUTROPHILS # BLD AUTO: 13.2 K/UL (ref 1.8–7.7)
NEUTROPHILS NFR BLD: 74.9 % (ref 38–73)
NEUTROPHILS NFR BLD: 76.1 % (ref 38–73)
NONHDLC SERPL-MCNC: 132 MG/DL
NRBC BLD-RTO: 0 /100 WBC
NRBC BLD-RTO: 0 /100 WBC
PHOSPHATE SERPL-MCNC: 4.1 MG/DL (ref 2.7–4.5)
PLATELET # BLD AUTO: 209 K/UL (ref 150–450)
PLATELET # BLD AUTO: 215 K/UL (ref 150–450)
PLATELET BLD QL SMEAR: ABNORMAL
PMV BLD AUTO: 9.4 FL (ref 9.2–12.9)
PMV BLD AUTO: 9.5 FL (ref 9.2–12.9)
POIKILOCYTOSIS BLD QL SMEAR: SLIGHT
POLYCHROMASIA BLD QL SMEAR: ABNORMAL
POTASSIUM SERPL-SCNC: 4.4 MMOL/L (ref 3.5–5.1)
PROT SERPL-MCNC: 5.8 G/DL (ref 6–8.4)
PROTHROMBIN TIME: 12.4 SEC (ref 9–12.5)
RBC # BLD AUTO: 3.24 M/UL (ref 4–5.4)
RBC # BLD AUTO: 3.41 M/UL (ref 4–5.4)
SODIUM SERPL-SCNC: 139 MMOL/L (ref 136–145)
SODIUM UR-SCNC: 83 MMOL/L (ref 20–250)
TRIGL SERPL-MCNC: 100 MG/DL (ref 30–150)
TROPONIN I SERPL DL<=0.01 NG/ML-MCNC: 0.1 NG/ML (ref 0–0.03)
TROPONIN I SERPL DL<=0.01 NG/ML-MCNC: 0.13 NG/ML (ref 0–0.03)
TROPONIN I SERPL DL<=0.01 NG/ML-MCNC: 0.14 NG/ML (ref 0–0.03)
TSH SERPL DL<=0.005 MIU/L-ACNC: 0.54 UIU/ML (ref 0.4–4)
UUN UR-MCNC: 718 MG/DL (ref 140–1050)
WBC # BLD AUTO: 14.51 K/UL (ref 3.9–12.7)
WBC # BLD AUTO: 17.33 K/UL (ref 3.9–12.7)

## 2022-04-02 PROCEDURE — 63600175 PHARM REV CODE 636 W HCPCS: Performed by: FAMILY MEDICINE

## 2022-04-02 PROCEDURE — 84300 ASSAY OF URINE SODIUM: CPT | Performed by: FAMILY MEDICINE

## 2022-04-02 PROCEDURE — 85025 COMPLETE CBC W/AUTO DIFF WBC: CPT | Mod: 91 | Performed by: FAMILY MEDICINE

## 2022-04-02 PROCEDURE — 94640 AIRWAY INHALATION TREATMENT: CPT

## 2022-04-02 PROCEDURE — 82553 CREATINE MB FRACTION: CPT | Performed by: NURSE PRACTITIONER

## 2022-04-02 PROCEDURE — 97163 PT EVAL HIGH COMPLEX 45 MIN: CPT

## 2022-04-02 PROCEDURE — 25000003 PHARM REV CODE 250: Performed by: NURSE PRACTITIONER

## 2022-04-02 PROCEDURE — 84100 ASSAY OF PHOSPHORUS: CPT | Performed by: NURSE PRACTITIONER

## 2022-04-02 PROCEDURE — 85610 PROTHROMBIN TIME: CPT | Performed by: NURSE PRACTITIONER

## 2022-04-02 PROCEDURE — 99233 PR SUBSEQUENT HOSPITAL CARE,LEVL III: ICD-10-PCS | Mod: 25,,, | Performed by: INTERNAL MEDICINE

## 2022-04-02 PROCEDURE — 80053 COMPREHEN METABOLIC PANEL: CPT | Performed by: NURSE PRACTITIONER

## 2022-04-02 PROCEDURE — 85730 THROMBOPLASTIN TIME PARTIAL: CPT | Performed by: NURSE PRACTITIONER

## 2022-04-02 PROCEDURE — 93010 EKG 12-LEAD: ICD-10-PCS | Mod: ,,, | Performed by: INTERNAL MEDICINE

## 2022-04-02 PROCEDURE — 85025 COMPLETE CBC W/AUTO DIFF WBC: CPT | Performed by: NURSE PRACTITIONER

## 2022-04-02 PROCEDURE — 36415 COLL VENOUS BLD VENIPUNCTURE: CPT | Performed by: FAMILY MEDICINE

## 2022-04-02 PROCEDURE — 94761 N-INVAS EAR/PLS OXIMETRY MLT: CPT

## 2022-04-02 PROCEDURE — 20000000 HC ICU ROOM

## 2022-04-02 PROCEDURE — 99900035 HC TECH TIME PER 15 MIN (STAT)

## 2022-04-02 PROCEDURE — 84540 ASSAY OF URINE/UREA-N: CPT | Performed by: FAMILY MEDICINE

## 2022-04-02 PROCEDURE — 93005 ELECTROCARDIOGRAM TRACING: CPT

## 2022-04-02 PROCEDURE — 25000242 PHARM REV CODE 250 ALT 637 W/ HCPCS: Performed by: EMERGENCY MEDICINE

## 2022-04-02 PROCEDURE — 82570 ASSAY OF URINE CREATININE: CPT | Performed by: FAMILY MEDICINE

## 2022-04-02 PROCEDURE — 84443 ASSAY THYROID STIM HORMONE: CPT | Performed by: NURSE PRACTITIONER

## 2022-04-02 PROCEDURE — 83735 ASSAY OF MAGNESIUM: CPT | Performed by: NURSE PRACTITIONER

## 2022-04-02 PROCEDURE — 97167 OT EVAL HIGH COMPLEX 60 MIN: CPT

## 2022-04-02 PROCEDURE — 99233 SBSQ HOSP IP/OBS HIGH 50: CPT | Mod: 25,,, | Performed by: INTERNAL MEDICINE

## 2022-04-02 PROCEDURE — 36415 COLL VENOUS BLD VENIPUNCTURE: CPT | Performed by: NURSE PRACTITIONER

## 2022-04-02 PROCEDURE — 83036 HEMOGLOBIN GLYCOSYLATED A1C: CPT | Performed by: NURSE PRACTITIONER

## 2022-04-02 PROCEDURE — 80061 LIPID PANEL: CPT | Performed by: NURSE PRACTITIONER

## 2022-04-02 PROCEDURE — 25000003 PHARM REV CODE 250: Performed by: EMERGENCY MEDICINE

## 2022-04-02 PROCEDURE — 84484 ASSAY OF TROPONIN QUANT: CPT | Mod: 91 | Performed by: NURSE PRACTITIONER

## 2022-04-02 PROCEDURE — 93010 ELECTROCARDIOGRAM REPORT: CPT | Mod: ,,, | Performed by: INTERNAL MEDICINE

## 2022-04-02 PROCEDURE — 92610 EVALUATE SWALLOWING FUNCTION: CPT

## 2022-04-02 PROCEDURE — 63600175 PHARM REV CODE 636 W HCPCS: Performed by: INTERNAL MEDICINE

## 2022-04-02 PROCEDURE — 27000221 HC OXYGEN, UP TO 24 HOURS

## 2022-04-02 RX ORDER — DILTIAZEM HYDROCHLORIDE 5 MG/ML
10 INJECTION INTRAVENOUS ONCE
Status: COMPLETED | OUTPATIENT
Start: 2022-04-02 | End: 2022-04-02

## 2022-04-02 RX ORDER — ALBUTEROL SULFATE 90 UG/1
2 AEROSOL, METERED RESPIRATORY (INHALATION) EVERY 8 HOURS
Status: DISCONTINUED | OUTPATIENT
Start: 2022-04-02 | End: 2022-04-02

## 2022-04-02 RX ORDER — DIGOXIN 0.25 MG/ML
250 INJECTION INTRAMUSCULAR; INTRAVENOUS ONCE
Status: COMPLETED | OUTPATIENT
Start: 2022-04-02 | End: 2022-04-02

## 2022-04-02 RX ORDER — SODIUM CHLORIDE 9 MG/ML
INJECTION, SOLUTION INTRAVENOUS CONTINUOUS
Status: DISCONTINUED | OUTPATIENT
Start: 2022-04-02 | End: 2022-04-04

## 2022-04-02 RX ORDER — IPRATROPIUM BROMIDE AND ALBUTEROL SULFATE 2.5; .5 MG/3ML; MG/3ML
3 SOLUTION RESPIRATORY (INHALATION) 4 TIMES DAILY
Status: DISCONTINUED | OUTPATIENT
Start: 2022-04-02 | End: 2022-04-04

## 2022-04-02 RX ORDER — MORPHINE SULFATE 2 MG/ML
1 INJECTION, SOLUTION INTRAMUSCULAR; INTRAVENOUS EVERY 4 HOURS PRN
Status: DISCONTINUED | OUTPATIENT
Start: 2022-04-02 | End: 2022-04-06

## 2022-04-02 RX ORDER — DILTIAZEM HCL/D5W 125 MG/125
10 PLASTIC BAG, INJECTION (ML) INTRAVENOUS CONTINUOUS
Status: DISCONTINUED | OUTPATIENT
Start: 2022-04-02 | End: 2022-04-02

## 2022-04-02 RX ADMIN — GABAPENTIN 100 MG: 100 CAPSULE ORAL at 09:04

## 2022-04-02 RX ADMIN — Medication 10 MG/HR: at 04:04

## 2022-04-02 RX ADMIN — IPRATROPIUM BROMIDE AND ALBUTEROL SULFATE 3 ML: 2.5; .5 SOLUTION RESPIRATORY (INHALATION) at 08:04

## 2022-04-02 RX ADMIN — MORPHINE SULFATE 1 MG: 2 INJECTION, SOLUTION INTRAMUSCULAR; INTRAVENOUS at 11:04

## 2022-04-02 RX ADMIN — ATORVASTATIN CALCIUM 40 MG: 40 TABLET, FILM COATED ORAL at 09:04

## 2022-04-02 RX ADMIN — DIGOXIN 250 MCG: 250 INJECTION, SOLUTION INTRAMUSCULAR; INTRAVENOUS at 09:04

## 2022-04-02 RX ADMIN — DILTIAZEM HYDROCHLORIDE 10 MG: 5 INJECTION INTRAVENOUS at 02:04

## 2022-04-02 RX ADMIN — SODIUM CHLORIDE: 0.9 INJECTION, SOLUTION INTRAVENOUS at 08:04

## 2022-04-02 RX ADMIN — DILTIAZEM HYDROCHLORIDE 10 MG: 5 INJECTION INTRAVENOUS at 03:04

## 2022-04-02 RX ADMIN — SODIUM CHLORIDE: 0.9 INJECTION, SOLUTION INTRAVENOUS at 11:04

## 2022-04-02 RX ADMIN — MORPHINE SULFATE 1 MG: 2 INJECTION, SOLUTION INTRAMUSCULAR; INTRAVENOUS at 03:04

## 2022-04-02 RX ADMIN — ESMOLOL HYDROCHLORIDE 50 MCG/KG/MIN: 20 INJECTION INTRAVENOUS at 08:04

## 2022-04-02 RX ADMIN — MORPHINE SULFATE 1 MG: 2 INJECTION, SOLUTION INTRAMUSCULAR; INTRAVENOUS at 07:04

## 2022-04-02 RX ADMIN — SODIUM CHLORIDE: 0.9 INJECTION, SOLUTION INTRAVENOUS at 04:04

## 2022-04-02 RX ADMIN — LEVOTHYROXINE SODIUM 112 MCG: 112 TABLET ORAL at 05:04

## 2022-04-02 RX ADMIN — SODIUM CHLORIDE: 0.9 INJECTION, SOLUTION INTRAVENOUS at 02:04

## 2022-04-02 NOTE — PT/OT/SLP EVAL
Speech Language Pathology Evaluation  Bedside Swallow    Patient Name:  Ana Castañeda   MRN:  [id number]  Admitting Diagnosis: Atrial flutter    Recommendations:                 General Recommendations:  Dysphagia therapy  Diet recommendations:  NPO, NPO   General Precautions: Standard, aspiration  Communication strategies:  provide increased time to answer    History:     Past Medical History:   Diagnosis Date    Anticoagulant long-term use     Asthma     Debility     Gait apraxia     Gout     Hypertension     NPH (normal pressure hydrocephalus)     Stroke     Thyroid disease        Past Surgical History:   Procedure Laterality Date    CAROTID ENDARTERECTOMY           Subjective     Patient with varying levels of alertness and seen at bedside with family present.    Pain/Comfort:  · Pain Rating 1: 0/10  · Pain Rating Post-Intervention 1: 0/10    Respiratory Status: Room air    Objective:     Oral Musculature Evaluation  · Oral Musculature: unable to assess due to poor participation/comprehension  · Dentition: upper dentures  · Mucosal Quality: adequate    Bedside Swallow Eval: Bedside swallowing evaluation attempted. Patient with varying levels of alertness. She was unable to follow simple directives for oral mech examination. Oral care completed for hydration, sensation, and generation of spontaneous swallow reflex. Patient with limited opening of oral cavity despite cues. She was unable to produce a swallow despite verbal, tactile, and gustatory stimulation. Recommend continue NPO with swallow reassessment when patient can maintain an appropriate level of alertness.    Assessment:     Ana Castañeda is a 86 y.o. female with an SLP diagnosis of Dysphagia.      Goals:   Multidisciplinary Problems     SLP Goals        Problem: SLP    Goal Priority Disciplines Outcome   SLP Goal     SLP    Description: TPW maintain an appropriate level of alertness for bedside swallowing trials     TPW tolerate the least  restrictive diet without any overt s/s of aspiration                     Plan:     · Patient to be seen:  3 x/week   · Plan of Care expires:     · Plan of Care reviewed with:  family   · SLP Follow-Up:  Yes       Discharge recommendations:   (TBD)     Time Tracking:     SLP Treatment Date:   04/02/22  Speech Start Time:  0917  Speech Stop Time:  0933     Speech Total Time (min):  16 min    Billable Minutes: Eval Swallow and Oral Function 15    04/02/2022

## 2022-04-02 NOTE — ASSESSMENT & PLAN NOTE
4/01 Treat pneumonia  4/02 CT scan chest without contrast showed Left lower lobe mass or evidence of consolidation extending to the left hilum.  Suspect left hilar adenopathy with enlarged mediastinal lymph nodes as described.  Mildly prominent left supraclavicular node with questionable abnormal nodes in the left axilla.  Findings are concerning for underlying neoplastic process.  Findings discussed with daughter at bedside, Wendy

## 2022-04-02 NOTE — ASSESSMENT & PLAN NOTE
3/31 Pt with hx of P Afib, was in and out of Afin/ flutter, treated initially with Cardizem Gtt and then changed to Amiodarone and Metoprolo added with good effect  Pt already on long term xarelto  Continue  Cards consulted and following  4/01 Telemetry  Continue BBlockade  Discussed with Neurology- Hold Xarelto for now, no Iv heparin drip at this time  4/02 Recurrent Afib/aflutter with RVR- Iv cardizem given

## 2022-04-02 NOTE — NURSING
Bladder scan greater than 574ml. Isa Mahoney NP notified. inustructed to in and out cath. 300ml from in and out cath.

## 2022-04-02 NOTE — SIGNIFICANT EVENT
Heart rate still up in 160s despite Iv cardizem drip however systolic blood pressure down to 117. Discussed with Dr. Molina and will transfer to ICU and change Iv cardizem to Iv Esmolol drip.

## 2022-04-02 NOTE — PLAN OF CARE
Pt Disoriented x4. Unable to answer questions. Lethargic. POC reviewed with daughter at bedside. Bed alarm on   Pt remains free of injuries and falls; fall precaution in place   IV intact; infusing NS at 100mL/hr    Waffle mattress placed on bed.   NPO awaiting speech study   Bed low, side rails up x2, non slip socks in use, call light in reach   Reminded to call for assistance  Hourly rounding complete will continue to monitor

## 2022-04-02 NOTE — PLAN OF CARE
PT TOTAL A FOR BED MOBILITY AND MINIMAL VERBALIZATION. PT REPEATING WITH THERAPIST SAYS BUT UNABLE TO ANSWER QUESTIONS ABOUT HX/PLOF

## 2022-04-02 NOTE — ASSESSMENT & PLAN NOTE
3/31 Seen by Cards- Demand ischemia- no ACS  4/02 Reported Complaints of chest pain- Repeat EKG and Trend troponins

## 2022-04-02 NOTE — ASSESSMENT & PLAN NOTE
4/01 Previously on Xarelto at home  4/02 Daughter reported patient with prior right sided weakness PTA and could only ambulate 50 feet at times

## 2022-04-02 NOTE — ASSESSMENT & PLAN NOTE
4/01 As noted on Carotid ultrasound  Will repeat BMP in am and monitor renal status- Plan CTA of head and neck once creatinine allowable  Add ASA and Statin   Continue plavix  4/02 Neurology recommended hold anticoagulation for 4 days post ischemic stroke and start ASA 81 mg daily.   No intervention for left ICA occlusion.

## 2022-04-02 NOTE — ASSESSMENT & PLAN NOTE
3/31 LLL Pneumonia with Parapneumonic effusion seen on Renal CT  R.o Infrahilar mass with CT Chest later  Started on Levaquin and continued on O2, Nebs,   Add IS and Acapella  Consult Pulmonology in am  4/02 Continue current treatment. Abnormal Ct scan concerning for Lung mass

## 2022-04-02 NOTE — SUBJECTIVE & OBJECTIVE
Interval History:  Neurology recommended to hold anticoagulation for 4 days post ischemic stroke and start ASA 81 mg daily. Etiology likely hypotension resulting in bilateral ischemic strokes. No intervention for left ICA occlusion and Target  - 160 mmHg to assist with perfusion on the left hemisphere. Ct scan of chest without contrast showed Left lower lobe mass or evidence of consolidation extending to the left hilum.  Suspect left hilar adenopathy with enlarged mediastinal lymph nodes as described.  Mildly prominent left supraclavicular node with questionable abnormal nodes in the left axilla.  Findings are concerning for underlying neoplastic process. Findings discussed with daughter, Wendy. 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 Retrograde flow noted within the mid left ICA. Less than 50% stenosis of the right carotid bifurcation was noted. TARA noted. Continue IVF. Some urinary retention noted and stinson catheter placed. Check retroperitoneal ultrasound. Patient back into afib/aflutter with RVR. Iv cardizem given.     Review of Systems   Unable to perform ROS: Acuity of condition   Constitutional:  Positive for activity change and fatigue. Negative for chills and fever.   HENT:  Negative for ear discharge, ear pain and facial swelling.    Eyes:  Negative for pain and redness.   Respiratory:  Negative for cough and shortness of breath.    Cardiovascular:  Negative for chest pain, palpitations and leg swelling.   Gastrointestinal:  Negative for abdominal distention, abdominal pain, blood in stool, constipation, diarrhea, nausea and vomiting.   Endocrine: Negative for polydipsia and polyphagia.   Genitourinary:  Negative for difficulty urinating, dysuria, flank pain and hematuria.        Urinary retention   Musculoskeletal:  Positive for arthralgias and gait problem. Negative for neck pain and neck stiffness.        Muscle  spasms  Hx chronic right sided weakness since prior CVA   Skin:  Negative for color change.   Allergic/Immunologic: Negative for food allergies.   Neurological:  Positive for speech difficulty and weakness. Negative for seizures.   Hematological:  Does not bruise/bleed easily.   Psychiatric/Behavioral:  Positive for confusion. Negative for agitation, behavioral problems, hallucinations and suicidal ideas. The patient is not nervous/anxious.    Objective:     Vital Signs (Most Recent):  Temp: 99.1 °F (37.3 °C) (04/02/22 1140)  Pulse: (!) 152 (04/02/22 1341)  Resp: (!) 28 (04/02/22 1341)  BP: (!) 141/69 (04/02/22 1341)  SpO2: (!) 94 % (04/02/22 1341)   Vital Signs (24h Range):  Temp:  [98.5 °F (36.9 °C)-99.5 °F (37.5 °C)] 99.1 °F (37.3 °C)  Pulse:  [] 152  Resp:  [17-36] 28  SpO2:  [91 %-95 %] 94 %  BP: (137-175)/(60-77) 141/69     Weight: 78.9 kg (173 lb 15.1 oz)  Body mass index is 28.08 kg/m².  No intake or output data in the 24 hours ending 04/02/22 1444   Physical Exam  Constitutional:       General: She is not in acute distress.     Appearance: She is not diaphoretic.      Comments: Lethargic- mostly nonverbal   HENT:      Head: Atraumatic.      Mouth/Throat:      Mouth: Mucous membranes are dry.   Eyes:      General:         Right eye: No discharge.         Left eye: No discharge.      Conjunctiva/sclera: Conjunctivae normal.      Comments: Left pupil > right pupil   Cardiovascular:      Rate and Rhythm: Tachycardia present. Rhythm irregular.      Pulses: Normal pulses.   Pulmonary:      Effort: Pulmonary effort is normal. No respiratory distress.      Breath sounds: No rhonchi or rales.      Comments:  diminished  Abdominal:      General: Bowel sounds are normal. There is no distension.      Palpations: Abdomen is soft.      Tenderness: There is no abdominal tenderness. There is no guarding.   Genitourinary:     Comments: Not examined  Musculoskeletal:      Cervical back: Normal range of motion and neck  supple. No rigidity or tenderness.      Right lower leg: No edema.      Left lower leg: No edema.      Comments: No LUE movement noted  Right sided weakness noted  Some LLE movement noted     Skin:     General: Skin is warm and dry.      Capillary Refill: Capillary refill takes less than 2 seconds.      Comments: Decreased skin turgor   Neurological:      Comments: Lethargic  Rarely responds appropriately to simple commands at times   Psychiatric:      Comments: Calm and cooperative at this time          Lab Results   Component Value Date    WBC 14.51 (H) 04/02/2022    HGB 9.9 (L) 04/02/2022    HCT 30.6 (L) 04/02/2022    MCV 94 04/02/2022     04/02/2022       BMP  Lab Results   Component Value Date     04/02/2022    K 4.4 04/02/2022     04/02/2022    CO2 18 (L) 04/02/2022    BUN 35 (H) 04/02/2022    CREATININE 2.4 (H) 04/02/2022    CALCIUM 8.1 (L) 04/02/2022    ANIONGAP 14 04/02/2022    ESTGFRAFRICA 21 (A) 04/02/2022    EGFRNONAA 18 (A) 04/02/2022

## 2022-04-02 NOTE — ASSESSMENT & PLAN NOTE
4/01 Allow for permissive HTN for now  Prn hydralazine  4/02 Neurology recommended Target  - 160 mmHg to assist with perfusion on the left hemisphere.

## 2022-04-02 NOTE — ASSESSMENT & PLAN NOTE
4/02 Bladder scan greater than 574ml. In and out cath with 300ml urine obtained  Ratliff catheter placed

## 2022-04-02 NOTE — PT/OT/SLP EVAL
Physical Therapy Evaluation    Patient Name:  Ana Castañeda   MRN:  [id number]    Recommendations:     Discharge Recommendations:  nursing facility, skilled   Discharge Equipment Recommendations:  (TBD)   Barriers to discharge: Decreased caregiver support    Assessment:     Ana Castañeda is a 86 y.o. female admitted with a medical diagnosis of Atrial flutter.  She presents with the following impairments/functional limitations:  weakness, impaired endurance, gait instability, decreased coordination, pain, impaired cognition, impaired functional mobilty, decreased safety awareness, decreased upper extremity function, decreased lower extremity function, decreased ROM, impaired self care skills, impaired balance .    Rehab Prognosis: Fair; patient would benefit from acute skilled PT services to address these deficits and reach maximum level of function.    Recent Surgery: * No surgery found *      Plan:     During this hospitalization, patient to be seen 3 x/week to address the identified rehab impairments via therapeutic activities, therapeutic exercises, wheelchair management/training and progress toward the following goals:    · Plan of Care Expires:  04/16/22    Subjective     Chief Complaint: PAIN ALL OVER  Patient/Family Comments/goals: INC MOBILITY AND RETURN TO PLOF PER    Pain/Comfort:  · Pain Rating 1:  (UNABLE TO RELATE TO A NUMBER)  · Location - Side 1: Bilateral  · Location 1:  (ALL OVER)    Patients cultural, spiritual, Mu-ism conflicts given the current situation:      Living Environment:   PT LIVES AT HOME WITH  IN A ONE STORY HOME WITH NO STEPS.   Prior to admission, patients level of function was ASSIST X 1 FOR STAND WITH RW AND T/F TO CHAIR AND T/F TO BSC.  Equipment used at home: walker, rolling, wheelchair, bedside commode, grab bar.  DME owned (not currently used): none.  Upon discharge, patient will have assistance from  .    Objective:     Communicated with NURSE HAYDEN AND  Epic CHART REVIEW  prior to session.  Patient found supine with peripheral IV, telemetry  upon PT entry to room.    General Precautions: Standard, fall   Orthopedic Precautions:N/A   Braces: N/A    Exams:  · Cognitive Exam:  Patient is oriented to NONE  · RLE ROM: IMPAIRED  · RLE Strength: UNABLE TO ASSESS  · LLE ROM: IMPAIRED  · LLE Strength: UNABLE TO ASSES    Functional Mobility:  · Bed Mobility:     · Rolling Left:  total assistance  · Supine to Sit: total assistance  · Sit to Supine: total assistance    Therapeutic Activities and Exercises:   PT MET IN RM NOT RESPONDING HOWEVER BEGAN TO REPEAT WITH  AND P.T. STATED. PT WITH LEFT NEGLECT. P.T OBTAINED HX FROM PT . PT UNABLE TO SUPPORT SELF EOB. PT RETURNED SUP AND BED ALARM ON. PT  EDUCATED ON ROLE OF P.T. VS. PEOPLE MOVERS PROGRAM. P.T. TO COMPLETE TX TRIALS AND REASSESS EACH VISIT IF TO CONT WITH SKILLED P.T. VS PEOPLE MOVERS PROGRAM. PT  REPORTED UNDERSTANDING.     AM-PAC 6 CLICK MOBILITY  Total Score:6     Patient left HOB elevated with call button in reach.    GOALS:   Multidisciplinary Problems     Physical Therapy Goals        Problem: Physical Therapy    Goal Priority Disciplines Outcome Goal Variances Interventions   Physical Therapy Goal     PT, PT/OT      Description: LT22  1. PT WILL COMPLETE BED MOBILITY WITH MAX A  2. PT WILL STAND T/F TO CHAIR WITH RW AND MAX A  3. PT WILL FOLLOW CUES FOR ROM TE X 10 REPS B LE AROM                   History:     Past Medical History:   Diagnosis Date    Anticoagulant long-term use     Asthma     Debility     Gait apraxia     Gout     Hypertension     NPH (normal pressure hydrocephalus)     Stroke     Thyroid disease        Past Surgical History:   Procedure Laterality Date    CAROTID ENDARTERECTOMY         Time Tracking:     PT Received On: 22  PT Start Time: 810     PT Stop Time: 830  PT Total Time (min): 20 min     Billable Minutes: Evaluation  20      04/02/2022

## 2022-04-02 NOTE — ASSESSMENT & PLAN NOTE
"4/01 Daughter reported Intermittent confusion since 1 pm yesterday   Ct scan of head abnormal showing signs of multiple areas of CVA  Daughter reports patient " missed a few doses of Xarelto" in the past few days  Cardiology notified  Neurology consulted- Hold off Iv heparin drip for now, Check MRI brain  CVA pathway initiated  4/02 Neurology recommended hold anticoagulation for 4 days post ischemic stroke and start ASA 81 mg daily. Etiology likely hypotension resulting in bilateral ischemic strokes. No intervention for left ICA occlusion and Target  - 160 mmHg to assist with perfusion on the left hemisphere.     "

## 2022-04-02 NOTE — PLAN OF CARE
SEE EVAL FOR DETAILS. PT DISPLAYED DEFICITS WITH FUNCTIONAL TRANSFERS, BED MOBILITY, DECREASE ADL'S AND DECREASE B UE ROM/ STRENGTH/ENDURANCE. RECOMMEND: SNF

## 2022-04-02 NOTE — ASSESSMENT & PLAN NOTE
4/01 Monitor  Trend BMP  Renal dose all medications  Add IVF  4/02 Creatinine continues to rise- 2.4 today. Urinary retention this am- [place] catheter ordered  Retroperitoneal ultrasound pending

## 2022-04-02 NOTE — CONSULTS
"  [facility] - Telemetry ([facility])  Adult Nutrition  Consult Note    SUMMARY     Recommendations  Recommendation/Intervention: 1.) Consider Isosource 1.5 @ 20 mls/hr advancing 10 mls Q4 hours or as tolerated to goal rate 45mls/hr continuous providing 1620 kcals/day, 73 g protein/day, 190 g CHO/day, and 825 mls water/day. Flush 160 mls free water Q4 hrs or per MD.   Goals: 1.) enteral nutrition will initiate within 48 hrs  Nutrition Goal Status: new  Communication of RD Recs: other (comment)    1. Atypical atrial flutter    2. Abdominal pain    3. SOB (shortness of breath)    4. Weakness    5. Atrial flutter    6. AMS (altered mental status)    7. Chest pain      Past Medical History:   Diagnosis Date    Anticoagulant long-term use     Asthma     Debility     Gait apraxia     Gout     Hypertension     NPH (normal pressure hydrocephalus)     Stroke     Thyroid disease          Assessment and Plan  Nutrition Problem  Inadequate energy intake    Related to (etiology):   Previous CVA     Signs and Symptoms (as evidenced by):   NPO status, dysphagia     Interventions/Recommendations (treatment strategy):  Consider enteral nutrition    Nutrition Diagnosis Status:   New        Reason for Assessment  Reason For Assessment: consult  General Information Comments: admits with atrial flutter. SLP recommending NPO due to dysphagia.    Nutrition Risk Screen    Nutrition Risk Screen: no indicators present    Nutrition/Diet History    Food Allergies: NKFA  Factors Affecting Nutritional Intake: difficulty/impaired swallowing, NPO    Anthropometrics    Temp: 99.1 °F (37.3 °C)  Height Method: Estimated  Height: 5' 6"  Height (inches): 66 in  Weight Method: Bed Scale  Weight: 78.9 kg (173 lb 15.1 oz)  Weight (lb): 173.94 lb  Ideal Body Weight (IBW), Female: 130 lb  % Ideal Body Weight, Female (lb): 133.8 %  BMI (Calculated): 28.1  BMI Grade: 25 - 29.9 - overweight  Usual Body Weight (UBW), kg:  (sunita)   "     Lab/Procedures/Meds  Pertinent Labs Reviewed: reviewed  BMP  Lab Results   Component Value Date     04/02/2022    K 4.4 04/02/2022     04/02/2022    CO2 18 (L) 04/02/2022    BUN 35 (H) 04/02/2022    CREATININE 2.4 (H) 04/02/2022    CALCIUM 8.1 (L) 04/02/2022    ANIONGAP 14 04/02/2022    ESTGFRAFRICA 21 (A) 04/02/2022    EGFRNONAA 18 (A) 04/02/2022     Lab Results   Component Value Date    ALBUMIN 2.6 (L) 04/02/2022     Lab Results   Component Value Date    CALCIUM 8.1 (L) 04/02/2022    PHOS 4.1 04/02/2022     No results for input(s): POCTGLUCOSE in the last 24 hours.    Pertinent Medications Reviewed: reviewed  Scheduled Meds:   albuterol-ipratropium  3 mL Nebulization QID    aspirin  81 mg Oral Daily    atorvastatin  40 mg Oral QHS    gabapentin  100 mg Oral TID    levothyroxine  112 mcg Oral Before breakfast    multivitamin  1 tablet Oral Daily    polyethylene glycol  17 g Oral Daily    senna-docusate 8.6-50 mg  2 tablet Oral Daily     Continuous Infusions:   sodium chloride 0.9% 100 mL/hr at 04/02/22 1408    sodium chloride 0.9%           Estimated/Assessed Needs  Weight Used For Calorie Calculations: 78.9 kg (173 lb 15.1 oz)  Energy Calorie Requirements (kcal): 9712-2930 kcals/day  Energy Need Method: [facility]  Protein Requirements: 65-80g  Weight Used For Protein Calculations: 78.9 kg (173 lb 15.1 oz)  Estimated Fluid Requirement Method: RDA Method  RDA Method (mL): 1600         Nutrition Prescription Ordered  Current Diet Order: NPO    Evaluation of Received Nutrient/Fluid Intake  IV Fluid (mL): 2400  Energy Calories Required: not meeting needs  Protein Required: not meeting needs  % Intake of Estimated Energy Needs: 0 - 25 %  % Meal Intake: NPO    Nutrition Risk    Level of Risk/Frequency of Follow-up:  (x2 weekly)       Monitor and Evaluation  Food and Nutrient Intake: energy intake, food and beverage intake, enteral nutrition intake  Food and Nutrient Adminstration: diet  order, enteral and parenteral nutrition administration  Anthropometric Measurements: weight, weight change, body mass index  Biochemical Data, Medical Tests and Procedures: electrolyte and renal panel, glucose/endocrine profile, lipid profile  Nutrition-Focused Physical Findings: overall appearance       Nutrition Follow-Up    RD Follow-up?: Yes

## 2022-04-02 NOTE — CONSULTS
Tele - General Neurology Consultation    Reason for consult:  stroke  Reason for admission:  Atrial flutter [I48.92]  Weakness [R53.1]  SOB (shortness of breath) [R06.02]  Atypical atrial flutter [I48.4]  Abdominal pain [R10.9]  Length of Stay:  1    History of present illness:      Ms Castañeda is a 85 y/o CF with PMHx of dementia and NPH was brought to the hospital 2/2 abdominal pain and difficulty standing up. She has been more sleepy today. Easily arousable. Follows simple commands.     Review of systems:     Could not be completed as patient is drowsy    Past Medical History:   Diagnosis Date    Anticoagulant long-term use     Asthma     Debility     Gait apraxia     Gout     Hypertension     NPH (normal pressure hydrocephalus)     Stroke     Thyroid disease        Past Surgical History:   Procedure Laterality Date    CAROTID ENDARTERECTOMY         History reviewed. No pertinent family history.    Social History     Socioeconomic History    Marital status:    Tobacco Use    Smoking status: Former Smoker    Smokeless tobacco: Never Used   Substance and Sexual Activity    Alcohol use: No    Drug use: Never       Scheduled Meds:   albuterol-ipratropium  3 mL Nebulization Q6H WAKE    [START ON 4/2/2022] aspirin  81 mg Oral Daily    atorvastatin  40 mg Oral QHS    gabapentin  100 mg Oral TID    levothyroxine  112 mcg Oral Before breakfast    multivitamin  1 tablet Oral Daily    polyethylene glycol  17 g Oral Daily    senna-docusate 8.6-50 mg  2 tablet Oral Daily     Continuous Infusions:   sodium chloride 0.9% 100 mL/hr at 04/01/22 1640    sodium chloride 0.9%       PRN Meds:.albuterol-ipratropium, labetaloL, morphine, naloxone, sodium chloride 0.9%, sodium chloride 0.9%    Review of patient's allergies indicates:   Allergen Reactions    Food color red [red food color (bulk)] Nausea And Vomiting    Sulfa (sulfonamide antibiotics)      Other reaction(s): Unknown    Pcn [penicillins]  Rash         Physical Exam    Vitals:    04/01/22 1541 04/01/22 1643 04/01/22 1930 04/01/22 2050   BP:   137/60    BP Location:       Patient Position:       Pulse: 87  89 90   Resp: 18 18 17 18   Temp:   98.8 °F (37.1 °C)    TempSrc:   Oral    SpO2: 95%  (!) 92% (!) 91%   Weight:       Height:         MSE - Lethargic, follows simple commands     Motor - bilateral UEx 4 / 5 and bilateral John 2/ 5      IMAGING (personally reviewed):  Results for orders placed or performed during the hospital encounter of 03/31/22   MRI Brain Without Contrast    Narrative    EXAMINATION:  MRI BRAIN WITHOUT CONTRAST    CLINICAL HISTORY:  CVA;.    TECHNIQUE:  Multiplanar multisequence MR imaging of the brain was performed without contrast.    COMPARISON:  None    FINDINGS:  Moderate central and peripheral atrophy.  Extensive T2 hyperintensities in the deep white matter both cerebral hemispheres consistent with small vessel disease.  Extensive bilateral parietal territory infarcts greater on the right.  Evidence of bilateral occipital lobe territory infarcts and evidence of water shed is creamy a/infarction identified in the deep white matter of the frontal lobes bilaterally.  Acute lacunar infarct extends to the perisylvian white matter of the right cerebral hemisphere.  Multiple old cortical infarcts can be seen of the cerebellar hemispheres.  There is evidence of an acute left cerebellar territory infarct.  No mass lesion.    The ventricles are dilated.  No evidence of intra or extra-axial hemorrhage.    Normal vascular flow voids are preserved.    Mucoperiosteal thickening in scattered ethmoid air cells with retention cysts in the maxillary sinuses bilaterally.    Bone marrow signal intensity is normal.      Impression    Multifocal acute ischemic changes/infarctions including the left cerebellar hemisphere, the occipital lobes bilaterally, the perisylvian white matter and cortex on the right side, the right and left parietal lobes  and the right and left frontal lobes.  No evidence of a mass or bleed.  Extensive small vessel disease.  Marked atrophy.      Electronically signed by: Meng Fletcher MD  Date:    04/01/2022  Time:    14:57   CT Head Without Contrast    Narrative    EXAMINATION:  CT HEAD WITHOUT CONTRAST    CLINICAL HISTORY:  Mental status change, unknown cause;    TECHNIQUE:  Contiguous axial images were obtained from the skull base through the vertex without intravenous contrast.    COMPARISON:  None    FINDINGS:  No intracranial hemorrhage. No mass effect or midline shift. There are areas of parenchymal hypodensity within the posterior frontal and parietal lobes bilaterally concerning for acute/recent infarcts.  There are small bilateral remote cerebellar infarcts.  There are additional patchy areas of periventricular and subcortical hypodensity which are nonspecific most suggestive of chronic microvascular ischemic changes.  Significant prominence of the ventricular system with effacement of the sulci at the vertex concerning for a component of normal pressure hydrocephalus.  Mucous retention cyst within both maxillary sinuses.  Minor mucosal thickening of the right ethmoid air cells.  Mastoid air cells are clear.  No concerning osseous findings.      Impression    Acute/recent infarcts involving the bilateral posterior frontal and parietal lobes.    No acute intracranial hemorrhage.    Tiny remote bilateral cerebellar infarcts.    Mild chronic microvascular ischemic changes.    Prominence of the ventricular system concerning for a component of normal pressure hydrocephalus.    Findings discussed with Dr. Vargas via telephone at 09:50 on 04/01/2022.    All CT scans at this facility use dose modulation, iterative reconstruction, and/or weight base dosing when appropriate to reduce radiation dose to as low as reasonably achievable.      Electronically signed by: Rivas Georges  Date:    04/01/2022  Time:    09:52         LABS:  Lab  Results   Component Value Date    WBC 12.80 (H) 04/01/2022    HGB 11.0 (L) 04/01/2022    HCT 33.7 (L) 04/01/2022    MCV 95 04/01/2022     04/01/2022   CMP  Sodium   Date Value Ref Range Status   04/01/2022 137 136 - 145 mmol/L Final     Potassium   Date Value Ref Range Status   04/01/2022 5.2 (H) 3.5 - 5.1 mmol/L Final     Chloride   Date Value Ref Range Status   04/01/2022 105 95 - 110 mmol/L Final     CO2   Date Value Ref Range Status   04/01/2022 18 (L) 23 - 29 mmol/L Final     Glucose   Date Value Ref Range Status   04/01/2022 108 70 - 110 mg/dL Final     BUN   Date Value Ref Range Status   04/01/2022 23 8 - 23 mg/dL Final     Creatinine   Date Value Ref Range Status   04/01/2022 1.8 (H) 0.5 - 1.4 mg/dL Final     Calcium   Date Value Ref Range Status   04/01/2022 8.7 8.7 - 10.5 mg/dL Final     Total Protein   Date Value Ref Range Status   04/01/2022 6.8 6.0 - 8.4 g/dL Final     Albumin   Date Value Ref Range Status   04/01/2022 2.8 (L) 3.5 - 5.2 g/dL Final     Total Bilirubin   Date Value Ref Range Status   04/01/2022 1.0 0.1 - 1.0 mg/dL Final     Comment:     For infants and newborns, interpretation of results should be based  on gestational age, weight and in agreement with clinical  observations.    Premature Infant recommended reference ranges:  Up to 24 hours.............<8.0 mg/dL  Up to 48 hours............<12.0 mg/dL  3-5 days..................<15.0 mg/dL  6-29 days.................<15.0 mg/dL       Alkaline Phosphatase   Date Value Ref Range Status   04/01/2022 120 55 - 135 U/L Final     AST   Date Value Ref Range Status   04/01/2022 25 10 - 40 U/L Final     ALT   Date Value Ref Range Status   04/01/2022 8 (L) 10 - 44 U/L Final     Anion Gap   Date Value Ref Range Status   04/01/2022 14 8 - 16 mmol/L Final     eGFR if    Date Value Ref Range Status   04/01/2022 29 (A) >60 mL/min/1.73 m^2 Final     eGFR if non    Date Value Ref Range Status   04/01/2022 25 (A) >60  mL/min/1.73 m^2 Final     Comment:     Calculation used to obtain the estimated glomerular filtration  rate (eGFR) is the CKD-EPI equation.                 ASSESSMENT  /  RECOMMENDATIONS    Bilateral ischemic stroke   Left CCA/ ICA occlusion  Atrial fibrillation   Debility     - rec to hold anticoagulation for 4 days post ischemic stroke and start ASA 81 mg daily   - Etiology likely hypotension resulting in bilateral ischemic strokes   - No intervention for left ICA occlusion   - rec  - 160 mmHg to assist with perfusion on the left hemisphere   - PT  / OT / SLP eval and treat   - rec A1c and Lipid panel study          Juan Snow MD  Tele - Neurology

## 2022-04-02 NOTE — SIGNIFICANT EVENT
Elevated heart rate noted in 160s. Will give Iv Cardizem. Called and discussed patient's overall condition with Daughter, Wendy. Patient with Hx prior CVA now with new multiple CVAs, TARA, and suspected lung mass. Patient remains in guarded condition.     ReginaldoWendy Daughter   [phone]

## 2022-04-02 NOTE — PT/OT/SLP EVAL
Occupational Therapy   Evaluation    Name: Ana Castañeda  MRN: [id number]  Admitting Diagnosis:  Atrial flutter  Recent Surgery: * No surgery found *      Recommendations:     Discharge Recommendations:    Discharge Equipment Recommendations:  none  Barriers to discharge:       Assessment:     Ana Castañeda is a 86 y.o. female with a medical diagnosis of Atrial flutter.  She presents with DEBILITY AND GENERALIZED WEAKNESS. Performance deficits affecting function: weakness, impaired balance, decreased safety awareness, visual deficits, impaired endurance, impaired self care skills, impaired functional mobilty, decreased upper extremity function, decreased lower extremity function, gait instability.      Rehab Prognosis: Fair; patient would benefit from acute skilled OT services to address these deficits and reach maximum level of function.       Plan:     Patient to be seen 2 x/week to address the above listed problems via self-care/home management, therapeutic activities, therapeutic exercises  · Plan of Care Expires: 04/16/22  · Plan of Care Reviewed with: patient    Subjective     Chief Complaint: DEBILITY AND GENERALIZED WEAKNESS  Patient/Family Comments/goals:     Occupational Profile:  Living Environment: LIVES WITH  SPOUSE  Previous level of function:  (I) WITH ADL'S AND FUNCTIONAL MOBILITY  Roles and Routines: OCCUPATIONAL THERAPY  Equipment Used at Home:  walker, rolling, wheelchair  Assistance upon Discharge:     Pain/Comfort:  · Pain Rating 1: 0/10    Patients cultural, spiritual, Taoism conflicts given the current situation:      Objective:     Communicated with: NURSE AND EPIC CHART REVEIW prior to session.  Patient found up in chair with peripheral IV, telemetry upon OT entry to room.    General Precautions: Standard, aspiration   Orthopedic Precautions:N/A   Braces: N/A      Occupational Performance:    Bed Mobility:    · Patient completed Rolling/Turning to Left with  total assistance  · Patient  completed Rolling/Turning to Right with total assistance  · Patient completed Scooting/Bridging with total assistance    Activities of Daily Living:  · Upper Body Dressing: total assistance .  · Lower Body Dressing: total assistance .    Cognitive/Visual Perceptual:  Cognitive/Psychosocial Skills:     -       Oriented to: Person   -       Communication: SLURRED  -       Memory: UNABLE TO ACCURATELY ASSESS  -       Safety awareness/insight to disability: impaired     Physical Exam:  Upper Extremity Range of Motion:     -       Right Upper Extremity: PROM WFL  -       Left Upper Extremity: PROM WFL  Upper Extremity Strength:    -       Right Upper Extremity: MMT: 1/5 GROSSLY  -       Left Upper Extremity: MMT: 1/5 GROSSLY   Strength:    -       Right Upper Extremity: MMT: 1/5 GROSSLY  -       Left Upper Extremity: MMT: 1/5 GROSSLY       AMPAC 6 Click ADL:  AMPAC Total Score: 6    Treatment & Education:  O.T. EVAL COMPLETED. PT/ SON  EDUCATED ON O.T. POC. PT DISPLAYED DEFICITS WITH ADL'S,  BED MOBILITY, DECREASE B UE STRENGTH/ENDURANCE. RECOMMEND: SNF  Education:    Patient left supine with all lines intact, call button in reach, NURSE notified and SON present    GOALS:   Multidisciplinary Problems     Occupational Therapy Goals        Problem: Occupational Therapy    Goal Priority Disciplines Outcome Interventions   Occupational Therapy Goal     OT, PT/OT     Description: O.T. GOALS TO BEE MET BY 4-16-22  MOD A WITH UE DRESSING  PT WILL TOLERATE 1 SET X 10 REPS B UE AAROM EXERCISE  MOD A WITH SUPINE< SIT TRANSFER                   History:     Past Medical History:   Diagnosis Date    Anticoagulant long-term use     Asthma     Debility     Gait apraxia     Gout     Hypertension     NPH (normal pressure hydrocephalus)     Stroke     Thyroid disease        Past Surgical History:   Procedure Laterality Date    CAROTID ENDARTERECTOMY         Time Tracking:     OT Date of Treatment: 04/02/22  OT Start Time:  1800  OT Stop Time: 1815  OT Total Time (min): 15 min    Billable Minutes:Evaluation 15 MINUTES    4/2/2022

## 2022-04-02 NOTE — PROGRESS NOTES
[facility] ([facility]  Progress Note    Patient Name: Ana Castañeda  MRN: [id number]  Patient Class: IP- Inpatient   Admission Date: 3/31/2022  Length of Stay: 2 days  Attending Physician: Obed Vargas MD  Primary Care Provider: Stephen Tarango MD      Subjective:     Principal Problem:Atrial flutter, multiple CVA      HPI:  Ana Castañeda is a 86 y.o. 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 The pain is described as crampy/spasms. No associated NVD, fever or chills. She had a UTI which was treated with oral Cipro 2 weeks ago. She denies any CP, SOB, dizziness, weakness, numbness, and all other sxs at this time. daughter states that when she has been feeling tired and not eating drinking much as well for last few days nidhi after she finished her cipro dose and her R sided weakness has got worse. In the ER, initial VSS, Afebrile, Sats 95% on RA. CXr showed Left infrahilar pneumonia. On CT Abd/Pelvis Renal Stone showed moderate left pleural effusion with infiltrate/consolidation left lung base.  Possible perihilar lung mass. Dedicated CT chest would be helpful, if clinically warranted.  2. Hypodensities within the liver most likely representing cysts.  3. Punctate gallstones or sludge within the gallbladder.  4. Very small hiatal hernia.  Mild diverticulosis.  5. Hysterectomy.    She was also found to be in A Fluttter w - she was given IV Cardizem 15 mg bolus and then started on titrating Cardizem gtt and hence admitted to ICU initially. Cards evaluated the pt and d/hira Cardizem and started her on Amiodarone gtt after bolus and also started metoprol with improved heart rate. Pt felt much better and was downgraded to Tele. She also received IV Levaquin 750 mg in the  ER.     She had a similar admission here in 2019 when she was admitted to ICU on Bipap, with acute hypoxemic resp failure sec to LLL Pneumonia and Afib w RVR and Lactic Acidosis.         Overview/Hospital Course:  The patient was monitored closely during her stay. She was kept on continuous telemetry monitoring. Patient's heart rate remained stable however she was noted to have intermittent confusion with lethargy. A Ct scan was done with areas of acute CVAs noted. Neurology was consulted. MRI of brain, bilateral carotid ultrasound, and echocardiogram was ordered. Patient was placed NPO. She was given IVF for hydration. PT, OT, and ST were consulted.   4/02 Neurology recommended to hold anticoagulation for 4 days post ischemic stroke and start ASA 81 mg daily. Etiology likely hypotension resulting in bilateral ischemic strokes. No intervention for left ICA occlusion and Target  - 160 mmHg to assist with perfusion on the left hemisphere. Ct scan of chest without contrast showed Left lower lobe mass or evidence of consolidation extending to the left hilum.  Suspect left hilar adenopathy with enlarged mediastinal lymph nodes as described.  Mildly prominent left supraclavicular node with questionable abnormal nodes in the left axilla.  Findings are concerning for underlying neoplastic process. Findings discussed with daughter, Wendy. 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 Retrograde flow noted within the mid left ICA. Less than 50% stenosis of the right carotid bifurcation was noted. TARA noted. Continue IVF. Some urinary retention noted and stinson catheter placed. Check retroperitoneal ultrasound. Patient back into afib/aflutter with RVR. Iv cardizem given.       Interval History:  Neurology recommended to hold anticoagulation for 4 days post ischemic stroke and start ASA 81 mg daily. Etiology likely hypotension resulting in  bilateral ischemic strokes. No intervention for left ICA occlusion and Target  - 160 mmHg to assist with perfusion on the left hemisphere. Ct scan of chest without contrast showed Left lower lobe mass or evidence of consolidation extending to the left hilum.  Suspect left hilar adenopathy with enlarged mediastinal lymph nodes as described.  Mildly prominent left supraclavicular node with questionable abnormal nodes in the left axilla.  Findings are concerning for underlying neoplastic process. Findings discussed with daughter, Wendy. 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 Retrograde flow noted within the mid left ICA. Less than 50% stenosis of the right carotid bifurcation was noted. TARA noted. Continue IVF. Some urinary retention noted and stinson catheter placed. Check retroperitoneal ultrasound. Patient back into afib/aflutter with RVR. Iv cardizem given.     Review of Systems   Unable to perform ROS: Acuity of condition   Constitutional:  Positive for activity change and fatigue. Negative for chills and fever.   HENT:  Negative for ear discharge, ear pain and facial swelling.    Eyes:  Negative for pain and redness.   Respiratory:  Negative for cough and shortness of breath.    Cardiovascular:  Negative for chest pain, palpitations and leg swelling.   Gastrointestinal:  Negative for abdominal distention, abdominal pain, blood in stool, constipation, diarrhea, nausea and vomiting.   Endocrine: Negative for polydipsia and polyphagia.   Genitourinary:  Negative for difficulty urinating, dysuria, flank pain and hematuria.        Urinary retention   Musculoskeletal:  Positive for arthralgias and gait problem. Negative for neck pain and neck stiffness.        Muscle spasms  Hx chronic right sided weakness since prior CVA   Skin:  Negative for color change.   Allergic/Immunologic: Negative for food allergies.   Neurological:   Positive for speech difficulty and weakness. Negative for seizures.   Hematological:  Does not bruise/bleed easily.   Psychiatric/Behavioral:  Positive for confusion. Negative for agitation, behavioral problems, hallucinations and suicidal ideas. The patient is not nervous/anxious.    Objective:     Vital Signs (Most Recent):  Temp: 99.1 °F (37.3 °C) (04/02/22 1140)  Pulse: (!) 152 (04/02/22 1341)  Resp: (!) 28 (04/02/22 1341)  BP: (!) 141/69 (04/02/22 1341)  SpO2: (!) 94 % (04/02/22 1341)   Vital Signs (24h Range):  Temp:  [98.5 °F (36.9 °C)-99.5 °F (37.5 °C)] 99.1 °F (37.3 °C)  Pulse:  [] 152  Resp:  [17-36] 28  SpO2:  [91 %-95 %] 94 %  BP: (137-175)/(60-77) 141/69     Weight: 78.9 kg (173 lb 15.1 oz)  Body mass index is 28.08 kg/m².  No intake or output data in the 24 hours ending 04/02/22 1444   Physical Exam  Constitutional:       General: She is not in acute distress.     Appearance: She is not diaphoretic.      Comments: Lethargic- mostly nonverbal   HENT:      Head: Atraumatic.      Mouth/Throat:      Mouth: Mucous membranes are dry.   Eyes:      General:         Right eye: No discharge.         Left eye: No discharge.      Conjunctiva/sclera: Conjunctivae normal.      Comments: Left pupil > right pupil   Cardiovascular:      Rate and Rhythm: Tachycardia present. Rhythm irregular.      Pulses: Normal pulses.   Pulmonary:      Effort: Pulmonary effort is normal. No respiratory distress.      Breath sounds: No rhonchi or rales.      Comments:  diminished  Abdominal:      General: Bowel sounds are normal. There is no distension.      Palpations: Abdomen is soft.      Tenderness: There is no abdominal tenderness. There is no guarding.   Genitourinary:     Comments: Not examined  Musculoskeletal:      Cervical back: Normal range of motion and neck supple. No rigidity or tenderness.      Right lower leg: No edema.      Left lower leg: No edema.      Comments: No LUE movement noted  Right sided weakness  noted  Some LLE movement noted     Skin:     General: Skin is warm and dry.      Capillary Refill: Capillary refill takes less than 2 seconds.      Comments: Decreased skin turgor   Neurological:      Comments: Lethargic  Rarely responds appropriately to simple commands at times   Psychiatric:      Comments: Calm and cooperative at this time          Lab Results   Component Value Date    WBC 14.51 (H) 04/02/2022    HGB 9.9 (L) 04/02/2022    HCT 30.6 (L) 04/02/2022    MCV 94 04/02/2022     04/02/2022       BMP  Lab Results   Component Value Date     04/02/2022    K 4.4 04/02/2022     04/02/2022    CO2 18 (L) 04/02/2022    BUN 35 (H) 04/02/2022    CREATININE 2.4 (H) 04/02/2022    CALCIUM 8.1 (L) 04/02/2022    ANIONGAP 14 04/02/2022    ESTGFRAFRICA 21 (A) 04/02/2022    EGFRNONAA 18 (A) 04/02/2022           Assessment/Plan:      * Atrial flutter with RVR  3/31 Pt with hx of P Afib, was in and out of Afin/ flutter, treated initially with Cardizem Gtt and then changed to Amiodarone and Metoprolo added with good effect  Pt already on long term xarelto  Continue  Cards consulted and following  4/01 Telemetry  Continue BBlockade  Discussed with Neurology- Hold Xarelto for now, no Iv heparin drip at this time  4/02 Recurrent Afib/aflutter with RVR- Iv cardizem given    Urinary retention  4/02 Bladder scan greater than 574ml. In and out cath with 300ml urine obtained  Ratliff catheter placed      Left carotid artery occlusion  4/01 As noted on Carotid ultrasound  Will repeat BMP in am and monitor renal status- Plan CTA of head and neck once creatinine allowable  Add ASA and Statin   Continue plavix  4/02 Neurology recommended hold anticoagulation for 4 days post ischemic stroke and start ASA 81 mg daily.   No intervention for left ICA occlusion.     Pleural effusion on left  4/01 Monitor      Mass of left lung  4/01 Treat pneumonia  4/02 CT scan chest without contrast showed Left lower lobe mass or evidence of  "consolidation extending to the left hilum.  Suspect left hilar adenopathy with enlarged mediastinal lymph nodes as described.  Mildly prominent left supraclavicular node with questionable abnormal nodes in the left axilla.  Findings are concerning for underlying neoplastic process.  Findings discussed with daughter at bedside, Wendy    Liver lesion  4/01 Noted on Ct scan  Monitor as Outpatient      Hiatal hernia- Small  4/01 Noted on Ct scan      History of CVA in adulthood  4/01 Previously on Xarelto at home  4/02 Daughter reported patient with prior right sided weakness PTA and could only ambulate 50 feet at times    Elevated bilirubin  4/01 Resolved  4/02 Recurrent. Monitor        Hypoalbuminemia  4/01 Monitor      TARA (acute kidney injury)  4/01 Monitor  Trend BMP  Renal dose all medications  Add IVF  4/02 Creatinine continues to rise- 2.4 today. Urinary retention this am- Ratliff catheter ordered  Retroperitoneal ultrasound pending      Normocytic anemia  4/01 Add MVI      Debility  4/01 Fall and skin precautions  Turn Q 2 hours  Consult PT, ST, and OT  4/02 Remains significantly debilitated- Monitor        Acute CVA (cerebrovascular accident)- multiple  4/01 Daughter reported Intermittent confusion since 1 pm yesterday   Ct scan of head abnormal showing signs of multiple areas of CVA  Daughter reports patient " missed a few doses of Xarelto" in the past few days  Cardiology notified  Neurology consulted- Hold off Iv heparin drip for now, Check MRI brain  CVA pathway initiated  4/02 Neurology recommended hold anticoagulation for 4 days post ischemic stroke and start ASA 81 mg daily. Etiology likely hypotension resulting in bilateral ischemic strokes. No intervention for left ICA occlusion and Target  - 160 mmHg to assist with perfusion on the left hemisphere.       Slow transit constipation  3/31 As seen on CT Abd Renal stones with mild stranding- should be covered by Levaquin  Treat with Dulcolax and " Mag Citrate   Start pericolace and miralax  4/01 Add dulcolax suppository      Pneumonia due to infectious organism- Possible Mass  3/31 LLL Pneumonia with Parapneumonic effusion seen on Renal CT  R.o Infrahilar mass with CT Chest later  Started on Levaquin and continued on O2, Nebs,   Add IS and Acapella  Consult Pulmonology in am  4/02 Continue current treatment. Abnormal Ct scan concerning for Lung mass         Troponin level elevated  3/31 Seen by Cards- Demand ischemia- no ACS  4/02 Reported Complaints of chest pain- Repeat EKG and Trend troponins      Essential hypertension  4/01 Allow for permissive HTN for now  Prn hydralazine  4/02 Neurology recommended Target  - 160 mmHg to assist with perfusion on the left hemisphere.     PAF (paroxysmal atrial fibrillation) with chronic anticoagulation with apixaban  4/01 See above        VTE Risk Mitigation (From admission, onward)         Ordered     IP VTE HIGH RISK PATIENT  Once         04/01/22 1619     Place sequential compression device  Until discontinued         04/01/22 1619     Place HALIMA hose  Until discontinued         04/01/22 1616                Discharge Planning   JOSE:      Code Status: DNR   Is the patient medically ready for discharge?:     Reason for patient still in hospital (select all that apply): Treatment  Discharge Plan A: Home with family, Home Health        Time spent seeing patient( greater than 1/2 spent in direct contact) :  52  Minutes      MARIAJOSE Mendoza  Department of Hospital Medicine   [facility] - Telemetry ([facility])

## 2022-04-02 NOTE — ASSESSMENT & PLAN NOTE
4/01 Fall and skin precautions  Turn Q 2 hours  Consult PT, ST, and OT  4/02 Remains significantly debilitated- Monitor

## 2022-04-02 NOTE — SIGNIFICANT EVENT
Patient's overall status and plan of care discussed with Daughter, Wendy, also the  daughter who has medical POA, son, and  at bedside including  Patient with Hx prior CVA now with new multiple CVAs, TARA, urinary retention, afib/flutter with RVR, occluded left carotid artery, and suspected lung mass. Family is aware patient remains in guarded condition.

## 2022-04-03 PROBLEM — K44.9 HIATAL HERNIA: Status: RESOLVED | Noted: 2022-04-01 | Resolved: 2022-04-03

## 2022-04-03 PROBLEM — J90 PLEURAL EFFUSION ON LEFT: Status: RESOLVED | Noted: 2022-04-01 | Resolved: 2022-04-03

## 2022-04-03 PROBLEM — R53.81 DEBILITY: Status: RESOLVED | Noted: 2022-04-01 | Resolved: 2022-04-03

## 2022-04-03 PROBLEM — I48.0 PAF (PAROXYSMAL ATRIAL FIBRILLATION): Status: RESOLVED | Noted: 2019-11-16 | Resolved: 2022-04-03

## 2022-04-03 PROBLEM — R91.8 MASS OF LEFT LUNG: Status: RESOLVED | Noted: 2022-04-01 | Resolved: 2022-04-03

## 2022-04-03 LAB
ALBUMIN SERPL BCP-MCNC: 2.5 G/DL (ref 3.5–5.2)
ALP SERPL-CCNC: 108 U/L (ref 55–135)
ALT SERPL W/O P-5'-P-CCNC: 7 U/L (ref 10–44)
ANION GAP SERPL CALC-SCNC: 12 MMOL/L (ref 8–16)
AST SERPL-CCNC: 39 U/L (ref 10–40)
BASOPHILS # BLD AUTO: 0.04 K/UL (ref 0–0.2)
BASOPHILS NFR BLD: 0.3 % (ref 0–1.9)
BILIRUB SERPL-MCNC: 1.4 MG/DL (ref 0.1–1)
BUN SERPL-MCNC: 31 MG/DL (ref 8–23)
CALCIUM SERPL-MCNC: 7.9 MG/DL (ref 8.7–10.5)
CHLORIDE SERPL-SCNC: 112 MMOL/L (ref 95–110)
CO2 SERPL-SCNC: 16 MMOL/L (ref 23–29)
CREAT SERPL-MCNC: 1.9 MG/DL (ref 0.5–1.4)
DIFFERENTIAL METHOD: ABNORMAL
EOSINOPHIL # BLD AUTO: 0.1 K/UL (ref 0–0.5)
EOSINOPHIL NFR BLD: 0.6 % (ref 0–8)
ERYTHROCYTE [DISTWIDTH] IN BLOOD BY AUTOMATED COUNT: 15.9 % (ref 11.5–14.5)
EST. GFR  (AFRICAN AMERICAN): 28 ML/MIN/1.73 M^2
EST. GFR  (NON AFRICAN AMERICAN): 24 ML/MIN/1.73 M^2
GLUCOSE SERPL-MCNC: 103 MG/DL (ref 70–110)
HCT VFR BLD AUTO: 30.6 % (ref 37–48.5)
HGB BLD-MCNC: 9.8 G/DL (ref 12–16)
IMM GRANULOCYTES # BLD AUTO: 0.09 K/UL (ref 0–0.04)
IMM GRANULOCYTES NFR BLD AUTO: 0.6 % (ref 0–0.5)
LYMPHOCYTES # BLD AUTO: 1.1 K/UL (ref 1–4.8)
LYMPHOCYTES NFR BLD: 7.3 % (ref 18–48)
MAGNESIUM SERPL-MCNC: 1.9 MG/DL (ref 1.6–2.6)
MCH RBC QN AUTO: 31.2 PG (ref 27–31)
MCHC RBC AUTO-ENTMCNC: 32 G/DL (ref 32–36)
MCV RBC AUTO: 98 FL (ref 82–98)
MONOCYTES # BLD AUTO: 2.1 K/UL (ref 0.3–1)
MONOCYTES NFR BLD: 14.1 % (ref 4–15)
NEUTROPHILS # BLD AUTO: 11.4 K/UL (ref 1.8–7.7)
NEUTROPHILS NFR BLD: 77.1 % (ref 38–73)
NRBC BLD-RTO: 0 /100 WBC
PLATELET # BLD AUTO: 199 K/UL (ref 150–450)
PMV BLD AUTO: 9.3 FL (ref 9.2–12.9)
POTASSIUM SERPL-SCNC: 4.7 MMOL/L (ref 3.5–5.1)
PROCALCITONIN SERPL IA-MCNC: 0.19 NG/ML
PROT SERPL-MCNC: 5.4 G/DL (ref 6–8.4)
RBC # BLD AUTO: 3.14 M/UL (ref 4–5.4)
SODIUM SERPL-SCNC: 140 MMOL/L (ref 136–145)
WBC # BLD AUTO: 14.79 K/UL (ref 3.9–12.7)

## 2022-04-03 PROCEDURE — 36415 COLL VENOUS BLD VENIPUNCTURE: CPT | Performed by: NURSE PRACTITIONER

## 2022-04-03 PROCEDURE — 27000221 HC OXYGEN, UP TO 24 HOURS

## 2022-04-03 PROCEDURE — 99233 SBSQ HOSP IP/OBS HIGH 50: CPT | Mod: ,,, | Performed by: INTERNAL MEDICINE

## 2022-04-03 PROCEDURE — 83735 ASSAY OF MAGNESIUM: CPT | Performed by: NURSE PRACTITIONER

## 2022-04-03 PROCEDURE — 94761 N-INVAS EAR/PLS OXIMETRY MLT: CPT

## 2022-04-03 PROCEDURE — 63600175 PHARM REV CODE 636 W HCPCS: Performed by: INTERNAL MEDICINE

## 2022-04-03 PROCEDURE — 36415 COLL VENOUS BLD VENIPUNCTURE: CPT | Performed by: FAMILY MEDICINE

## 2022-04-03 PROCEDURE — 63600175 PHARM REV CODE 636 W HCPCS: Performed by: NURSE PRACTITIONER

## 2022-04-03 PROCEDURE — 63600175 PHARM REV CODE 636 W HCPCS: Mod: JG | Performed by: INTERNAL MEDICINE

## 2022-04-03 PROCEDURE — 80053 COMPREHEN METABOLIC PANEL: CPT | Performed by: NURSE PRACTITIONER

## 2022-04-03 PROCEDURE — 25000003 PHARM REV CODE 250: Performed by: NURSE PRACTITIONER

## 2022-04-03 PROCEDURE — 25000003 PHARM REV CODE 250: Performed by: INTERNAL MEDICINE

## 2022-04-03 PROCEDURE — 99900035 HC TECH TIME PER 15 MIN (STAT)

## 2022-04-03 PROCEDURE — 99233 PR SUBSEQUENT HOSPITAL CARE,LEVL III: ICD-10-PCS | Mod: ,,, | Performed by: INTERNAL MEDICINE

## 2022-04-03 PROCEDURE — 94664 DEMO&/EVAL PT USE INHALER: CPT

## 2022-04-03 PROCEDURE — 20000000 HC ICU ROOM

## 2022-04-03 PROCEDURE — 63600175 PHARM REV CODE 636 W HCPCS: Performed by: FAMILY MEDICINE

## 2022-04-03 PROCEDURE — 25000242 PHARM REV CODE 250 ALT 637 W/ HCPCS: Performed by: NURSE PRACTITIONER

## 2022-04-03 PROCEDURE — 99291 CRITICAL CARE FIRST HOUR: CPT | Mod: ,,, | Performed by: INTERNAL MEDICINE

## 2022-04-03 PROCEDURE — 94640 AIRWAY INHALATION TREATMENT: CPT

## 2022-04-03 PROCEDURE — P9047 ALBUMIN (HUMAN), 25%, 50ML: HCPCS | Mod: JG | Performed by: INTERNAL MEDICINE

## 2022-04-03 PROCEDURE — 84145 PROCALCITONIN (PCT): CPT | Performed by: FAMILY MEDICINE

## 2022-04-03 PROCEDURE — 99291 PR CRITICAL CARE, E/M 30-74 MINUTES: ICD-10-PCS | Mod: ,,, | Performed by: INTERNAL MEDICINE

## 2022-04-03 PROCEDURE — 85025 COMPLETE CBC W/AUTO DIFF WBC: CPT | Performed by: NURSE PRACTITIONER

## 2022-04-03 RX ORDER — DIGOXIN 0.25 MG/ML
250 INJECTION INTRAMUSCULAR; INTRAVENOUS ONCE
Status: COMPLETED | OUTPATIENT
Start: 2022-04-03 | End: 2022-04-03

## 2022-04-03 RX ORDER — ALBUMIN HUMAN 250 G/1000ML
25 SOLUTION INTRAVENOUS ONCE AS NEEDED
Status: COMPLETED | OUTPATIENT
Start: 2022-04-03 | End: 2022-04-03

## 2022-04-03 RX ORDER — ESMOLOL HYDROCHLORIDE 20 MG/ML
0-100 INJECTION, SOLUTION INTRAVENOUS CONTINUOUS
Status: DISCONTINUED | OUTPATIENT
Start: 2022-04-03 | End: 2022-04-05

## 2022-04-03 RX ADMIN — ESMOLOL HYDROCHLORIDE IN SODIUM CHLORIDE 60 MCG/KG/MIN: 20 INJECTION INTRAVENOUS at 02:04

## 2022-04-03 RX ADMIN — DIGOXIN 250 MCG: 250 INJECTION, SOLUTION INTRAMUSCULAR; INTRAVENOUS at 09:04

## 2022-04-03 RX ADMIN — MORPHINE SULFATE 1 MG: 2 INJECTION, SOLUTION INTRAMUSCULAR; INTRAVENOUS at 11:04

## 2022-04-03 RX ADMIN — ALBUMIN (HUMAN) 25 G: 5 SOLUTION INTRAVENOUS at 02:04

## 2022-04-03 RX ADMIN — ESMOLOL HYDROCHLORIDE IN SODIUM CHLORIDE 100.13 MCG/KG/MIN: 20 INJECTION INTRAVENOUS at 06:04

## 2022-04-03 RX ADMIN — SODIUM CHLORIDE: 0.9 INJECTION, SOLUTION INTRAVENOUS at 01:04

## 2022-04-03 RX ADMIN — IPRATROPIUM BROMIDE AND ALBUTEROL SULFATE 3 ML: 2.5; .5 SOLUTION RESPIRATORY (INHALATION) at 09:04

## 2022-04-03 RX ADMIN — SODIUM CHLORIDE 500 ML: 0.9 INJECTION, SOLUTION INTRAVENOUS at 12:04

## 2022-04-03 RX ADMIN — DIGOXIN 250 MCG: 250 INJECTION, SOLUTION INTRAMUSCULAR; INTRAVENOUS at 11:04

## 2022-04-03 RX ADMIN — ESMOLOL HYDROCHLORIDE IN SODIUM CHLORIDE 100 MCG/KG/MIN: 20 INJECTION INTRAVENOUS at 08:04

## 2022-04-03 RX ADMIN — ESMOLOL HYDROCHLORIDE IN SODIUM CHLORIDE 100 MCG/KG/MIN: 20 INJECTION INTRAVENOUS at 10:04

## 2022-04-03 RX ADMIN — ESMOLOL HYDROCHLORIDE IN SODIUM CHLORIDE 100 MCG/KG/MIN: 20 INJECTION INTRAVENOUS at 03:04

## 2022-04-03 RX ADMIN — SODIUM CHLORIDE: 0.9 INJECTION, SOLUTION INTRAVENOUS at 08:04

## 2022-04-03 NOTE — ASSESSMENT & PLAN NOTE
3/31 LLL Pneumonia with Parapneumonic effusion seen on Renal CT  R.o Infrahilar mass with CT Chest later  Started on Levaquin and continued on O2, Nebs,   Add IS and Acapella  Consult Pulmonology in am  4/02 Continue current treatment. Abnormal Ct scan concerning for Lung mass  4/3: CT concerning for mass, will check procal; will need outpatient lung biopsy

## 2022-04-03 NOTE — ASSESSMENT & PLAN NOTE
AFIb/AFL with RVR    -d/c cardizem  -start amio bouls and gtt  -add metoprol as needed for high BP  -continue xarelto 20 mg daily and pt states that she took Xarelto faithfully and only off yesterday and today  -keep NPO after mn and consider NOMAN/DCCv if still afib with RVR in AM  -check TSH    4/1/22  -SR this AM  -Amiodarone d/c'd, continue BB  -Xarelto on hold given acute CVA, neurology following    4/2/22  On AFL with RVR. eliquis is on hold due to acute CVA per neurology advise. It is contraindicated for DCCV and amio gtt  BP dropped to 110 mmHG when on cardizem gtt at 10 mg/hr  Advise to d/c cardizem and started esmolol gtt in ICU  Add dioxin 250 mcg ivp x1 now

## 2022-04-03 NOTE — EICU
EICU BRIEF ADMIT NOTE:    HISTORY: Please refer to H/P and ER notes for detail. Transfer to the ICU with atrial flutter and rapid ventricular response    CAMERA ASSESSMENT: Two way audiovisual assessment was done: no distress    Telemetry was reviewed. Medical records including notes, labs and imaging were reviewed.    DISCUSSED with bedside nurse.    ASSESSMENT AND PLAN:    # Atrial flutter. Rapid ventricular response, heart rate around 150  --Switch from Cardizem to esmolol infusion, by primary service.  Titrate esmolol to achieve heart rate less than 100. Recent labs with normal electrolytes.  Troponin elevated but decreasing. Cardiology following. Off anticoagulation due to recent stroke, as per neurology    BE[facility]ACT[facility] REVIEW:    STRESS ULCER PROPHYLAXIS: not needed  DVT PROPHYLAXIS:   SCD    Thank You for allowing EICU to participate in the care of the patient. Please call as needed      Jose Greenfield MD  [facility]  [phone]    2:07 AM    Atrial flutter, heart rate around 140 bpm. On esmolol at 50 mcg/kg/min, got a dose of 250 mcg of digoxin as well.  Has periodic hypotension, Improved with 500 cc bolus of fluids  --Change esmolol to a titrating infusion with range between 50-100mg/kg/min, goal is to get the heart rate below 120 bpm if tolerated  --monitor blood pressure closely.  Albumin bolus ×1 ordered in case the patient becomes hypotensive again  --Cardiology recommendation noted.  Off anticoagulation due to recent CVA, amiodarone and cardioversion contraindicated

## 2022-04-03 NOTE — PT/OT/SLP PROGRESS
Speech Language Pathology      Ana Castañeda  MRN: [id number]    Patient not seen today secondary to inability to maintain an appropriate level of alertness for po trials. Will follow-up at a later time/date.    Jackie Schaeffer CCC-SLP  4/3/2022

## 2022-04-03 NOTE — PLAN OF CARE
Pt transferred overnight from  tele d/t Afib RVR requiring Esmolol  gtt, BP labile and addressed w/eICU. Pt turned  q2 hrs and heels floated  to prevent skin  breakdown. Pt w/AMS and unable to communicate understanding of POC. Pt remains afib RVR, will be addressed with  Cards this AM, poss NOMAN w/cardioversion.

## 2022-04-03 NOTE — PROGRESS NOTES
[facility] ([facility])  Critical Care Medicine  Progress Note    Patient Name: Ana Castañeda  MRN: [id number]  Admission Date: 3/31/2022  Hospital Length of Stay: 3 days  Code Status: DNR  Attending Provider: Obed Vargas MD  Primary Care Provider: Stephen Tarango MD   Principal Problem: Atrial flutter    Subjective:     HPI:  86-year-old female patient medical history of Anticoagulant long-term use, Asthma, Debility, Gait apraxia, Gout, Hypertension, NPH (normal pressure hydrocephalus), Stroke, and Thyroid disease admitted for few days history of abdominal pain complicating a recent history of UTI treated by Cipro.  No nausea vomiting no diarrhea.        Hospital/ICU Course:  O2 sat 96% on room air.  Afebrile.  CT abdomen pelvis was reviewed.  I was consulted for left pleural effusion?  Left lung mass.  4/3 O2 sat 95% , afebrile , transferred to ICU for RVR , esmolol gtt , lethargic       4/3 O2 sat 95% , afebrile , transferred to ICU for RVR , esmolol gtt , lethargic.     Review of Systems   Constitutional:  Positive for fatigue. Negative for chills and fever.   HENT:  Negative for nosebleeds.    Eyes:  Negative for discharge.   Respiratory:  Positive for cough and shortness of breath. Negative for choking.    Cardiovascular:  Negative for chest pain.   Gastrointestinal:  Positive for abdominal pain. Negative for blood in stool.   Endocrine: Negative for cold intolerance.   Genitourinary:  Positive for dysuria. Negative for hematuria.   Musculoskeletal:  Positive for arthralgias. Negative for neck stiffness.   Skin:  Negative for rash.   Allergic/Immunologic: Negative for immunocompromised state.   Neurological:  Positive for weakness. Negative for seizures.        History of stroke    Left-sided weakness   Hematological:  Negative for adenopathy.   Psychiatric/Behavioral:  Negative for behavioral problems.    Objective:     Vital Signs (Most Recent):  Temp: 98.1 °F (36.7 °C) (04/03/22 1101)  Pulse: (!) 120  (04/03/22 1151)  Resp: (!) 28 (04/03/22 1151)  BP: (!) 148/75 (04/03/22 1151)  SpO2: 95 % (04/03/22 1151) Vital Signs (24h Range):  Temp:  [97.8 °F (36.6 °C)-98.8 °F (37.1 °C)] 98.1 °F (36.7 °C)  Pulse:  [115-162] 120  Resp:  [17-49] 28  SpO2:  [92 %-100 %] 95 %  BP: ()/() 148/75     Weight: 78.9 kg (173 lb 15.1 oz)  Body mass index is 28.08 kg/m².      Intake/Output Summary (Last 24 hours) at 4/3/2022 1231  Last data filed at 4/3/2022 1152  Gross per 24 hour   Intake 2984.78 ml   Output 680 ml   Net 2304.78 ml         Physical Exam  Vitals and nursing note reviewed.   Constitutional:       General: She is not in acute distress.     Appearance: She is well-developed. She is ill-appearing.   HENT:      Head: Normocephalic and atraumatic.   Cardiovascular:      Rate and Rhythm: Normal rate and regular rhythm.   Pulmonary:      Effort: Pulmonary effort is normal.   Abdominal:      Palpations: Abdomen is soft.      Tenderness: There is no abdominal tenderness.   Musculoskeletal:         General: No signs of injury.      Cervical back: Neck supple.   Skin:     General: Skin is warm.   Neurological:      Mental Status: She is alert. Mental status is at baseline.      Comments: Left-sided weakness       Vents:  Oxygen Concentration (%): 28 (04/01/22 2052)    Lines/Drains/Airways       Drain  Duration                  Urethral Catheter 04/02/22 1200 16 Fr. 1 day              Peripheral Intravenous Line  Duration                  Peripheral IV - Single Lumen 03/31/22 1630 18 G Right Antecubital 2 days         Peripheral IV - Single Lumen 04/02/22 2000 18 G Anterior;Right Upper Arm <1 day                    Significant Labs:    CBC/Anemia Profile:  Recent Labs   Lab 04/02/22  0451 04/02/22  1542 04/03/22  0309   WBC 14.51* 17.33* 14.79*   HGB 9.9* 10.6* 9.8*   HCT 30.6* 33.4* 30.6*    209 199   MCV 94 98 98   RDW 15.3* 15.9* 15.9*          Chemistries:  Recent Labs   Lab 04/02/22  0451 04/03/22  0309   NA  139 140   K 4.4 4.7    112*   CO2 18* 16*   BUN 35* 31*   CREATININE 2.4* 1.9*   CALCIUM 8.1* 7.9*   ALBUMIN 2.6* 2.5*   PROT 5.8* 5.4*   BILITOT 1.6* 1.4*   ALKPHOS 124 108   ALT 9* 7*   AST 31 39   MG 1.9 1.9   PHOS 4.1  --       Latest Reference Range & Units 04/02/22 04:51 04/02/22 14:45 04/02/22 18:48   CPK 20 - 180 U/L 313 (H)     CPK MB 0.1 - 6.5 ng/mL 4.3     MB % 0.0 - 5.0 % 1.4 [1]     Troponin I 0.000 - 0.026 ng/mL 0.136 (H) [2] 0.128 (H) [3] 0.100 (H) [4]     EKG 03/31/2022    Normal sinus rhythm   Prolonged QT   Abnormal ECG   When compared with ECG of 31-MAR-2022 11:45,   Sinus rhythm has replaced Atrial flutter   Vent. rate has decreased  BPM   ST no longer depressed in Inferior leads   ST no longer depressed in Anterior-lateral leads   T wave inversion no longer evident in Inferior leads   T wave inversion no longer evident in Anterior-lateral leads        2D echo 04/01/2022    · Concentric remodeling and low normal systolic function.  · Intermediate central venous pressure (8 mmHg).  · The estimated PA systolic pressure is 48 mmHg.  · The estimated ejection fraction is 50%.  · Indeterminate left ventricular diastolic function.  · With normal right ventricular systolic function.  ·   Significant Imaging:     MRI of the brain 04/01/2022    Multifocal acute ischemic changes/infarctions including the left cerebellar hemisphere, the occipital lobes bilaterally, the perisylvian white matter and cortex on the right side, the right and left parietal lobes and the right and left frontal lobes.  No evidence of a mass or bleed.  Extensive small vessel disease.  Marked atrophy.      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  CTA could be utilized for further evaluation.     Retrograde flow noted within the mid left ICA.     Less than 50% stenosis of the right carotid bifurcation.    Chest x-ray  03/31/2022      FINDINGS:  No definite change compared to the prior examination obtained earlier today     Impression:     Stable findings with left lung opacity.           CT renal stone study 03/31/2022      FINDINGS:  Moderate size left pleural effusion with infiltrate and consolidation or possible lung mass in the perihilar region measuring 2.3 cm.  Right lung base clear.     2.1 cm hypodensity anterior and inferior liver adjacent the gallbladder fossa.  Finding likely represents a cyst (series 2, image 38).  13 mm hypodensity dome of the junction of the right lobe of the liver could represent small cyst (series 2, image 24).  Punctate gallstones are sludge layering within the lumen of the gallbladder.     The spleen is normal in size and appearance.  The pancreas is normal.  The adrenal glands are normal.  The aorta and IVC are normal.  No retroperitoneal adenopathy.     Left kidney and left ureter are normal.  Right kidney and right ureter are normal.     Very small hiatal hernia.  The small intestine is normal.  Appendix not visualized.  No inflammatory changes in the region of the appendix.  Few diverticuli sigmoid colon.  Remaining colon is normal.     The pelvis demonstrates normal bladder.  Uterus absent.  Adnexal regions are normal.     Lower chest and abdominal wall soft tissues are normal.  Degenerative changes of the spine.  No suspicious osseous lesions.     Impression:     1. Moderate left pleural effusion with infiltrate/consolidation left lung base.  Possible perihilar lung mass.  Dedicated CT chest would be helpful, if clinically warranted.  2. Hypodensities within the liver most likely representing cysts.  3. Punctate gallstones or sludge within the gallbladder.  4. Very small hiatal hernia.  Mild diverticulosis.  5. Hysterectomy.              ABG  Recent Labs   Lab 03/31/22  2359   PH 7.406   PO2 86   PCO2 36.3   HCO3 22.8*   BE -2     Assessment/Plan:     Neuro  Acute CVA (cerebrovascular  accident)- multiple  4/3 anticoagulation on hold as per Neurology recommendation.    Pulmonary  Pneumonia due to infectious organism- Possible Mass  4/3 patient overall poor prognosis with multiple CVA.  If becomes stable can proceed with workup for lung mass and pleural effusion.    Cardiac/Vascular  * Atrial flutter with RVR  On beta-blocker.  As outpatient on Xarelto.  Now on hold due to acute stroke.  4/3 esmolol gtt , dig ,     Renal/  Urinary retention  4/3 Urethral catheter in place    TARA (acute kidney injury)  4/3 creat 1.9 , IVF monitor UO     GI  Liver lesion  Rule out Mets. workup as outpatient.       Critical Care Daily Checklist:    A: Awake: RASS Goal/Actual Goal:    Actual: Trejo Agitation Sedation Scale (RASS): Drowsy   B: Spontaneous Breathing Trial Performed?     C: SAT & SBT Coordinated?  Not intubated                      D: Delirium: CAM-ICU Overall CAM-ICU: Positive   E: Early Mobility Performed? Yes   F: Feeding Goal: Goals: 1.) enteral nutrition will initiate within 48 hrs  Status: Nutrition Goal Status: new   Current Diet Order   Procedures    Diet NPO Except for: Medication     No eating, drinking, or oral medications until patient passes the Velasquez or evaluated by Speech.     Order Specific Question:   Except for     Answer:   Medication      AS: Analgesia/Sedation Not sedated   T: Thromboembolic Prophylaxis Mechanical prophylaxis   H: HOB > 300 Yes   U: Stress Ulcer Prophylaxis (if needed) Famotidine   G: Glucose Control Fingerstick prn    B: Bowel Function Stool Occurrence: 0   I: Indwelling Catheter (Lines & Ratliff) Necessity Urine catheter TARA critically ill   D: De-escalation of Antimicrobials/Pharmacotherapies No antibiotic    Plan for the day/ETD Y    Code Status:  Family/Goals of Care: DNR   spoke to daughter at bedside.   Poor prognosis.  Discussed with daughter comfort care.  Continue discussion.  Critical Care Time: 35 minutes  Critical secondary to Patient has a condition  that poses threat to life and bodily function: Acute Renal Failure complicating multiple ischemic stroke complicating lung mass pleural effusion and altered mental status.       Critical care was time spent personally by me on the following activities: development of treatment plan with patient or surrogate and bedside caregivers, discussions with consultants, evaluation of patient's response to treatment, examination of patient, ordering and performing treatments and interventions, ordering and review of laboratory studies, ordering and review of radiographic studies, pulse oximetry, re-evaluation of patient's condition. This critical care time did not overlap with that of any other provider or involve time for any procedures.     Zuly Hull MD  Critical Care Medicine  [facility] - Intensive Care ([facility])

## 2022-04-03 NOTE — ASSESSMENT & PLAN NOTE
3/31 Pt with hx of P Afib, was in and out of Afin/ flutter, treated initially with Cardizem Gtt and then changed to Amiodarone and Metoprolo added with good effect  Pt already on long term xarelto  Continue  Cards consulted and following  4/01 Telemetry  Continue BBlockade  Discussed with Neurology- Hold Xarelto for now, no Iv heparin drip at this time  4/02 Recurrent Afib/aflutter with RVR- Iv cardizem given  4/3:  Currently in ICU on esmolol drip  Rate not controlled  Discussed with cards  Will give IV push digoxin

## 2022-04-03 NOTE — PROGRESS NOTES
Orders received from  Dr Greenfield to titrate  Esmolol gtt and given Albumin PRN SBP<90. Will implement and monitor.

## 2022-04-03 NOTE — ASSESSMENT & PLAN NOTE
4/01 Monitor  Trend BMP  Renal dose all medications  Add IVF  4/02 Creatinine continues to rise- 2.4 today. Urinary retention this am- Ratliff catheter ordered  Retroperitoneal ultrasound pending  4/3:  Cr trending down  Cont current management

## 2022-04-03 NOTE — PROGRESS NOTES
[facility] ([facility])  Cardiology  Progress Note    Patient Name: Ana Castañeda  MRN: [id number]  Admission Date: 3/31/2022  Hospital Length of Stay: 2 days  Code Status: DNR   Attending Physician: Obed Vargas MD   Primary Care Physician: Stephen Tarango MD  Expected Discharge Date:   Principal Problem:Atrial flutter    Subjective:     Hospital Course:   5 yo female, cardiology consult for afib/AFL with RVR  PMH PAF on xeratlo, sthma, HTN, h/o CVA with residual right-sided weakness, s/p CEA  Went to ER deu to leg pain and found afl with RVR in ER and started Cardizem gtt. No wHR at 175 bpm. Denied chest pain dyspnea palpitation and dizziness  Echo in  normal EF   EKG today A flutter  Troponin 0.05 and   Cr 1.2 and HGB 12.6  CXR left PNA  Abd CT showed left pleural effusion, gallbladder stone and mild diverticulosis    4/1/22-Patient seen and examined today, resting in bed. Lethargic/confused. SR during exam. CT of head + for acute infarcts. Neurology following. MRI pending. Creatinine bumped to 1.8, K 5.2. Echo showed EF of 50%.    04/02/2022--SR in AM and developed AFL with 2 to 1 conduction HR at 160 bpm. Brain MRI showed acute CVA. Neurology on board. Pt some confusion and no chest hellen dyspnea and palpitation. Eliquis is on hold per neurology advise.           ROS  Objective:     Vital Signs (Most Recent):  Temp: 98.1 °F (36.7 °C) (04/02/22 1642)  Pulse: (!) 160 (04/02/22 2006)  Resp: (!) 24 (04/02/22 1642)  BP: 120/76 (04/02/22 2015)  SpO2: 95 % (04/02/22 1642)   Vital Signs (24h Range):  Temp:  [98.1 °F (36.7 °C)-99.5 °F (37.5 °C)] 98.1 °F (36.7 °C)  Pulse:  [] 160  Resp:  [18-36] 24  SpO2:  [92 %-95 %] 95 %  BP: (117-175)/(57-77) 120/76     Weight: 78.9 kg (173 lb 15.1 oz)  Body mass index is 28.08 kg/m².     SpO2: 95 %  O2 Device (Oxygen Therapy): nasal cannula    No intake or output data in the 24 hours ending 04/02/22 2052    Lines/Drains/Airways       Drain  Duration              Female External Urinary Catheter 03/31/22 1000 2 days         Urethral Catheter 04/02/22 1200 16 Fr. <1 day              Peripheral Intravenous Line  Duration                  Peripheral IV - Single Lumen 03/31/22 1630 18 G Right Antecubital 2 days         Peripheral IV - Single Lumen 04/02/22 2000 18 G Anterior;Right Upper Arm <1 day                    Physical Exam  Vitals and nursing note reviewed.   Constitutional:       General: She is not in acute distress.     Appearance: She is well-developed. She is not diaphoretic.      Comments: On supplemental O2   HENT:      Head: Normocephalic and atraumatic.   Eyes:      General:         Right eye: No discharge.         Left eye: No discharge.      Pupils: Pupils are equal, round, and reactive to light.   Neck:      Thyroid: No thyromegaly.      Vascular: No JVD.      Trachea: No tracheal deviation.   Cardiovascular:      Rate and Rhythm: Tachycardia present. Rhythm irregular.      Heart sounds: Normal heart sounds, S1 normal and S2 normal. No murmur heard.     Comments: SR during exam  Pulmonary:      Effort: Pulmonary effort is normal. No respiratory distress.      Breath sounds: No wheezing.      Comments: Diminished BS at bases L > R  Abdominal:      General: There is no distension.      Palpations: Abdomen is soft.      Tenderness: There is no rebound.   Musculoskeletal:      Cervical back: Neck supple.      Right lower leg: No edema.      Left lower leg: No edema.   Skin:     General: Skin is warm and dry.      Findings: No erythema.   Neurological:      Mental Status: She is alert.      Comments: Confused, lethargic       Significant Labs:   Recent Lab Results  (Last 5 results in the past 24 hours)        04/02/22  1848   04/02/22  1542   04/02/22  1445   04/02/22  1420   04/02/22  0451        Albumin         2.6       Alkaline Phosphatase         124       ALT         9       Anion Gap         14       Aniso         Slight       aPTT         29.4  Comment:  aPTT therapeutic range = 39-69 seconds       AST         31       Baso #   0.05       0.04       Basophil %   0.3       0.3       BILIRUBIN TOTAL         1.6  Comment: For infants and newborns, interpretation of results should be based  on gestational age, weight and in agreement with clinical  observations.    Premature Infant recommended reference ranges:  Up to 24 hours.............<8.0 mg/dL  Up to 48 hours............<12.0 mg/dL  3-5 days..................<15.0 mg/dL  6-29 days.................<15.0 mg/dL         BUN         35       Calcium         8.1       Chloride         107       Cholesterol         170  Comment: The National Cholesterol Education Program (NCEP) has set the  following guidelines (reference ranges) for Cholesterol:  Optimal.....................<200 mg/dL  Borderline High.............200-239 mg/dL  High........................> or = 240 mg/dL         CO2         18       CPK         313       CPK MB         4.3       Creatinine         2.4       Creatinine, Urine       133.4         Differential Method   Automated       Automated       eGFR if          21       eGFR if non          18  Comment: Calculation used to obtain the estimated glomerular filtration  rate (eGFR) is the CKD-EPI equation.          Eos #   0.0       0.1       Eosinophil %   0.2       0.6       Estimated Avg Glucose         91       Glucose         113       Gran # (ANC)   13.2       10.9       Gran %   76.1       74.9       HDL         38  Comment: The National Cholesterol Education Program (NCEP) has set the  following guidelines (reference values) for HDL Cholesterol:  Low...............<40 mg/dL  Optimal...........>60 mg/dL         HDL/Cholesterol Ratio         22.4       Hematocrit   33.4       30.6       Hemoglobin   10.6       9.9       Hemoglobin A1C External         4.8  Comment: ADA Screening Guidelines:  5.7-6.4%  Consistent with prediabetes  >or=6.5%  Consistent with  diabetes    High levels of fetal hemoglobin interfere with the HbA1C  assay. Heterozygous hemoglobin variants (HbS, HgC, etc)do  not significantly interfere with this assay.   However, presence of multiple variants may affect accuracy.         Immature Grans (Abs)   0.11  Comment: Mild elevation in immature granulocytes is non specific and   can be seen in a variety of conditions including stress response,   acute inflammation, trauma and pregnancy. Correlation with other   laboratory and clinical findings is essential.         0.10  Comment: Mild elevation in immature granulocytes is non specific and   can be seen in a variety of conditions including stress response,   acute inflammation, trauma and pregnancy. Correlation with other   laboratory and clinical findings is essential.         Immature Granulocytes   0.6       0.7       INR         1.2  Comment: Coumadin Therapy:  2.0 - 3.0 for INR for all indicators except mechanical heart valves  and antiphospholipid syndromes which should use 2.5 - 3.5.         LDL Cholesterol External         112.0  Comment: The National Cholesterol Education Program (NCEP) has set the  following guidelines (reference values) for LDL Cholesterol:  Optimal.......................<130 mg/dL  Borderline High...............130-159 mg/dL  High..........................160-189 mg/dL  Very High.....................>190 mg/dL         Lymph #   1.2       1.1       Lymph %   7.2       7.7       Magnesium         1.9       MB %         1.4  Comment: To be positive, the MB% must be greater than 5% AND the CK-MB  greater than 6.5 ng/mL. Values not in the reference interval,   but not qualifying as positive, should be considered trace.         MCH   31.1       30.6       MCHC   31.7       32.4       MCV   98       94       Mono #   2.7       2.3       Mono %   15.6       15.8       MPV   9.4       9.5       Non-HDL Cholesterol         132  Comment: Risk category and Non-HDL cholesterol  goals:  Coronary heart disease (CHD)or equivalent (10-year risk of CHD >20%):  Non-HDL cholesterol goal     <130 mg/dL  Two or more CHD risk factors and 10-year risk of CHD <= 20%:  Non-HDL cholesterol goal     <160 mg/dL  0 to 1 CHD risk factor:  Non-HDL cholesterol goal     <190 mg/dL         nRBC   0       0       Phosphorus         4.1       Platelet Estimate         Appears normal       Platelets   209       215       Poik         Slight       Poly         Occasional       Potassium         4.4       PROTEIN TOTAL         5.8       Protime         12.4       RBC   3.41       3.24       RDW   15.9       15.3       Sodium         139       Sodium, Urine       83  Comment: The random urine reference ranges provided were established   for 24 hour urine collections.  No reference ranges exist for  random urine specimens.  Correlate clinically.           Tear Drop Cells         Occasional       Total Cholesterol/HDL Ratio         4.5       Triglycerides         100  Comment: The National Cholesterol Education Program (NCEP) has set the  following guidelines (reference values) for triglycerides:  Normal......................<150 mg/dL  Borderline High.............150-199 mg/dL  High........................200-499 mg/dL         Troponin I 0.100  Comment: The reference interval for Troponin I represents the 99th percentile   cutoff   for our facility and is consistent with 3rd generation assay   performance.       0.128  Comment: The reference interval for Troponin I represents the 99th percentile   cutoff   for our facility and is consistent with 3rd generation assay   performance.       0.136  Comment: The reference interval for Troponin I represents the 99th percentile   cutoff   for our facility and is consistent with 3rd generation assay   performance.         TSH         0.539       WBC   17.33       14.51                              Significant Imaging: EKG:      Plan    * Atrial flutter with RVR  AFIb/AFL with  RVR    -d/c cardizem  -start amio bouls and gtt  -add metoprol as needed for high BP  -continue xarelto 20 mg daily and pt states that she took Xarelto faithfully and only off yesterday and today  -keep NPO after mn and consider NOMAN/DCCv if still afib with RVR in AM  -check TSH    4/1/22  -SR this AM  -Amiodarone d/c'd, continue BB  -Xarelto on hold given acute CVA, neurology following    4/2/22  On AFL with RVR. eliquis is on hold due to acute CVA per neurology advise. It is contraindicated for DCCV and amio gtt  BP dropped to 110 mmHG when on cardizem gtt at 10 mg/hr  Advise to d/c cardizem and started esmolol gtt in ICU  Add dioxin 250 mcg ivp x1 now    TARA (acute kidney injury)  -Mgmt as per hospital medicine    Acute CVA (cerebrovascular accident)- multiple  -Neurology consulted  -Resume Xarelto if ok from neurology standpoint    Troponin level elevated  Demand ischemia >>> NSTEMI/ACS    rx for AFL with RVR    Essential hypertension  See above note    PAF (paroxysmal atrial fibrillation) with chronic anticoagulation with apixaban  See above note        VTE Risk Mitigation (From admission, onward)         Ordered     IP VTE HIGH RISK PATIENT  Once         04/01/22 1619     Place sequential compression device  Until discontinued         04/01/22 1619     Place HALIMA hose  Until discontinued         04/01/22 1616                Sundeep Molina MD  Cardiology  [facility] - Intensive Care (Hospital)

## 2022-04-03 NOTE — PROGRESS NOTES
Pt SBP 68, 500mL NS bolus given per PRN order, BP responded appropriately,  [id number].  EICU,Dr Greenfield called to inform him of pt's BP and her HR remaining 150s RVR,  MD to review chart.

## 2022-04-03 NOTE — ASSESSMENT & PLAN NOTE
4/3 patient overall poor prognosis with multiple CVA.  If becomes stable can proceed with workup for lung mass and pleural effusion.

## 2022-04-03 NOTE — PROGRESS NOTES
[facility] ([facility] Medicine  Progress Note    Patient Name: Ana Castañeda  MRN: [id number]  Patient Class: IP- Inpatient   Admission Date: 3/31/2022  Length of Stay: 3 days  Attending Physician: Obed Vargas MD  Primary Care Provider: Stephen Tarango MD        Subjective:     Principal Problem:Atrial flutter        HPI:  Ana Castañeda is a 86 y.o. 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 The pain is described as crampy/spasms. No associated NVD, fever or chills. She had a UTI which was treated with oral Cipro 2 weeks ago. She denies any CP, SOB, dizziness, weakness, numbness, and all other sxs at this time. daughter states that when she has been feeling tired and not eating drinking much as well for last few days nidhi after she finished her cipro dose and her R sided weakness has got worse. In the ER, initial VSS, Afebrile, Sats 95% on RA. CXr showed Left infrahilar pneumonia. On CT Abd/Pelvis Renal Stone showed moderate left pleural effusion with infiltrate/consolidation left lung base.  Possible perihilar lung mass. Dedicated CT chest would be helpful, if clinically warranted.  2. Hypodensities within the liver most likely representing cysts.  3. Punctate gallstones or sludge within the gallbladder.  4. Very small hiatal hernia.  Mild diverticulosis.  5. Hysterectomy.    She was also found to be in A Fluttter w - she was given IV Cardizem 15 mg bolus and then started on titrating Cardizem gtt and hence admitted to ICU initially. Cards evaluated the pt and d/hira Cardizem and started her on Amiodarone gtt after bolus and also started metoprol with improved heart rate. Pt felt much better and was downgraded to Tele. She also received IV Levaquin 750 mg in the ER.      She had a similar admission here in 2019 when she was admitted to ICU on Bipap, with acute hypoxemic resp failure sec to LLL Pneumonia and Afib w RVR and Lactic Acidosis.         Overview/Hospital Course:  The patient was monitored closely during her stay. She was kept on continuous telemetry monitoring. Patient's heart rate remained stable however she was noted to have intermittent confusion with lethargy. A Ct scan was done with areas of acute CVAs noted. Neurology was consulted. MRI of brain, bilateral carotid ultrasound, and echocardiogram was ordered. Patient was placed NPO. She was given IVF for hydration. PT, OT, and ST were consulted.   4/02 Neurology recommended to hold anticoagulation for 4 days post ischemic stroke and start ASA 81 mg daily. Etiology likely hypotension resulting in bilateral ischemic strokes. No intervention for left ICA occlusion and Target  - 160 mmHg to assist with perfusion on the left hemisphere. Ct scan of chest without contrast showed Left lower lobe mass or evidence of consolidation extending to the left hilum.  Suspect left hilar adenopathy with enlarged mediastinal lymph nodes as described.  Mildly prominent left supraclavicular node with questionable abnormal nodes in the left axilla.  Findings are concerning for underlying neoplastic process. Findings discussed with daughter, Wendy. 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 Retrograde flow noted within the mid left ICA. Less than 50% stenosis of the right carotid bifurcation was noted. TARA noted. Continue IVF. Some urinary retention noted and stinson catheter placed. Check retroperitoneal ultrasound. Patient back into afib/aflutter with RVR. Iv cardizem given.   4/3: transferred to ICU for afib RVR. Currently on esmolol drip. Rate not controlled. Will discuss with cardiology on plans.       Interval History: transferred to ICU for afib  RVR. Currently on esmolol drip. Rate not controlled. Will discuss with cardiology on plans. Patient currently resting comfortably.     Review of Systems   Constitutional:  Negative for fatigue and fever.   HENT:  Negative for sinus pressure.    Eyes:  Negative for visual disturbance.   Respiratory:  Negative for shortness of breath.    Cardiovascular:  Negative for chest pain.   Gastrointestinal:  Negative for nausea and vomiting.   Genitourinary:  Negative for difficulty urinating.   Musculoskeletal:  Negative for back pain.   Skin:  Negative for rash.   Neurological:  Negative for headaches.   Psychiatric/Behavioral:  Negative for confusion.    Objective:     Vital Signs (Most Recent):  Temp: 97.9 °F (36.6 °C) (04/03/22 0701)  Pulse: (!) 124 (04/03/22 1045)  Resp: (!) 28 (04/03/22 1045)  BP: 112/80 (04/03/22 1030)  SpO2: 95 % (04/03/22 1045)   Vital Signs (24h Range):  Temp:  [97.8 °F (36.6 °C)-99.1 °F (37.3 °C)] 97.9 °F (36.6 °C)  Pulse:  [115-163] 124  Resp:  [17-49] 28  SpO2:  [92 %-100 %] 95 %  BP: ()/() 112/80     Weight: 78.9 kg (173 lb 15.1 oz)  Body mass index is 28.08 kg/m².    Intake/Output Summary (Last 24 hours) at 4/3/2022 1050  Last data filed at 4/3/2022 1000  Gross per 24 hour   Intake 2861.07 ml   Output 625 ml   Net 2236.07 ml      Physical Exam  Constitutional:       General: She is not in acute distress.     Appearance: She is not diaphoretic.      Comments: Lethargic   HENT:      Head: Atraumatic.      Mouth/Throat:      Mouth: Mucous membranes are dry.   Eyes:      General:         Right eye: No discharge.         Left eye: No discharge.      Conjunctiva/sclera: Conjunctivae normal.      Comments: Left pupil > right pupil   Cardiovascular:      Rate and Rhythm: Tachycardia present. Rhythm irregular.      Pulses: Normal pulses.   Pulmonary:      Effort: Pulmonary effort is normal. No respiratory distress.      Breath sounds: No rhonchi.      Comments:  diminished  Abdominal:       General: Bowel sounds are normal. There is no distension.      Palpations: Abdomen is soft.      Tenderness: There is no abdominal tenderness. There is no guarding.   Genitourinary:     Comments: Not examined  Musculoskeletal:      Cervical back: Normal range of motion and neck supple. No rigidity or tenderness.      Right lower leg: No edema.      Left lower leg: No edema.   Skin:     General: Skin is warm and dry.      Capillary Refill: Capillary refill takes less than 2 seconds.      Comments: Decreased skin turgor   Neurological:      Comments: Lethargic  Responds appropriately to simple commands at times   Psychiatric:      Comments: Dash and cooperative       Significant Labs: All pertinent labs within the past 24 hours have been reviewed.    Significant Imaging: I have reviewed all pertinent imaging results/findings within the past 24 hours.          Assessment/Plan:      * Atrial flutter with RVR  3/31 Pt with hx of P Afib, was in and out of Afin/ flutter, treated initially with Cardizem Gtt and then changed to Amiodarone and Metoprolo added with good effect  Pt already on long term xarelto  Continue  Cards consulted and following  4/01 Telemetry  Continue BBlockade  Discussed with Neurology- Hold Xarelto for now, no Iv heparin drip at this time  4/02 Recurrent Afib/aflutter with RVR- Iv cardizem given  4/3:  Currently in ICU on esmolol drip  Rate not controlled  Discussed with cards  Will give IV push digoxin    Urinary retention  4/02 Bladder scan greater than 574ml. In and out cath with 300ml urine obtained  Ratliff catheter placed      Left carotid artery occlusion  4/01 As noted on Carotid ultrasound  Will repeat BMP in am and monitor renal status- Plan CTA of head and neck once creatinine allowable  Add ASA and Statin   Continue plavix  4/02 Neurology recommended hold anticoagulation for 4 days post ischemic stroke and start ASA 81 mg daily.   No intervention for left ICA occlusion.     Liver lesion  4/01  "Noted on Ct scan  Monitor as Outpatient      History of CVA in adulthood  4/01 Previously on Xarelto at home  4/02 Daughter reported patient with prior right sided weakness PTA and could only ambulate 50 feet at times    Elevated bilirubin  4/01 Resolved  4/02 Recurrent. Monitor        Hypoalbuminemia  4/01 Monitor      TARA (acute kidney injury)  4/01 Monitor  Trend BMP  Renal dose all medications  Add IVF  4/02 Creatinine continues to rise- 2.4 today. Urinary retention this am- Ratliff catheter ordered  Retroperitoneal ultrasound pending  4/3:  Cr trending down  Cont current management      Normocytic anemia  4/01 Add MVI      Acute CVA (cerebrovascular accident)- multiple  4/01 Daughter reported Intermittent confusion since 1 pm yesterday   Ct scan of head abnormal showing signs of multiple areas of CVA  Daughter reports patient " missed a few doses of Xarelto" in the past few days  Cardiology notified  Neurology consulted- Hold off Iv heparin drip for now, Check MRI brain  CVA pathway initiated  4/02 Neurology recommended hold anticoagulation for 4 days post ischemic stroke and start ASA 81 mg daily. Etiology likely hypotension resulting in bilateral ischemic strokes. No intervention for left ICA occlusion and Target  - 160 mmHg to assist with perfusion on the left hemisphere.   4/3:  Cont to hold AC per neuro recs  Cont asa and statin   PT/OT  Placement likely needed      Slow transit constipation  3/31 As seen on CT Abd Renal stones with mild stranding- should be covered by Levaquin  Treat with Dulcolax and Mag Citrate   Start pericolace and miralax  4/01 Add dulcolax suppository      Pneumonia due to infectious organism- Possible Mass  3/31 LLL Pneumonia with Parapneumonic effusion seen on Renal CT  R.o Infrahilar mass with CT Chest later  Started on Levaquin and continued on O2, Nebs,   Add IS and Acapella  Consult Pulmonology in am  4/02 Continue current treatment. Abnormal Ct scan concerning for Lung " mass  4/3: CT concerning for mass, will check procal; will need outpatient lung biopsy        Troponin level elevated  3/31 Seen by Cards- Demand ischemia- no ACS  4/02 Reported Complaints of chest pain- Repeat EKG and Trend troponins      Essential hypertension  4/01 Allow for permissive HTN for now  Prn hydralazine  4/02 Neurology recommended Target  - 160 mmHg to assist with perfusion on the left hemisphere.       VTE Risk Mitigation (From admission, onward)         Ordered     IP VTE HIGH RISK PATIENT  Once         04/01/22 1619     Place sequential compression device  Until discontinued         04/01/22 1619     Place HALIMA hose  Until discontinued         04/01/22 1616                Discharge Planning   JOSE:      Code Status: DNR   Is the patient medically ready for discharge?:     Reason for patient still in hospital (select all that apply): Patient trending condition  Discharge Plan A: Home with family, Home Health            Critical care time spent on the evaluation and treatment of severe organ dysfunction, review of pertinent labs and imaging studies, discussions with consulting providers and discussions with patient/family: 40 minutes.      Obed Vargas MD  Department of Hospital Medicine   [facility] - Intensive [facility] ([facility])

## 2022-04-03 NOTE — ASSESSMENT & PLAN NOTE
"4/01 Daughter reported Intermittent confusion since 1 pm yesterday   Ct scan of head abnormal showing signs of multiple areas of CVA  Daughter reports patient " missed a few doses of Xarelto" in the past few days  Cardiology notified  Neurology consulted- Hold off Iv heparin drip for now, Check MRI brain  CVA pathway initiated  4/02 Neurology recommended hold anticoagulation for 4 days post ischemic stroke and start ASA 81 mg daily. Etiology likely hypotension resulting in bilateral ischemic strokes. No intervention for left ICA occlusion and Target  - 160 mmHg to assist with perfusion on the left hemisphere.   4/3:  Cont to hold AC per neuro recs  Cont asa and statin   PT/OT  Placement likely needed    "

## 2022-04-03 NOTE — SUBJECTIVE & OBJECTIVE
Interval History: transferred to ICU for afib RVR. Currently on esmolol drip. Rate not controlled. Will discuss with cardiology on plans. Patient currently resting comfortably.     Review of Systems   Constitutional:  Negative for fatigue and fever.   HENT:  Negative for sinus pressure.    Eyes:  Negative for visual disturbance.   Respiratory:  Negative for shortness of breath.    Cardiovascular:  Negative for chest pain.   Gastrointestinal:  Negative for nausea and vomiting.   Genitourinary:  Negative for difficulty urinating.   Musculoskeletal:  Negative for back pain.   Skin:  Negative for rash.   Neurological:  Negative for headaches.   Psychiatric/Behavioral:  Negative for confusion.    Objective:     Vital Signs (Most Recent):  Temp: 97.9 °F (36.6 °C) (04/03/22 0701)  Pulse: (!) 124 (04/03/22 1045)  Resp: (!) 28 (04/03/22 1045)  BP: 112/80 (04/03/22 1030)  SpO2: 95 % (04/03/22 1045)   Vital Signs (24h Range):  Temp:  [97.8 °F (36.6 °C)-99.1 °F (37.3 °C)] 97.9 °F (36.6 °C)  Pulse:  [115-163] 124  Resp:  [17-49] 28  SpO2:  [92 %-100 %] 95 %  BP: ()/() 112/80     Weight: 78.9 kg (173 lb 15.1 oz)  Body mass index is 28.08 kg/m².    Intake/Output Summary (Last 24 hours) at 4/3/2022 1050  Last data filed at 4/3/2022 1000  Gross per 24 hour   Intake 2861.07 ml   Output 625 ml   Net 2236.07 ml      Physical Exam  Constitutional:       General: She is not in acute distress.     Appearance: She is not diaphoretic.      Comments: Lethargic   HENT:      Head: Atraumatic.      Mouth/Throat:      Mouth: Mucous membranes are dry.   Eyes:      General:         Right eye: No discharge.         Left eye: No discharge.      Conjunctiva/sclera: Conjunctivae normal.      Comments: Left pupil > right pupil   Cardiovascular:      Rate and Rhythm: Tachycardia present. Rhythm irregular.      Pulses: Normal pulses.   Pulmonary:      Effort: Pulmonary effort is normal. No respiratory distress.      Breath sounds: No rhonchi.       Comments:  diminished  Abdominal:      General: Bowel sounds are normal. There is no distension.      Palpations: Abdomen is soft.      Tenderness: There is no abdominal tenderness. There is no guarding.   Genitourinary:     Comments: Not examined  Musculoskeletal:      Cervical back: Normal range of motion and neck supple. No rigidity or tenderness.      Right lower leg: No edema.      Left lower leg: No edema.   Skin:     General: Skin is warm and dry.      Capillary Refill: Capillary refill takes less than 2 seconds.      Comments: Decreased skin turgor   Neurological:      Comments: Lethargic  Responds appropriately to simple commands at times   Psychiatric:      Comments: Dash and cooperative       Significant Labs: All pertinent labs within the past 24 hours have been reviewed.    Significant Imaging: I have reviewed all pertinent imaging results/findings within the past 24 hours.

## 2022-04-03 NOTE — SUBJECTIVE & OBJECTIVE
ROS  Objective:     Vital Signs (Most Recent):  Temp: 98.1 °F (36.7 °C) (04/02/22 1642)  Pulse: (!) 160 (04/02/22 2006)  Resp: (!) 24 (04/02/22 1642)  BP: 120/76 (04/02/22 2015)  SpO2: 95 % (04/02/22 1642)   Vital Signs (24h Range):  Temp:  [98.1 °F (36.7 °C)-99.5 °F (37.5 °C)] 98.1 °F (36.7 °C)  Pulse:  [] 160  Resp:  [18-36] 24  SpO2:  [92 %-95 %] 95 %  BP: (117-175)/(57-77) 120/76     Weight: 78.9 kg (173 lb 15.1 oz)  Body mass index is 28.08 kg/m².     SpO2: 95 %  O2 Device (Oxygen Therapy): nasal cannula    No intake or output data in the 24 hours ending 04/02/22 2052    Lines/Drains/Airways       Drain  Duration             Female External Urinary Catheter 03/31/22 1000 2 days         Urethral Catheter 04/02/22 1200 16 Fr. <1 day              Peripheral Intravenous Line  Duration                  Peripheral IV - Single Lumen 03/31/22 1630 18 G Right Antecubital 2 days         Peripheral IV - Single Lumen 04/02/22 2000 18 G Anterior;Right Upper Arm <1 day                    Physical Exam  Vitals and nursing note reviewed.   Constitutional:       General: She is not in acute distress.     Appearance: She is well-developed. She is not diaphoretic.      Comments: On supplemental O2   HENT:      Head: Normocephalic and atraumatic.   Eyes:      General:         Right eye: No discharge.         Left eye: No discharge.      Pupils: Pupils are equal, round, and reactive to light.   Neck:      Thyroid: No thyromegaly.      Vascular: No JVD.      Trachea: No tracheal deviation.   Cardiovascular:      Rate and Rhythm: Tachycardia present. Rhythm irregular.      Heart sounds: Normal heart sounds, S1 normal and S2 normal. No murmur heard.     Comments: SR during exam  Pulmonary:      Effort: Pulmonary effort is normal. No respiratory distress.      Breath sounds: No wheezing.      Comments: Diminished BS at bases L > R  Abdominal:      General: There is no distension.      Palpations: Abdomen is soft.       Tenderness: There is no rebound.   Musculoskeletal:      Cervical back: Neck supple.      Right lower leg: No edema.      Left lower leg: No edema.   Skin:     General: Skin is warm and dry.      Findings: No erythema.   Neurological:      Mental Status: She is alert.      Comments: Confused, lethargic       Significant Labs:   Recent Lab Results  (Last 5 results in the past 24 hours)        04/02/22  1848   04/02/22  1542   04/02/22  1445   04/02/22  1420   04/02/22  0451        Albumin         2.6       Alkaline Phosphatase         124       ALT         9       Anion Gap         14       Aniso         Slight       aPTT         29.4  Comment: aPTT therapeutic range = 39-69 seconds       AST         31       Baso #   0.05       0.04       Basophil %   0.3       0.3       BILIRUBIN TOTAL         1.6  Comment: For infants and newborns, interpretation of results should be based  on gestational age, weight and in agreement with clinical  observations.    Premature Infant recommended reference ranges:  Up to 24 hours.............<8.0 mg/dL  Up to 48 hours............<12.0 mg/dL  3-5 days..................<15.0 mg/dL  6-29 days.................<15.0 mg/dL         BUN         35       Calcium         8.1       Chloride         107       Cholesterol         170  Comment: The National Cholesterol Education Program (NCEP) has set the  following guidelines (reference ranges) for Cholesterol:  Optimal.....................<200 mg/dL  Borderline High.............200-239 mg/dL  High........................> or = 240 mg/dL         CO2         18       CPK         313       CPK MB         4.3       Creatinine         2.4       Creatinine, Urine       133.4         Differential Method   Automated       Automated       eGFR if          21       eGFR if non          18  Comment: Calculation used to obtain the estimated glomerular filtration  rate (eGFR) is the CKD-EPI equation.          Eos #   0.0        0.1       Eosinophil %   0.2       0.6       Estimated Avg Glucose         91       Glucose         113       Gran # (ANC)   13.2       10.9       Gran %   76.1       74.9       HDL         38  Comment: The National Cholesterol Education Program (NCEP) has set the  following guidelines (reference values) for HDL Cholesterol:  Low...............<40 mg/dL  Optimal...........>60 mg/dL         HDL/Cholesterol Ratio         22.4       Hematocrit   33.4       30.6       Hemoglobin   10.6       9.9       Hemoglobin A1C External         4.8  Comment: ADA Screening Guidelines:  5.7-6.4%  Consistent with prediabetes  >or=6.5%  Consistent with diabetes    High levels of fetal hemoglobin interfere with the HbA1C  assay. Heterozygous hemoglobin variants (HbS, HgC, etc)do  not significantly interfere with this assay.   However, presence of multiple variants may affect accuracy.         Immature Grans (Abs)   0.11  Comment: Mild elevation in immature granulocytes is non specific and   can be seen in a variety of conditions including stress response,   acute inflammation, trauma and pregnancy. Correlation with other   laboratory and clinical findings is essential.         0.10  Comment: Mild elevation in immature granulocytes is non specific and   can be seen in a variety of conditions including stress response,   acute inflammation, trauma and pregnancy. Correlation with other   laboratory and clinical findings is essential.         Immature Granulocytes   0.6       0.7       INR         1.2  Comment: Coumadin Therapy:  2.0 - 3.0 for INR for all indicators except mechanical heart valves  and antiphospholipid syndromes which should use 2.5 - 3.5.         LDL Cholesterol External         112.0  Comment: The National Cholesterol Education Program (NCEP) has set the  following guidelines (reference values) for LDL Cholesterol:  Optimal.......................<130 mg/dL  Borderline High...............130-159  mg/dL  High..........................160-189 mg/dL  Very High.....................>190 mg/dL         Lymph #   1.2       1.1       Lymph %   7.2       7.7       Magnesium         1.9       MB %         1.4  Comment: To be positive, the MB% must be greater than 5% AND the CK-MB  greater than 6.5 ng/mL. Values not in the reference interval,   but not qualifying as positive, should be considered trace.         MCH   31.1       30.6       MCHC   31.7       32.4       MCV   98       94       Mono #   2.7       2.3       Mono %   15.6       15.8       MPV   9.4       9.5       Non-HDL Cholesterol         132  Comment: Risk category and Non-HDL cholesterol goals:  Coronary heart disease (CHD)or equivalent (10-year risk of CHD >20%):  Non-HDL cholesterol goal     <130 mg/dL  Two or more CHD risk factors and 10-year risk of CHD <= 20%:  Non-HDL cholesterol goal     <160 mg/dL  0 to 1 CHD risk factor:  Non-HDL cholesterol goal     <190 mg/dL         nRBC   0       0       Phosphorus         4.1       Platelet Estimate         Appears normal       Platelets   209       215       Poik         Slight       Poly         Occasional       Potassium         4.4       PROTEIN TOTAL         5.8       Protime         12.4       RBC   3.41       3.24       RDW   15.9       15.3       Sodium         139       Sodium, Urine       83  Comment: The random urine reference ranges provided were established   for 24 hour urine collections.  No reference ranges exist for  random urine specimens.  Correlate clinically.           Tear Drop Cells         Occasional       Total Cholesterol/HDL Ratio         4.5       Triglycerides         100  Comment: The National Cholesterol Education Program (NCEP) has set the  following guidelines (reference values) for triglycerides:  Normal......................<150 mg/dL  Borderline High.............150-199 mg/dL  High........................200-499 mg/dL         Troponin I 0.100  Comment: The reference  interval for Troponin I represents the 99th percentile   cutoff   for our facility and is consistent with 3rd generation assay   performance.       0.128  Comment: The reference interval for Troponin I represents the 99th percentile   cutoff   for our facility and is consistent with 3rd generation assay   performance.       0.136  Comment: The reference interval for Troponin I represents the 99th percentile   cutoff   for our facility and is consistent with 3rd generation assay   performance.         TSH         0.539       WBC   17.33       14.51                              Significant Imaging: EKG:

## 2022-04-03 NOTE — ASSESSMENT & PLAN NOTE
On beta-blocker.  As outpatient on Xarelto.  Now on hold due to acute stroke.  4/3 esmolol gtt , dig ,

## 2022-04-03 NOTE — PLAN OF CARE
Plan of care reviewed with pt and family at bedside.   Pt on cont IV fluids and esmolol gtt for persistent afib. HR remains uncontrolled in 120s-140s.   Pt arouses to voice, withdraws to pain, not following commands, appropriate eye contact. Occasionally moans and mumbles single words.  O2 via NC @2L.  Remains NPO pending SLP eval.  Ratliff catheter in use with good output.   Pt turned/repositioned q2hr with pillow and wedge support. Heels floated.

## 2022-04-03 NOTE — SUBJECTIVE & OBJECTIVE
4/3 O2 sat 95% , afebrile , transferred to ICU for RVR , esmolol gtt , lethargic.     Review of Systems   Constitutional:  Positive for fatigue. Negative for chills and fever.   HENT:  Negative for nosebleeds.    Eyes:  Negative for discharge.   Respiratory:  Positive for cough and shortness of breath. Negative for choking.    Cardiovascular:  Negative for chest pain.   Gastrointestinal:  Positive for abdominal pain. Negative for blood in stool.   Endocrine: Negative for cold intolerance.   Genitourinary:  Positive for dysuria. Negative for hematuria.   Musculoskeletal:  Positive for arthralgias. Negative for neck stiffness.   Skin:  Negative for rash.   Allergic/Immunologic: Negative for immunocompromised state.   Neurological:  Positive for weakness. Negative for seizures.        History of stroke    Left-sided weakness   Hematological:  Negative for adenopathy.   Psychiatric/Behavioral:  Negative for behavioral problems.    Objective:     Vital Signs (Most Recent):  Temp: 98.1 °F (36.7 °C) (04/03/22 1101)  Pulse: (!) 120 (04/03/22 1151)  Resp: (!) 28 (04/03/22 1151)  BP: (!) 148/75 (04/03/22 1151)  SpO2: 95 % (04/03/22 1151) Vital Signs (24h Range):  Temp:  [97.8 °F (36.6 °C)-98.8 °F (37.1 °C)] 98.1 °F (36.7 °C)  Pulse:  [115-162] 120  Resp:  [17-49] 28  SpO2:  [92 %-100 %] 95 %  BP: ()/() 148/75     Weight: 78.9 kg (173 lb 15.1 oz)  Body mass index is 28.08 kg/m².      Intake/Output Summary (Last 24 hours) at 4/3/2022 1231  Last data filed at 4/3/2022 1152  Gross per 24 hour   Intake 2984.78 ml   Output 680 ml   Net 2304.78 ml         Physical Exam  Vitals and nursing note reviewed.   Constitutional:       General: She is not in acute distress.     Appearance: She is well-developed. She is ill-appearing.   HENT:      Head: Normocephalic and atraumatic.   Cardiovascular:      Rate and Rhythm: Normal rate and regular rhythm.   Pulmonary:      Effort: Pulmonary effort is normal.   Abdominal:       Palpations: Abdomen is soft.      Tenderness: There is no abdominal tenderness.   Musculoskeletal:         General: No signs of injury.      Cervical back: Neck supple.   Skin:     General: Skin is warm.   Neurological:      Mental Status: She is alert. Mental status is at baseline.      Comments: Left-sided weakness       Vents:  Oxygen Concentration (%): 28 (04/01/22 2052)    Lines/Drains/Airways       Drain  Duration                  Urethral Catheter 04/02/22 1200 16 Fr. 1 day              Peripheral Intravenous Line  Duration                  Peripheral IV - Single Lumen 03/31/22 1630 18 G Right Antecubital 2 days         Peripheral IV - Single Lumen 04/02/22 2000 18 G Anterior;Right Upper Arm <1 day                    Significant Labs:    CBC/Anemia Profile:  Recent Labs   Lab 04/02/22  0451 04/02/22  1542 04/03/22  0309   WBC 14.51* 17.33* 14.79*   HGB 9.9* 10.6* 9.8*   HCT 30.6* 33.4* 30.6*    209 199   MCV 94 98 98   RDW 15.3* 15.9* 15.9*          Chemistries:  Recent Labs   Lab 04/02/22  0451 04/03/22  0309    140   K 4.4 4.7    112*   CO2 18* 16*   BUN 35* 31*   CREATININE 2.4* 1.9*   CALCIUM 8.1* 7.9*   ALBUMIN 2.6* 2.5*   PROT 5.8* 5.4*   BILITOT 1.6* 1.4*   ALKPHOS 124 108   ALT 9* 7*   AST 31 39   MG 1.9 1.9   PHOS 4.1  --       Latest Reference Range & Units 04/02/22 04:51 04/02/22 14:45 04/02/22 18:48   CPK 20 - 180 U/L 313 (H)     CPK MB 0.1 - 6.5 ng/mL 4.3     MB % 0.0 - 5.0 % 1.4 [1]     Troponin I 0.000 - 0.026 ng/mL 0.136 (H) [2] 0.128 (H) [3] 0.100 (H) [4]     EKG 03/31/2022    Normal sinus rhythm   Prolonged QT   Abnormal ECG   When compared with ECG of 31-MAR-2022 11:45,   Sinus rhythm has replaced Atrial flutter   Vent. rate has decreased  BPM   ST no longer depressed in Inferior leads   ST no longer depressed in Anterior-lateral leads   T wave inversion no longer evident in Inferior leads   T wave inversion no longer evident in Anterior-lateral leads        2D echo  04/01/2022    Concentric remodeling and low normal systolic function.  Intermediate central venous pressure (8 mmHg).  The estimated PA systolic pressure is 48 mmHg.  The estimated ejection fraction is 50%.  Indeterminate left ventricular diastolic function.  With normal right ventricular systolic function.    Significant Imaging:     MRI of the brain 04/01/2022    Multifocal acute ischemic changes/infarctions including the left cerebellar hemisphere, the occipital lobes bilaterally, the perisylvian white matter and cortex on the right side, the right and left parietal lobes and the right and left frontal lobes.  No evidence of a mass or bleed.  Extensive small vessel disease.  Marked atrophy.      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  CTA could be utilized for further evaluation.     Retrograde flow noted within the mid left ICA.     Less than 50% stenosis of the right carotid bifurcation.    Chest x-ray 03/31/2022      FINDINGS:  No definite change compared to the prior examination obtained earlier today     Impression:     Stable findings with left lung opacity.           CT renal stone study 03/31/2022      FINDINGS:  Moderate size left pleural effusion with infiltrate and consolidation or possible lung mass in the perihilar region measuring 2.3 cm.  Right lung base clear.     2.1 cm hypodensity anterior and inferior liver adjacent the gallbladder fossa.  Finding likely represents a cyst (series 2, image 38).  13 mm hypodensity dome of the junction of the right lobe of the liver could represent small cyst (series 2, image 24).  Punctate gallstones are sludge layering within the lumen of the gallbladder.     The spleen is normal in size and appearance.  The pancreas is normal.  The adrenal glands are normal.  The aorta and IVC are normal.  No retroperitoneal adenopathy.     Left kidney and left ureter are normal.  Right  kidney and right ureter are normal.     Very small hiatal hernia.  The small intestine is normal.  Appendix not visualized.  No inflammatory changes in the region of the appendix.  Few diverticuli sigmoid colon.  Remaining colon is normal.     The pelvis demonstrates normal bladder.  Uterus absent.  Adnexal regions are normal.     Lower chest and abdominal wall soft tissues are normal.  Degenerative changes of the spine.  No suspicious osseous lesions.     Impression:     1. Moderate left pleural effusion with infiltrate/consolidation left lung base.  Possible perihilar lung mass.  Dedicated CT chest would be helpful, if clinically warranted.  2. Hypodensities within the liver most likely representing cysts.  3. Punctate gallstones or sludge within the gallbladder.  4. Very small hiatal hernia.  Mild diverticulosis.  5. Hysterectomy.

## 2022-04-04 PROBLEM — R17 ELEVATED BILIRUBIN: Status: RESOLVED | Noted: 2022-04-01 | Resolved: 2022-04-04

## 2022-04-04 PROBLEM — D64.9 NORMOCYTIC ANEMIA: Status: RESOLVED | Noted: 2022-04-01 | Resolved: 2022-04-04

## 2022-04-04 LAB
ANION GAP SERPL CALC-SCNC: 12 MMOL/L (ref 8–16)
APTT BLDCRRT: 28.8 SEC (ref 21–32)
BASOPHILS # BLD AUTO: 0.05 K/UL (ref 0–0.2)
BASOPHILS NFR BLD: 0.3 % (ref 0–1.9)
BUN SERPL-MCNC: 40 MG/DL (ref 8–23)
CALCIUM SERPL-MCNC: 8.7 MG/DL (ref 8.7–10.5)
CHLORIDE SERPL-SCNC: 114 MMOL/L (ref 95–110)
CO2 SERPL-SCNC: 14 MMOL/L (ref 23–29)
CREAT SERPL-MCNC: 2 MG/DL (ref 0.5–1.4)
DIFFERENTIAL METHOD: ABNORMAL
EOSINOPHIL # BLD AUTO: 0 K/UL (ref 0–0.5)
EOSINOPHIL NFR BLD: 0.2 % (ref 0–8)
ERYTHROCYTE [DISTWIDTH] IN BLOOD BY AUTOMATED COUNT: 16.6 % (ref 11.5–14.5)
EST. GFR  (AFRICAN AMERICAN): 26 ML/MIN/1.73 M^2
EST. GFR  (NON AFRICAN AMERICAN): 22 ML/MIN/1.73 M^2
GLUCOSE SERPL-MCNC: 98 MG/DL (ref 70–110)
HCT VFR BLD AUTO: 33.6 % (ref 37–48.5)
HGB BLD-MCNC: 10.1 G/DL (ref 12–16)
IMM GRANULOCYTES # BLD AUTO: 0.1 K/UL (ref 0–0.04)
IMM GRANULOCYTES NFR BLD AUTO: 0.6 % (ref 0–0.5)
LYMPHOCYTES # BLD AUTO: 1.3 K/UL (ref 1–4.8)
LYMPHOCYTES NFR BLD: 8.1 % (ref 18–48)
MCH RBC QN AUTO: 30.6 PG (ref 27–31)
MCHC RBC AUTO-ENTMCNC: 30.1 G/DL (ref 32–36)
MCV RBC AUTO: 102 FL (ref 82–98)
MONOCYTES # BLD AUTO: 2 K/UL (ref 0.3–1)
MONOCYTES NFR BLD: 12.7 % (ref 4–15)
NEUTROPHILS # BLD AUTO: 12.4 K/UL (ref 1.8–7.7)
NEUTROPHILS NFR BLD: 78.1 % (ref 38–73)
NRBC BLD-RTO: 0 /100 WBC
PLATELET # BLD AUTO: 173 K/UL (ref 150–450)
PMV BLD AUTO: 9.8 FL (ref 9.2–12.9)
POTASSIUM SERPL-SCNC: 5.2 MMOL/L (ref 3.5–5.1)
RBC # BLD AUTO: 3.3 M/UL (ref 4–5.4)
SODIUM SERPL-SCNC: 140 MMOL/L (ref 136–145)
WBC # BLD AUTO: 15.87 K/UL (ref 3.9–12.7)

## 2022-04-04 PROCEDURE — 25000003 PHARM REV CODE 250: Performed by: FAMILY MEDICINE

## 2022-04-04 PROCEDURE — 99233 SBSQ HOSP IP/OBS HIGH 50: CPT | Mod: ,,, | Performed by: INTERNAL MEDICINE

## 2022-04-04 PROCEDURE — 97530 THERAPEUTIC ACTIVITIES: CPT | Performed by: PHYSICAL THERAPIST

## 2022-04-04 PROCEDURE — 27000221 HC OXYGEN, UP TO 24 HOURS

## 2022-04-04 PROCEDURE — 25000003 PHARM REV CODE 250: Performed by: INTERNAL MEDICINE

## 2022-04-04 PROCEDURE — 94761 N-INVAS EAR/PLS OXIMETRY MLT: CPT

## 2022-04-04 PROCEDURE — 63600175 PHARM REV CODE 636 W HCPCS: Performed by: FAMILY MEDICINE

## 2022-04-04 PROCEDURE — 25000242 PHARM REV CODE 250 ALT 637 W/ HCPCS: Performed by: NURSE PRACTITIONER

## 2022-04-04 PROCEDURE — 99233 PR SUBSEQUENT HOSPITAL CARE,LEVL III: ICD-10-PCS | Mod: ,,, | Performed by: INTERNAL MEDICINE

## 2022-04-04 PROCEDURE — 36415 COLL VENOUS BLD VENIPUNCTURE: CPT | Performed by: FAMILY MEDICINE

## 2022-04-04 PROCEDURE — 63600175 PHARM REV CODE 636 W HCPCS: Performed by: INTERNAL MEDICINE

## 2022-04-04 PROCEDURE — 99900035 HC TECH TIME PER 15 MIN (STAT)

## 2022-04-04 PROCEDURE — 85730 THROMBOPLASTIN TIME PARTIAL: CPT | Performed by: FAMILY MEDICINE

## 2022-04-04 PROCEDURE — 43752 NASAL/OROGASTRIC W/TUBE PLMT: CPT

## 2022-04-04 PROCEDURE — 25000003 PHARM REV CODE 250: Performed by: NURSE PRACTITIONER

## 2022-04-04 PROCEDURE — 94640 AIRWAY INHALATION TREATMENT: CPT

## 2022-04-04 PROCEDURE — 25000003 PHARM REV CODE 250: Performed by: PHYSICIAN ASSISTANT

## 2022-04-04 PROCEDURE — 20000000 HC ICU ROOM

## 2022-04-04 PROCEDURE — 85025 COMPLETE CBC W/AUTO DIFF WBC: CPT | Performed by: FAMILY MEDICINE

## 2022-04-04 PROCEDURE — 94664 DEMO&/EVAL PT USE INHALER: CPT

## 2022-04-04 PROCEDURE — 80048 BASIC METABOLIC PNL TOTAL CA: CPT | Performed by: FAMILY MEDICINE

## 2022-04-04 RX ORDER — LEVOTHYROXINE SODIUM 112 UG/1
112 TABLET ORAL
Status: DISCONTINUED | OUTPATIENT
Start: 2022-04-05 | End: 2022-04-05

## 2022-04-04 RX ORDER — ATORVASTATIN CALCIUM 40 MG/1
40 TABLET, FILM COATED ORAL NIGHTLY
Status: DISCONTINUED | OUTPATIENT
Start: 2022-04-04 | End: 2022-04-06 | Stop reason: HOSPADM

## 2022-04-04 RX ORDER — GABAPENTIN 250 MG/5ML
100 SOLUTION ORAL EVERY 12 HOURS
Status: DISCONTINUED | OUTPATIENT
Start: 2022-04-04 | End: 2022-04-06

## 2022-04-04 RX ORDER — FUROSEMIDE 10 MG/ML
10 INJECTION INTRAMUSCULAR; INTRAVENOUS ONCE
Status: COMPLETED | OUTPATIENT
Start: 2022-04-04 | End: 2022-04-04

## 2022-04-04 RX ORDER — MUPIROCIN 20 MG/G
OINTMENT TOPICAL 2 TIMES DAILY
Status: DISCONTINUED | OUTPATIENT
Start: 2022-04-04 | End: 2022-04-06 | Stop reason: HOSPADM

## 2022-04-04 RX ORDER — DILTIAZEM HCL/D5W 125 MG/125
5 PLASTIC BAG, INJECTION (ML) INTRAVENOUS CONTINUOUS
Status: DISCONTINUED | OUTPATIENT
Start: 2022-04-04 | End: 2022-04-05

## 2022-04-04 RX ORDER — IPRATROPIUM BROMIDE AND ALBUTEROL SULFATE 2.5; .5 MG/3ML; MG/3ML
3 SOLUTION RESPIRATORY (INHALATION) EVERY 4 HOURS PRN
Status: DISCONTINUED | OUTPATIENT
Start: 2022-04-04 | End: 2022-04-06 | Stop reason: HOSPADM

## 2022-04-04 RX ORDER — NAPROXEN SODIUM 220 MG/1
81 TABLET, FILM COATED ORAL DAILY
Status: DISCONTINUED | OUTPATIENT
Start: 2022-04-05 | End: 2022-04-06 | Stop reason: HOSPADM

## 2022-04-04 RX ORDER — POLYETHYLENE GLYCOL 3350 17 G/17G
17 POWDER, FOR SOLUTION ORAL DAILY
Status: DISCONTINUED | OUTPATIENT
Start: 2022-04-05 | End: 2022-04-06 | Stop reason: HOSPADM

## 2022-04-04 RX ORDER — AMOXICILLIN 250 MG
2 CAPSULE ORAL DAILY
Status: DISCONTINUED | OUTPATIENT
Start: 2022-04-05 | End: 2022-04-05

## 2022-04-04 RX ADMIN — SODIUM BICARBONATE: 84 INJECTION, SOLUTION INTRAVENOUS at 03:04

## 2022-04-04 RX ADMIN — ESMOLOL HYDROCHLORIDE IN SODIUM CHLORIDE 100 MCG/KG/MIN: 20 INJECTION INTRAVENOUS at 10:04

## 2022-04-04 RX ADMIN — ESMOLOL HYDROCHLORIDE IN SODIUM CHLORIDE 100 MCG/KG/MIN: 20 INJECTION INTRAVENOUS at 05:04

## 2022-04-04 RX ADMIN — ASPIRIN 81 MG CHEWABLE TABLET 81 MG: 81 TABLET CHEWABLE at 11:04

## 2022-04-04 RX ADMIN — ESMOLOL HYDROCHLORIDE IN SODIUM CHLORIDE 100 MCG/KG/MIN: 20 INJECTION INTRAVENOUS at 12:04

## 2022-04-04 RX ADMIN — LEVOTHYROXINE SODIUM 112 MCG: 112 TABLET ORAL at 11:04

## 2022-04-04 RX ADMIN — DOCUSATE SODIUM AND SENNOSIDES 2 TABLET: 8.6; 5 TABLET, FILM COATED ORAL at 11:04

## 2022-04-04 RX ADMIN — ESMOLOL HYDROCHLORIDE IN SODIUM CHLORIDE 100 MCG/KG/MIN: 20 INJECTION INTRAVENOUS at 07:04

## 2022-04-04 RX ADMIN — GABAPENTIN 100 MG: 250 SOLUTION ORAL at 09:04

## 2022-04-04 RX ADMIN — GABAPENTIN 100 MG: 100 CAPSULE ORAL at 03:04

## 2022-04-04 RX ADMIN — SODIUM CHLORIDE 3 G: 9 INJECTION, SOLUTION INTRAVENOUS at 03:04

## 2022-04-04 RX ADMIN — POLYETHYLENE GLYCOL 3350 17 G: 17 POWDER, FOR SOLUTION ORAL at 11:04

## 2022-04-04 RX ADMIN — THERA TABS 1 TABLET: TAB at 11:04

## 2022-04-04 RX ADMIN — ESMOLOL HYDROCHLORIDE IN SODIUM CHLORIDE 100 MCG/KG/MIN: 20 INJECTION INTRAVENOUS at 03:04

## 2022-04-04 RX ADMIN — ATORVASTATIN CALCIUM 40 MG: 40 TABLET, FILM COATED ORAL at 09:04

## 2022-04-04 RX ADMIN — IPRATROPIUM BROMIDE AND ALBUTEROL SULFATE 3 ML: 2.5; .5 SOLUTION RESPIRATORY (INHALATION) at 07:04

## 2022-04-04 RX ADMIN — Medication 5 MG/HR: at 11:04

## 2022-04-04 RX ADMIN — FUROSEMIDE 10 MG: 10 INJECTION, SOLUTION INTRAMUSCULAR; INTRAVENOUS at 03:04

## 2022-04-04 RX ADMIN — MUPIROCIN: 20 OINTMENT TOPICAL at 09:04

## 2022-04-04 RX ADMIN — MUPIROCIN: 20 OINTMENT TOPICAL at 11:04

## 2022-04-04 NOTE — ASSESSMENT & PLAN NOTE
3/31 LLL Pneumonia with Parapneumonic effusion seen on Renal CT  R.o Infrahilar mass with CT Chest later  Started on Levaquin and continued on O2, Nebs,   Add IS and Acapella  Consult Pulmonology in am  4/02 Continue current treatment. Abnormal Ct scan concerning for Lung mass  4/3: CT concerning for mass, will check procal; will need outpatient lung biopsy    4/4:  WBC trending up  LLL pleural effusion noted  Although procal normal  Due to persistent leukocytosis  Will treat empirically for pna  Start unasyn

## 2022-04-04 NOTE — ASSESSMENT & PLAN NOTE
AFIb/AFL with RVR    -d/c cardizem  -start amio bouls and gtt  -add metoprol as needed for high BP  -continue xarelto 20 mg daily and pt states that she took Xarelto faithfully and only off yesterday and today  -keep NPO after mn and consider NOMAN/DCCv if still afib with RVR in AM  -check TSH    4/1/22  -SR this AM  -Amiodarone d/c'd, continue BB  -Xarelto on hold given acute CVA, neurology following    4/2/22  On AFL with RVR. eliquis is on hold due to acute CVA per neurology advise. It is contraindicated for DCCV and amio gtt  BP dropped to 110 mmHG when on cardizem gtt at 10 mg/hr  Advise to d/c cardizem and started esmolol gtt in ICU  Add dioxin 250 mcg ivp x1 now    04/03/22  afib /AFL with RVR  Continue esmolol gtt  Add digoxin 250 mcg ivpx1 now    4/4/22  -HR remains uncontrolled, refractory to esmolol/digoxin  -BP stable this AM, add cardizem gtt  -Resume AC when ok from neurology standpoint

## 2022-04-04 NOTE — SUBJECTIVE & OBJECTIVE
ROS  Objective:     Vital Signs (Most Recent):  Temp: 97.8 °F (36.6 °C) (04/03/22 1645)  Pulse: (!) 118 (04/03/22 1830)  Resp: (!) 24 (04/03/22 1830)  BP: (!) 114/58 (04/03/22 1830)  SpO2: 96 % (04/03/22 1830)   Vital Signs (24h Range):  Temp:  [97.8 °F (36.6 °C)-98.8 °F (37.1 °C)] 97.8 °F (36.6 °C)  Pulse:  [105-148] 118  Resp:  [17-49] 24  SpO2:  [92 %-99 %] 96 %  BP: ()/() 114/58     Weight: 78.9 kg (173 lb 15.1 oz)  Body mass index is 28.08 kg/m².     SpO2: 96 %  O2 Device (Oxygen Therapy): nasal cannula      Intake/Output Summary (Last 24 hours) at 4/3/2022 2035  Last data filed at 4/3/2022 1800  Gross per 24 hour   Intake 3849.8 ml   Output 705 ml   Net 3144.8 ml       Lines/Drains/Airways       Drain  Duration                  Urethral Catheter 04/02/22 1200 16 Fr. 1 day              Peripheral Intravenous Line  Duration                  Peripheral IV - Single Lumen 03/31/22 1630 18 G Right Antecubital 3 days         Peripheral IV - Single Lumen 04/02/22 2000 18 G Anterior;Right Upper Arm 1 day                    Physical Exam  Vitals and nursing note reviewed.   Constitutional:       General: She is not in acute distress.     Appearance: She is well-developed. She is not diaphoretic.      Comments: On supplemental O2   HENT:      Head: Normocephalic and atraumatic.   Eyes:      General:         Right eye: No discharge.         Left eye: No discharge.      Pupils: Pupils are equal, round, and reactive to light.   Neck:      Thyroid: No thyromegaly.      Vascular: No JVD.      Trachea: No tracheal deviation.   Cardiovascular:      Rate and Rhythm: Tachycardia present. Rhythm irregular.      Heart sounds: Normal heart sounds, S1 normal and S2 normal. No murmur heard.     Comments: SR during exam  Pulmonary:      Effort: Pulmonary effort is normal. No respiratory distress.      Breath sounds: No wheezing.      Comments: Diminished BS at bases L > R  Abdominal:      General: There is no  distension.      Palpations: Abdomen is soft.      Tenderness: There is no rebound.   Musculoskeletal:      Cervical back: Neck supple.      Right lower leg: No edema.      Left lower leg: No edema.   Skin:     General: Skin is warm and dry.      Findings: No erythema.   Neurological:      Comments: Confused, lethargic       Significant Labs:   Recent Lab Results         04/03/22  1144   04/03/22  0309        Procalcitonin 0.19  Comment: A concentration < 0.25 ng/mL represents a low risk of bacterial   infection.  Procalcitonin may not be accurate among patients with localized   infection, recent trauma or major surgery, immunosuppressed state,   invasive fungal infection, renal dysfunction. Decisions regarding   initiation or continuation of antibiotic therapy should not be based   solely on procalcitonin levels.           Albumin   2.5       Alkaline Phosphatase   108       ALT   7       Anion Gap   12       AST   39       Baso #   0.04       Basophil %   0.3       BILIRUBIN TOTAL   1.4  Comment: For infants and newborns, interpretation of results should be based  on gestational age, weight and in agreement with clinical  observations.    Premature Infant recommended reference ranges:  Up to 24 hours.............<8.0 mg/dL  Up to 48 hours............<12.0 mg/dL  3-5 days..................<15.0 mg/dL  6-29 days.................<15.0 mg/dL         BUN   31       Calcium   7.9       Chloride   112       CO2   16       Creatinine   1.9       Differential Method   Automated       eGFR if    28       eGFR if non    24  Comment: Calculation used to obtain the estimated glomerular filtration  rate (eGFR) is the CKD-EPI equation.          Eos #   0.1       Eosinophil %   0.6       Glucose   103       Gran # (ANC)   11.4       Gran %   77.1       Hematocrit   30.6       Hemoglobin   9.8       Immature Grans (Abs)   0.09  Comment: Mild elevation in immature granulocytes is non specific and    can be seen in a variety of conditions including stress response,   acute inflammation, trauma and pregnancy. Correlation with other   laboratory and clinical findings is essential.         Immature Granulocytes   0.6       Lymph #   1.1       Lymph %   7.3       Magnesium   1.9       MCH   31.2       MCHC   32.0       MCV   98       Mono #   2.1       Mono %   14.1       MPV   9.3       nRBC   0       Platelets   199       Potassium   4.7       PROTEIN TOTAL   5.4       RBC   3.14       RDW   15.9       Sodium   140       WBC   14.79               Significant Imaging: EKG:

## 2022-04-04 NOTE — EICU
Alerted by bedside regarding patient still in AFIB with RVR   Possible heart failure from RVR     Patient has been tried on amio, cardizem and esmolol     Patient now seems to have some crackles and is edematous per bedside   Possible heart failure ..     Was on IVF - d/c   Lasix 10 mg IV x 1 dose

## 2022-04-04 NOTE — ASSESSMENT & PLAN NOTE
"4/01 Daughter reported Intermittent confusion since 1 pm yesterday   Ct scan of head abnormal showing signs of multiple areas of CVA  Daughter reports patient " missed a few doses of Xarelto" in the past few days  Cardiology notified  Neurology consulted- Hold off Iv heparin drip for now, Check MRI brain  CVA pathway initiated  4/02 Neurology recommended hold anticoagulation for 4 days post ischemic stroke and start ASA 81 mg daily. Etiology likely hypotension resulting in bilateral ischemic strokes. No intervention for left ICA occlusion and Target  - 160 mmHg to assist with perfusion on the left hemisphere.   4/4:  Cont to hold AC per neuro recs  Cont asa and statin   PT/OT  Placement likely needed  However patient has not been able to participate with PT  Should condition not improve in the next 24-48hrs  Will need to have goals of care discussion     "

## 2022-04-04 NOTE — ASSESSMENT & PLAN NOTE
AFIb/AFL with RVR    -d/c cardizem  -start amio bouls and gtt  -add metoprol as needed for high BP  -continue xarelto 20 mg daily and pt states that she took Xarelto faithfully and only off yesterday and today  -keep NPO after mn and consider NOMAN/DCCv if still afib with RVR in AM  -check TSH    4/1/22  -SR this AM  -Amiodarone d/c'd, continue BB  -Xarelto on hold given acute CVA, neurology following    4/2/22  On AFL with RVR. eliquis is on hold due to acute CVA per neurology advise. It is contraindicated for DCCV and amio gtt  BP dropped to 110 mmHG when on cardizem gtt at 10 mg/hr  Advise to d/c cardizem and started esmolol gtt in ICU  Add dioxin 250 mcg ivp x1 now    04/03/22  afib /AFL with RVR  Continue esmolol gtt  Add digoxin 250 mcg ivpx1 now

## 2022-04-04 NOTE — PROGRESS NOTES
[facility] - Intensive Care ([facility])  Cardiology  Progress Note    Patient Name: Ana Castañeda  MRN: [id number]  Admission Date: 3/31/2022  Hospital Length of Stay: 4 days  Code Status: DNR   Attending Physician: Obed Vargas MD   Primary Care Physician: Stephen Tarango MD  Expected Discharge Date:   Principal Problem:Atrial flutter    Subjective:     Hospital Course:   5 yo female, cardiology consult for afib/AFL with RVR  PMH PAF on xeratlo, sthma, HTN, h/o CVA with residual right-sided weakness, s/p CEA  Went to ER deu to leg pain and found afl with RVR in ER and started Cardizem gtt. No wHR at 175 bpm. Denied chest pain dyspnea palpitation and dizziness  Echo in  normal EF   EKG today A flutter  Troponin 0.05 and   Cr 1.2 and HGB 12.6  CXR left PNA  Abd CT showed left pleural effusion, gallbladder stone and mild diverticulosis    4/1/22-Patient seen and examined today, resting in bed. Lethargic/confused. SR during exam. CT of head + for acute infarcts. Neurology following. MRI pending. Creatinine bumped to 1.8, K 5.2. Echo showed EF of 50%.    04/02/2022--SR in AM and developed AFL with 2 to 1 conduction HR at 160 bpm. Brain MRI showed acute CVA. Neurology on board. Pt some confusion and no chest hellen dyspnea and palpitation. Eliquis is on hold per neurology advise.     04/3/22 in afib with RVR and transferred to icu for esmolol gtt and d/c cardizem gtt. The labs reviewed. Xarelto is on hold per neurology recommendation    4/4/22-Patient seen and examined today, resting in bed. Family at bedside. Lethargic. Dyspneic. Remains in afib/flutter with RVR. BP stable, cardizem drip initiated. Assess response. Labs reviewed. Creatinine 2.0.         Review of Systems   Reason unable to perform ROS: patient lethargic/altered.   Objective:     Vital Signs (Most Recent):  Temp: 98.5 °F (36.9 °C) (04/04/22 0705)  Pulse: (!) 120 (04/04/22 1000)  Resp: (!) 27 (04/04/22 1000)  BP: (!) 167/73 (04/04/22 1000)  SpO2: 96 %  (04/04/22 1000)   Vital Signs (24h Range):  Temp:  [97.8 °F (36.6 °C)-98.9 °F (37.2 °C)] 98.5 °F (36.9 °C)  Pulse:  [] 120  Resp:  [16-36] 27  SpO2:  [92 %-100 %] 96 %  BP: ()/() 167/73     Weight: 82 kg (180 lb 12.4 oz)  Body mass index is 29.18 kg/m².     SpO2: 96 %  O2 Device (Oxygen Therapy): nasal cannula      Intake/Output Summary (Last 24 hours) at 4/4/2022 1100  Last data filed at 4/4/2022 1027  Gross per 24 hour   Intake 2087.27 ml   Output 1605 ml   Net 482.27 ml       Lines/Drains/Airways       Drain  Duration                  Urethral Catheter 04/02/22 1200 16 Fr. 1 day              Peripheral Intravenous Line  Duration                  Peripheral IV - Single Lumen 03/31/22 1630 18 G Right Antecubital 3 days         Peripheral IV - Single Lumen 04/02/22 2000 18 G Anterior;Right Upper Arm 1 day                    Physical Exam  Vitals and nursing note reviewed.   Constitutional:       General: She is not in acute distress.     Appearance: She is well-developed. She is ill-appearing. She is not diaphoretic.      Comments: On supplemental o2   HENT:      Head: Normocephalic and atraumatic.   Eyes:      General:         Right eye: No discharge.         Left eye: No discharge.      Pupils: Pupils are equal, round, and reactive to light.   Neck:      Thyroid: No thyromegaly.      Vascular: No JVD.      Trachea: No tracheal deviation.   Cardiovascular:      Rate and Rhythm: Tachycardia present. Rhythm irregularly irregular.      Heart sounds: Normal heart sounds, S1 normal and S2 normal. No murmur heard.     Comments: Remains in afib with RVR, 's during exam  Pulmonary:      Effort: Pulmonary effort is normal.      Breath sounds: Rhonchi present.      Comments: Increased WOB  Rhonchi/diminished BS at bases  Abdominal:      General: There is no distension.      Palpations: Abdomen is soft.      Tenderness: There is no rebound.   Musculoskeletal:      Cervical back: Neck supple.      Right  lower leg: No edema.      Left lower leg: No edema.   Skin:     General: Skin is warm and dry.      Findings: No erythema.   Neurological:      Mental Status: She is alert.      Comments: Awake, lethargic, did not respond to questions during exam       Significant Labs: CMP   Recent Labs   Lab 04/03/22  0309 04/04/22  0823    140   K 4.7 5.2*   * 114*   CO2 16* 14*    98   BUN 31* 40*   CREATININE 1.9* 2.0*   CALCIUM 7.9* 8.7   PROT 5.4*  --    ALBUMIN 2.5*  --    BILITOT 1.4*  --    ALKPHOS 108  --    AST 39  --    ALT 7*  --    ANIONGAP 12 12   ESTGFRAFRICA 28* 26*   EGFRNONAA 24* 22*   , CBC   Recent Labs   Lab 04/02/22  1542 04/03/22  0309 04/04/22  0823   WBC 17.33* 14.79* 15.87*   HGB 10.6* 9.8* 10.1*   HCT 33.4* 30.6* 33.6*    199 173   , Troponin   Recent Labs   Lab 04/02/22  1445 04/02/22  1848   TROPONINI 0.128* 0.100*   , and All pertinent lab results from the last 24 hours have been reviewed.    Significant Imaging: Echocardiogram: Transthoracic echo (TTE) complete (Cupid Only):   Results for orders placed or performed during the hospital encounter of 03/31/22   Echo   Result Value Ref Range    BSA 1.92 m2    TDI SEPTAL 0.08 m/s    LV LATERAL E/E' RATIO 11.75 m/s    LV SEPTAL E/E' RATIO 11.75 m/s    LA WIDTH 2.75 cm    IVC diameter 1.63 cm    Left Ventricular Outflow Tract Mean Velocity 0.96415977550 cm/s    Left Ventricular Outflow Tract Mean Gradient 1.05 mmHg    TDI LATERAL 0.08 m/s    LVIDd 3.10 (A) 3.5 - 6.0 cm    IVS 1.42 (A) 0.6 - 1.1 cm    Posterior Wall 1.28 (A) 0.6 - 1.1 cm    Ao root annulus 2.92 cm    LVIDs 1.93 (A) 2.1 - 4.0 cm    FS 38 28 - 44 %    LA volume 19.49 cm3    Sinus 2.75 cm    STJ 2.46 cm    Ascending aorta 2.49 cm    LV mass 138.15 g    LA size 2.78 cm    TAPSE 1.82 cm    Left Ventricle Relative Wall Thickness 0.83 cm    AV mean gradient 6 mmHg    AV valve area 1.12 cm2    AV Velocity Ratio 0.44     AV index (prosthetic) 0.46     MV valve area p 1/2  method 4.31 cm2    E/A ratio 1.24     Mean e' 0.08 m/s    E wave deceleration time 176.39829834280824 msec    IVRT 57.517748488905476 msec    LVOT diameter 1.76 cm    LVOT area 2.4 cm2    LVOT peak messi 0.66 m/s    LVOT peak VTI 18.30 cm    Ao peak messi 1.50 m/s    Ao VTI 39.7 cm    RVOT peak messi 0.54 m/s    RVOT peak VTI 14.1 cm    Mr max messi 5.30 m/s    LVOT stroke volume 44.50 cm3    AV peak gradient 9 mmHg    PV mean gradient 0.67 mmHg    E/E' ratio 11.75 m/s    MV Peak E Messi 0.94 m/s    TR Max Messi 3.15 m/s    MV stenosis pressure 1/2 time 51.221661507 ms    MV Peak A Messi 0.76 m/s    LV Systolic Volume 11.55 mL    LV Systolic Volume Index 6.1 mL/m2    LV Diastolic Volume 37.98 mL    LV Diastolic Volume Index 20.10 mL/m2    LA Volume Index 10.3 mL/m2    LV Mass Index 73 g/m2    Echo EF Estimated 70 %    RA Major Axis 3.26 cm    Left Atrium Minor Axis 2.75 cm    Left Atrium Major Axis 3.30 cm    Triscuspid Valve Regurgitation Peak Gradient 40 mmHg    RA Width 2.58 cm    Right Atrial Pressure (from IVC) 8 mmHg    EF 50 %    TV rest pulmonary artery pressure 48 mmHg    Narrative    · Concentric remodeling and low normal systolic function.  · Intermediate central venous pressure (8 mmHg).  · The estimated PA systolic pressure is 48 mmHg.  · The estimated ejection fraction is 50%.  · Indeterminate left ventricular diastolic function.  · With normal right ventricular systolic function.      , EKG: REviewed, and X-Ray: CXR: X-Ray Chest 1 View (CXR):   Results for orders placed or performed during the hospital encounter of 03/31/22   X-Ray Chest 1 View    Narrative    EXAMINATION:  XR CHEST 1 VIEW    CLINICAL HISTORY:  Altered mental status, unspecified    TECHNIQUE:  Single frontal view of the chest was performed.    COMPARISON:  Prior    FINDINGS:  No definite change compared to the prior examination obtained earlier today      Impression    Stable findings with left lung opacity.  Please see that  report      Electronically signed by: Gianluca Wyatt  Date:    03/31/2022  Time:    23:53    and X-Ray Chest PA and Lateral (CXR): No results found for this visit on 03/31/22.    Assessment and Plan:   Patient who presents with PAFL and acute CVA. HR uncontrolled. Cardizem added. Resume AC when ok from neurology standpoint.     * Atrial flutter with RVR  AFIb/AFL with RVR    -d/c cardizem  -start amio bouls and gtt  -add metoprol as needed for high BP  -continue xarelto 20 mg daily and pt states that she took Xarelto faithfully and only off yesterday and today  -keep NPO after mn and consider NOMAN/DCCv if still afib with RVR in AM  -check TSH    4/1/22  -SR this AM  -Amiodarone d/c'd, continue BB  -Xarelto on hold given acute CVA, neurology following    4/2/22  On AFL with RVR. eliquis is on hold due to acute CVA per neurology advise. It is contraindicated for DCCV and amio gtt  BP dropped to 110 mmHG when on cardizem gtt at 10 mg/hr  Advise to d/c cardizem and started esmolol gtt in ICU  Add dioxin 250 mcg ivp x1 now    04/03/22  afib /AFL with RVR  Continue esmolol gtt  Add digoxin 250 mcg ivpx1 now    4/4/22  -HR remains uncontrolled, refractory to esmolol/digoxin  -BP stable this AM, add cardizem gtt  -Resume AC when ok from neurology standpoint    Left carotid artery occlusion  -Mgmt as per hospital medicine/vasular      TARA (acute kidney injury)  -Mgmt as per hospital medicine    Acute CVA (cerebrovascular accident)- multiple  -Neurology consulted  -Resume Xarelto when ok from neurology standpoint    Troponin level elevated  Demand ischemia >>> NSTEMI/ACS    rx for AFL with RVR    Essential hypertension  See above note        VTE Risk Mitigation (From admission, onward)         Ordered     IP VTE HIGH RISK PATIENT  Once         04/01/22 1619     Place sequential compression device  Until discontinued         04/01/22 1619     Place HALIMA hose  Until discontinued         04/01/22 1616                Susan YAP  DEBBIE Alcocer  Cardiology  [facility] - Intensive Care ([facility])

## 2022-04-04 NOTE — SUBJECTIVE & OBJECTIVE
Interval History: still in afib, not controlled on esmolol drip and digoxin push. Cardizem drip started by cardiology. Patient still altered. NG tube for temporary tube feedings. Should condition not improve in the next 1-2 days, family may transition to comfort care. Metabolic acidosis noted. Will start bicarb drip.     Review of Systems   Unable to perform ROS: Mental status change   Objective:     Vital Signs (Most Recent):  Temp: 97.9 °F (36.6 °C) (04/04/22 1105)  Pulse: 76 (04/04/22 1200)  Resp: (!) 23 (04/04/22 1200)  BP: (!) 153/88 (04/04/22 1200)  SpO2: 96 % (04/04/22 1200)   Vital Signs (24h Range):  Temp:  [97.8 °F (36.6 °C)-98.9 °F (37.2 °C)] 97.9 °F (36.6 °C)  Pulse:  [] 76  Resp:  [16-36] 23  SpO2:  [92 %-100 %] 96 %  BP: ()/() 153/88     Weight: 82 kg (180 lb 12.4 oz)  Body mass index is 29.18 kg/m².    Intake/Output Summary (Last 24 hours) at 4/4/2022 1422  Last data filed at 4/4/2022 1200  Gross per 24 hour   Intake 1766.87 ml   Output 1740 ml   Net 26.87 ml      Physical Exam  Constitutional:       General: She is not in acute distress.     Appearance: She is not diaphoretic.      Comments: Lethargic   HENT:      Head: Atraumatic.      Mouth/Throat:      Mouth: Mucous membranes are dry.   Eyes:      General:         Right eye: No discharge.         Left eye: No discharge.      Conjunctiva/sclera: Conjunctivae normal.      Comments: Left pupil > right pupil   Cardiovascular:      Rate and Rhythm: Tachycardia present. Rhythm irregular.      Pulses: Normal pulses.   Pulmonary:      Effort: Pulmonary effort is normal. No respiratory distress.      Breath sounds: No rhonchi.      Comments:  Diminished DIAMANTE  Abdominal:      General: Bowel sounds are normal. There is no distension.      Palpations: Abdomen is soft.      Tenderness: There is no abdominal tenderness. There is no guarding.   Genitourinary:     Comments: Not examined  Musculoskeletal:      Cervical back: Normal range of motion  [facility] ED  Emergency Department Encounter  EmergencyMedicine Resident     Pt Elizabeth Spaulding  MRN: [id number]  [place] 1986  Date of evaluation: 5/22/20  PCP:  No primary care provider on file. CHIEF COMPLAINT       Chief Complaint   Patient presents with    Back Pain    Fall     Pt fell in the shower yesterday. +loc       HISTORY OF PRESENT ILLNESS  (Location/Symptom, Timing/Onset, Context/Setting, Quality, Duration, Modifying Factors, Severity.)      Mu Velázquez is a 34 y.o. female who presents with complaint of fall, back pain, possible seizure. Patient told nurse that she fell in the shower, stated to resident that she must of passed out, possibly due to seizure after getting up to do dishes out of wheelchair. Patient also stated that she moved back to the facility 1 year ago from [place], however chart review shows the patient has been here for multiple years. Patient states that she has multiple sclerosis but is not followed up with a neurologist for 15 years. Past visits the patient has left AGAINST MEDICAL ADVICE and becoming angry with the care. Denies fevers chills nausea vomiting lower extremity paresthesias or focal weakness no difference in character of pain. PAST MEDICAL / SURGICAL / SOCIAL / FAMILY HISTORY      has a past medical history of Asthma, Bipolar disorder ([facility] [address] Cervical dysplasia, Migraine, and Seizures ([facility] [address] has a past surgical history that includes Cholecystectomy (2007); Tubal ligation; and Appendectomy.     Social History     Socioeconomic History    Marital status:      Spouse name: Not on file    Number of children: Not on file    Years of education: Not on file    Highest education level: Not on file   Occupational History    Not on file   Social Needs    Financial resource strain: Not on file    Food insecurity     Worry: Not on file     Inability: Not on file    Transportation needs     Medical: Not on file     Non-medical: Not on file   Tobacco Use    Smoking status: Current Every Day Smoker     Packs/day: 0.20     Years: 11.00     Pack years: 2.20     Types: Cigarettes    Smokeless tobacco: Never Used   Substance and Sexual Activity    Alcohol use: Yes     Alcohol/week: 0.0 standard drinks     Comment: occassional, heavy 3 years ago    Drug use: Yes     Types: Opiates , Marijuana     Comment: Hx Percocet abuse sober for 2 years.  Sexual activity: Not on file   Lifestyle    Physical activity     Days per week: Not on file     Minutes per session: Not on file    Stress: Not on file   Relationships    Social connections     Talks on phone: Not on file     Gets together: Not on file     Attends [employer] service: Not on file     Active member of club or organization: Not on file     Attends meetings of clubs or organizations: Not on file     Relationship status: Not on file    Intimate partner violence     Fear of current or ex partner: Not on file     Emotionally abused: Not on file     Physically abused: Not on file     Forced sexual activity: Not on file   Other Topics Concern    Not on file   Social History Narrative    Not on file       Family History   Problem Relation Age of Onset    Diabetes Mother         G.Parents    Hypertension Mother         G.Parents    Diabetes Father     Hypertension Father     Stroke Father         G.Parents    Cancer Other         G.Parents, Pancrease and ??    Coronary Art Dis Other         G.Parents       Allergies:  Diclofenac; Morphine; and Pcn [penicillins]    Home Medications:  Prior to Admission medications    Medication Sig Start Date End Date Taking?  Authorizing Provider   cyclobenzaprine (FLEXERIL) 5 MG tablet Take 1 tablet by mouth 2 times daily as needed for Muscle spasms 5/16/20 5/26/20  Hermes Davis MD   albuterol sulfate HFA (VENTOLIN HFA) 108 (90 Base) MCG/ACT inhaler Inhale 2 puffs into the lungs 4 times daily as needed for Wheezing and neck supple. No rigidity or tenderness.      Right lower leg: No edema.      Left lower leg: No edema.   Skin:     General: Skin is warm and dry.      Capillary Refill: Capillary refill takes less than 2 seconds.      Comments: Decreased skin turgor   Neurological:      Comments: Lethargic  Responds appropriately to simple commands at times   Psychiatric:      Comments: Dash and cooperative       Significant Labs: All pertinent labs within the past 24 hours have been reviewed.    Significant Imaging: I have reviewed all pertinent imaging results/findings within the past 24 hours.

## 2022-04-04 NOTE — SUBJECTIVE & OBJECTIVE
Review of Systems   Reason unable to perform ROS: patient lethargic/altered.   Objective:     Vital Signs (Most Recent):  Temp: 98.5 °F (36.9 °C) (04/04/22 0705)  Pulse: (!) 120 (04/04/22 1000)  Resp: (!) 27 (04/04/22 1000)  BP: (!) 167/73 (04/04/22 1000)  SpO2: 96 % (04/04/22 1000)   Vital Signs (24h Range):  Temp:  [97.8 °F (36.6 °C)-98.9 °F (37.2 °C)] 98.5 °F (36.9 °C)  Pulse:  [] 120  Resp:  [16-36] 27  SpO2:  [92 %-100 %] 96 %  BP: ()/() 167/73     Weight: 82 kg (180 lb 12.4 oz)  Body mass index is 29.18 kg/m².     SpO2: 96 %  O2 Device (Oxygen Therapy): nasal cannula      Intake/Output Summary (Last 24 hours) at 4/4/2022 1100  Last data filed at 4/4/2022 1027  Gross per 24 hour   Intake 2087.27 ml   Output 1605 ml   Net 482.27 ml       Lines/Drains/Airways       Drain  Duration                  Urethral Catheter 04/02/22 1200 16 Fr. 1 day              Peripheral Intravenous Line  Duration                  Peripheral IV - Single Lumen 03/31/22 1630 18 G Right Antecubital 3 days         Peripheral IV - Single Lumen 04/02/22 2000 18 G Anterior;Right Upper Arm 1 day                    Physical Exam  Vitals and nursing note reviewed.   Constitutional:       General: She is not in acute distress.     Appearance: She is well-developed. She is ill-appearing. She is not diaphoretic.      Comments: On supplemental o2   HENT:      Head: Normocephalic and atraumatic.   Eyes:      General:         Right eye: No discharge.         Left eye: No discharge.      Pupils: Pupils are equal, round, and reactive to light.   Neck:      Thyroid: No thyromegaly.      Vascular: No JVD.      Trachea: No tracheal deviation.   Cardiovascular:      Rate and Rhythm: Tachycardia present. Rhythm irregularly irregular.      Heart sounds: Normal heart sounds, S1 normal and S2 normal. No murmur heard.     Comments: Remains in afib with RVR, 's during exam  Pulmonary:      Effort: Pulmonary effort is normal.      Breath  sounds: Rhonchi present.      Comments: Increased WOB  Rhonchi/diminished BS at bases  Abdominal:      General: There is no distension.      Palpations: Abdomen is soft.      Tenderness: There is no rebound.   Musculoskeletal:      Cervical back: Neck supple.      Right lower leg: No edema.      Left lower leg: No edema.   Skin:     General: Skin is warm and dry.      Findings: No erythema.   Neurological:      Mental Status: She is alert.      Comments: Awake, lethargic, did not respond to questions during exam       Significant Labs: CMP   Recent Labs   Lab 04/03/22  0309 04/04/22  0823    140   K 4.7 5.2*   * 114*   CO2 16* 14*    98   BUN 31* 40*   CREATININE 1.9* 2.0*   CALCIUM 7.9* 8.7   PROT 5.4*  --    ALBUMIN 2.5*  --    BILITOT 1.4*  --    ALKPHOS 108  --    AST 39  --    ALT 7*  --    ANIONGAP 12 12   ESTGFRAFRICA 28* 26*   EGFRNONAA 24* 22*   , CBC   Recent Labs   Lab 04/02/22  1542 04/03/22  0309 04/04/22  0823   WBC 17.33* 14.79* 15.87*   HGB 10.6* 9.8* 10.1*   HCT 33.4* 30.6* 33.6*    199 173   , Troponin   Recent Labs   Lab 04/02/22  1445 04/02/22  1848   TROPONINI 0.128* 0.100*   , and All pertinent lab results from the last 24 hours have been reviewed.    Significant Imaging: Echocardiogram: Transthoracic echo (TTE) complete (Cupid Only):   Results for orders placed or performed during the hospital encounter of 03/31/22   Echo   Result Value Ref Range    BSA 1.92 m2    TDI SEPTAL 0.08 m/s    LV LATERAL E/E' RATIO 11.75 m/s    LV SEPTAL E/E' RATIO 11.75 m/s    LA WIDTH 2.75 cm    IVC diameter 1.63 cm    Left Ventricular Outflow Tract Mean Velocity 0.73537429792 cm/s    Left Ventricular Outflow Tract Mean Gradient 1.05 mmHg    TDI LATERAL 0.08 m/s    LVIDd 3.10 (A) 3.5 - 6.0 cm    IVS 1.42 (A) 0.6 - 1.1 cm    Posterior Wall 1.28 (A) 0.6 - 1.1 cm    Ao root annulus 2.92 cm    LVIDs 1.93 (A) 2.1 - 4.0 cm    FS 38 28 - 44 %    LA volume 19.49 cm3    Sinus 2.75 cm    STJ 2.46 cm     Ascending aorta 2.49 cm    LV mass 138.15 g    LA size 2.78 cm    TAPSE 1.82 cm    Left Ventricle Relative Wall Thickness 0.83 cm    AV mean gradient 6 mmHg    AV valve area 1.12 cm2    AV Velocity Ratio 0.44     AV index (prosthetic) 0.46     MV valve area p 1/2 method 4.31 cm2    E/A ratio 1.24     Mean e' 0.08 m/s    E wave deceleration time 176.57821745658897 msec    IVRT 57.560385293707298 msec    LVOT diameter 1.76 cm    LVOT area 2.4 cm2    LVOT peak messi 0.66 m/s    LVOT peak VTI 18.30 cm    Ao peak messi 1.50 m/s    Ao VTI 39.7 cm    RVOT peak messi 0.54 m/s    RVOT peak VTI 14.1 cm    Mr max messi 5.30 m/s    LVOT stroke volume 44.50 cm3    AV peak gradient 9 mmHg    PV mean gradient 0.67 mmHg    E/E' ratio 11.75 m/s    MV Peak E Messi 0.94 m/s    TR Max Messi 3.15 m/s    MV stenosis pressure 1/2 time 51.243735181 ms    MV Peak A Messi 0.76 m/s    LV Systolic Volume 11.55 mL    LV Systolic Volume Index 6.1 mL/m2    LV Diastolic Volume 37.98 mL    LV Diastolic Volume Index 20.10 mL/m2    LA Volume Index 10.3 mL/m2    LV Mass Index 73 g/m2    Echo EF Estimated 70 %    RA Major Axis 3.26 cm    Left Atrium Minor Axis 2.75 cm    Left Atrium Major Axis 3.30 cm    Triscuspid Valve Regurgitation Peak Gradient 40 mmHg    RA Width 2.58 cm    Right Atrial Pressure (from IVC) 8 mmHg    EF 50 %    TV rest pulmonary artery pressure 48 mmHg    Narrative    · Concentric remodeling and low normal systolic function.  · Intermediate central venous pressure (8 mmHg).  · The estimated PA systolic pressure is 48 mmHg.  · The estimated ejection fraction is 50%.  · Indeterminate left ventricular diastolic function.  · With normal right ventricular systolic function.      , EKG: REviewed, and X-Ray: CXR: X-Ray Chest 1 View (CXR):   Results for orders placed or performed during the hospital encounter of 03/31/22   X-Ray Chest 1 View    Narrative    EXAMINATION:  XR CHEST 1 VIEW    CLINICAL HISTORY:  Altered mental status,  unspecified    TECHNIQUE:  Single frontal view of the chest was performed.    COMPARISON:  Prior    FINDINGS:  No definite change compared to the prior examination obtained earlier today      Impression    Stable findings with left lung opacity.  Please see that report      Electronically signed by: Gianluca Wyatt  Date:    03/31/2022  Time:    23:53    and X-Ray Chest PA and Lateral (CXR): No results found for this visit on 03/31/22.

## 2022-04-04 NOTE — PROGRESS NOTES
[facility] - Telemetry ([facility])  Adult Nutrition  Consult Note    SUMMARY     Recommendations  Recommendation/Intervention:   1. Recommend to initiate Isosource 1.5:  -Goal rate 45mL/hr.   -Provides 1620 calories, 73g protein, and 825mL H2O.   -100mL H2O flush q 4-6 hours or per MD/NP.   -Check Mg, K+, Na, Phos, and Glucose before and during initiation;Correct as indicated.     2. Weekly weights per RD follow up.     Goals:   1. Patient will meet >75% of estimated energy needs by RD follow up.     Nutrition Goal Status: new     Communication of RD Recs: other (comment)    1. Atypical atrial flutter    2. Abdominal pain    3. SOB (shortness of breath)    4. Weakness    5. Atrial flutter    6. AMS (altered mental status)    7. Chest pain      Past Medical History:   Diagnosis Date    Anticoagulant long-term use     Asthma     Debility     Gait apraxia     Gout     Hypertension     NPH (normal pressure hydrocephalus)     Stroke     Thyroid disease          Assessment and Plan  Nutrition Problem  Inadequate energy intake    Related to (etiology):   Previous CVA     Signs and Symptoms (as evidenced by):   NPO status, dysphagia     Interventions/Recommendations (treatment strategy):  Enteral Nutrition  Collaboration with Medical Providers     Nutrition Diagnosis Status:   New        Reason for Assessment  Reason For Assessment: consult  General Information Comments: admits with atrial flutter. SLP recommending NPO due to dysphagia.    4/4: Patient seen for follow up. Patient is NPO. Patient is getting NG tube placed today. Patient has 1+ trace edema to legs, arms, and hands. Patients last BM 3/29. Patient is ill appearing. Patient had abdominal pain PTA. Will continue to monitor.     Nutrition Risk Screen    Nutrition Risk Screen: dysphagia or difficulty swallowing    Nutrition/Diet History    Food Allergies: NKFA  Factors Affecting Nutritional Intake: difficulty/impaired swallowing,  "NPO    Anthropometrics    Temp: 98.5 °F (36.9 °C)  Height Method: Estimated  Height: 5' 6" (167.6 cm)  Height (inches): 66 in  Weight Method: Bed Scale  Weight: 82 kg (180 lb 12.4 oz)  Weight (lb): 180.78 lb  Ideal Body Weight (IBW), Female: 130 lb  % Ideal Body Weight, Female (lb): 133.8 %  BMI (Calculated): 29.2  BMI Grade: 25 - 29.9 - overweight  Usual Body Weight (UBW), kg:  (sunita)       Lab/Procedures/Meds  Pertinent Labs Reviewed: reviewed  BMP  Lab Results   Component Value Date     04/04/2022    K 5.2 (H) 04/04/2022     (H) 04/04/2022    CO2 14 (L) 04/04/2022    BUN 40 (H) 04/04/2022    CREATININE 2.0 (H) 04/04/2022    CALCIUM 8.7 04/04/2022    ANIONGAP 12 04/04/2022    ESTGFRAFRICA 26 (A) 04/04/2022    EGFRNONAA 22 (A) 04/04/2022     Lab Results   Component Value Date    ALBUMIN 2.5 (L) 04/03/2022     Lab Results   Component Value Date    CALCIUM 8.7 04/04/2022    PHOS 4.1 04/02/2022     No results for input(s): POCTGLUCOSE in the last 24 hours.    Pertinent Medications Reviewed: reviewed  Scheduled Meds:   aspirin  81 mg Oral Daily    atorvastatin  40 mg Oral QHS    gabapentin  100 mg Oral TID    levothyroxine  112 mcg Oral Before breakfast    multivitamin  1 tablet Oral Daily    mupirocin   Nasal BID    polyethylene glycol  17 g Oral Daily    senna-docusate 8.6-50 mg  2 tablet Oral Daily     Continuous Infusions:   dilTIAZem 5 mg/hr (04/04/22 1115)    esmolol 100 mcg/kg/min (04/04/22 1027)         Estimated/Assessed Needs  Weight Used For Calorie Calculations: 78.9 kg (173 lb 15.1 oz)  Energy Calorie Requirements (kcal): 0806-8381 kcals/day  Energy Need Method: [place]  Protein Requirements: 65-80g  Weight Used For Protein Calculations: 78.9 kg (173 lb 15.1 oz)  Estimated Fluid Requirement Method: RDA Method  RDA Method (mL): 1600         Nutrition Prescription Ordered  Current Diet Order: NPO    Evaluation of Received Nutrient/Fluid Intake    Energy Calories Required: not " meeting needs  Protein Required: not meeting needs  % Intake of Estimated Energy Needs: 0 - 25 %  % Meal Intake: NPO    Nutrition Risk    Level of Risk/Frequency of Follow-up: moderate       Monitor and Evaluation  Food and Nutrient Intake: energy intake, food and beverage intake, enteral nutrition intake  Food and Nutrient Adminstration: diet order, enteral and parenteral nutrition administration  Anthropometric Measurements: weight, weight change, body mass index  Biochemical Data, Medical Tests and Procedures: electrolyte and renal panel, glucose/endocrine profile, lipid profile  Nutrition-Focused Physical Findings: overall appearance       Nutrition Follow-Up    RD Follow-up?: Yes   Deirdre Morley RD,LDN

## 2022-04-04 NOTE — PROGRESS NOTES
Progress Note  [facility]    SUBJECTIVE:     History of Present Illness/Interval History:  The pt is an 86 yof who presented to the ED 3/31 with complaint of abdominal pain. The pt was found to have atrial fib & was admitted to the hospital. Early during the course of hospitalization, the pt was noted to be confused. The pt was found to have acute stroke. Neurology was concerned that it was due to hypotension.    Review of patient's allergies indicates:   Allergen Reactions    Food color red [red food color (bulk)] Nausea And Vomiting    Sulfa (sulfonamide antibiotics)      Other reaction(s): Unknown    Pcn [penicillins] Rash       Past Medical History:   Diagnosis Date    Anticoagulant long-term use     Asthma     Debility     Gait apraxia     Gout     Hypertension     NPH (normal pressure hydrocephalus)     Stroke     Thyroid disease      Past Surgical History:   Procedure Laterality Date    CAROTID ENDARTERECTOMY       History reviewed. No pertinent family history.  Social History     Socioeconomic History    Marital status:    Tobacco Use    Smoking status: Former Smoker    Smokeless tobacco: Never Used   Substance and Sexual Activity    Alcohol use: No    Drug use: Never     Review of Systems:  Unable to obtain due to patient's status- unable to speak to me.    OBJECTIVE:     Vital Signs  Vitals:    04/04/22 0830 04/04/22 0900 04/04/22 0930 04/04/22 1000   BP: 138/87 (!) 158/104 (!) 183/114 (!) 167/73   Pulse: (!) 135 89 (!) 112 (!) 120   Resp: 20 (!) 24 (!) 25 (!) 27   Temp:       TempSrc:       SpO2: 99% 100% 99% 96%   Weight:       Height:         Body mass index is 29.18 kg/m².    Physical Exam:  General: no distress  Eyes:  conjunctivae/corneas clear  Nose: no discharge  Neck: trachea midline with no masses appreciated  Lungs:  normal respiratory effort, no wheezes, no rales, diminished at left base  Heart: tachy, irregular rhythm and no murmur  Abdomen:  non-distended  Extremities: no cyanosis, mild edema in both legs, no clubbing  Skin: No rashes or lesions. good skin turgor  Neurologic: eyes closed, does not follow commands or respond to my verbal prompts. Withdraws to pain though inconsistently. +purposefule spotnaneous movements. Cough is intact.    Laboratory:  Lab Results   Component Value Date    WBC 15.87 (H) 04/04/2022    HGB 10.1 (L) 04/04/2022    HCT 33.6 (L) 04/04/2022     (H) 04/04/2022     04/04/2022       Diagnostic Results:  CT: Reviewed    ASSESSMENT/PLAN:     1) Acute stroke. Management per neurology.  2) Atrial fib with RVR. Management per cardiology.  3) Dysphagia. Unable to participate in swallow evaluation with SLP. Recommend placing an NG tube & starting tube feeds as a temporary measure. Family expressed that pt would NOT want long-term tube feeding if this problems proves to be non-temporary.  4) Abnormal chest CT. Possible lung malignancy. Monitor neurologic recovery for now.    Jamari Last MD  [facility]

## 2022-04-04 NOTE — PROGRESS NOTES
[facility] Care ([facility]  Progress Note    Patient Name: Ana Castañeda  MRN: [id number]  Patient Class: IP- Inpatient   Admission Date: 3/31/2022  Length of Stay: 4 days  Attending Physician: Obed Vargas MD  Primary Care Provider: Stephen Tarango MD        Subjective:     Principal Problem:Atrial flutter        HPI:  Ana Castañeda is a 86 y.o. 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 The pain is described as crampy/spasms. No associated NVD, fever or chills. She had a UTI which was treated with oral Cipro 2 weeks ago. She denies any CP, SOB, dizziness, weakness, numbness, and all other sxs at this time. daughter states that when she has been feeling tired and not eating drinking much as well for last few days nidhi after she finished her cipro dose and her R sided weakness has got worse. In the ER, initial VSS, Afebrile, Sats 95% on RA. CXr showed Left infrahilar pneumonia. On CT Abd/Pelvis Renal Stone showed moderate left pleural effusion with infiltrate/consolidation left lung base.  Possible perihilar lung mass. Dedicated CT chest would be helpful, if clinically warranted.  2. Hypodensities within the liver most likely representing cysts.  3. Punctate gallstones or sludge within the gallbladder.  4. Very small hiatal hernia.  Mild diverticulosis.  5. Hysterectomy.    She was also found to be in A Fluttter w - she was given IV Cardizem 15 mg bolus and then started on titrating Cardizem gtt and hence admitted to ICU initially. Cards evaluated the pt and d/hira Cardizem and started her on Amiodarone gtt after bolus and also started metoprol with improved heart rate. Pt felt much better and was downgraded to Tele. She also received IV Levaquin 750 mg in the ER.      She had a similar admission here in 2019 when she was admitted to ICU on Bipap, with acute hypoxemic resp failure sec to LLL Pneumonia and Afib w RVR and Lactic Acidosis.         Overview/Hospital Course:  The patient was monitored closely during her stay. She was kept on continuous telemetry monitoring. Patient's heart rate remained stable however she was noted to have intermittent confusion with lethargy. A Ct scan was done with areas of acute CVAs noted. Neurology was consulted. MRI of brain, bilateral carotid ultrasound, and echocardiogram was ordered. Patient was placed NPO. She was given IVF for hydration. PT, OT, and ST were consulted.   4/02 Neurology recommended to hold anticoagulation for 4 days post ischemic stroke and start ASA 81 mg daily. Etiology likely hypotension resulting in bilateral ischemic strokes. No intervention for left ICA occlusion and Target  - 160 mmHg to assist with perfusion on the left hemisphere. Ct scan of chest without contrast showed Left lower lobe mass or evidence of consolidation extending to the left hilum.  Suspect left hilar adenopathy with enlarged mediastinal lymph nodes as described.  Mildly prominent left supraclavicular node with questionable abnormal nodes in the left axilla.  Findings are concerning for underlying neoplastic process. Findings discussed with daughter, Wendy. 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 Retrograde flow noted within the mid left ICA. Less than 50% stenosis of the right carotid bifurcation was noted. TARA noted. Continue IVF. Some urinary retention noted and stinson catheter placed. Check retroperitoneal ultrasound. Patient back into afib/aflutter with RVR. Iv cardizem given.   4/3: transferred to ICU for afib RVR. Currently on esmolol drip. Rate not controlled. Will discuss with cardiology on plans.   4/4: still in afib, not controlled on esmolol drip  and digoxin push. Cardizem drip started by cardiology. Patient still altered. NG tube for temporary tube feedings. Should condition not improve in the next 1-2 days, family may transition to comfort care. Metabolic acidosis noted. Will start bicarb drip.       Interval History: still in afib, not controlled on esmolol drip and digoxin push. Cardizem drip started by cardiology. Patient still altered. NG tube for temporary tube feedings. Should condition not improve in the next 1-2 days, family may transition to comfort care. Metabolic acidosis noted. Will start bicarb drip.     Review of Systems   Unable to perform ROS: Mental status change   Objective:     Vital Signs (Most Recent):  Temp: 97.9 °F (36.6 °C) (04/04/22 1105)  Pulse: 76 (04/04/22 1200)  Resp: (!) 23 (04/04/22 1200)  BP: (!) 153/88 (04/04/22 1200)  SpO2: 96 % (04/04/22 1200)   Vital Signs (24h Range):  Temp:  [97.8 °F (36.6 °C)-98.9 °F (37.2 °C)] 97.9 °F (36.6 °C)  Pulse:  [] 76  Resp:  [16-36] 23  SpO2:  [92 %-100 %] 96 %  BP: ()/() 153/88     Weight: 82 kg (180 lb 12.4 oz)  Body mass index is 29.18 kg/m².    Intake/Output Summary (Last 24 hours) at 4/4/2022 1422  Last data filed at 4/4/2022 1200  Gross per 24 hour   Intake 1766.87 ml   Output 1740 ml   Net 26.87 ml      Physical Exam  Constitutional:       General: She is not in acute distress.     Appearance: She is not diaphoretic.      Comments: Lethargic   HENT:      Head: Atraumatic.      Mouth/Throat:      Mouth: Mucous membranes are dry.   Eyes:      General:         Right eye: No discharge.         Left eye: No discharge.      Conjunctiva/sclera: Conjunctivae normal.      Comments: Left pupil > right pupil   Cardiovascular:      Rate and Rhythm: Tachycardia present. Rhythm irregular.      Pulses: Normal pulses.   Pulmonary:      Effort: Pulmonary effort is normal. No respiratory distress.      Breath sounds: No rhonchi.      Comments:  Diminished DIAMANTE  Abdominal:      General:  Bowel sounds are normal. There is no distension.      Palpations: Abdomen is soft.      Tenderness: There is no abdominal tenderness. There is no guarding.   Genitourinary:     Comments: Not examined  Musculoskeletal:      Cervical back: Normal range of motion and neck supple. No rigidity or tenderness.      Right lower leg: No edema.      Left lower leg: No edema.   Skin:     General: Skin is warm and dry.      Capillary Refill: Capillary refill takes less than 2 seconds.      Comments: Decreased skin turgor   Neurological:      Comments: Lethargic  Responds appropriately to simple commands at times   Psychiatric:      Comments: Dash and cooperative       Significant Labs: All pertinent labs within the past 24 hours have been reviewed.    Significant Imaging: I have reviewed all pertinent imaging results/findings within the past 24 hours.        Assessment/Plan:      * Atrial flutter with RVR  4/4:  Currently in ICU on esmolol drip  Rate not controlled  Did not improve with digoxin  cardizem drip started   Cont to hold ac per neuro recs for 4 days after stroke    Urinary retention  4/02 Bladder scan greater than 574ml. In and out cath with 300ml urine obtained  Ratliff catheter placed      Left carotid artery occlusion  4/01 As noted on Carotid ultrasound  Will repeat BMP in am and monitor renal status- Plan CTA of head and neck once creatinine allowable  Add ASA and Statin   Continue plavix  4/02 Neurology recommended hold anticoagulation for 4 days post ischemic stroke and start ASA 81 mg daily.   No intervention for left ICA occlusion.     Liver lesion  4/01 Noted on Ct scan  Monitor as Outpatient      History of CVA in adulthood  4/01 Previously on Xarelto at home  4/02 Daughter reported patient with prior right sided weakness PTA and could only ambulate 50 feet at times    Hypoalbuminemia  4/01 Monitor      TARA (acute kidney injury)  4/01 Monitor  Trend BMP  Renal dose all medications  Add IVF  4/02 Creatinine  "continues to rise- 2.4 today. Urinary retention this am- Ratliff catheter ordered  Retroperitoneal ultrasound pending  4/4:  Creatinine stable  Cont ivf    Acute CVA (cerebrovascular accident)- multiple  4/01 Daughter reported Intermittent confusion since 1 pm yesterday   Ct scan of head abnormal showing signs of multiple areas of CVA  Daughter reports patient " missed a few doses of Xarelto" in the past few days  Cardiology notified  Neurology consulted- Hold off Iv heparin drip for now, Check MRI brain  CVA pathway initiated  4/02 Neurology recommended hold anticoagulation for 4 days post ischemic stroke and start ASA 81 mg daily. Etiology likely hypotension resulting in bilateral ischemic strokes. No intervention for left ICA occlusion and Target  - 160 mmHg to assist with perfusion on the left hemisphere.   4/4:  Cont to hold AC per neuro recs  Cont asa and statin   PT/OT  Placement likely needed  However patient has not been able to participate with PT  Should condition not improve in the next 24-48hrs  Will need to have goals of care discussion       Slow transit constipation  3/31 As seen on CT Abd Renal stones with mild stranding- should be covered by Levaquin  Treat with Dulcolax and Mag Citrate   Start pericolace and miralax  4/01 Add dulcolax suppository      Pneumonia due to infectious organism- Possible Mass  3/31 LLL Pneumonia with Parapneumonic effusion seen on Renal CT  R.o Infrahilar mass with CT Chest later  Started on Levaquin and continued on O2, Nebs,   Add IS and Acapella  Consult Pulmonology in am  4/02 Continue current treatment. Abnormal Ct scan concerning for Lung mass  4/3: CT concerning for mass, will check procal; will need outpatient lung biopsy    4/4:  WBC trending up  LLL pleural effusion noted  Although procal normal  Due to persistent leukocytosis  Will treat empirically for pna  Start unasyn      Troponin level elevated  3/31 Seen by Cards- Demand ischemia- no ACS  4/02 " Reported Complaints of chest pain- Repeat EKG and Trend troponins      Essential hypertension  4/01 Allow for permissive HTN for now  Prn hydralazine  4/02 Neurology recommended Target  - 160 mmHg to assist with perfusion on the left hemisphere.       VTE Risk Mitigation (From admission, onward)         Ordered     IP VTE HIGH RISK PATIENT  Once         04/01/22 1619     Place sequential compression device  Until discontinued         04/01/22 1619     Place HALIMA hose  Until discontinued         04/01/22 1616                Discharge Planning   JOSE:      Code Status: DNR   Is the patient medically ready for discharge?:     Reason for patient still in hospital (select all that apply): Patient trending condition  Discharge Plan A: Home with family, Home Health            Critical care time spent on the evaluation and treatment of severe organ dysfunction, review of pertinent labs and imaging studies, discussions with consulting providers and discussions with patient/family: 40 minutes.      Obed Vargas MD  Department of Hospital Medicine   [facility] - Intensive Care ([facility])

## 2022-04-04 NOTE — PROGRESS NOTES
[facility] ([facility]  Cardiology  Progress Note    Patient Name: Ana Castañeda  MRN: [id number]  Admission Date: 3/31/2022  Hospital Length of Stay: 3 days  Code Status: DNR   Attending Physician: Obed Vargas MD   Primary Care Physician: Stephen Tarango MD  Expected Discharge Date:   Principal Problem:Atrial flutter    Subjective:     Hospital Course:   7 yo female, cardiology consult for afib/AFL with RVR  PMH PAF on xeratlo, sthma, HTN, h/o CVA with residual right-sided weakness, s/p CEA  Went to ER deu to leg pain and found afl with RVR in ER and started Cardizem gtt. No wHR at 175 bpm. Denied chest pain dyspnea palpitation and dizziness  Echo in  normal EF   EKG today A flutter  Troponin 0.05 and   Cr 1.2 and HGB 12.6  CXR left PNA  Abd CT showed left pleural effusion, gallbladder stone and mild diverticulosis    4/1/22-Patient seen and examined today, resting in bed. Lethargic/confused. SR during exam. CT of head + for acute infarcts. Neurology following. MRI pending. Creatinine bumped to 1.8, K 5.2. Echo showed EF of 50%.    04/02/2022--SR in AM and developed AFL with 2 to 1 conduction HR at 160 bpm. Brain MRI showed acute CVA. Neurology on board. Pt some confusion and no chest hellen dyspnea and palpitation. Eliquis is on hold per neurology advise.     04/3/22 in afib with RVR and transferred to icu for esmolol gtt and d/c cardizem gtt. The labs reviewed. Xarelto is on hold per neurology recommendation          ROS  Objective:     Vital Signs (Most Recent):  Temp: 97.8 °F (36.6 °C) (04/03/22 1645)  Pulse: (!) 118 (04/03/22 1830)  Resp: (!) 24 (04/03/22 1830)  BP: (!) 114/58 (04/03/22 1830)  SpO2: 96 % (04/03/22 1830)   Vital Signs (24h Range):  Temp:  [97.8 °F (36.6 °C)-98.8 °F (37.1 °C)] 97.8 °F (36.6 °C)  Pulse:  [105-148] 118  Resp:  [17-49] 24  SpO2:  [92 %-99 %] 96 %  BP: ()/() 114/58     Weight: 78.9 kg (173 lb 15.1 oz)  Body mass index is 28.08 kg/m².     SpO2: 96 %  O2  Device (Oxygen Therapy): nasal cannula      Intake/Output Summary (Last 24 hours) at 4/3/2022 2035  Last data filed at 4/3/2022 1800  Gross per 24 hour   Intake 3849.8 ml   Output 705 ml   Net 3144.8 ml       Lines/Drains/Airways       Drain  Duration                  Urethral Catheter 04/02/22 1200 16 Fr. 1 day              Peripheral Intravenous Line  Duration                  Peripheral IV - Single Lumen 03/31/22 1630 18 G Right Antecubital 3 days         Peripheral IV - Single Lumen 04/02/22 2000 18 G Anterior;Right Upper Arm 1 day                    Physical Exam  Vitals and nursing note reviewed.   Constitutional:       General: She is not in acute distress.     Appearance: She is well-developed. She is not diaphoretic.      Comments: On supplemental O2   HENT:      Head: Normocephalic and atraumatic.   Eyes:      General:         Right eye: No discharge.         Left eye: No discharge.      Pupils: Pupils are equal, round, and reactive to light.   Neck:      Thyroid: No thyromegaly.      Vascular: No JVD.      Trachea: No tracheal deviation.   Cardiovascular:      Rate and Rhythm: Tachycardia present. Rhythm irregular.      Heart sounds: Normal heart sounds, S1 normal and S2 normal. No murmur heard.     Comments: SR during exam  Pulmonary:      Effort: Pulmonary effort is normal. No respiratory distress.      Breath sounds: No wheezing.      Comments: Diminished BS at bases L > R  Abdominal:      General: There is no distension.      Palpations: Abdomen is soft.      Tenderness: There is no rebound.   Musculoskeletal:      Cervical back: Neck supple.      Right lower leg: No edema.      Left lower leg: No edema.   Skin:     General: Skin is warm and dry.      Findings: No erythema.   Neurological:      Comments: Confused, lethargic       Significant Labs:   Recent Lab Results         04/03/22  1144   04/03/22  0309        Procalcitonin 0.19  Comment: A concentration < 0.25 ng/mL represents a low risk of  bacterial   infection.  Procalcitonin may not be accurate among patients with localized   infection, recent trauma or major surgery, immunosuppressed state,   invasive fungal infection, renal dysfunction. Decisions regarding   initiation or continuation of antibiotic therapy should not be based   solely on procalcitonin levels.           Albumin   2.5       Alkaline Phosphatase   108       ALT   7       Anion Gap   12       AST   39       Baso #   0.04       Basophil %   0.3       BILIRUBIN TOTAL   1.4  Comment: For infants and newborns, interpretation of results should be based  on gestational age, weight and in agreement with clinical  observations.    Premature Infant recommended reference ranges:  Up to 24 hours.............<8.0 mg/dL  Up to 48 hours............<12.0 mg/dL  3-5 days..................<15.0 mg/dL  6-29 days.................<15.0 mg/dL         BUN   31       Calcium   7.9       Chloride   112       CO2   16       Creatinine   1.9       Differential Method   Automated       eGFR if    28       eGFR if non    24  Comment: Calculation used to obtain the estimated glomerular filtration  rate (eGFR) is the CKD-EPI equation.          Eos #   0.1       Eosinophil %   0.6       Glucose   103       Gran # (ANC)   11.4       Gran %   77.1       Hematocrit   30.6       Hemoglobin   9.8       Immature Grans (Abs)   0.09  Comment: Mild elevation in immature granulocytes is non specific and   can be seen in a variety of conditions including stress response,   acute inflammation, trauma and pregnancy. Correlation with other   laboratory and clinical findings is essential.         Immature Granulocytes   0.6       Lymph #   1.1       Lymph %   7.3       Magnesium   1.9       MCH   31.2       MCHC   32.0       MCV   98       Mono #   2.1       Mono %   14.1       MPV   9.3       nRBC   0       Platelets   199       Potassium   4.7       PROTEIN TOTAL   5.4       RBC   3.14       RDW    15.9       Sodium   140       WBC   14.79               Significant Imaging: EKG:      Assessment and Plan:       * Atrial flutter with RVR  AFIb/AFL with RVR    -d/c cardizem  -start amio bouls and gtt  -add metoprol as needed for high BP  -continue xarelto 20 mg daily and pt states that she took Xarelto faithfully and only off yesterday and today  -keep NPO after mn and consider NOMAN/DCCv if still afib with RVR in AM  -check TSH    4/1/22  -SR this AM  -Amiodarone d/c'd, continue BB  -Xarelto on hold given acute CVA, neurology following    4/2/22  On AFL with RVR. eliquis is on hold due to acute CVA per neurology advise. It is contraindicated for DCCV and amio gtt  BP dropped to 110 mmHG when on cardizem gtt at 10 mg/hr  Advise to d/c cardizem and started esmolol gtt in ICU  Add dioxin 250 mcg ivp x1 now    04/03/22  afib /AFL with RVR  Continue esmolol gtt  Add digoxin 250 mcg ivpx1 now    TARA (acute kidney injury)  -Mgmt as per hospital medicine    Acute CVA (cerebrovascular accident)- multiple  -Neurology consulted  -Resume Xarelto if ok from neurology standpoint    Troponin level elevated  Demand ischemia >>> NSTEMI/ACS    rx for AFL with RVR    Essential hypertension  See above note        VTE Risk Mitigation (From admission, onward)         Ordered     IP VTE HIGH RISK PATIENT  Once         04/01/22 1619     Place sequential compression device  Until discontinued         04/01/22 1619     Place HALIMA hose  Until discontinued         04/01/22 1616                Sundeep Molina MD  Cardiology  [facility] (Hospital)

## 2022-04-04 NOTE — PROGRESS NOTES
Spoke w/eICU concerning pts increased WOB and decreased UOP. IVF d/c'd and 10mg lasix given. Will monitor.

## 2022-04-04 NOTE — ASSESSMENT & PLAN NOTE
4/01 Monitor  Trend BMP  Renal dose all medications  Add IVF  4/02 Creatinine continues to rise- 2.4 today. Urinary retention this am- Ratliff catheter ordered  Retroperitoneal ultrasound pending  4/4:  Creatinine stable  Cont ivf

## 2022-04-04 NOTE — PT/OT/SLP PROGRESS
Occupational Therapy  Treatment    Ana Castañeda   MRN: [id number]   Admitting Diagnosis: Atrial flutter    OT Date of Treatment: 04/04/22   OT Start Time: 1042  OT Stop Time: 1053  OT Total Time (min): 11 min    Billable Minutes:  Therapeutic Activity 11 min    OT/EMELY: OT     EMELY Visit Number: 0    General Precautions: Standard, aspiration, fall  Orthopedic Precautions: N/A  Braces: N/A  Respiratory Status: Nasal cannula, flow 4 L/min         Subjective:  Communicated with Nurse Henry and [facility] chart review prior to session.  Pt found supine in bed with HOB elevated; family in room.    Pain/Comfort  Pain Rating 1: 0/10  Pain Rating Post-Intervention 1: 0/10    Objective:  Patient found with: peripheral IV, telemetry, pressure relief boots, stinson catheter, blood pressure cuff, pulse ox (continuous), oxygen     Functional Mobility:  Therapeutic Activities and Exercises:  Pt performed (R) rolling and supine>sit with Total A x 2, scooted to EOB with Total A x 2. Pt sat EOB ~10min with Total A to hold upright sitting balance. Therapist performed PROM to (B) LE and UE in sitting 1 x 10 reps each: ankle PF/DF, knee flex/ ext, hip flex/ ext, shoulder flex/ ext, shoulder abd/ add, elbow flex/ ext, forearm sup/ pronation, wrist flex/ ext, finger flex/ ext. Pt assisted sit.supine with Total A, scooted up in bed with Total A.     AM-PAC 6 CLICK ADL   How much help from another person does this patient currently need?   1 = Unable, Total/Dependent Assistance  2 = A lot, Maximum/Moderate Assistance  3 = A little, Minimum/Contact Guard/Supervision  4 = None, Modified [place]/Independent    Putting on and taking off regular lower body clothing? : 1  Bathing (including washing, rinsing, drying)?: 1  Toileting, which includes using toilet, bedpan, or urinal? : 1  Putting on and taking off regular upper body clothing?: 1  Taking care of personal grooming such as brushing teeth?: 1  Eating meals?: 1  Daily Activity Total Score: 6  "    AM-PAC Raw Score CMS "G-Code Modifier Level of Impairment Assistance   6 % Total / Unable   7 - 8 CM 80 - 100% Maximal Assist   9-13 CL 60 - 80% Moderate Assist   14 - 19 CK 40 - 60% Moderate Assist   20 - 22 CJ 20 - 40% Minimal Assist   23 CI 1-20% SBA / CGA   24 CH 0% Independent/ Mod I       Patient left HOB elevated with all lines intact, call button in reach, Nurse Elaine notified and daughter present    ASSESSMENT:  Ana Castañeda is a 86 y.o. female with a medical diagnosis of Atrial flutter and presents with impaired functional mobility and ADLs. Pt will benefit from continued skilled OT in order to address the listed impairments.     Rehab identified problem list/impairments: Rehab identified problem list/impairments: weakness, impaired endurance, impaired self care skills, impaired functional mobilty, impaired balance, decreased coordination, decreased upper extremity function, decreased lower extremity function, decreased safety awareness, decreased ROM, impaired coordination, edema, impaired cardiopulmonary response to activity    Rehab potential is poor.    Activity tolerance: Poor    Discharge recommendations: Discharge Facility/Level of Care Needs: nursing facility, skilled     Barriers to discharge:   UNKNOWN    Equipment recommendations: other (see comments) (TBD)     GOALS:   Multidisciplinary Problems     Occupational Therapy Goals        Problem: Occupational Therapy    Goal Priority Disciplines Outcome Interventions   Occupational Therapy Goal     OT, PT/OT Ongoing, Not Progressing    Description: O.T. GOALS TO BEE MET BY 4-16-22  MOD A WITH UE DRESSING  PT WILL TOLERATE 1 SET X 10 REPS B UE AAROM EXERCISE  MOD A WITH SUPINE< SIT TRANSFER                   Plan:  Patient to be seen 2 x/week to address the above listed problems via self-care/home management, therapeutic activities, therapeutic exercises  Plan of Care expires: 04/16/22  Plan of Care reviewed with: patient, daughter, " spouse         04/04/2022

## 2022-04-04 NOTE — PLAN OF CARE
Pt remains in Afib RVR rate 130s, BP stable. Unable to review POC w/pt due to AMS. Esmolol gtt maxed and pt received digoxin overnight w/out improvement.

## 2022-04-04 NOTE — PT/OT/SLP PROGRESS
Speech Language Pathology      Ana Castañeda  MRN: [id number]    ST presented to patients room for ongoing swallowing assessment; however, pt not alert for PO trials today.  Nursing reported pt somnolent following PT and staff assisted bath; however, was more alert early am.  NG tube present.  Noted POSSIBLE MD report of NOMAN/DCCv tomorrow, 3/5/22 (will be NPO after MN).  ST to continue POC for swallowing assessment as cognition/ALEX improves.

## 2022-04-04 NOTE — PLAN OF CARE
Recommendations: 4/4  Recommendation/Intervention:   1. Recommend to initiate Isosource 1.5:  -Goal rate 45mL/hr.   -Provides 1620 calories, 73g protein, and 825mL H2O.   -100mL H2O flush q 4-6 hours or per MD/NP.   -Check Mg, K+, Na, Phos, and Glucose before and during initiation;Correct as indicated.      2. Weekly weights per RD follow up.     Deirdre Morely RD,LDN

## 2022-04-04 NOTE — PT/OT/SLP PROGRESS
Physical Therapy  Treatment    Ana Castañeda   MRN: [id number]   Admitting Diagnosis: Atrial flutter    PT Received On: 04/04/22  PT Start Time: 1030     PT Stop Time: 1042    PT Total Time (min): 12 min       Billable Minutes:  Therapeutic Activity 12 min    Treatment Type: Treatment  PT/PTA: PT     PTA Visit Number: 0       General Precautions: Standard, aspiration, fall  Orthopedic Precautions: N/A   Braces: N/A  Respiratory Status: Nasal cannula, flow 4 L/min         Subjective:  Communicated with Nurse Henry and [facility] chart review prior to session.  Pt found supine in bed with HOB elevated; family in room.     Pain/Comfort  Pain Rating 1: 0/10  Pain Rating Post-Intervention 1: 0/10    Objective:   Patient found with: peripheral IV, telemetry, pressure relief boots, stinson catheter, blood pressure cuff, pulse ox (continuous), oxygen    Functional Mobility:  Therapeutic Activities and Exercises:  Pt performed (R) rolling and supine>sit with Total A x 2, scooted to EOB with Total A x 2. Pt sat EOB ~10min with Total A to hold upright sitting balance. Therapist performed PROM to (B) LE and UE in sitting 1 x 10 reps each: ankle PF/DF, knee flex/ ext, hip flex/ ext, shoulder flex/ ext, shoulder abd/ add, elbow flex/ ext, forearm sup/ pronation, wrist flex/ ext, finger flex/ ext. Pt assisted sit.supine with Total A, scooted up in bed with Total A.     AM-PAC 6 CLICK MOBILITY  How much help from another person does this patient currently need?   1 = Unable, Total/Dependent Assistance  2 = A lot, Maximum/Moderate Assistance  3 = A little, Minimum/Contact Guard/Supervision  4 = None, Modified [place]/Independent    Turning over in bed (including adjusting bedclothes, sheets and blankets)?: 1  Sitting down on and standing up from a chair with arms (e.g., wheelchair, bedside commode, etc.): 1  Moving from lying on back to sitting on the side of the bed?: 1  Moving to and from a bed to a chair (including a wheelchair)?:  1  Need to walk in hospital room?: 1  Climbing 3-5 steps with a railing?: 1  Basic Mobility Total Score: 6    AM-PAC Raw Score CMS G-Code Modifier Level of Impairment Assistance   6 % Total / Unable   7 - 9 CM 80 - 100% Maximal Assist   10 - 14 CL 60 - 80% Moderate Assist   15 - 19 CK 40 - 60% Moderate Assist   20 - 22 CJ 20 - 40% Minimal Assist   23 CI 1-20% SBA / CGA   24 CH 0% Independent/ Mod I     Patient left HOB elevated with all lines intact, call button in reach, Nurse Elaine notified and daughter present.    Assessment:  Ana Castañeda is a 86 y.o. female with a medical diagnosis of Atrial flutter and presents with impaired functional mobility. Pt will benefit from continued skilled PT in order to address the listed impairments.     Rehab identified problem list/impairments: Rehab identified problem list/impairments: weakness, impaired endurance, impaired self care skills, impaired functional mobilty, impaired balance, decreased coordination, decreased upper extremity function, decreased lower extremity function, decreased safety awareness, decreased ROM, impaired coordination, edema, impaired cardiopulmonary response to activity    Rehab potential is poor.    Activity tolerance: Poor    Discharge recommendations: Discharge Facility/Level of Care Needs: nursing facility, skilled     Barriers to discharge:   UNKNOWN    Equipment recommendations: Equipment Needed After Discharge: other (see comments) (TBD)     GOALS:   Multidisciplinary Problems     Physical Therapy Goals        Problem: Physical Therapy    Goal Priority Disciplines Outcome Goal Variances Interventions   Physical Therapy Goal     PT, PT/OT Ongoing, Not Progressing     Description: LT22  1. PT WILL COMPLETE BED MOBILITY WITH MAX A  2. PT WILL STAND T/F TO CHAIR WITH RW AND MAX A  3. PT WILL FOLLOW CUES FOR ROM TE X 10 REPS B LE AROM                   PLAN:    Patient to be seen 3 x/week  to address the above listed problems via  therapeutic activities, therapeutic exercises, wheelchair management/training  Plan of Care expires: 04/16/22  Plan of Care reviewed with: patient, daughter, spouse         04/04/2022

## 2022-04-05 LAB
ALBUMIN SERPL BCP-MCNC: 2.4 G/DL (ref 3.5–5.2)
ANION GAP SERPL CALC-SCNC: 16 MMOL/L (ref 8–16)
ANISOCYTOSIS BLD QL SMEAR: SLIGHT
APPEARANCE FLD: NORMAL
BACTERIA BLD CULT: NORMAL
BASOPHILS # BLD AUTO: 0.05 K/UL (ref 0–0.2)
BASOPHILS NFR BLD: 0.3 % (ref 0–1.9)
BODY FLD TYPE: NORMAL
BODY FLUID SOURCE, LDH: NORMAL
BUN SERPL-MCNC: 48 MG/DL (ref 8–23)
CALCIUM SERPL-MCNC: 7.7 MG/DL (ref 8.7–10.5)
CHLORIDE SERPL-SCNC: 110 MMOL/L (ref 95–110)
CO2 SERPL-SCNC: 17 MMOL/L (ref 23–29)
COLOR FLD: YELLOW
CREAT SERPL-MCNC: 1.9 MG/DL (ref 0.5–1.4)
DIFFERENTIAL METHOD: ABNORMAL
EOSINOPHIL # BLD AUTO: 0.1 K/UL (ref 0–0.5)
EOSINOPHIL NFR BLD: 0.6 % (ref 0–8)
ERYTHROCYTE [DISTWIDTH] IN BLOOD BY AUTOMATED COUNT: 16.1 % (ref 11.5–14.5)
EST. GFR  (AFRICAN AMERICAN): 28 ML/MIN/1.73 M^2
EST. GFR  (NON AFRICAN AMERICAN): 24 ML/MIN/1.73 M^2
GLUCOSE FLD-MCNC: 119 MG/DL
GLUCOSE SERPL-MCNC: 150 MG/DL (ref 70–110)
HCT VFR BLD AUTO: 28.1 % (ref 37–48.5)
HGB BLD-MCNC: 9.1 G/DL (ref 12–16)
IMM GRANULOCYTES # BLD AUTO: 0.16 K/UL (ref 0–0.04)
IMM GRANULOCYTES NFR BLD AUTO: 0.9 % (ref 0–0.5)
LDH FLD L TO P-CCNC: 250 U/L
LYMPHOCYTES # BLD AUTO: 1.2 K/UL (ref 1–4.8)
LYMPHOCYTES NFR BLD: 7.1 % (ref 18–48)
LYMPHOCYTES NFR FLD MANUAL: 41 %
MAGNESIUM SERPL-MCNC: 2 MG/DL (ref 1.6–2.6)
MCH RBC QN AUTO: 30.5 PG (ref 27–31)
MCHC RBC AUTO-ENTMCNC: 32.4 G/DL (ref 32–36)
MCV RBC AUTO: 94 FL (ref 82–98)
MONOCYTES # BLD AUTO: 2.2 K/UL (ref 0.3–1)
MONOCYTES NFR BLD: 12.8 % (ref 4–15)
MONOS+MACROS NFR FLD MANUAL: 25 %
NEUTROPHILS # BLD AUTO: 13.4 K/UL (ref 1.8–7.7)
NEUTROPHILS NFR BLD: 78.3 % (ref 38–73)
NEUTROPHILS NFR FLD MANUAL: 34 %
NRBC BLD-RTO: 0 /100 WBC
OVALOCYTES BLD QL SMEAR: ABNORMAL
PHOSPHATE SERPL-MCNC: 2 MG/DL (ref 2.7–4.5)
PLATELET # BLD AUTO: 131 K/UL (ref 150–450)
PLATELET BLD QL SMEAR: ABNORMAL
PMV BLD AUTO: 10 FL (ref 9.2–12.9)
POIKILOCYTOSIS BLD QL SMEAR: SLIGHT
POLYCHROMASIA BLD QL SMEAR: ABNORMAL
POTASSIUM SERPL-SCNC: 3.7 MMOL/L (ref 3.5–5.1)
PROT FLD-MCNC: 3 G/DL
RBC # BLD AUTO: 2.98 M/UL (ref 4–5.4)
SODIUM SERPL-SCNC: 143 MMOL/L (ref 136–145)
SPECIMEN SOURCE: NORMAL
SPECIMEN SOURCE: NORMAL
SPHEROCYTES BLD QL SMEAR: ABNORMAL
TARGETS BLD QL SMEAR: ABNORMAL
WBC # BLD AUTO: 17.15 K/UL (ref 3.9–12.7)
WBC # FLD: 1278 /CU MM

## 2022-04-05 PROCEDURE — 88112 PR  CYTOPATH, CELL ENHANCE TECH: ICD-10-PCS | Mod: 26,,, | Performed by: PATHOLOGY

## 2022-04-05 PROCEDURE — 94761 N-INVAS EAR/PLS OXIMETRY MLT: CPT

## 2022-04-05 PROCEDURE — 27000221 HC OXYGEN, UP TO 24 HOURS

## 2022-04-05 PROCEDURE — 87205 SMEAR GRAM STAIN: CPT | Performed by: INTERNAL MEDICINE

## 2022-04-05 PROCEDURE — 20000000 HC ICU ROOM

## 2022-04-05 PROCEDURE — 88342 IMHCHEM/IMCYTCHM 1ST ANTB: CPT | Performed by: PATHOLOGY

## 2022-04-05 PROCEDURE — 88341 IMHCHEM/IMCYTCHM EA ADD ANTB: CPT | Mod: 26,,, | Performed by: PATHOLOGY

## 2022-04-05 PROCEDURE — 80069 RENAL FUNCTION PANEL: CPT | Performed by: SURGERY

## 2022-04-05 PROCEDURE — 84157 ASSAY OF PROTEIN OTHER: CPT | Performed by: INTERNAL MEDICINE

## 2022-04-05 PROCEDURE — 87070 CULTURE OTHR SPECIMN AEROBIC: CPT | Performed by: INTERNAL MEDICINE

## 2022-04-05 PROCEDURE — 85025 COMPLETE CBC W/AUTO DIFF WBC: CPT | Performed by: SURGERY

## 2022-04-05 PROCEDURE — 36415 COLL VENOUS BLD VENIPUNCTURE: CPT | Performed by: SURGERY

## 2022-04-05 PROCEDURE — 25000003 PHARM REV CODE 250: Performed by: FAMILY MEDICINE

## 2022-04-05 PROCEDURE — 88342 IMHCHEM/IMCYTCHM 1ST ANTB: CPT | Mod: 26,,, | Performed by: PATHOLOGY

## 2022-04-05 PROCEDURE — 88112 CYTOPATH CELL ENHANCE TECH: CPT | Performed by: PATHOLOGY

## 2022-04-05 PROCEDURE — 88305 TISSUE EXAM BY PATHOLOGIST: CPT | Performed by: PATHOLOGY

## 2022-04-05 PROCEDURE — 99291 PR CRITICAL CARE, E/M 30-74 MINUTES: ICD-10-PCS | Mod: 25,,, | Performed by: INTERNAL MEDICINE

## 2022-04-05 PROCEDURE — 94640 AIRWAY INHALATION TREATMENT: CPT

## 2022-04-05 PROCEDURE — 88305 TISSUE EXAM BY PATHOLOGIST: CPT | Mod: 26,,, | Performed by: PATHOLOGY

## 2022-04-05 PROCEDURE — 99233 PR SUBSEQUENT HOSPITAL CARE,LEVL III: ICD-10-PCS | Mod: ,,, | Performed by: INTERNAL MEDICINE

## 2022-04-05 PROCEDURE — 83735 ASSAY OF MAGNESIUM: CPT | Performed by: SURGERY

## 2022-04-05 PROCEDURE — 25000003 PHARM REV CODE 250: Performed by: PHYSICIAN ASSISTANT

## 2022-04-05 PROCEDURE — 88112 CYTOPATH CELL ENHANCE TECH: CPT | Mod: 26,,, | Performed by: PATHOLOGY

## 2022-04-05 PROCEDURE — 89051 BODY FLUID CELL COUNT: CPT | Performed by: INTERNAL MEDICINE

## 2022-04-05 PROCEDURE — 32554 PR THORACEN W/O IMAG GUIDANC: ICD-10-PCS | Mod: LT,,, | Performed by: INTERNAL MEDICINE

## 2022-04-05 PROCEDURE — 82945 GLUCOSE OTHER FLUID: CPT | Performed by: INTERNAL MEDICINE

## 2022-04-05 PROCEDURE — 99233 SBSQ HOSP IP/OBS HIGH 50: CPT | Mod: ,,, | Performed by: INTERNAL MEDICINE

## 2022-04-05 PROCEDURE — 97530 THERAPEUTIC ACTIVITIES: CPT | Performed by: PHYSICAL THERAPIST

## 2022-04-05 PROCEDURE — 63600175 PHARM REV CODE 636 W HCPCS: Performed by: FAMILY MEDICINE

## 2022-04-05 PROCEDURE — 88305 TISSUE EXAM BY PATHOLOGIST: ICD-10-PCS | Mod: 26,,, | Performed by: PATHOLOGY

## 2022-04-05 PROCEDURE — 99291 CRITICAL CARE FIRST HOUR: CPT | Mod: 25,,, | Performed by: INTERNAL MEDICINE

## 2022-04-05 PROCEDURE — 88342 IMHCHEM/IMCYTCHM 1ST ANTB: CPT | Mod: 91 | Performed by: PATHOLOGY

## 2022-04-05 PROCEDURE — 99900035 HC TECH TIME PER 15 MIN (STAT)

## 2022-04-05 PROCEDURE — 83615 LACTATE (LD) (LDH) ENZYME: CPT | Performed by: INTERNAL MEDICINE

## 2022-04-05 PROCEDURE — 88341 IMHCHEM/IMCYTCHM EA ADD ANTB: CPT | Mod: 59 | Performed by: PATHOLOGY

## 2022-04-05 PROCEDURE — 25000242 PHARM REV CODE 250 ALT 637 W/ HCPCS: Performed by: INTERNAL MEDICINE

## 2022-04-05 PROCEDURE — 88341 IMHCHEM/IMCYTCHM EA ADD ANTB: CPT | Performed by: PATHOLOGY

## 2022-04-05 PROCEDURE — 88341 PR IHC OR ICC EACH ADD'L SINGLE ANTIBODY  STAINPR: ICD-10-PCS | Mod: 26,,, | Performed by: PATHOLOGY

## 2022-04-05 PROCEDURE — 32554 ASPIRATE PLEURA W/O IMAGING: CPT | Mod: LT,,, | Performed by: INTERNAL MEDICINE

## 2022-04-05 PROCEDURE — 88342 CHG IMMUNOCYTOCHEMISTRY: ICD-10-PCS | Mod: 26,,, | Performed by: PATHOLOGY

## 2022-04-05 RX ORDER — METOPROLOL TARTRATE 25 MG/1
25 TABLET, FILM COATED ORAL 2 TIMES DAILY
Status: DISCONTINUED | OUTPATIENT
Start: 2022-04-05 | End: 2022-04-06 | Stop reason: HOSPADM

## 2022-04-05 RX ORDER — LEVOTHYROXINE SODIUM 112 UG/1
112 TABLET ORAL
Status: DISCONTINUED | OUTPATIENT
Start: 2022-04-06 | End: 2022-04-06 | Stop reason: HOSPADM

## 2022-04-05 RX ORDER — DILTIAZEM HYDROCHLORIDE 30 MG/1
30 TABLET, FILM COATED ORAL EVERY 6 HOURS
Status: DISCONTINUED | OUTPATIENT
Start: 2022-04-05 | End: 2022-04-06 | Stop reason: HOSPADM

## 2022-04-05 RX ORDER — AMOXICILLIN 250 MG
2 CAPSULE ORAL DAILY
Status: DISCONTINUED | OUTPATIENT
Start: 2022-04-06 | End: 2022-04-06 | Stop reason: HOSPADM

## 2022-04-05 RX ADMIN — SODIUM CHLORIDE 3 G: 9 INJECTION, SOLUTION INTRAVENOUS at 04:04

## 2022-04-05 RX ADMIN — ESMOLOL HYDROCHLORIDE IN SODIUM CHLORIDE 30 MCG/KG/MIN: 20 INJECTION INTRAVENOUS at 07:04

## 2022-04-05 RX ADMIN — MUPIROCIN: 20 OINTMENT TOPICAL at 08:04

## 2022-04-05 RX ADMIN — ASPIRIN 81 MG CHEWABLE TABLET 81 MG: 81 TABLET CHEWABLE at 08:04

## 2022-04-05 RX ADMIN — ESMOLOL HYDROCHLORIDE IN SODIUM CHLORIDE 100 MCG/KG/MIN: 20 INJECTION INTRAVENOUS at 12:04

## 2022-04-05 RX ADMIN — POLYETHYLENE GLYCOL 3350 17 G: 17 POWDER, FOR SOLUTION ORAL at 08:04

## 2022-04-05 RX ADMIN — IPRATROPIUM BROMIDE AND ALBUTEROL SULFATE 3 ML: 2.5; .5 SOLUTION RESPIRATORY (INHALATION) at 03:04

## 2022-04-05 RX ADMIN — SODIUM CHLORIDE 3 G: 9 INJECTION, SOLUTION INTRAVENOUS at 03:04

## 2022-04-05 RX ADMIN — MULTIVITAMIN 15 ML: LIQUID ORAL at 08:04

## 2022-04-05 RX ADMIN — GABAPENTIN 100 MG: 250 SOLUTION ORAL at 08:04

## 2022-04-05 RX ADMIN — DILTIAZEM HYDROCHLORIDE 30 MG: 30 TABLET, FILM COATED ORAL at 06:04

## 2022-04-05 RX ADMIN — METOPROLOL TARTRATE 25 MG: 25 TABLET, FILM COATED ORAL at 08:04

## 2022-04-05 RX ADMIN — DILTIAZEM HYDROCHLORIDE 30 MG: 30 TABLET, FILM COATED ORAL at 11:04

## 2022-04-05 RX ADMIN — DILTIAZEM HYDROCHLORIDE 30 MG: 30 TABLET, FILM COATED ORAL at 12:04

## 2022-04-05 RX ADMIN — Medication 5 MG/HR: at 07:04

## 2022-04-05 RX ADMIN — LEVOTHYROXINE SODIUM 112 MCG: 112 TABLET ORAL at 05:04

## 2022-04-05 RX ADMIN — DOCUSATE SODIUM AND SENNOSIDES 2 TABLET: 8.6; 5 TABLET, FILM COATED ORAL at 08:04

## 2022-04-05 RX ADMIN — ATORVASTATIN CALCIUM 40 MG: 40 TABLET, FILM COATED ORAL at 08:04

## 2022-04-05 RX ADMIN — SODIUM BICARBONATE: 84 INJECTION, SOLUTION INTRAVENOUS at 08:04

## 2022-04-05 NOTE — ASSESSMENT & PLAN NOTE
AFIb/AFL with RVR    -d/c cardizem  -start amio bouls and gtt  -add metoprol as needed for high BP  -continue xarelto 20 mg daily and pt states that she took Xarelto faithfully and only off yesterday and today  -keep NPO after mn and consider NOMAN/DCCv if still afib with RVR in AM  -check TSH    4/1/22  -SR this AM  -Amiodarone d/c'd, continue BB  -Xarelto on hold given acute CVA, neurology following    4/2/22  On AFL with RVR. eliquis is on hold due to acute CVA per neurology advise. It is contraindicated for DCCV and amio gtt  BP dropped to 110 mmHG when on cardizem gtt at 10 mg/hr  Advise to d/c cardizem and started esmolol gtt in ICU  Add dioxin 250 mcg ivp x1 now    04/03/22  afib /AFL with RVR  Continue esmolol gtt  Add digoxin 250 mcg ivpx1 now    4/4/22  -HR remains uncontrolled, refractory to esmolol/digoxin  -BP stable this AM, add cardizem gtt  -Resume AC when ok from neurology standpoint    4/5/22  -Converted to SR, HR in 70's this AM  -Stop cardizem gtt, switch to po 30 mg q 6 hours  -Stop Esmolol gtt, start Lopressor 25 mg BID  -Resume AC when ok from neurology standpoint

## 2022-04-05 NOTE — PROGRESS NOTES
Progress Note  [facility]    SUBJECTIVE:     History of Present Illness/Interval History:  The pt is an 86 yof who presented to the ED 3/31 with complaint of abdominal pain. The pt was found to have atrial fib & was admitted to the hospital. Early during the course of hospitalization, the pt was noted to be confused. The pt was found to have acute stroke. Neurology was concerned that it was due to hypotension.    Review of patient's allergies indicates:   Allergen Reactions    Food color red [red food color (bulk)] Nausea And Vomiting    Sulfa (sulfonamide antibiotics)      Other reaction(s): Unknown    Pcn [penicillins] Rash     Pt has tolerated augmentin, zosyn, and cefepime       Past Medical History:   Diagnosis Date    Anticoagulant long-term use     Asthma     Debility     Gait apraxia     Gout     Hypertension     NPH (normal pressure hydrocephalus)     Stroke     Thyroid disease      Past Surgical History:   Procedure Laterality Date    CAROTID ENDARTERECTOMY       History reviewed. No pertinent family history.  Social History     Socioeconomic History    Marital status:    Tobacco Use    Smoking status: Former Smoker    Smokeless tobacco: Never Used   Substance and Sexual Activity    Alcohol use: No    Drug use: Never     Review of Systems:  Unable to obtain due to patient's status- unable to speak to me.    OBJECTIVE:     Vital Signs  Vitals:    04/05/22 1330 04/05/22 1345 04/05/22 1400 04/05/22 1415   BP: 139/63 (!) 117/54 130/61 (!) 150/69   Pulse: 85 78 79 81   Resp: (!) 32 (!) 23 (!) 25 (!) 28   Temp:       TempSrc:       SpO2: 96% 98% 97% 98%   Weight:       Height:         Body mass index is 31.95 kg/m².    Physical Exam:  General: no distress  Eyes:  conjunctivae/corneas clear  Nose: no discharge  Neck: trachea midline with no masses appreciated  Lungs:  normal respiratory effort, no wheezes, no rales, diminished at left base  Heart: tachy, irregular rhythm and no  murmur  Abdomen: non-distended  Extremities: no cyanosis, mild edema in both legs, no clubbing  Skin: No rashes or lesions. good skin turgor  Neurologic: eyes closed, does not follow commands or respond to my verbal prompts. Withdraws to pain though inconsistently. +purposeful spotnaneous movements. Cough is intact.    Laboratory:  Lab Results   Component Value Date    WBC 17.15 (H) 04/05/2022    HGB 9.1 (L) 04/05/2022    HCT 28.1 (L) 04/05/2022    MCV 94 04/05/2022     (L) 04/05/2022       Diagnostic Results:  CT: Reviewed    ASSESSMENT/PLAN:     1) Acute stroke.   2) Atrial fib with RVR. Recommend converting from IV AVN blocking agents to tablets.  3) Dysphagia. Unable to participate in swallow evaluation with SLP. Administering tube feeds as a temporary measure. Family expressed that pt would NOT want long-term tube feeding if this problems proves to be non-temporary.  4) Abnormal chest CT. Possible lung malignancy. Monitor neurologic recovery for now.  5) Pleural effusion. Diagnostic & therapeutic thoracentesis today.    Critical Care Time: 50 minutes  Critical care was time spent personally by me on the following activities: evaluating this patient's organ dysfunction, development of treatment plan, discussing treatment plan with patient or surrogate and bedside caregivers, discussions with consultants, evaluation of patient's response to treatment, examination of patient, ordering and performing treatments and interventions, ordering and review of laboratory studies, ordering and review of radiographic studies, re-evaluation of patient's condition. This critical care time did not overlap with that of any other provider or involve time for any procedures.    Jamari Last MD

## 2022-04-05 NOTE — PLAN OF CARE
Patient VSS, HR NSR. Esmolol and cardizem gtts stopped per cardiology, PO medications initiated. Bicarb gtt adjusted per MD. Ratliff catheter removed per protocol and MD verbal. NGT with TF at goal, tolerating well. Left thora done per Dr Last, ~1.1L removed and samples sent to lab. Patient turned q2 with wedge, heels elevated. Patient remains somnolent and occasionally moans or has garbled/slurred, incomprehensible speech. Patient does localizes and withdraw to stimuli. Family at bedside and involved in POC. Will monitor.     Problem: Adult Inpatient Plan of Care  Goal: Plan of Care Review  Outcome: Ongoing, Progressing  Goal: Patient-Specific Goal (Individualized)  Outcome: Ongoing, Progressing  Goal: Absence of Hospital-Acquired Illness or Injury  Outcome: Ongoing, Progressing  Goal: Optimal Comfort and Wellbeing  Outcome: Ongoing, Progressing  Goal: Readiness for Transition of Care  Outcome: Ongoing, Progressing

## 2022-04-05 NOTE — PROCEDURES
"Ana Castañeda is a 86 y.o. female patient.    Temp: 97.9 °F (36.6 °C) (04/05/22 1145)  Pulse: 81 (04/05/22 1415)  Resp: (!) 28 (04/05/22 1415)  BP: (!) 150/69 (04/05/22 1415)  SpO2: 98 % (04/05/22 1415)  Weight: 89.8 kg (197 lb 15.6 oz) (bed scale) (04/05/22 0600)  Height: 5' 6" (167.6 cm) (04/02/22 1411)       Thoracentesis    Date/Time: 4/5/2022 4:24 PM  Location procedure was performed: [facility]  PULMONARY [facility]IC[facility]  Performed by: Jamari Last MD  Authorized by: Jamari Last MD   Pre-operative diagnosis: pleural effusion  Post-operative diagnosis: pleural effusion  Consent Done: Yes  Consent: Written consent obtained.  Risks and benefits: risks, benefits and alternatives were discussed  Site marked: the operative site was marked  Imaging studies: imaging studies available  Required items: required blood products, implants, devices, and special equipment available  Patient identity confirmed: MRN and name  Procedure purpose: diagnostic and therapeutic  Indications: pleural effusion  Preparation: Patient was prepped and draped in the usual sterile fashion.  Local anesthesia used: yes    Anesthesia:  Local anesthesia used: yes  Local Anesthetic: lidocaine 1% without epinephrine  Anesthetic total: 10 mL  Description of findings: clear yellow fluid   Preparation: skin prepped with ChloraPrep  Needle size: 18  Catheter size: 18 gauge  Number of attempts: 1  Drainage amount: 1000 ml  Drainage characteristics: serous  Patient tolerance: Patient tolerated the procedure well with no immediate complications  Chest x-ray performed: no  Estimated blood loss (mL): 2  Specimens: Yes  Drainage Tube: removed      Addednum: left pleural effusion. Left posterior drainage site.  4/5/2022  "

## 2022-04-05 NOTE — PLAN OF CARE
04/05/22 1422   Post-Acute Status   Post-Acute Authorization Placement   Post-Acute Placement Status Pending medical clearance/testing   Discharge Plan   Discharge Plan A Skilled Nursing Facility     [facility] accepted patient when stable for discharge. PASRR and 142 uploaded into [employer].

## 2022-04-05 NOTE — CHAPLAIN
Initial visit with patient and her daughter.  Visited with patient and her daughter to assess for spiritual and emotional needs.  Pt was resting during the visit but I did speak with her daughter.  Pt's daughter talked about what happened to her as well as past medical experiences.  Pt's daughter asked for prayer and I took time to do this before leaving.  Spiritual care remains available as needed.    Chaplain Gilmar Duarte M.Div., [facility]

## 2022-04-05 NOTE — ASSESSMENT & PLAN NOTE
3/31 LLL Pneumonia with Parapneumonic effusion seen on Renal CT  R.o Infrahilar mass with CT Chest later  Started on Levaquin and continued on O2, Nebs,   Add IS and Acapella  Consult Pulmonology in am  4/02 Continue current treatment. Abnormal Ct scan concerning for Lung mass  4/3: CT concerning for mass, will check procal; will need outpatient lung biopsy    4/4:  WBC trending up  LLL pleural effusion noted  Although procal normal  Due to persistent leukocytosis  Will treat empirically for pna  Start unasyn    Await Thoracentesis and possibly check cytology to r/o malignancy

## 2022-04-05 NOTE — SUBJECTIVE & OBJECTIVE
Review of Systems   Unable to perform ROS: Patient unresponsive   Objective:     Vital Signs (Most Recent):  Temp: 97.7 °F (36.5 °C) (04/05/22 0715)  Pulse: 81 (04/05/22 1115)  Resp: (!) 28 (04/05/22 1115)  BP: (!) 160/71 (04/05/22 1115)  SpO2: (!) 94 % (04/05/22 1115)   Vital Signs (24h Range):  Temp:  [97.7 °F (36.5 °C)-98.4 °F (36.9 °C)] 97.7 °F (36.5 °C)  Pulse:  [] 81  Resp:  [22-45] 28  SpO2:  [92 %-98 %] 94 %  BP: ()/() 160/71     Weight: 89.8 kg (197 lb 15.6 oz) (bed scale)  Body mass index is 31.95 kg/m².     SpO2: (!) 94 %  O2 Device (Oxygen Therapy): nasal cannula      Intake/Output Summary (Last 24 hours) at 4/5/2022 1147  Last data filed at 4/5/2022 1100  Gross per 24 hour   Intake 3363.27 ml   Output 825 ml   Net 2538.27 ml       Lines/Drains/Airways       Drain  Duration                  Urethral Catheter 04/02/22 1200 16 Fr. 2 days         NG/OG Tube 04/04/22 1135 14 Fr. Right nostril 1 day              Peripheral Intravenous Line  Duration                  Peripheral IV - Single Lumen 04/02/22 2000 18 G Anterior;Right Upper Arm 2 days         Peripheral IV - Single Lumen 04/04/22 1848 22 G Left;Posterior Hand <1 day         Peripheral IV - Single Lumen 04/04/22 1848 22 G Posterior;Right Hand <1 day                    Physical Exam  Vitals and nursing note reviewed.   Constitutional:       General: She is not in acute distress.     Appearance: She is well-developed. She is ill-appearing. She is not diaphoretic.      Comments: On supplemental O2   HENT:      Head: Normocephalic and atraumatic.   Eyes:      General:         Right eye: No discharge.         Left eye: No discharge.      Pupils: Pupils are equal, round, and reactive to light.   Neck:      Thyroid: No thyromegaly.      Vascular: No JVD.      Trachea: No tracheal deviation.   Cardiovascular:      Rate and Rhythm: Normal rate and regular rhythm.      Heart sounds: Normal heart sounds, S1 normal and S2 normal. No murmur  heard.     Comments: Remains in SR  Pulmonary:      Effort: Pulmonary effort is normal. No respiratory distress.      Breath sounds: Normal breath sounds. No wheezing.      Comments: Diminished BS at bases  Abdominal:      General: There is no distension.      Palpations: Abdomen is soft.      Tenderness: There is no rebound.   Musculoskeletal:      Cervical back: Neck supple.      Right lower leg: No edema.      Left lower leg: No edema.   Skin:     General: Skin is warm and dry.      Findings: No erythema.   Neurological:      Mental Status: She is alert.      Comments: Lethargic, did not respond to questions       Significant Labs: CMP   Recent Labs   Lab 04/04/22  0823 04/05/22  0628    143   K 5.2* 3.7   * 110   CO2 14* 17*   GLU 98 150*   BUN 40* 48*   CREATININE 2.0* 1.9*   CALCIUM 8.7 7.7*   ALBUMIN  --  2.4*   ANIONGAP 12 16   ESTGFRAFRICA 26* 28*   EGFRNONAA 22* 24*   , CBC   Recent Labs   Lab 04/04/22 0823 04/05/22  0628   WBC 15.87* 17.15*   HGB 10.1* 9.1*   HCT 33.6* 28.1*    131*   , Troponin No results for input(s): TROPONINI in the last 48 hours., and All pertinent lab results from the last 24 hours have been reviewed.    Significant Imaging: Echocardiogram: Transthoracic echo (TTE) complete (Cupid Only):   Results for orders placed or performed during the hospital encounter of 03/31/22   Echo   Result Value Ref Range    BSA 1.92 m2    TDI SEPTAL 0.08 m/s    LV LATERAL E/E' RATIO 11.75 m/s    LV SEPTAL E/E' RATIO 11.75 m/s    LA WIDTH 2.75 cm    IVC diameter 1.63 cm    Left Ventricular Outflow Tract Mean Velocity 0.37244878541 cm/s    Left Ventricular Outflow Tract Mean Gradient 1.05 mmHg    TDI LATERAL 0.08 m/s    LVIDd 3.10 (A) 3.5 - 6.0 cm    IVS 1.42 (A) 0.6 - 1.1 cm    Posterior Wall 1.28 (A) 0.6 - 1.1 cm    Ao root annulus 2.92 cm    LVIDs 1.93 (A) 2.1 - 4.0 cm    FS 38 28 - 44 %    LA volume 19.49 cm3    Sinus 2.75 cm    STJ 2.46 cm    Ascending aorta 2.49 cm    LV mass  138.15 g    LA size 2.78 cm    TAPSE 1.82 cm    Left Ventricle Relative Wall Thickness 0.83 cm    AV mean gradient 6 mmHg    AV valve area 1.12 cm2    AV Velocity Ratio 0.44     AV index (prosthetic) 0.46     MV valve area p 1/2 method 4.31 cm2    E/A ratio 1.24     Mean e' 0.08 m/s    E wave deceleration time 176.68987072929038 msec    IVRT 57.139676557536855 msec    LVOT diameter 1.76 cm    LVOT area 2.4 cm2    LVOT peak messi 0.66 m/s    LVOT peak VTI 18.30 cm    Ao peak messi 1.50 m/s    Ao VTI 39.7 cm    RVOT peak messi 0.54 m/s    RVOT peak VTI 14.1 cm    Mr max messi 5.30 m/s    LVOT stroke volume 44.50 cm3    AV peak gradient 9 mmHg    PV mean gradient 0.67 mmHg    E/E' ratio 11.75 m/s    MV Peak E Messi 0.94 m/s    TR Max Messi 3.15 m/s    MV stenosis pressure 1/2 time 51.115453357 ms    MV Peak A Messi 0.76 m/s    LV Systolic Volume 11.55 mL    LV Systolic Volume Index 6.1 mL/m2    LV Diastolic Volume 37.98 mL    LV Diastolic Volume Index 20.10 mL/m2    LA Volume Index 10.3 mL/m2    LV Mass Index 73 g/m2    Echo EF Estimated 70 %    RA Major Axis 3.26 cm    Left Atrium Minor Axis 2.75 cm    Left Atrium Major Axis 3.30 cm    Triscuspid Valve Regurgitation Peak Gradient 40 mmHg    RA Width 2.58 cm    Right Atrial Pressure (from IVC) 8 mmHg    EF 50 %    TV rest pulmonary artery pressure 48 mmHg    Narrative    · Concentric remodeling and low normal systolic function.  · Intermediate central venous pressure (8 mmHg).  · The estimated PA systolic pressure is 48 mmHg.  · The estimated ejection fraction is 50%.  · Indeterminate left ventricular diastolic function.  · With normal right ventricular systolic function.      , EKG: Reviewed, and X-Ray: CXR: X-Ray Chest 1 View (CXR):   Results for orders placed or performed during the hospital encounter of 03/31/22   X-Ray Chest 1 View    Narrative    EXAMINATION:  XR CHEST 1 VIEW    CLINICAL HISTORY:  NG placement;    TECHNIQUE:  Single frontal view of the chest was  performed.    COMPARISON:  None    FINDINGS:  NG tube present with the tip in the region of the pylorus.  Increasing left-sided pleural effusion when compared to the prior chest x-ray dated 03/31/2022.      Impression    NG tube in good position with the tip in the region the pylorus of the stomach.      Electronically signed by: Meng Fletcher MD  Date:    04/04/2022  Time:    11:35    and X-Ray Chest PA and Lateral (CXR): No results found for this visit on 03/31/22.

## 2022-04-05 NOTE — PROGRESS NOTES
[facility] - Int[facility] (Hospital)  Cardiology  Progress Note    Patient Name: Ana Castañeda  MRN: [id number]  Admission Date: 3/31/2022  Hospital Length of Stay: 5 days  Code Status: DNR   Attending Physician: Tiffanie Jones MD   Primary Care Physician: Stephen Tarango MD  Expected Discharge Date:   Principal Problem:Atrial flutter    Subjective:     Hospital Course:   7 yo female, cardiology consult for afib/AFL with RVR  PMH PAF on xeratlo, sthma, HTN, h/o CVA with residual right-sided weakness, s/p CEA  Went to ER deu to leg pain and found afl with RVR in ER and started Cardizem gtt. No wHR at 175 bpm. Denied chest pain dyspnea palpitation and dizziness  Echo in  normal EF   EKG today A flutter  Troponin 0.05 and   Cr 1.2 and HGB 12.6  CXR left PNA  Abd CT showed left pleural effusion, gallbladder stone and mild diverticulosis    4/1/22-Patient seen and examined today, resting in bed. Lethargic/confused. SR during exam. CT of head + for acute infarcts. Neurology following. MRI pending. Creatinine bumped to 1.8, K 5.2. Echo showed EF of 50%.    04/02/2022--SR in AM and developed AFL with 2 to 1 conduction HR at 160 bpm. Brain MRI showed acute CVA. Neurology on board. Pt some confusion and no chest hellen dyspnea and palpitation. Eliquis is on hold per neurology advise.     04/3/22 in afib with RVR and transferred to icu for esmolol gtt and d/c cardizem gtt. The labs reviewed. Xarelto is on hold per neurology recommendation    4/4/22-Patient seen and examined today, resting in bed. Family at bedside. Lethargic. Dyspneic. Remains in afib/flutter with RVR. BP stable, cardizem drip initiated. Assess response. Labs reviewed. Creatinine 2.0.     4/5/22-Patient seen and examined today. Remains lethargic/unresponsive. Converted to SR, HR in 70's during exam, will transition meds to po. Labs reviewed.           Review of Systems   Unable to perform ROS: Patient unresponsive   Objective:     Vital Signs (Most  Recent):  Temp: 97.7 °F (36.5 °C) (04/05/22 0715)  Pulse: 81 (04/05/22 1115)  Resp: (!) 28 (04/05/22 1115)  BP: (!) 160/71 (04/05/22 1115)  SpO2: (!) 94 % (04/05/22 1115)   Vital Signs (24h Range):  Temp:  [97.7 °F (36.5 °C)-98.4 °F (36.9 °C)] 97.7 °F (36.5 °C)  Pulse:  [] 81  Resp:  [22-45] 28  SpO2:  [92 %-98 %] 94 %  BP: ()/() 160/71     Weight: 89.8 kg (197 lb 15.6 oz) (bed scale)  Body mass index is 31.95 kg/m².     SpO2: (!) 94 %  O2 Device (Oxygen Therapy): nasal cannula      Intake/Output Summary (Last 24 hours) at 4/5/2022 1147  Last data filed at 4/5/2022 1100  Gross per 24 hour   Intake 3363.27 ml   Output 825 ml   Net 2538.27 ml       Lines/Drains/Airways       Drain  Duration                  Urethral Catheter 04/02/22 1200 16 Fr. 2 days         NG/OG Tube 04/04/22 1135 14 Fr. Right nostril 1 day              Peripheral Intravenous Line  Duration                  Peripheral IV - Single Lumen 04/02/22 2000 18 G Anterior;Right Upper Arm 2 days         Peripheral IV - Single Lumen 04/04/22 1848 22 G Left;Posterior Hand <1 day         Peripheral IV - Single Lumen 04/04/22 1848 22 G Posterior;Right Hand <1 day                    Physical Exam  Vitals and nursing note reviewed.   Constitutional:       General: She is not in acute distress.     Appearance: She is well-developed. She is ill-appearing. She is not diaphoretic.      Comments: On supplemental O2   HENT:      Head: Normocephalic and atraumatic.   Eyes:      General:         Right eye: No discharge.         Left eye: No discharge.      Pupils: Pupils are equal, round, and reactive to light.   Neck:      Thyroid: No thyromegaly.      Vascular: No JVD.      Trachea: No tracheal deviation.   Cardiovascular:      Rate and Rhythm: Normal rate and regular rhythm.      Heart sounds: Normal heart sounds, S1 normal and S2 normal. No murmur heard.     Comments: Remains in SR  Pulmonary:      Effort: Pulmonary effort is normal. No respiratory  distress.      Breath sounds: Normal breath sounds. No wheezing.      Comments: Diminished BS at bases  Abdominal:      General: There is no distension.      Palpations: Abdomen is soft.      Tenderness: There is no rebound.   Musculoskeletal:      Cervical back: Neck supple.      Right lower leg: No edema.      Left lower leg: No edema.   Skin:     General: Skin is warm and dry.      Findings: No erythema.   Neurological:      Mental Status: She is alert.      Comments: Lethargic, did not respond to questions       Significant Labs: CMP   Recent Labs   Lab 04/04/22  0823 04/05/22  0628    143   K 5.2* 3.7   * 110   CO2 14* 17*   GLU 98 150*   BUN 40* 48*   CREATININE 2.0* 1.9*   CALCIUM 8.7 7.7*   ALBUMIN  --  2.4*   ANIONGAP 12 16   ESTGFRAFRICA 26* 28*   EGFRNONAA 22* 24*   , CBC   Recent Labs   Lab 04/04/22  0823 04/05/22  0628   WBC 15.87* 17.15*   HGB 10.1* 9.1*   HCT 33.6* 28.1*    131*   , Troponin No results for input(s): TROPONINI in the last 48 hours., and All pertinent lab results from the last 24 hours have been reviewed.    Significant Imaging: Echocardiogram: Transthoracic echo (TTE) complete (Cupid Only):   Results for orders placed or performed during the hospital encounter of 03/31/22   Echo   Result Value Ref Range    BSA 1.92 m2    TDI SEPTAL 0.08 m/s    LV LATERAL E/E' RATIO 11.75 m/s    LV SEPTAL E/E' RATIO 11.75 m/s    LA WIDTH 2.75 cm    IVC diameter 1.63 cm    Left Ventricular Outflow Tract Mean Velocity 0.05746489165 cm/s    Left Ventricular Outflow Tract Mean Gradient 1.05 mmHg    TDI LATERAL 0.08 m/s    LVIDd 3.10 (A) 3.5 - 6.0 cm    IVS 1.42 (A) 0.6 - 1.1 cm    Posterior Wall 1.28 (A) 0.6 - 1.1 cm    Ao root annulus 2.92 cm    LVIDs 1.93 (A) 2.1 - 4.0 cm    FS 38 28 - 44 %    LA volume 19.49 cm3    Sinus 2.75 cm    STJ 2.46 cm    Ascending aorta 2.49 cm    LV mass 138.15 g    LA size 2.78 cm    TAPSE 1.82 cm    Left Ventricle Relative Wall Thickness 0.83 cm    AV mean  gradient 6 mmHg    AV valve area 1.12 cm2    AV Velocity Ratio 0.44     AV index (prosthetic) 0.46     MV valve area p 1/2 method 4.31 cm2    E/A ratio 1.24     Mean e' 0.08 m/s    E wave deceleration time 176.25798808441019 msec    IVRT 57.434465319326636 msec    LVOT diameter 1.76 cm    LVOT area 2.4 cm2    LVOT peak messi 0.66 m/s    LVOT peak VTI 18.30 cm    Ao peak messi 1.50 m/s    Ao VTI 39.7 cm    RVOT peak messi 0.54 m/s    RVOT peak VTI 14.1 cm    Mr max messi 5.30 m/s    LVOT stroke volume 44.50 cm3    AV peak gradient 9 mmHg    PV mean gradient 0.67 mmHg    E/E' ratio 11.75 m/s    MV Peak E Messi 0.94 m/s    TR Max Messi 3.15 m/s    MV stenosis pressure 1/2 time 51.439498282 ms    MV Peak A Messi 0.76 m/s    LV Systolic Volume 11.55 mL    LV Systolic Volume Index 6.1 mL/m2    LV Diastolic Volume 37.98 mL    LV Diastolic Volume Index 20.10 mL/m2    LA Volume Index 10.3 mL/m2    LV Mass Index 73 g/m2    Echo EF Estimated 70 %    RA Major Axis 3.26 cm    Left Atrium Minor Axis 2.75 cm    Left Atrium Major Axis 3.30 cm    Triscuspid Valve Regurgitation Peak Gradient 40 mmHg    RA Width 2.58 cm    Right Atrial Pressure (from IVC) 8 mmHg    EF 50 %    TV rest pulmonary artery pressure 48 mmHg    Narrative    · Concentric remodeling and low normal systolic function.  · Intermediate central venous pressure (8 mmHg).  · The estimated PA systolic pressure is 48 mmHg.  · The estimated ejection fraction is 50%.  · Indeterminate left ventricular diastolic function.  · With normal right ventricular systolic function.      , EKG: Reviewed, and X-Ray: CXR: X-Ray Chest 1 View (CXR):   Results for orders placed or performed during the hospital encounter of 03/31/22   X-Ray Chest 1 View    Narrative    EXAMINATION:  XR CHEST 1 VIEW    CLINICAL HISTORY:  NG placement;    TECHNIQUE:  Single frontal view of the chest was performed.    COMPARISON:  None    FINDINGS:  NG tube present with the tip in the region of the pylorus.  Increasing  left-sided pleural effusion when compared to the prior chest x-ray dated 03/31/2022.      Impression    NG tube in good position with the tip in the region the pylorus of the stomach.      Electronically signed by: Meng Fletcher MD  Date:    04/04/2022  Time:    11:35    and X-Ray Chest PA and Lateral (CXR): No results found for this visit on 03/31/22.    Assessment and Plan:   Patient who presents with aflutter/CVA. Converted to SR. Meds adjusted. Resume AC when ok from neurology standpoint.     * Atrial flutter with RVR  AFIb/AFL with RVR    -d/c cardizem  -start amio bouls and gtt  -add metoprol as needed for high BP  -continue xarelto 20 mg daily and pt states that she took Xarelto faithfully and only off yesterday and today  -keep NPO after mn and consider NOMAN/DCCv if still afib with RVR in AM  -check TSH    4/1/22  -SR this AM  -Amiodarone d/c'd, continue BB  -Xarelto on hold given acute CVA, neurology following    4/2/22  On AFL with RVR. eliquis is on hold due to acute CVA per neurology advise. It is contraindicated for DCCV and amio gtt  BP dropped to 110 mmHG when on cardizem gtt at 10 mg/hr  Advise to d/c cardizem and started esmolol gtt in ICU  Add dioxin 250 mcg ivp x1 now    04/03/22  afib /AFL with RVR  Continue esmolol gtt  Add digoxin 250 mcg ivpx1 now    4/4/22  -HR remains uncontrolled, refractory to esmolol/digoxin  -BP stable this AM, add cardizem gtt  -Resume AC when ok from neurology standpoint    4/5/22  -Converted to SR, HR in 70's this AM  -Stop cardizem gtt, switch to po 30 mg q 6 hours  -Stop Esmolol gtt, start Lopressor 25 mg BID  -Resume AC when ok from neurology standpoint    Left carotid artery occlusion  -Mgmt as per hospital medicine/vasular      TARA (acute kidney injury)  -Mgmt as per hospital medicine    Acute CVA (cerebrovascular accident)- multiple  -Neurology consulted  -Resume Xarelto when ok from neurology standpoint    Troponin level elevated  Demand ischemia >>>  NSTEMI/ACS    rx for AFL with RVR    Essential hypertension  See above note        VTE Risk Mitigation (From admission, onward)         Ordered     IP VTE HIGH RISK PATIENT  Once         04/01/22 1619     Place sequential compression device  Until discontinued         04/01/22 1619     Place HALIMA hose  Until discontinued         04/01/22 1616                Susan Alcocer PA-C  Cardiology  [facility] Intensive [facility] ([facility])

## 2022-04-05 NOTE — ASSESSMENT & PLAN NOTE
4/01 Monitor  Trend BMP  Renal dose all medications  Add IVF  4/02 Creatinine continues to rise- 2.4 today. Urinary retention this am- Ratliff catheter ordered  Retroperitoneal ultrasound pending  4/4:  Creatinine stable  Cont ivf    Slightly better

## 2022-04-05 NOTE — PT/OT/SLP PROGRESS
Physical Therapy  Treatment    Ana Castañeda   MRN: [id number]   Admitting Diagnosis: Atrial flutter    PT Received On: 04/05/22  PT Start Time: 1132     PT Stop Time: 1144    PT Total Time (min): 12 min       Billable Minutes:  Therapeutic Activity 12 min    Treatment Type: Treatment  PT/PTA: PT     PTA Visit Number: 0       General Precautions: Standard, aspiration, fall  Orthopedic Precautions: N/A   Braces: N/A  Respiratory Status: Nasal cannula, flow 4 L/min         Subjective:  Communicated with Nurse Black and [facility] chart review prior to session.  Pt found supine in bed with HOB elevated. Pt's daughter in room.     Pain/Comfort  Pain Rating 1: 0/10    Objective:   Patient found with: peripheral IV, telemetry, stinson catheter, blood pressure cuff, pulse ox (continuous), oxygen, NG tube    Functional Mobility:  Therapeutic Activities and Exercises:  Pt not arousable throughout therapy session. Therapist performed PROM to exercises to (B) LEs 1 x 10 reps in bed: AP, heel slides, SLR, hip abd/ add. Attempted (R)<>(L) rolling Total A x 2, performed sternal rub and muscle tapping, however pt still not arousing.     AM-PAC 6 CLICK MOBILITY  How much help from another person does this patient currently need?   1 = Unable, Total/Dependent Assistance  2 = A lot, Maximum/Moderate Assistance  3 = A little, Minimum/Contact Guard/Supervision  4 = None, Modified Aguilar/Independent    Turning over in bed (including adjusting bedclothes, sheets and blankets)?: 1  Sitting down on and standing up from a chair with arms (e.g., wheelchair, bedside commode, etc.): 1  Moving from lying on back to sitting on the side of the bed?: 1  Moving to and from a bed to a chair (including a wheelchair)?: 1  Need to walk in hospital room?: 1  Climbing 3-5 steps with a railing?: 1  Basic Mobility Total Score: 6    AM-PAC Raw Score CMS G-Code Modifier Level of Impairment Assistance   6 % Total / Unable   7 - 9 CM 80 - 100% Maximal  Assist   10 - 14 CL 60 - 80% Moderate Assist   15 - 19 CK 40 - 60% Moderate Assist   20 - 22 CJ 20 - 40% Minimal Assist   23 CI 1-20% SBA / CGA   24 CH 0% Independent/ Mod I     Patient left HOB elevated with all lines intact, call button in reach, Nurse Wayne notified and daughter present.    Assessment:  Ana Castañeda is a 86 y.o. female with a medical diagnosis of Atrial flutter and presents with impaired functional mobility. Pt will benefit from continued skilled PT in order to address the listed impairments.    Rehab identified problem list/impairments: Rehab identified problem list/impairments: weakness, impaired endurance, impaired self care skills, impaired functional mobilty, impaired balance, decreased coordination, decreased upper extremity function, decreased lower extremity function, decreased safety awareness, decreased ROM, impaired coordination, edema, impaired cardiopulmonary response to activity    Rehab potential is poor.    Activity tolerance: Poor    Discharge recommendations: Discharge Facility/Level of Care Needs: nursing facility, skilled     Barriers to discharge:   UNKNOWN    Equipment recommendations: Equipment Needed After Discharge: other (see comments) (TBD)     GOALS:   Multidisciplinary Problems     Physical Therapy Goals        Problem: Physical Therapy    Goal Priority Disciplines Outcome Goal Variances Interventions   Physical Therapy Goal     PT, PT/OT Ongoing, Progressing     Description: LT22  1. PT WILL COMPLETE BED MOBILITY WITH MAX A  2. PT WILL STAND T/F TO CHAIR WITH RW AND MAX A  3. PT WILL FOLLOW CUES FOR ROM TE X 10 REPS B LE AROM                   PLAN:    Patient to be seen 3 x/week  to address the above listed problems via therapeutic activities, therapeutic exercises, wheelchair management/training  Plan of Care expires: 22  Plan of Care reviewed with: patient, daughter         2022

## 2022-04-05 NOTE — PT/OT/SLP PROGRESS
"Occupational Therapy  Treatment    Ana Castañeda   MRN: [id number]   Admitting Diagnosis: Atrial flutter    OT Date of Treatment: 04/05/22   OT Start Time: 1119  OT Stop Time: 1132  OT Total Time (min): 13 min    Billable Minutes:  Therapeutic Activity 13 min    OT/EMELY: OT     EMELY Visit Number: 0    General Precautions: Standard, aspiration, fall  Orthopedic Precautions: N/A  Braces: N/A  Respiratory Status: Nasal cannula, flow 4 L/min         Subjective:  Communicated with Nurse Black and [facility] chart review prior to session.  Pt found supine in bed with HOB elevated; daughter in room.   Pain/Comfort  Pain Rating 1: 0/10    Objective:  Patient found with: peripheral IV, telemetry, stinson catheter, blood pressure cuff, pulse ox (continuous), oxygen, NG tube     Functional Mobility:  Therapeutic Activities and Exercises:  Pt not arousable throughout therapy session. Therapist performed PROM to exercises to (B) UEs 1 x 10 reps in bed: shoulder flex/ ext, shoulder abd/ add, elbow flex/ ext, forearm sup/ pronation, wrist flex/ ext, finger flex/ ext. Attempted (R)<>(L) rolling Total A x 2, performed sternal rub and muscle tapping, however pt still not arousing.     AM-PAC 6 CLICK ADL   How much help from another person does this patient currently need?   1 = Unable, Total/Dependent Assistance  2 = A lot, Maximum/Moderate Assistance  3 = A little, Minimum/Contact Guard/Supervision  4 = None, Modified [place]/Independent    Putting on and taking off regular lower body clothing? : 1  Bathing (including washing, rinsing, drying)?: 1  Toileting, which includes using toilet, bedpan, or urinal? : 1  Putting on and taking off regular upper body clothing?: 1  Taking care of personal grooming such as brushing teeth?: 1  Eating meals?: 1  Daily Activity Total Score: 6     AM-PAC Raw Score CMS "G-Code Modifier Level of Impairment Assistance   6 % Total / Unable   7 - 8 CM 80 - 100% Maximal Assist   9-13 CL 60 - 80% Moderate " Assist   14 - 19 CK 40 - 60% Moderate Assist   20 - 22 CJ 20 - 40% Minimal Assist   23 CI 1-20% SBA / CGA   24 CH 0% Independent/ Mod I       Patient left HOB elevated with all lines intact, call button in reach, Nurse Naheed notified and daughter present    ASSESSMENT:  Ana Castañeda is a 86 y.o. female with a medical diagnosis of Atrial flutter and presents with impaired functional mobility and ADLs. Pt will benefit from continued skilled OT in order to address the listed impairments. .    Rehab identified problem list/impairments: Rehab identified problem list/impairments: weakness, impaired endurance, impaired self care skills, impaired functional mobilty, impaired balance, decreased coordination, decreased upper extremity function, decreased lower extremity function, decreased safety awareness, decreased ROM, impaired coordination, edema, impaired cardiopulmonary response to activity    Rehab potential is poor.    Activity tolerance: Poor    Discharge recommendations: Discharge Facility/Level of Care Needs: nursing facility, skilled     Barriers to discharge:  UNKNOWN    Equipment recommendations: other (see comments) (TBD)     GOALS:   Multidisciplinary Problems     Occupational Therapy Goals        Problem: Occupational Therapy    Goal Priority Disciplines Outcome Interventions   Occupational Therapy Goal     OT, PT/OT Ongoing, Progressing    Description: O.T. GOALS TO BEE MET BY 4-16-22  MOD A WITH UE DRESSING  PT WILL TOLERATE 1 SET X 10 REPS B UE AAROM EXERCISE  MOD A WITH SUPINE< SIT TRANSFER                   Plan:  Patient to be seen 2 x/week to address the above listed problems via self-care/home management, therapeutic activities, therapeutic exercises  Plan of Care expires: 04/16/22  Plan of Care reviewed with: patient, daughter         04/05/2022

## 2022-04-05 NOTE — PT/OT/SLP PROGRESS
Speech Language Pathology      Ana Castañeda  MRN: [id number]    ST unable to wake pt this am for a swallow eval.  NG tube present and pt tolerating feeds.  ST will continue POC for swallowing assessment as pt is able to tolerate.   Eden Buck, ARELI-SLP

## 2022-04-05 NOTE — PLAN OF CARE
Pt converted to SR. Rate control gtts decreased. Tube feedings tolerated well and increased. No acute events overnight. Morning labs pending.

## 2022-04-05 NOTE — SUBJECTIVE & OBJECTIVE
Interval History: B ischemic Strokes since admission noted-  remains lethargic, unresponsive, on Bicarb gtt. Converted to NSR, Cardiology following. Dr. Cheatham evaluating her for Thoracentesis. WBC 17.5, H/H 9/27, Bun/Cr 48/1.9, PO2 2.0, on Unasyn. CT Chest showed possible Neoplasm. Family reportedly considering comfort care as well if she does not turn around soon.     Review of Systems   Unable to perform ROS: Mental status change   Objective:     Vital Signs (Most Recent):  Temp: 97.9 °F (36.6 °C) (04/05/22 1145)  Pulse: 74 (04/05/22 1145)  Resp: (!) 24 (04/05/22 1145)  BP: (!) 147/67 (04/05/22 1145)  SpO2: 97 % (04/05/22 1145)   Vital Signs (24h Range):  Temp:  [97.7 °F (36.5 °C)-98.4 °F (36.9 °C)] 97.9 °F (36.6 °C)  Pulse:  [] 74  Resp:  [22-45] 24  SpO2:  [92 %-98 %] 97 %  BP: ()/() 147/67     Weight: 89.8 kg (197 lb 15.6 oz) (bed scale)  Body mass index is 31.95 kg/m².    Intake/Output Summary (Last 24 hours) at 4/5/2022 1317  Last data filed at 4/5/2022 1100  Gross per 24 hour   Intake 3307.21 ml   Output 775 ml   Net 2532.21 ml      Physical Exam  Constitutional:       General: She is not in acute distress.     Appearance: She is not diaphoretic.      Comments: Lethargic   HENT:      Head: Atraumatic.      Mouth/Throat:      Mouth: Mucous membranes are dry.   Eyes:      General:         Right eye: No discharge.         Left eye: No discharge.      Conjunctiva/sclera: Conjunctivae normal.      Comments: Left pupil > right pupil   Cardiovascular:      Rate and Rhythm: Tachycardia present. Rhythm irregular.      Pulses: Normal pulses.   Pulmonary:      Effort: Pulmonary effort is normal. No respiratory distress.      Breath sounds: No rhonchi.      Comments:  Diminished DIAMANTE  Abdominal:      General: Bowel sounds are normal. There is no distension.      Palpations: Abdomen is soft.      Tenderness: There is no abdominal tenderness. There is no guarding.   Genitourinary:     Comments: Not  examined  Musculoskeletal:      Cervical back: Normal range of motion and neck supple. No rigidity or tenderness.      Right lower leg: No edema.      Left lower leg: No edema.   Skin:     General: Skin is warm and dry.      Capillary Refill: Capillary refill takes less than 2 seconds.      Comments: Decreased skin turgor   Neurological:      Comments: Lethargic  Responds appropriately to simple commands at times   Psychiatric:      Comments: Dash and cooperative       Significant Labs: All pertinent labs within the past 24 hours have been reviewed.  ABGs:   Blood Culture:   BMP:   Recent Labs   Lab 04/05/22  0628   *      K 3.7      CO2 17*   BUN 48*   CREATININE 1.9*   CALCIUM 7.7*   MG 2.0     CBC:   Recent Labs   Lab 04/04/22  0823 04/05/22  0628   WBC 15.87* 17.15*   HGB 10.1* 9.1*   HCT 33.6* 28.1*    131*     Magnesium:   Recent Labs   Lab 04/05/22  0628   MG 2.0     POCT Glucose:   TSH:   Recent Labs   Lab 04/02/22  0451   TSH 0.539       Significant Imaging: I have reviewed all pertinent imaging results/findings within the past 24 hours.

## 2022-04-05 NOTE — ASSESSMENT & PLAN NOTE
"4/01 Daughter reported Intermittent confusion since 1 pm yesterday   Ct scan of head abnormal showing signs of multiple areas of CVA  Daughter reports patient " missed a few doses of Xarelto" in the past few days  Cardiology notified  Neurology consulted- Hold off Iv heparin drip for now, Check MRI brain  CVA pathway initiated  4/02 Neurology recommended hold anticoagulation for 4 days post ischemic stroke and start ASA 81 mg daily. Etiology likely hypotension resulting in bilateral ischemic strokes. No intervention for left ICA occlusion and Target  - 160 mmHg to assist with perfusion on the left hemisphere.   4/4:  Cont to hold AC per neuro recs  Cont asa and statin   PT/OT  Placement likely needed  However patient has not been able to participate with PT  Should condition not improve in the next 24-48hrs  Will need to have goals of care discussion     Continue supportive care  "

## 2022-04-05 NOTE — EICU
[facility] Physician Virtual/Remote Brief Evaluation Note      AM labs ordered      KELSEA Montoya MD  [facility] Attending  [phone]    This report has been created through the use of [employer] dictation software. Typographical and content errors may occur with this process. While efforts are made to detect and correct such errors, in some cases errors will persist. For this reason, wording in this document should be considered in the proper context and not strictly verbatim

## 2022-04-05 NOTE — PROGRESS NOTES
[facility] - Intensive Care ([facility] Medicine  Progress Note    Patient Name: Ana Castañeda  MRN: [id number]  Patient Class: IP- Inpatient   Admission Date: 3/31/2022  Length of Stay: 5 days  Attending Physician: Tiffanie Jones MD  Primary Care Provider: Stephen Tarango MD        Subjective:     Principal Problem:Atrial flutter        HPI:  Ana Castañeda is a 86 y.o. 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 The pain is described as crampy/spasms. No associated NVD, fever or chills. She had a UTI which was treated with oral Cipro 2 weeks ago. She denies any CP, SOB, dizziness, weakness, numbness, and all other sxs at this time. daughter states that when she has been feeling tired and not eating drinking much as well for last few days nidhi after she finished her cipro dose and her R sided weakness has got worse. In the ER, initial VSS, Afebrile, Sats 95% on RA. CXr showed Left infrahilar pneumonia. On CT Abd/Pelvis Renal Stone showed moderate left pleural effusion with infiltrate/consolidation left lung base.  Possible perihilar lung mass. Dedicated CT chest would be helpful, if clinically warranted.  2. Hypodensities within the liver most likely representing cysts.  3. Punctate gallstones or sludge within the gallbladder.  4. Very small hiatal hernia.  Mild diverticulosis.  5. Hysterectomy.    She was also found to be in A Fluttter w - she was given IV Cardizem 15 mg bolus and then started on titrating Cardizem gtt and hence admitted to ICU initially. Cards evaluated the pt and d/hira Cardizem and started her on Amiodarone gtt after bolus and also started metoprol with improved heart rate. Pt felt much better and was downgraded to Tele. She also received IV Levaquin 750 mg in  the ER.     She had a similar admission here in 2019 when she was admitted to ICU on Bipap, with acute hypoxemic resp failure sec to LLL Pneumonia and Afib w RVR and Lactic Acidosis.         Overview/Hospital Course:  The patient was monitored closely during her stay. She was kept on continuous telemetry monitoring. Patient's heart rate remained stable however she was noted to have intermittent confusion with lethargy. A Ct scan was done with areas of acute CVAs noted. Neurology was consulted. MRI of brain, bilateral carotid ultrasound, and echocardiogram was ordered. Patient was placed NPO. She was given IVF for hydration. PT, OT, and ST were consulted.   4/02 Neurology recommended to hold anticoagulation for 4 days post ischemic stroke and start ASA 81 mg daily. Etiology likely hypotension resulting in bilateral ischemic strokes. No intervention for left ICA occlusion and Target  - 160 mmHg to assist with perfusion on the left hemisphere. Ct scan of chest without contrast showed Left lower lobe mass or evidence of consolidation extending to the left hilum.  Suspect left hilar adenopathy with enlarged mediastinal lymph nodes as described.  Mildly prominent left supraclavicular node with questionable abnormal nodes in the left axilla.  Findings are concerning for underlying neoplastic process. Findings discussed with daughter, Wendy. 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 Retrograde flow noted within the mid left ICA. Less than 50% stenosis of the right carotid bifurcation was noted. TARA noted. Continue IVF. Some urinary retention noted and stinson catheter placed. Check retroperitoneal ultrasound. Patient back into afib/aflutter with RVR. Iv cardizem given.   4/3: transferred to ICU for afib RVR. Currently on esmolol drip. Rate not controlled. Will discuss with cardiology on plans.   4/4: still in afib, not controlled on esmolol  drip and digoxin push. Cardizem drip started by cardiology. Patient still altered. NG tube for temporary tube feedings. Should condition not improve in the next 1-2 days, family may transition to comfort care. Metabolic acidosis noted. Will start bicarb drip.   4/5- B ischemic Strokes since admission noted- remains lethargic, unresponsive, on Bicarb gtt. Converted to NSR, Cardiology following. Dr. Cheatham evaluating her for Thoracentesis. WBC 17.5, H/H 9/27, Bun/Cr 48/1.9, PO2 2.0, on Unasyn. CT Chest showed possible Neoplasm. Family reportedly considering comfort care as well if she does not turn around soon.       Interval History: B ischemic Strokes since admission noted-  remains lethargic, unresponsive, on Bicarb gtt. Converted to NSR, Cardiology following. Dr. Cheatham evaluating her for Thoracentesis. WBC 17.5, H/H 9/27, Bun/Cr 48/1.9, PO2 2.0, on Unasyn. CT Chest showed possible Neoplasm. Family reportedly considering comfort care as well if she does not turn around soon.     Review of Systems   Unable to perform ROS: Mental status change   Objective:     Vital Signs (Most Recent):  Temp: 97.9 °F (36.6 °C) (04/05/22 1145)  Pulse: 74 (04/05/22 1145)  Resp: (!) 24 (04/05/22 1145)  BP: (!) 147/67 (04/05/22 1145)  SpO2: 97 % (04/05/22 1145)   Vital Signs (24h Range):  Temp:  [97.7 °F (36.5 °C)-98.4 °F (36.9 °C)] 97.9 °F (36.6 °C)  Pulse:  [] 74  Resp:  [22-45] 24  SpO2:  [92 %-98 %] 97 %  BP: ()/() 147/67     Weight: 89.8 kg (197 lb 15.6 oz) (bed scale)  Body mass index is 31.95 kg/m².    Intake/Output Summary (Last 24 hours) at 4/5/2022 1317  Last data filed at 4/5/2022 1100  Gross per 24 hour   Intake 3307.21 ml   Output 775 ml   Net 2532.21 ml      Physical Exam  Constitutional:       General: She is not in acute distress.     Appearance: She is not diaphoretic.      Comments: Lethargic   HENT:      Head: Atraumatic.      Mouth/Throat:      Mouth: Mucous membranes are dry.   Eyes:      General:          Right eye: No discharge.         Left eye: No discharge.      Conjunctiva/sclera: Conjunctivae normal.      Comments: Left pupil > right pupil   Cardiovascular:      Rate and Rhythm: Tachycardia present. Rhythm irregular.      Pulses: Normal pulses.   Pulmonary:      Effort: Pulmonary effort is normal. No respiratory distress.      Breath sounds: No rhonchi.      Comments:  Diminished DIAMANTE  Abdominal:      General: Bowel sounds are normal. There is no distension.      Palpations: Abdomen is soft.      Tenderness: There is no abdominal tenderness. There is no guarding.   Genitourinary:     Comments: Not examined  Musculoskeletal:      Cervical back: Normal range of motion and neck supple. No rigidity or tenderness.      Right lower leg: No edema.      Left lower leg: No edema.   Skin:     General: Skin is warm and dry.      Capillary Refill: Capillary refill takes less than 2 seconds.      Comments: Decreased skin turgor   Neurological:      Comments: Lethargic  Responds appropriately to simple commands at times   Psychiatric:      Comments: Dash and cooperative       Significant Labs: All pertinent labs within the past 24 hours have been reviewed.  ABGs:   Blood Culture:   BMP:   Recent Labs   Lab 04/05/22  0628   *      K 3.7      CO2 17*   BUN 48*   CREATININE 1.9*   CALCIUM 7.7*   MG 2.0     CBC:   Recent Labs   Lab 04/04/22  0823 04/05/22  0628   WBC 15.87* 17.15*   HGB 10.1* 9.1*   HCT 33.6* 28.1*    131*     Magnesium:   Recent Labs   Lab 04/05/22  0628   MG 2.0     POCT Glucose:   TSH:   Recent Labs   Lab 04/02/22  0451   TSH 0.539       Significant Imaging: I have reviewed all pertinent imaging results/findings within the past 24 hours.      Assessment/Plan:      * Atrial flutter with RVR  4/4:  Currently in ICU on esmolol drip  Rate not controlled  Did not improve with digoxin  cardizem drip started   Cont to hold ac per neuro recs for 4 days after stroke    Converted to NSR,  cards following    Pneumonia due to infectious organism- Possible Mass  3/31 LLL Pneumonia with Parapneumonic effusion seen on Renal CT  R.o Infrahilar mass with CT Chest later  Started on Levaquin and continued on O2, Nebs,   Add IS and Acapella  Consult Pulmonology in am  4/02 Continue current treatment. Abnormal Ct scan concerning for Lung mass  4/3: CT concerning for mass, will check procal; will need outpatient lung biopsy    4/4:  WBC trending up  LLL pleural effusion noted  Although procal normal  Due to persistent leukocytosis  Will treat empirically for pna  Start unasyn    Await Thoracentesis and possibly check cytology to r/o malignancy      Troponin level elevated  3/31 Seen by Cards- Demand ischemia- no ACS  4/02 Reported Complaints of chest pain- Repeat EKG and Trend troponins    Demand ischemia      Slow transit constipation  3/31 As seen on CT Abd Renal stones with mild stranding- should be covered by Levaquin  Treat with Dulcolax and Mag Citrate   Start pericolace and miralax  4/01 Add dulcolax suppository      Urinary retention  4/02 Bladder scan greater than 574ml. In and out cath with 300ml urine obtained  Ratliff catheter placed      Left carotid artery occlusion  4/01 As noted on Carotid ultrasound  Will repeat BMP in am and monitor renal status- Plan CTA of head and neck once creatinine allowable  Add ASA and Statin   Continue plavix  4/02 Neurology recommended hold anticoagulation for 4 days post ischemic stroke and start ASA 81 mg daily.   No intervention for left ICA occlusion.     Liver lesion  4/01 Noted on Ct scan  Monitor as Outpatient      History of CVA in adulthood  4/01 Previously on Xarelto at home  4/02 Daughter reported patient with prior right sided weakness PTA and could only ambulate 50 feet at times    Hypoalbuminemia  4/01 Monitor    Moderately severe PCM    TARA (acute kidney injury)  4/01 Monitor  Trend BMP  Renal dose all medications  Add IVF  4/02 Creatinine continues to  "rise- 2.4 today. Urinary retention this am- Ratliff catheter ordered  Retroperitoneal ultrasound pending  4/4:  Creatinine stable  Cont ivf    Slightly better    Acute CVA (cerebrovascular accident)- multiple  4/01 Daughter reported Intermittent confusion since 1 pm yesterday   Ct scan of head abnormal showing signs of multiple areas of CVA  Daughter reports patient " missed a few doses of Xarelto" in the past few days  Cardiology notified  Neurology consulted- Hold off Iv heparin drip for now, Check MRI brain  CVA pathway initiated  4/02 Neurology recommended hold anticoagulation for 4 days post ischemic stroke and start ASA 81 mg daily. Etiology likely hypotension resulting in bilateral ischemic strokes. No intervention for left ICA occlusion and Target  - 160 mmHg to assist with perfusion on the left hemisphere.   4/4:  Cont to hold AC per neuro recs  Cont asa and statin   PT/OT  Placement likely needed  However patient has not been able to participate with PT  Should condition not improve in the next 24-48hrs  Will need to have goals of care discussion     Continue supportive care    Essential hypertension  4/01 Allow for permissive HTN for now  Prn hydralazine  4/02 Neurology recommended Target  - 160 mmHg to assist with perfusion on the left hemisphere.       VTE Risk Mitigation (From admission, onward)         Ordered     IP VTE HIGH RISK PATIENT  Once         04/01/22 1619     Place sequential compression device  Until discontinued         04/01/22 1619     Place HALIMA hose  Until discontinued         04/01/22 1616                Discharge Planning   JOSE:      Code Status: DNR   Is the patient medically ready for discharge?:     Reason for patient still in hospital (select all that apply): Patient unstable, Patient new problem, Patient trending condition, Laboratory test, Treatment, Imaging, Consult recommendations, PT / OT recommendations and Pending disposition  Discharge Plan A: Skilled Nursing " Facility      Seen and discussed with Dr. Cheatham and the ICU team  Condition: Critical  Prognosis: Guarded to poor        Critical care time spent on the evaluation and treatment of severe organ dysfunction, review of pertinent labs and imaging studies, discussions with consulting providers and discussions with patient/family: 45 minutes.      Tiffanie Jones MD  Department of Hospital Medicine   [facility] - Intensive Care ([facility])

## 2022-04-05 NOTE — ASSESSMENT & PLAN NOTE
4/4:  Currently in ICU on esmolol drip  Rate not controlled  Did not improve with digoxin  cardizem drip started   Cont to hold ac per neuro recs for 4 days after stroke    Converted to NSR, cards following

## 2022-04-05 NOTE — ASSESSMENT & PLAN NOTE
3/31 Seen by Cards- Demand ischemia- no ACS  4/02 Reported Complaints of chest pain- Repeat EKG and Trend troponins    Demand ischemia

## 2022-04-05 NOTE — PLAN OF CARE
04/05/22 1209   Post-Acute Status   Post-Acute Authorization Placement   Post-Acute Placement Status Referrals Sent   Discharge Plan   Discharge Plan A Skilled Nursing Facility     Discussed recommendations of SNF at discharge with daughter at bedside. Her 1st and 2nd choices are [facility] and [facility]. Referrals sent.

## 2022-04-05 NOTE — PLAN OF CARE
Assumed care of pt from GONZALO Sultana mid shift  POC reviewed with pt and family  Pt non verbal; pt withdraws from pain, but does not follow commands  Pt is Afib RVR on monitor; esmolol and cardizem gtt continued as ordered  Bicarb gtt infusing; IV abx administered as ordered  NGT placed by GONZALO Sultana this shift; tube feeds ordered and initiated; isosource 1.5 currently running at 20 ml/hr with goal rate of 45 ml/hr  4L NC  Ratliff in place;  ml this shift; no BM  Bed alarm in use; safety precautions maintained

## 2022-04-06 VITALS
OXYGEN SATURATION: 95 % | WEIGHT: 201.5 LBS | HEIGHT: 66 IN | BODY MASS INDEX: 32.38 KG/M2 | RESPIRATION RATE: 20 BRPM | SYSTOLIC BLOOD PRESSURE: 150 MMHG | TEMPERATURE: 98 F | HEART RATE: 74 BPM | DIASTOLIC BLOOD PRESSURE: 67 MMHG

## 2022-04-06 PROBLEM — R33.9 URINARY RETENTION: Status: RESOLVED | Noted: 2022-04-02 | Resolved: 2022-04-06

## 2022-04-06 PROBLEM — R79.89 TROPONIN LEVEL ELEVATED: Status: RESOLVED | Noted: 2022-03-31 | Resolved: 2022-04-06

## 2022-04-06 PROBLEM — K59.01 SLOW TRANSIT CONSTIPATION: Status: RESOLVED | Noted: 2022-03-31 | Resolved: 2022-04-06

## 2022-04-06 LAB
ANION GAP SERPL CALC-SCNC: 12 MMOL/L (ref 8–16)
BACTERIA BLD CULT: NORMAL
BASOPHILS # BLD AUTO: 0.06 K/UL (ref 0–0.2)
BASOPHILS NFR BLD: 0.3 % (ref 0–1.9)
BUN SERPL-MCNC: 43 MG/DL (ref 8–23)
CA-I BLDV-SCNC: 1.01 MMOL/L (ref 1.06–1.42)
CALCIUM SERPL-MCNC: 7.3 MG/DL (ref 8.7–10.5)
CHLORIDE SERPL-SCNC: 107 MMOL/L (ref 95–110)
CO2 SERPL-SCNC: 24 MMOL/L (ref 23–29)
CREAT SERPL-MCNC: 1.4 MG/DL (ref 0.5–1.4)
DIFFERENTIAL METHOD: ABNORMAL
EOSINOPHIL # BLD AUTO: 0.2 K/UL (ref 0–0.5)
EOSINOPHIL NFR BLD: 0.9 % (ref 0–8)
ERYTHROCYTE [DISTWIDTH] IN BLOOD BY AUTOMATED COUNT: 16.6 % (ref 11.5–14.5)
EST. GFR  (AFRICAN AMERICAN): 40 ML/MIN/1.73 M^2
EST. GFR  (NON AFRICAN AMERICAN): 35 ML/MIN/1.73 M^2
GLUCOSE SERPL-MCNC: 128 MG/DL (ref 70–110)
HCT VFR BLD AUTO: 28.4 % (ref 37–48.5)
HGB BLD-MCNC: 9 G/DL (ref 12–16)
IMM GRANULOCYTES # BLD AUTO: 0.25 K/UL (ref 0–0.04)
IMM GRANULOCYTES NFR BLD AUTO: 1.4 % (ref 0–0.5)
LYMPHOCYTES # BLD AUTO: 1.3 K/UL (ref 1–4.8)
LYMPHOCYTES NFR BLD: 7.1 % (ref 18–48)
MAGNESIUM SERPL-MCNC: 2 MG/DL (ref 1.6–2.6)
MCH RBC QN AUTO: 30.9 PG (ref 27–31)
MCHC RBC AUTO-ENTMCNC: 31.7 G/DL (ref 32–36)
MCV RBC AUTO: 98 FL (ref 82–98)
MONOCYTES # BLD AUTO: 3 K/UL (ref 0.3–1)
MONOCYTES NFR BLD: 16.3 % (ref 4–15)
NEUTROPHILS # BLD AUTO: 13.4 K/UL (ref 1.8–7.7)
NEUTROPHILS NFR BLD: 74 % (ref 38–73)
NRBC BLD-RTO: 0 /100 WBC
PATH INTERP FLD-IMP: NORMAL
PLATELET # BLD AUTO: 113 K/UL (ref 150–450)
PLATELET BLD QL SMEAR: ABNORMAL
PMV BLD AUTO: 10.2 FL (ref 9.2–12.9)
POTASSIUM SERPL-SCNC: 3.6 MMOL/L (ref 3.5–5.1)
RBC # BLD AUTO: 2.91 M/UL (ref 4–5.4)
SODIUM SERPL-SCNC: 143 MMOL/L (ref 136–145)
WBC # BLD AUTO: 18.15 K/UL (ref 3.9–12.7)

## 2022-04-06 PROCEDURE — 97530 THERAPEUTIC ACTIVITIES: CPT | Performed by: PHYSICAL THERAPIST

## 2022-04-06 PROCEDURE — 82330 ASSAY OF CALCIUM: CPT | Performed by: EMERGENCY MEDICINE

## 2022-04-06 PROCEDURE — 85025 COMPLETE CBC W/AUTO DIFF WBC: CPT | Performed by: INTERNAL MEDICINE

## 2022-04-06 PROCEDURE — 80048 BASIC METABOLIC PNL TOTAL CA: CPT | Performed by: INTERNAL MEDICINE

## 2022-04-06 PROCEDURE — 25000003 PHARM REV CODE 250: Performed by: FAMILY MEDICINE

## 2022-04-06 PROCEDURE — 63600175 PHARM REV CODE 636 W HCPCS: Performed by: INTERNAL MEDICINE

## 2022-04-06 PROCEDURE — 25000003 PHARM REV CODE 250: Performed by: INTERNAL MEDICINE

## 2022-04-06 PROCEDURE — 36415 COLL VENOUS BLD VENIPUNCTURE: CPT | Performed by: INTERNAL MEDICINE

## 2022-04-06 PROCEDURE — 63600175 PHARM REV CODE 636 W HCPCS: Performed by: FAMILY MEDICINE

## 2022-04-06 PROCEDURE — 27000221 HC OXYGEN, UP TO 24 HOURS

## 2022-04-06 PROCEDURE — 99233 SBSQ HOSP IP/OBS HIGH 50: CPT | Mod: ,,, | Performed by: INTERNAL MEDICINE

## 2022-04-06 PROCEDURE — 25000003 PHARM REV CODE 250: Performed by: EMERGENCY MEDICINE

## 2022-04-06 PROCEDURE — 99900035 HC TECH TIME PER 15 MIN (STAT)

## 2022-04-06 PROCEDURE — 63600175 PHARM REV CODE 636 W HCPCS: Performed by: PHYSICIAN ASSISTANT

## 2022-04-06 PROCEDURE — 94761 N-INVAS EAR/PLS OXIMETRY MLT: CPT

## 2022-04-06 PROCEDURE — 99233 PR SUBSEQUENT HOSPITAL CARE,LEVL III: ICD-10-PCS | Mod: ,,, | Performed by: INTERNAL MEDICINE

## 2022-04-06 PROCEDURE — 25000003 PHARM REV CODE 250: Performed by: PHYSICIAN ASSISTANT

## 2022-04-06 PROCEDURE — 63600175 PHARM REV CODE 636 W HCPCS: Performed by: EMERGENCY MEDICINE

## 2022-04-06 PROCEDURE — 36415 COLL VENOUS BLD VENIPUNCTURE: CPT | Performed by: EMERGENCY MEDICINE

## 2022-04-06 PROCEDURE — 83735 ASSAY OF MAGNESIUM: CPT | Performed by: INTERNAL MEDICINE

## 2022-04-06 RX ORDER — IPRATROPIUM BROMIDE AND ALBUTEROL SULFATE 2.5; .5 MG/3ML; MG/3ML
3 SOLUTION RESPIRATORY (INHALATION) EVERY 4 HOURS PRN
Qty: 75 ML | Refills: 0
Start: 2022-04-06 | End: 2023-04-06

## 2022-04-06 RX ORDER — DILTIAZEM HCL 1 MG/ML
0-15 INJECTION, SOLUTION INTRAVENOUS CONTINUOUS
Status: DISCONTINUED | OUTPATIENT
Start: 2022-04-06 | End: 2022-04-06

## 2022-04-06 RX ORDER — IPRATROPIUM BROMIDE AND ALBUTEROL SULFATE 2.5; .5 MG/3ML; MG/3ML
3 SOLUTION RESPIRATORY (INHALATION) EVERY 4 HOURS PRN
Qty: 75 ML | Refills: 0 | Status: SHIPPED | OUTPATIENT
Start: 2022-04-06 | End: 2022-04-06 | Stop reason: SDUPTHER

## 2022-04-06 RX ORDER — GABAPENTIN 250 MG/5ML
100 SOLUTION ORAL EVERY 8 HOURS
Status: DISCONTINUED | OUTPATIENT
Start: 2022-04-06 | End: 2022-04-06 | Stop reason: HOSPADM

## 2022-04-06 RX ORDER — DILTIAZEM HYDROCHLORIDE 5 MG/ML
10 INJECTION INTRAVENOUS ONCE
Status: COMPLETED | OUTPATIENT
Start: 2022-04-06 | End: 2022-04-06

## 2022-04-06 RX ORDER — MORPHINE SULFATE 2 MG/ML
2 INJECTION, SOLUTION INTRAMUSCULAR; INTRAVENOUS EVERY 4 HOURS PRN
Status: DISCONTINUED | OUTPATIENT
Start: 2022-04-06 | End: 2022-04-06 | Stop reason: HOSPADM

## 2022-04-06 RX ADMIN — MULTIVITAMIN 15 ML: LIQUID ORAL at 08:04

## 2022-04-06 RX ADMIN — MUPIROCIN: 20 OINTMENT TOPICAL at 08:04

## 2022-04-06 RX ADMIN — LEVOTHYROXINE SODIUM 112 MCG: 0.11 TABLET ORAL at 05:04

## 2022-04-06 RX ADMIN — MORPHINE SULFATE 2 MG: 2 INJECTION, SOLUTION INTRAMUSCULAR; INTRAVENOUS at 07:04

## 2022-04-06 RX ADMIN — Medication 2.5 MG/HR: at 03:04

## 2022-04-06 RX ADMIN — AMIODARONE HYDROCHLORIDE 1 MG/MIN: 1.8 INJECTION, SOLUTION INTRAVENOUS at 06:04

## 2022-04-06 RX ADMIN — SODIUM CHLORIDE 3 G: 9 INJECTION, SOLUTION INTRAVENOUS at 11:04

## 2022-04-06 RX ADMIN — POLYETHYLENE GLYCOL 3350 17 G: 17 POWDER, FOR SOLUTION ORAL at 08:04

## 2022-04-06 RX ADMIN — SODIUM BICARBONATE: 84 INJECTION, SOLUTION INTRAVENOUS at 07:04

## 2022-04-06 RX ADMIN — DOCUSATE SODIUM AND SENNOSIDES 2 TABLET: 8.6; 5 TABLET, FILM COATED ORAL at 08:04

## 2022-04-06 RX ADMIN — GABAPENTIN 100 MG: 250 SOLUTION ORAL at 08:04

## 2022-04-06 RX ADMIN — ASPIRIN 81 MG CHEWABLE TABLET 81 MG: 81 TABLET CHEWABLE at 08:04

## 2022-04-06 RX ADMIN — DILTIAZEM HYDROCHLORIDE 30 MG: 30 TABLET, FILM COATED ORAL at 11:04

## 2022-04-06 RX ADMIN — SODIUM CHLORIDE 3 G: 9 INJECTION, SOLUTION INTRAVENOUS at 03:04

## 2022-04-06 RX ADMIN — DILTIAZEM HYDROCHLORIDE 30 MG: 30 TABLET, FILM COATED ORAL at 06:04

## 2022-04-06 RX ADMIN — DILTIAZEM HYDROCHLORIDE 10 MG: 5 INJECTION, SOLUTION INTRAVENOUS at 03:04

## 2022-04-06 RX ADMIN — MORPHINE SULFATE 2 MG: 2 INJECTION, SOLUTION INTRAMUSCULAR; INTRAVENOUS at 05:04

## 2022-04-06 RX ADMIN — AMIODARONE HYDROCHLORIDE 0.5 MG/MIN: 1.8 INJECTION, SOLUTION INTRAVENOUS at 11:04

## 2022-04-06 RX ADMIN — DILTIAZEM HYDROCHLORIDE 30 MG: 30 TABLET, FILM COATED ORAL at 05:04

## 2022-04-06 RX ADMIN — METOPROLOL TARTRATE 25 MG: 25 TABLET, FILM COATED ORAL at 08:04

## 2022-04-06 RX ADMIN — AMIODARONE HYDROCHLORIDE 150 MG: 1.5 INJECTION, SOLUTION INTRAVENOUS at 05:04

## 2022-04-06 NOTE — ASSESSMENT & PLAN NOTE
"4/01 Daughter reported Intermittent confusion since 1 pm yesterday   Ct scan of head abnormal showing signs of multiple areas of CVA  Daughter reports patient " missed a few doses of Xarelto" in the past few days  Cardiology notified  Neurology consulted- Hold off Iv heparin drip for now, Check MRI brain  CVA pathway initiated  4/02 Neurology recommended hold anticoagulation for 4 days post ischemic stroke and start ASA 81 mg daily. Etiology likely hypotension resulting in bilateral ischemic strokes. No intervention for left ICA occlusion and Target  - 160 mmHg to assist with perfusion on the left hemisphere.   4/4:  Cont to hold AC per neuro recs  Cont asa and statin   PT/OT  Placement likely needed  However patient has not been able to participate with PT  Should condition not improve in the next 24-48hrs  Will need to have goals of care discussion     Continue supportive care    Appears not making any progress- heaving towards PVS, no QOL. Await palliative meeting with hospice today  "

## 2022-04-06 NOTE — ASSESSMENT & PLAN NOTE
4/4:  Currently in ICU on esmolol drip  Rate not controlled  Did not improve with digoxin  cardizem drip started   Cont to hold ac per neuro recs for 4 days after stroke    Converted to NSR, cards following    Back and forth between Afib and NSR over nite, on amiodarone

## 2022-04-06 NOTE — PLAN OF CARE
04/06/22 1458   Post-Acute Status   Post-Acute Authorization Hospice   Hospice Status Referrals Sent   Discharge Plan   Discharge Plan A Hospice/home     Palliative RN met with family. [facility] chosen. Referral sent.

## 2022-04-06 NOTE — ASSESSMENT & PLAN NOTE
3/31 LLL Pneumonia with Parapneumonic effusion seen on Renal CT  R.o Infrahilar mass with CT Chest later  Started on Levaquin and continued on O2, Nebs,   Add IS and Acapella  Consult Pulmonology in am  4/02 Continue current treatment. Abnormal Ct scan concerning for Lung mass  4/3: CT concerning for mass, will check procal; will need outpatient lung biopsy    4/4:  WBC trending up  LLL pleural effusion noted  Although procal normal  Due to persistent leukocytosis  Will treat empirically for pna  Start unasyn    Await Thoracentesis and possibly check cytology to r/o malignancy      S/P thoracentesis with 1000 cc Parapneumonic fluid removed, appears a little better

## 2022-04-06 NOTE — PLAN OF CARE
POC reviewed with pt. Pt voided spontaneously before due to void window. Placed purewick for incontinence. Pt had bowel movement this shift. With cleaning turns pt converted back into Afib rvr. Gave 10mg cardizem and stat cardizem drip. Pt continues to tolerate tube feeds. Will continue to monitor pt.

## 2022-04-06 NOTE — PT/OT/SLP PROGRESS
Speech Language Pathology      Ana Castañeda  MRN: [id number]    Pt remains lethargic, not following directions, Respiratory rate in the 30's with open mouth breathing.  She is not appropriate for ST at this time.  Palliative Medicine consulted.  ST to follow-up.  Eden Buck, ARELI-SLP

## 2022-04-06 NOTE — PT/OT/SLP PROGRESS
Physical Therapy  Treatment    Ana Castañeda   MRN: [id number]   Admitting Diagnosis: Atrial flutter    PT Received On: 04/06/22  PT Start Time: 1125     PT Stop Time: 1135    PT Total Time (min): 10 min       Billable Minutes:  Therapeutic Activity 10 min    Treatment Type: Treatment  PT/PTA: PT     PTA Visit Number: 0       General Precautions: Standard, aspiration, fall  Orthopedic Precautions: N/A   Braces: N/A  Respiratory Status: Nasal cannula, flow 3 L/min         Subjective:  Communicated with Nurse Reymundo and [facility] chart review prior to session.  Pt found supine in bed with HOB elevated. No family in room at this time.     Pain/Comfort  Pain Rating 1: 0/10  Pain Rating Post-Intervention 1: 0/10    Objective:   Patient found with: peripheral IV, telemetry, pulse ox (continuous), oxygen, blood pressure cuff, NG tube, stinson catheter    Functional Mobility:  Therapeutic Activities and Exercises:  PT performed PROM to (B) LE 1 x 10 reps with pt in bed: AP, heel slides, SLR, hip abd/ add.  Pt with no active participation throughout session.       AM-PAC 6 CLICK MOBILITY  How much help from another person does this patient currently need?   1 = Unable, Total/Dependent Assistance  2 = A lot, Maximum/Moderate Assistance  3 = A little, Minimum/Contact Guard/Supervision  4 = None, Modified [place]/Independent    Turning over in bed (including adjusting bedclothes, sheets and blankets)?: 1  Sitting down on and standing up from a chair with arms (e.g., wheelchair, bedside commode, etc.): 1  Moving from lying on back to sitting on the side of the bed?: 1  Moving to and from a bed to a chair (including a wheelchair)?: 1  Need to walk in hospital room?: 1  Climbing 3-5 steps with a railing?: 1  Basic Mobility Total Score: 6    AM-PAC Raw Score CMS G-Code Modifier Level of Impairment Assistance   6 % Total / Unable   7 - 9 CM 80 - 100% Maximal Assist   10 - 14 CL 60 - 80% Moderate Assist   15 - 19 CK 40 - 60% Moderate  Assist   20 - 22 CJ 20 - 40% Minimal Assist   23 CI 1-20% SBA / CGA   24 CH 0% Independent/ Mod I     Patient left HOB elevated with all lines intact, call button in reach and Nurse Reymundo notified.    Assessment:  Ana Castañeda is a 86 y.o. female with a medical diagnosis of Atrial flutter and presents with impaired functional mobility. Pt will benefit from continued skilled PT in order to address the listed impairments.    Rehab identified problem list/impairments: Rehab identified problem list/impairments: weakness, impaired endurance, impaired self care skills, impaired functional mobilty, impaired balance, decreased coordination, decreased upper extremity function, decreased lower extremity function, decreased safety awareness, decreased ROM, impaired coordination, edema, impaired cardiopulmonary response to activity    Rehab potential is poor.    Activity tolerance: Poor    Discharge recommendations: Discharge Facility/Level of Care Needs: nursing facility, skilled, nursing facility, basic     Barriers to discharge:  UNKNOWN    Equipment recommendations: Equipment Needed After Discharge: other (see comments) (TBD)     GOALS:   Multidisciplinary Problems     Physical Therapy Goals        Problem: Physical Therapy    Goal Priority Disciplines Outcome Goal Variances Interventions   Physical Therapy Goal     PT, PT/OT Ongoing, Not Progressing     Description: LT22  1. PT WILL COMPLETE BED MOBILITY WITH MAX A  2. PT WILL STAND T/F TO CHAIR WITH RW AND MAX A  3. PT WILL FOLLOW CUES FOR ROM TE X 10 REPS B LE AROM                   PLAN:    Patient to be seen 3 x/week  to address the above listed problems via therapeutic activities, therapeutic exercises, wheelchair management/training  Plan of Care expires: 22  Plan of Care reviewed with: patient         2022

## 2022-04-06 NOTE — EICU
Pt went back to A.fib with -160 /60. Pt was started on Cardizem and meto po.  Cardizem 10 mg and qtt ordered, am labs stat ordered.  D/w RN    5.29 am Pt slowed down and briefly converted back to sinus then return to a.flutter  160  2:1 conduction..Will d/c cardizem, start amio- as previous pt responded to it. K, Mg wnl on am labs.  CHEO/w RN

## 2022-04-06 NOTE — PROGRESS NOTES
[facility])  Cardiology  Progress Note    Patient Name: Ana Castañeda  MRN: [id number]  Admission Date: 3/31/2022  Hospital Length of Stay: 6 days  Code Status: DNR   Attending Physician: Tiffanie Jones MD   Primary Care Physician: Stephen Tarango MD  Expected Discharge Date:   Principal Problem:Atrial flutter    Subjective:     Hospital Course:   7 yo female, cardiology consult for afib/AFL with RVR  PMH PAF on xeratlo, sthma, HTN, h/o CVA with residual right-sided weakness, s/p CEA  Went to ER deu to leg pain and found afl with RVR in ER and started Cardizem gtt. No wHR at 175 bpm. Denied chest pain dyspnea palpitation and dizziness  Echo in  normal EF   EKG today A flutter  Troponin 0.05 and   Cr 1.2 and HGB 12.6  CXR left PNA  Abd CT showed left pleural effusion, gallbladder stone and mild diverticulosis    4/1/22-Patient seen and examined today, resting in bed. Lethargic/confused. SR during exam. CT of head + for acute infarcts. Neurology following. MRI pending. Creatinine bumped to 1.8, K 5.2. Echo showed EF of 50%.    04/02/2022--SR in AM and developed AFL with 2 to 1 conduction HR at 160 bpm. Brain MRI showed acute CVA. Neurology on board. Pt some confusion and no chest hellen dyspnea and palpitation. Eliquis is on hold per neurology advise.     04/3/22 in afib with RVR and transferred to icu for esmolol gtt and d/c cardizem gtt. The labs reviewed. Xarelto is on hold per neurology recommendation    4/4/22-Patient seen and examined today, resting in bed. Family at bedside. Lethargic. Dyspneic. Remains in afib/flutter with RVR. BP stable, cardizem drip initiated. Assess response. Labs reviewed. Creatinine 2.0.     4/5/22-Patient seen and examined today. Remains lethargic/unresponsive. Converted to SR, HR in 70's during exam, will transition meds to po. Labs reviewed.     4/6/22-Patient seen and examined today, daughter at bedside. Remains lethargic, did not follow commands or  respond to questions although daughter reported she opened her eyes earlier this AM while being moved. Converted to afib overnight, amiodarone gtt initiated by ECU, back in SR with HR in 60's during exam. No acute CV status changes. Needs AC on board when ok with neurology. Labs reviewed.          Review of Systems   Unable to perform ROS: Patient unresponsive   Objective:     Vital Signs (Most Recent):  Temp: 98.4 °F (36.9 °C) (04/06/22 0730)  Pulse: 67 (04/06/22 1115)  Resp: (!) 33 (04/06/22 1115)  BP: (!) 150/72 (04/06/22 1115)  SpO2: 97 % (04/06/22 1115)   Vital Signs (24h Range):  Temp:  [97.7 °F (36.5 °C)-98.7 °F (37.1 °C)] 98.4 °F (36.9 °C)  Pulse:  [] 67  Resp:  [23-62] 33  SpO2:  [95 %-100 %] 97 %  BP: ()/() 150/72     Weight: 91.4 kg (201 lb 8 oz)  Body mass index is 32.52 kg/m².     SpO2: 97 %  O2 Device (Oxygen Therapy): nasal cannula      Intake/Output Summary (Last 24 hours) at 4/6/2022 1129  Last data filed at 4/6/2022 1100  Gross per 24 hour   Intake 3576.07 ml   Output 360 ml   Net 3216.07 ml       Lines/Drains/Airways       Drain  Duration                  NG/OG Tube 04/04/22 1135 14 Fr. Right nostril 1 day    Female External Urinary Catheter 04/06/22 0300 <1 day              Peripheral Intravenous Line  Duration                  Peripheral IV - Single Lumen 04/02/22 2000 18 G Anterior;Right Upper Arm 3 days         Peripheral IV - Single Lumen 04/04/22 1848 22 G Left;Posterior Hand 1 day         Peripheral IV - Single Lumen 04/04/22 1848 22 G Posterior;Right Hand 1 day                    Physical Exam  Vitals and nursing note reviewed.   Constitutional:       General: She is not in acute distress.     Appearance: She is well-developed. She is ill-appearing. She is not diaphoretic.      Comments: On supplemental O2   HENT:      Head: Normocephalic and atraumatic.      Nose:      Comments: NG tube in place  Eyes:      General:         Right eye: No discharge.         Left eye: No  discharge.      Pupils: Pupils are equal, round, and reactive to light.   Neck:      Thyroid: No thyromegaly.      Vascular: No JVD.      Trachea: No tracheal deviation.   Cardiovascular:      Rate and Rhythm: Normal rate and regular rhythm.      Heart sounds: Normal heart sounds, S1 normal and S2 normal. No murmur heard.  Pulmonary:      Effort: Pulmonary effort is normal. No respiratory distress.      Breath sounds: No wheezing.   Abdominal:      General: There is no distension.      Tenderness: There is no rebound.   Musculoskeletal:      Cervical back: Neck supple.      Right lower leg: No edema.      Left lower leg: No edema.   Skin:     General: Skin is warm and dry.      Findings: No erythema.   Neurological:      Mental Status: She is alert.      Comments: Lethargic, did not follow commands or respond to questions       Significant Labs: CMP   Recent Labs   Lab 04/05/22  0628 04/06/22  0405    143   K 3.7 3.6    107   CO2 17* 24   * 128*   BUN 48* 43*   CREATININE 1.9* 1.4   CALCIUM 7.7* 7.3*   ALBUMIN 2.4*  --    ANIONGAP 16 12   ESTGFRAFRICA 28* 40*   EGFRNONAA 24* 35*   , CBC   Recent Labs   Lab 04/05/22  0628 04/06/22  0405   WBC 17.15* 18.15*   HGB 9.1* 9.0*   HCT 28.1* 28.4*   * 113*   , Troponin No results for input(s): TROPONINI in the last 48 hours., and All pertinent lab results from the last 24 hours have been reviewed.    Significant Imaging: Echocardiogram: Transthoracic echo (TTE) complete (Cupid Only):   Results for orders placed or performed during the hospital encounter of 03/31/22   Echo   Result Value Ref Range    BSA 1.92 m2    TDI SEPTAL 0.08 m/s    LV LATERAL E/E' RATIO 11.75 m/s    LV SEPTAL E/E' RATIO 11.75 m/s    LA WIDTH 2.75 cm    IVC diameter 1.63 cm    Left Ventricular Outflow Tract Mean Velocity 0.78514465954 cm/s    Left Ventricular Outflow Tract Mean Gradient 1.05 mmHg    TDI LATERAL 0.08 m/s    LVIDd 3.10 (A) 3.5 - 6.0 cm    IVS 1.42 (A) 0.6 - 1.1 cm     Posterior Wall 1.28 (A) 0.6 - 1.1 cm    Ao root annulus 2.92 cm    LVIDs 1.93 (A) 2.1 - 4.0 cm    FS 38 28 - 44 %    LA volume 19.49 cm3    Sinus 2.75 cm    STJ 2.46 cm    Ascending aorta 2.49 cm    LV mass 138.15 g    LA size 2.78 cm    TAPSE 1.82 cm    Left Ventricle Relative Wall Thickness 0.83 cm    AV mean gradient 6 mmHg    AV valve area 1.12 cm2    AV Velocity Ratio 0.44     AV index (prosthetic) 0.46     MV valve area p 1/2 method 4.31 cm2    E/A ratio 1.24     Mean e' 0.08 m/s    E wave deceleration time 176.13986696334953 msec    IVRT 57.564346116123699 msec    LVOT diameter 1.76 cm    LVOT area 2.4 cm2    LVOT peak messi 0.66 m/s    LVOT peak VTI 18.30 cm    Ao peak messi 1.50 m/s    Ao VTI 39.7 cm    RVOT peak messi 0.54 m/s    RVOT peak VTI 14.1 cm    Mr max messi 5.30 m/s    LVOT stroke volume 44.50 cm3    AV peak gradient 9 mmHg    PV mean gradient 0.67 mmHg    E/E' ratio 11.75 m/s    MV Peak E Messi 0.94 m/s    TR Max Messi 3.15 m/s    MV stenosis pressure 1/2 time 51.227162011 ms    MV Peak A Messi 0.76 m/s    LV Systolic Volume 11.55 mL    LV Systolic Volume Index 6.1 mL/m2    LV Diastolic Volume 37.98 mL    LV Diastolic Volume Index 20.10 mL/m2    LA Volume Index 10.3 mL/m2    LV Mass Index 73 g/m2    Echo EF Estimated 70 %    RA Major Axis 3.26 cm    Left Atrium Minor Axis 2.75 cm    Left Atrium Major Axis 3.30 cm    Triscuspid Valve Regurgitation Peak Gradient 40 mmHg    RA Width 2.58 cm    Right Atrial Pressure (from IVC) 8 mmHg    EF 50 %    TV rest pulmonary artery pressure 48 mmHg    Narrative    · Concentric remodeling and low normal systolic function.  · Intermediate central venous pressure (8 mmHg).  · The estimated PA systolic pressure is 48 mmHg.  · The estimated ejection fraction is 50%.  · Indeterminate left ventricular diastolic function.  · With normal right ventricular systolic function.      , EKG: Reviewed, and X-Ray: CXR: X-Ray Chest 1 View (CXR):   Results for orders placed or performed  during the hospital encounter of 03/31/22   X-Ray Chest 1 View    Narrative    EXAMINATION:  XR CHEST 1 VIEW    CLINICAL HISTORY:  NG placement;    TECHNIQUE:  Single frontal view of the chest was performed.    COMPARISON:  None    FINDINGS:  NG tube present with the tip in the region of the pylorus.  Increasing left-sided pleural effusion when compared to the prior chest x-ray dated 03/31/2022.      Impression    NG tube in good position with the tip in the region the pylorus of the stomach.      Electronically signed by: Meng Fletcher MD  Date:    04/04/2022  Time:    11:35    and X-Ray Chest PA and Lateral (CXR): No results found for this visit on 03/31/22.    Assessment and Plan:   Patient who presents with aflutter with RVR/CVA. Back in aflutter overnight, converted on amiodarone drip. Remains unresponsive. Continue same meds/mgmt. Needs AC on board when ok with neurology.    * Atrial flutter with RVR  AFIb/AFL with RVR    -d/c cardizem  -start amio bouls and gtt  -add metoprol as needed for high BP  -continue xarelto 20 mg daily and pt states that she took Xarelto faithfully and only off yesterday and today  -keep NPO after mn and consider NOMAN/DCCv if still afib with RVR in AM  -check TSH    4/1/22  -SR this AM  -Amiodarone d/c'd, continue BB  -Xarelto on hold given acute CVA, neurology following    4/2/22  On AFL with RVR. eliquis is on hold due to acute CVA per neurology advise. It is contraindicated for DCCV and amio gtt  BP dropped to 110 mmHG when on cardizem gtt at 10 mg/hr  Advise to d/c cardizem and started esmolol gtt in ICU  Add dioxin 250 mcg ivp x1 now    04/03/22  afib /AFL with RVR  Continue esmolol gtt  Add digoxin 250 mcg ivpx1 now    4/4/22  -HR remains uncontrolled, refractory to esmolol/digoxin  -BP stable this AM, add cardizem gtt  -Resume AC when ok from neurology standpoint    4/5/22  -Converted to SR, HR in 70's this AM  -Stop cardizem gtt, switch to po 30 mg q 6 hours  -Stop Esmolol gtt,  start Lopressor 25 mg BID  -Resume AC when ok from neurology standpoint    4/6/22  -Converted back to afib/aflutter overnight, amiodarone gtt initiated, now back in SR  -Continue po CCB, BB as tolerated  -Needs AC on board when ok with neurology  -Can transition to po amiodarone once infusion completed    Left carotid artery occlusion  -Mgmt as per hospital medicine/vasular      TARA (acute kidney injury)  -Mgmt as per hospital medicine    Acute CVA (cerebrovascular accident)- multiple  -Neurology consulted  -Resume Xarelto when ok from neurology standpoint    Troponin level elevated  Demand ischemia >>> NSTEMI/ACS    rx for AFL with RVR    Essential hypertension  See above note        VTE Risk Mitigation (From admission, onward)         Ordered     IP VTE HIGH RISK PATIENT  Once         04/01/22 1619     Place sequential compression device  Until discontinued         04/01/22 1619     Place HALIMA hose  Until discontinued         04/01/22 1616                Susan Alcocer PA-C  Cardiology  [facility] ([facility])

## 2022-04-06 NOTE — SUBJECTIVE & OBJECTIVE
Interval History: Remains lethargic, non verbal, does not follow commands or respond to questions, appears a little more comfortable since yesterday after the Thoracentesis which revealed Parapneumonic effusion. Getting TF via NGT- her breathing appears gasping/increase work of breathing, moribund. Palliative has been consulted as pt not making any progress and they are meeting the family this afternoon. Converted to afib overnight, amiodarone gtt initiated by eICU, back in SR with HR in 60's this am. Cards recommends AC on board when ok with neurology but pt likely heading towards PVS with trach and Peg vs hospice.    Review of Systems   Unable to perform ROS: Mental status change   Objective:     Vital Signs (Most Recent):  Temp: 98.2 °F (36.8 °C) (04/06/22 1115)  Pulse: 66 (04/06/22 1330)  Resp: (!) 45 (04/06/22 1330)  BP: 138/68 (04/06/22 1330)  SpO2: 97 % (04/06/22 1330)   Vital Signs (24h Range):  Temp:  [97.7 °F (36.5 °C)-98.7 °F (37.1 °C)] 98.2 °F (36.8 °C)  Pulse:  [] 66  Resp:  [25-64] 45  SpO2:  [95 %-100 %] 97 %  BP: ()/() 138/68     Weight: 91.4 kg (201 lb 8 oz)  Body mass index is 32.52 kg/m².    Intake/Output Summary (Last 24 hours) at 4/6/2022 1414  Last data filed at 4/6/2022 1300  Gross per 24 hour   Intake 3606.66 ml   Output 225 ml   Net 3381.66 ml      Physical Exam  Constitutional:       General: She is not in acute distress.     Appearance: She is not diaphoretic.      Comments: Lethargic   HENT:      Head: Atraumatic.      Mouth/Throat:      Mouth: Mucous membranes are dry.   Eyes:      General:         Right eye: No discharge.         Left eye: No discharge.      Conjunctiva/sclera: Conjunctivae normal.      Comments: Left pupil > right pupil   Cardiovascular:      Rate and Rhythm: Tachycardia present. Rhythm irregular.      Pulses: Normal pulses.   Pulmonary:      Effort: Pulmonary effort is normal. No respiratory distress.      Breath sounds: No rhonchi.      Comments:   Diminished DIAMANTE  Abdominal:      General: Bowel sounds are normal. There is no distension.      Palpations: Abdomen is soft.      Tenderness: There is no abdominal tenderness. There is no guarding.   Genitourinary:     Comments: Not examined  Musculoskeletal:      Cervical back: Normal range of motion and neck supple. No rigidity or tenderness.      Right lower leg: No edema.      Left lower leg: No edema.   Skin:     General: Skin is warm and dry.      Capillary Refill: Capillary refill takes less than 2 seconds.      Comments: Decreased skin turgor   Neurological:      Comments: Lethargic  Responds appropriately to simple commands at times   Psychiatric:      Comments: Dash and cooperative       Significant Labs: All pertinent labs within the past 24 hours have been reviewed.  ABGs:   Blood Culture:   BMP:   Recent Labs   Lab 04/06/22  0405   *      K 3.6      CO2 24   BUN 43*   CREATININE 1.4   CALCIUM 7.3*   MG 2.0     CBC:   Recent Labs   Lab 04/05/22  0628 04/06/22  0405   WBC 17.15* 18.15*   HGB 9.1* 9.0*   HCT 28.1* 28.4*   * 113*     Lactic Acid:   Magnesium:   Recent Labs   Lab 04/05/22  0628 04/06/22  0405   MG 2.0 2.0     Respiratory Culture:     Significant Imaging: I have reviewed all pertinent imaging results/findings within the past 24 hours.

## 2022-04-06 NOTE — PROGRESS NOTES
Pt is not making progress in terms of stroke recovery. Appreciate input from the palliative care team. Plan to transition to home hospice.    Grady Creek MD [facility]

## 2022-04-06 NOTE — CARE UPDATE
Advance Care Planning   [facility] ([facility])  Palliative Care RN      Patient Name: Ana Castañeda  MRN: [id number]  Admission Date: 3/31/2022  Hospital Length of Stay: 6 days  Code Status: DNR   Attending Provider: Tiffanie Jones MD  Primary Care Physician: Stephen Tarango MD  Principal Problem:Atrial flutter    Met with daughter Wendy at bedside to introduce role of Palliative Medicine.  Mrs. Castañeda is  and has 4 adult children: Wendy, Demetrio, Kimberly [facility]) and Rina (RN who lives in [place]).  Wendy stated that she sees her mother has no QOL at this point and verbalized that her father has asked about what the Yaz[employer] wendy believes in regards to discontinuing artificial nutrition, as she currently has a NGT for enteral nutrition.  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  Wendy stated her sister Kimberly will be off of work at 1330 and we planned that I will F/U at 1400 to arrange for family meeting once she is able to speak to all of her siblings.  Per Wendy, her father has been struggling with the decline Mrs. Castañeda has had leaning heavily on his wendy and she would like her brother Demetrio to be present when EOL care is discussed with her father.  Updated attending, CC-Pulmonologist, bedside RN and HAYLEY Perez RN, Palliative Care   [phone]

## 2022-04-06 NOTE — PLAN OF CARE
Patient VSS throughout shift with amio gtt continued per cardiology. Palliative consult placed and family meeting done at bedside. Plan to DC home with home hospice care. Consents signed and orders placed. Dr Jones to speak with family regarding any medications that will be sent prescription upon DC. Patient remains somnolent, only grimacing/moaning and withdrawing from pain/stimuli. Will remove NGT and DC tube feeds per Dr Last. Plan to leave PIVs in place until DC for medication administration purposes. Family at bedside. Last rites performed.     Problem: Adult Inpatient Plan of Care  Goal: Plan of Care Review  Outcome: Ongoing, Progressing  Goal: Patient-Specific Goal (Individualized)  Outcome: Ongoing, Progressing  Goal: Absence of Hospital-Acquired Illness or Injury  Outcome: Ongoing, Progressing  Goal: Optimal Comfort and Wellbeing  Outcome: Ongoing, Progressing  Goal: Readiness for Transition of Care  Outcome: Ongoing, Progressing

## 2022-04-06 NOTE — ASSESSMENT & PLAN NOTE
AFIb/AFL with RVR    -d/c cardizem  -start amio bouls and gtt  -add metoprol as needed for high BP  -continue xarelto 20 mg daily and pt states that she took Xarelto faithfully and only off yesterday and today  -keep NPO after mn and consider NOMAN/DCCv if still afib with RVR in AM  -check TSH    4/1/22  -SR this AM  -Amiodarone d/c'd, continue BB  -Xarelto on hold given acute CVA, neurology following    4/2/22  On AFL with RVR. eliquis is on hold due to acute CVA per neurology advise. It is contraindicated for DCCV and amio gtt  BP dropped to 110 mmHG when on cardizem gtt at 10 mg/hr  Advise to d/c cardizem and started esmolol gtt in ICU  Add dioxin 250 mcg ivp x1 now    04/03/22  afib /AFL with RVR  Continue esmolol gtt  Add digoxin 250 mcg ivpx1 now    4/4/22  -HR remains uncontrolled, refractory to esmolol/digoxin  -BP stable this AM, add cardizem gtt  -Resume AC when ok from neurology standpoint    4/5/22  -Converted to SR, HR in 70's this AM  -Stop cardizem gtt, switch to po 30 mg q 6 hours  -Stop Esmolol gtt, start Lopressor 25 mg BID  -Resume AC when ok from neurology standpoint    4/6/22  -Converted back to afib/aflutter overnight, amiodarone gtt initiated, now back in SR  -Continue po CCB, BB as tolerated  -Needs AC on board when ok with neurology  -Can transition to po amiodarone once infusion completed

## 2022-04-06 NOTE — PT/OT/SLP PROGRESS
"Occupational Therapy  Treatment    Ana Castañeda   MRN: [id number]   Admitting Diagnosis: Atrial flutter    OT Date of Treatment: 04/06/22   OT Start Time: 1135  OT Stop Time: 1145  OT Total Time (min): 10 min    Billable Minutes:  Therapeutic Activity 10 min    OT/EMELY: OT     EMELY Visit Number: 0    General Precautions: Standard, aspiration, fall  Orthopedic Precautions: N/A  Braces: N/A  Respiratory Status: Nasal cannula, flow 3 L/min         Subjective:  Communicated with Nurse Reymundo and [facility] chart review prior to session.  Pt found supine in bed with HOB elevated. No family in room at this time.   Pain/Comfort  Pain Rating 1: 0/10  Pain Rating Post-Intervention 1: 0/10    Objective:  Patient found with: peripheral IV, telemetry, pulse ox (continuous), oxygen, blood pressure cuff, NG tube, stinson catheter     Functional Mobility:  Therapeutic Activities and Exercises:  OT performed PROM to (B) UE 1 x 10 reps with pt in bed: shoulder flex/ ext, shoulder abd/ add, elbow flex/ ext, forearm sup/ pronation, wrist flex/ ext, finger flex/ ext. Pt with no active participation.     AM-PAC 6 CLICK ADL   How much help from another person does this patient currently need?   1 = Unable, Total/Dependent Assistance  2 = A lot, Maximum/Moderate Assistance  3 = A little, Minimum/Contact Guard/Supervision  4 = None, Modified [place]/Independent    Putting on and taking off regular lower body clothing? : 1  Bathing (including washing, rinsing, drying)?: 1  Toileting, which includes using toilet, bedpan, or urinal? : 1  Putting on and taking off regular upper body clothing?: 1  Taking care of personal grooming such as brushing teeth?: 1  Eating meals?: 1  Daily Activity Total Score: 6     AM-PAC Raw Score CMS "G-Code Modifier Level of Impairment Assistance   6 % Total / Unable   7 - 8 CM 80 - 100% Maximal Assist   9-13 CL 60 - 80% Moderate Assist   14 - 19 CK 40 - 60% Moderate Assist   20 - 22 CJ 20 - 40% Minimal Assist   23 CI " 1-20% SBA / CGA   24 CH 0% Independent/ Mod I       Patient left HOB elevated with all lines intact, call button in reach and Nurse Reymundo notified    ASSESSMENT:  Ana Castañeda is a 86 y.o. female with a medical diagnosis of Atrial flutter and presents with impaired functional mobility and ADLs. Pt will benefit from continued skilled OT services in order to address the listed impairments.    Rehab identified problem list/impairments: Rehab identified problem list/impairments: weakness, impaired endurance, impaired self care skills, impaired functional mobilty, impaired balance, decreased coordination, decreased upper extremity function, decreased lower extremity function, decreased safety awareness, decreased ROM, impaired coordination, edema, impaired cardiopulmonary response to activity    Rehab potential is poor.    Activity tolerance: Poor    Discharge recommendations: Discharge Facility/Level of Care Needs: nursing facility, skilled, nursing facility, basic     Barriers to discharge:  UNKNOWN    Equipment recommendations: other (see comments) (TBD)     GOALS:   Multidisciplinary Problems     Occupational Therapy Goals        Problem: Occupational Therapy    Goal Priority Disciplines Outcome Interventions   Occupational Therapy Goal     OT, PT/OT Ongoing, Not Progressing    Description: O.T. GOALS TO BEE MET BY 4-16-22  MOD A WITH UE DRESSING  PT WILL TOLERATE 1 SET X 10 REPS B UE AAROM EXERCISE  MOD A WITH SUPINE< SIT TRANSFER                   Plan:  Patient to be seen 2 x/week to address the above listed problems via self-care/home management, therapeutic activities, therapeutic exercises  Plan of Care expires: 04/16/22  Plan of Care reviewed with: patient         04/06/2022

## 2022-04-06 NOTE — PROGRESS NOTES
Pharmacist Renal Dose Adjustment Note    Ana Castañeda is a 86 y.o. female being treated with the medication Ampicillin/sulbactam    Patient Data:    Vital Signs (Most Recent):  Temp: 98.4 °F (36.9 °C) (04/06/22 0730)  Pulse: 73 (04/06/22 0740)  Resp: (!) 32 (04/06/22 0740)  BP: (!) 148/65 (04/06/22 0730)  SpO2: 97 % (04/06/22 0740)   Vital Signs (72h Range):  Temp:  [97.7 °F (36.5 °C)-98.9 °F (37.2 °C)]   Pulse:  []   Resp:  [16-62]   BP: ()/()   SpO2:  [92 %-100 %]      Recent Labs   Lab 04/04/22  0823 04/05/22  0628 04/06/22  0405   CREATININE 2.0* 1.9* 1.4     Serum creatinine: 1.4 mg/dL 04/06/22 0405  Estimated creatinine clearance: 34 mL/min    Medication:Ampicillin/sulbactam dose: 3g frequency q12h will be changed to medication:Ampicillin/sulbactam dose:3g frequency:q8h    Pharmacist's Name: Sol Driver  Pharmacist's Extension: [phone]

## 2022-04-06 NOTE — SUBJECTIVE & OBJECTIVE
Review of Systems   Unable to perform ROS: Patient unresponsive   Objective:     Vital Signs (Most Recent):  Temp: 98.4 °F (36.9 °C) (04/06/22 0730)  Pulse: 67 (04/06/22 1115)  Resp: (!) 33 (04/06/22 1115)  BP: (!) 150/72 (04/06/22 1115)  SpO2: 97 % (04/06/22 1115)   Vital Signs (24h Range):  Temp:  [97.7 °F (36.5 °C)-98.7 °F (37.1 °C)] 98.4 °F (36.9 °C)  Pulse:  [] 67  Resp:  [23-62] 33  SpO2:  [95 %-100 %] 97 %  BP: ()/() 150/72     Weight: 91.4 kg (201 lb 8 oz)  Body mass index is 32.52 kg/m².     SpO2: 97 %  O2 Device (Oxygen Therapy): nasal cannula      Intake/Output Summary (Last 24 hours) at 4/6/2022 1129  Last data filed at 4/6/2022 1100  Gross per 24 hour   Intake 3576.07 ml   Output 360 ml   Net 3216.07 ml       Lines/Drains/Airways       Drain  Duration                  NG/OG Tube 04/04/22 1135 14 Fr. Right nostril 1 day    Female External Urinary Catheter 04/06/22 0300 <1 day              Peripheral Intravenous Line  Duration                  Peripheral IV - Single Lumen 04/02/22 2000 18 G Anterior;Right Upper Arm 3 days         Peripheral IV - Single Lumen 04/04/22 1848 22 G Left;Posterior Hand 1 day         Peripheral IV - Single Lumen 04/04/22 1848 22 G Posterior;Right Hand 1 day                    Physical Exam  Vitals and nursing note reviewed.   Constitutional:       General: She is not in acute distress.     Appearance: She is well-developed. She is ill-appearing. She is not diaphoretic.      Comments: On supplemental O2   HENT:      Head: Normocephalic and atraumatic.      Nose:      Comments: NG tube in place  Eyes:      General:         Right eye: No discharge.         Left eye: No discharge.      Pupils: Pupils are equal, round, and reactive to light.   Neck:      Thyroid: No thyromegaly.      Vascular: No JVD.      Trachea: No tracheal deviation.   Cardiovascular:      Rate and Rhythm: Normal rate and regular rhythm.      Heart sounds: Normal heart sounds, S1 normal and  S2 normal. No murmur heard.  Pulmonary:      Effort: Pulmonary effort is normal. No respiratory distress.      Breath sounds: No wheezing.   Abdominal:      General: There is no distension.      Tenderness: There is no rebound.   Musculoskeletal:      Cervical back: Neck supple.      Right lower leg: No edema.      Left lower leg: No edema.   Skin:     General: Skin is warm and dry.      Findings: No erythema.   Neurological:      Mental Status: She is alert.      Comments: Lethargic, did not follow commands or respond to questions       Significant Labs: CMP   Recent Labs   Lab 04/05/22  0628 04/06/22  0405    143   K 3.7 3.6    107   CO2 17* 24   * 128*   BUN 48* 43*   CREATININE 1.9* 1.4   CALCIUM 7.7* 7.3*   ALBUMIN 2.4*  --    ANIONGAP 16 12   ESTGFRAFRICA 28* 40*   EGFRNONAA 24* 35*   , CBC   Recent Labs   Lab 04/05/22 0628 04/06/22  0405   WBC 17.15* 18.15*   HGB 9.1* 9.0*   HCT 28.1* 28.4*   * 113*   , Troponin No results for input(s): TROPONINI in the last 48 hours., and All pertinent lab results from the last 24 hours have been reviewed.    Significant Imaging: Echocardiogram: Transthoracic echo (TTE) complete (Cupid Only):   Results for orders placed or performed during the hospital encounter of 03/31/22   Echo   Result Value Ref Range    BSA 1.92 m2    TDI SEPTAL 0.08 m/s    LV LATERAL E/E' RATIO 11.75 m/s    LV SEPTAL E/E' RATIO 11.75 m/s    LA WIDTH 2.75 cm    IVC diameter 1.63 cm    Left Ventricular Outflow Tract Mean Velocity 0.58237252214 cm/s    Left Ventricular Outflow Tract Mean Gradient 1.05 mmHg    TDI LATERAL 0.08 m/s    LVIDd 3.10 (A) 3.5 - 6.0 cm    IVS 1.42 (A) 0.6 - 1.1 cm    Posterior Wall 1.28 (A) 0.6 - 1.1 cm    Ao root annulus 2.92 cm    LVIDs 1.93 (A) 2.1 - 4.0 cm    FS 38 28 - 44 %    LA volume 19.49 cm3    Sinus 2.75 cm    STJ 2.46 cm    Ascending aorta 2.49 cm    LV mass 138.15 g    LA size 2.78 cm    TAPSE 1.82 cm    Left Ventricle Relative Wall  Thickness 0.83 cm    AV mean gradient 6 mmHg    AV valve area 1.12 cm2    AV Velocity Ratio 0.44     AV index (prosthetic) 0.46     MV valve area p 1/2 method 4.31 cm2    E/A ratio 1.24     Mean e' 0.08 m/s    E wave deceleration time 176.08051978350473 msec    IVRT 57.908414895802362 msec    LVOT diameter 1.76 cm    LVOT area 2.4 cm2    LVOT peak messi 0.66 m/s    LVOT peak VTI 18.30 cm    Ao peak messi 1.50 m/s    Ao VTI 39.7 cm    RVOT peak messi 0.54 m/s    RVOT peak VTI 14.1 cm    Mr max messi 5.30 m/s    LVOT stroke volume 44.50 cm3    AV peak gradient 9 mmHg    PV mean gradient 0.67 mmHg    E/E' ratio 11.75 m/s    MV Peak E Messi 0.94 m/s    TR Max Messi 3.15 m/s    MV stenosis pressure 1/2 time 51.262775071 ms    MV Peak A Messi 0.76 m/s    LV Systolic Volume 11.55 mL    LV Systolic Volume Index 6.1 mL/m2    LV Diastolic Volume 37.98 mL    LV Diastolic Volume Index 20.10 mL/m2    LA Volume Index 10.3 mL/m2    LV Mass Index 73 g/m2    Echo EF Estimated 70 %    RA Major Axis 3.26 cm    Left Atrium Minor Axis 2.75 cm    Left Atrium Major Axis 3.30 cm    Triscuspid Valve Regurgitation Peak Gradient 40 mmHg    RA Width 2.58 cm    Right Atrial Pressure (from IVC) 8 mmHg    EF 50 %    TV rest pulmonary artery pressure 48 mmHg    Narrative    · Concentric remodeling and low normal systolic function.  · Intermediate central venous pressure (8 mmHg).  · The estimated PA systolic pressure is 48 mmHg.  · The estimated ejection fraction is 50%.  · Indeterminate left ventricular diastolic function.  · With normal right ventricular systolic function.      , EKG: Reviewed, and X-Ray: CXR: X-Ray Chest 1 View (CXR):   Results for orders placed or performed during the hospital encounter of 03/31/22   X-Ray Chest 1 View    Narrative    EXAMINATION:  XR CHEST 1 VIEW    CLINICAL HISTORY:  NG placement;    TECHNIQUE:  Single frontal view of the chest was performed.    COMPARISON:  None    FINDINGS:  NG tube present with the tip in the region of  the pylorus.  Increasing left-sided pleural effusion when compared to the prior chest x-ray dated 03/31/2022.      Impression    NG tube in good position with the tip in the region the pylorus of the stomach.      Electronically signed by: Meng Fletcher MD  Date:    04/04/2022  Time:    11:35    and X-Ray Chest PA and Lateral (CXR): No results found for this visit on 03/31/22.

## 2022-04-06 NOTE — ASSESSMENT & PLAN NOTE
3/31 As seen on CT Abd Renal stones with mild stranding- should be covered by Levaquin  Treat with Dulcolax and Mag Citrate   Start pericolace and miralax  4/01 Add dulcolax suppository    resolved

## 2022-04-06 NOTE — PROGRESS NOTES
[facility] - Intensive Care ([facility] Medicine  Progress Note    Patient Name: Ana Castañeda  MRN: [id number]  Patient Class: IP- Inpatient   Admission Date: 3/31/2022  Length of Stay: 6 days  Attending Physician: Tiffanie Jones MD  Primary Care Provider: Stephen Tarango MD        Subjective:     Principal Problem:Atrial flutter        HPI:  Ana Castañeda is a 86 y.o. 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 The pain is described as crampy/spasms. No associated NVD, fever or chills. She had a UTI which was treated with oral Cipro 2 weeks ago. She denies any CP, SOB, dizziness, weakness, numbness, and all other sxs at this time. daughter states that when she has been feeling tired and not eating drinking much as well for last few days nidhi after she finished her cipro dose and her R sided weakness has got worse. In the ER, initial VSS, Afebrile, Sats 95% on RA. CXr showed Left infrahilar pneumonia. On CT Abd/Pelvis Renal Stone showed moderate left pleural effusion with infiltrate/consolidation left lung base.  Possible perihilar lung mass. Dedicated CT chest would be helpful, if clinically warranted.  2. Hypodensities within the liver most likely representing cysts.  3. Punctate gallstones or sludge within the gallbladder.  4. Very small hiatal hernia.  Mild diverticulosis.  5. Hysterectomy.    She was also found to be in A Fluttter w - she was given IV Cardizem 15 mg bolus and then started on titrating Cardizem gtt and hence admitted to ICU initially. Cards evaluated the pt and d/hira Cardizem and started her on Amiodarone gtt after bolus and also started metoprol with improved heart rate. Pt felt much better and was downgraded to Tele. She also received IV Levaquin 750 mg in  the ER.     She had a similar admission here in 2019 when she was admitted to ICU on Bipap, with acute hypoxemic resp failure sec to LLL Pneumonia and Afib w RVR and Lactic Acidosis.         Overview/Hospital Course:  The patient was monitored closely during her stay. She was kept on continuous telemetry monitoring. Patient's heart rate remained stable however she was noted to have intermittent confusion with lethargy. A Ct scan was done with areas of acute CVAs noted. Neurology was consulted. MRI of brain, bilateral carotid ultrasound, and echocardiogram was ordered. Patient was placed NPO. She was given IVF for hydration. PT, OT, and ST were consulted.   4/02 Neurology recommended to hold anticoagulation for 4 days post ischemic stroke and start ASA 81 mg daily. Etiology likely hypotension resulting in bilateral ischemic strokes. No intervention for left ICA occlusion and Target  - 160 mmHg to assist with perfusion on the left hemisphere. Ct scan of chest without contrast showed Left lower lobe mass or evidence of consolidation extending to the left hilum.  Suspect left hilar adenopathy with enlarged mediastinal lymph nodes as described.  Mildly prominent left supraclavicular node with questionable abnormal nodes in the left axilla.  Findings are concerning for underlying neoplastic process. Findings discussed with daughter, Wendy. 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 Retrograde flow noted within the mid left ICA. Less than 50% stenosis of the right carotid bifurcation was noted. TARA noted. Continue IVF. Some urinary retention noted and stinson catheter placed. Check retroperitoneal ultrasound. Patient back into afib/aflutter with RVR. Iv cardizem given.   4/3: transferred to ICU for afib RVR. Currently on esmolol drip. Rate not controlled. Will discuss with cardiology on plans.   4/4: still in afib, not controlled on esmolol  drip and digoxin push. Cardizem drip started by cardiology. Patient still altered. NG tube for temporary tube feedings. Should condition not improve in the next 1-2 days, family may transition to comfort care. Metabolic acidosis noted. Will start bicarb drip.   4/5- B ischemic Strokes since admission noted- remains lethargic, unresponsive, on Bicarb gtt. Converted to NSR, Cardiology following. Dr. Cheatham evaluating her for Thoracentesis. WBC 17.5, H/H 9/27, Bun/Cr 48/1.9, PO2 2.0, on Unasyn. CT Chest showed possible Neoplasm. Family reportedly considering comfort care as well if she does not turn around soon.   4/6- Remains lethargic, non verbal, does not follow commands or respond to questions, appears a little more comfortable since yesterday after the Thoracentesis which revealed Parapneumonic effusion. Getting TF via NGT- her breathing appears gasping/increase work of breathing, moribund. Palliative has been consulted as pt not making any progress and they are meeting the family this afternoon. Converted to afib overnight, amiodarone gtt initiated by eICU, back in SR with HR in 60's this am. Cards recommends AC on board when ok with neurology but pt likely heading towards PVS with trach and Peg vs hospice.      Interval History: Remains lethargic, non verbal, does not follow commands or respond to questions, appears a little more comfortable since yesterday after the Thoracentesis which revealed Parapneumonic effusion. Getting TF via NGT- her breathing appears gasping/increase work of breathing, moribund. Palliative has been consulted as pt not making any progress and they are meeting the family this afternoon. Converted to afib overnight, amiodarone gtt initiated by eICU, back in SR with HR in 60's this am. Cards recommends AC on board when ok with neurology but pt likely heading towards PVS with trach and Peg vs hospice.    Review of Systems   Unable to perform ROS: Mental status change   Objective:     Vital  Signs (Most Recent):  Temp: 98.2 °F (36.8 °C) (04/06/22 1115)  Pulse: 66 (04/06/22 1330)  Resp: (!) 45 (04/06/22 1330)  BP: 138/68 (04/06/22 1330)  SpO2: 97 % (04/06/22 1330)   Vital Signs (24h Range):  Temp:  [97.7 °F (36.5 °C)-98.7 °F (37.1 °C)] 98.2 °F (36.8 °C)  Pulse:  [] 66  Resp:  [25-64] 45  SpO2:  [95 %-100 %] 97 %  BP: ()/() 138/68     Weight: 91.4 kg (201 lb 8 oz)  Body mass index is 32.52 kg/m².    Intake/Output Summary (Last 24 hours) at 4/6/2022 1414  Last data filed at 4/6/2022 1300  Gross per 24 hour   Intake 3606.66 ml   Output 225 ml   Net 3381.66 ml      Physical Exam  Constitutional:       General: She is not in acute distress.     Appearance: She is not diaphoretic.      Comments: Lethargic   HENT:      Head: Atraumatic.      Mouth/Throat:      Mouth: Mucous membranes are dry.   Eyes:      General:         Right eye: No discharge.         Left eye: No discharge.      Conjunctiva/sclera: Conjunctivae normal.      Comments: Left pupil > right pupil   Cardiovascular:      Rate and Rhythm: Tachycardia present. Rhythm irregular.      Pulses: Normal pulses.   Pulmonary:      Effort: Pulmonary effort is normal. No respiratory distress.      Breath sounds: No rhonchi.      Comments:  Diminished DIAMANTE  Abdominal:      General: Bowel sounds are normal. There is no distension.      Palpations: Abdomen is soft.      Tenderness: There is no abdominal tenderness. There is no guarding.   Genitourinary:     Comments: Not examined  Musculoskeletal:      Cervical back: Normal range of motion and neck supple. No rigidity or tenderness.      Right lower leg: No edema.      Left lower leg: No edema.   Skin:     General: Skin is warm and dry.      Capillary Refill: Capillary refill takes less than 2 seconds.      Comments: Decreased skin turgor   Neurological:      Comments: Lethargic  Responds appropriately to simple commands at times   Psychiatric:      Comments: Dash and cooperative        Significant Labs: All pertinent labs within the past 24 hours have been reviewed.  ABGs:   Blood Culture:   BMP:   Recent Labs   Lab 04/06/22  0405   *      K 3.6      CO2 24   BUN 43*   CREATININE 1.4   CALCIUM 7.3*   MG 2.0     CBC:   Recent Labs   Lab 04/05/22  0628 04/06/22  0405   WBC 17.15* 18.15*   HGB 9.1* 9.0*   HCT 28.1* 28.4*   * 113*     Lactic Acid:   Magnesium:   Recent Labs   Lab 04/05/22  0628 04/06/22  0405   MG 2.0 2.0     Respiratory Culture:     Significant Imaging: I have reviewed all pertinent imaging results/findings within the past 24 hours.      Assessment/Plan:      * Atrial flutter with RVR  4/4:  Currently in ICU on esmolol drip  Rate not controlled  Did not improve with digoxin  cardizem drip started   Cont to hold ac per neuro recs for 4 days after stroke    Converted to NSR, cards following    Back and forth between Afib and NSR over nite, on amiodarone    Pneumonia due to infectious organism- Possible Mass  3/31 LLL Pneumonia with Parapneumonic effusion seen on Renal CT  R.o Infrahilar mass with CT Chest later  Started on Levaquin and continued on O2, Nebs,   Add IS and Acapella  Consult Pulmonology in am  4/02 Continue current treatment. Abnormal Ct scan concerning for Lung mass  4/3: CT concerning for mass, will check procal; will need outpatient lung biopsy    4/4:  WBC trending up  LLL pleural effusion noted  Although procal normal  Due to persistent leukocytosis  Will treat empirically for pna  Start unasyn    Await Thoracentesis and possibly check cytology to r/o malignancy      S/P thoracentesis with 1000 cc Parapneumonic fluid removed, appears a little better    Urinary retention  4/02 Bladder scan greater than 574ml. In and out cath with 300ml urine obtained  Ratliff catheter placed      Left carotid artery occlusion  4/01 As noted on Carotid ultrasound  Will repeat BMP in am and monitor renal status- Plan CTA of head and neck once creatinine  "allowable  Add ASA and Statin   Continue plavix  4/02 Neurology recommended hold anticoagulation for 4 days post ischemic stroke and start ASA 81 mg daily.   No intervention for left ICA occlusion.     Liver lesion  4/01 Noted on Ct scan  Monitor as Outpatient      History of CVA in adulthood  4/01 Previously on Xarelto at home  4/02 Daughter reported patient with prior right sided weakness PTA and could only ambulate 50 feet at times    Hypoalbuminemia  4/01 Monitor    Moderately severe PCM    TARA (acute kidney injury)  4/01 Monitor  Trend BMP  Renal dose all medications  Add IVF  4/02 Creatinine continues to rise- 2.4 today. Urinary retention this am- Ratliff catheter ordered  Retroperitoneal ultrasound pending  4/4:  Creatinine stable  Cont ivf    Slightly better    Acute CVA (cerebrovascular accident)- multiple  4/01 Daughter reported Intermittent confusion since 1 pm yesterday   Ct scan of head abnormal showing signs of multiple areas of CVA  Daughter reports patient " missed a few doses of Xarelto" in the past few days  Cardiology notified  Neurology consulted- Hold off Iv heparin drip for now, Check MRI brain  CVA pathway initiated  4/02 Neurology recommended hold anticoagulation for 4 days post ischemic stroke and start ASA 81 mg daily. Etiology likely hypotension resulting in bilateral ischemic strokes. No intervention for left ICA occlusion and Target  - 160 mmHg to assist with perfusion on the left hemisphere.   4/4:  Cont to hold AC per neuro recs  Cont asa and statin   PT/OT  Placement likely needed  However patient has not been able to participate with PT  Should condition not improve in the next 24-48hrs  Will need to have goals of care discussion     Continue supportive care    Appears not making any progress- heaving towards PVS, no QOL. Await palliative meeting with hospice today    Essential hypertension  4/01 Allow for permissive HTN for now  Prn hydralazine  4/02 Neurology recommended " Target  - 160 mmHg to assist with perfusion on the left hemisphere.       VTE Risk Mitigation (From admission, onward)         Ordered     IP VTE HIGH RISK PATIENT  Once         04/01/22 1619     Place sequential compression device  Until discontinued         04/01/22 1619     Place HALIMA hose  Until discontinued         04/01/22 1616                Discharge Planning   JOSE:      Code Status: DNR   Is the patient medically ready for discharge?:     Reason for patient still in hospital (select all that apply): Patient trending condition, Laboratory test, Treatment, Imaging, Consult recommendations and PT / OT recommendations  Discharge Plan A: Skilled Nursing Facility        Seen and discussed with Dr. Cheatham and the ICU team  Condition: Critical  Prognosis: Guarded to poor      Critical care time spent on the evaluation and treatment of severe organ dysfunction, review of pertinent labs and imaging studies, discussions with consulting providers and discussions with patient/family: 41 minutes.      Tiffanie Jones MD  Department of Hospital Medicine   '[place] - Intensive Care ([facility])

## 2022-04-06 NOTE — CONSULTS
Advance Care Planning   [facility] ([facility])  Palliative Care RN      Patient Name: Ana Castañeda  MRN: [id number]  Admission Date: 3/31/2022  Hospital Length of Stay: 6 days  Code Status: DNR   Attending Provider: Tiffanie Jones MD  Primary Care Physician: Stephen Tarango MD  Principal Problem:Atrial flutter    Advance Care Planning     Date: 04/06/2022    Today a meeting took place: bedside    Patient Participation: Patient is unable to participate     Attendees (Name and  Relationship to patient): Health care power of : Kimberly Lr (daughter) [phone], Elsi Gilmar (), Demetrio Schafer (son), Wendy eRginaldo (daughter), Xochilt Mcclure (grnad-daughter via phone)    Staff attendees (Name and  Role): Myself    ACP Conversation (General): Understanding of current condition Family able to verbalize understanding of pt's decline despite interventions    Code Status: DNR; status confirmed     ACP Documents: Reviewed current existing digital forms    Goals of care: The family and healthcare power of   endorses that what is most important right now is to focus on spending time at home and comfort and QOL .  Reviewed with family concern of medical team that despite interventions, pt continues to decline and is at the EOL with the recommendation to transition to comfort focused care per pt's wishes to not be prolonged with no meaningful chance of recovery.  Wendy shared with me in our earlier visit that it took her mother 6 years to accept the fact that she was WC bound following her first CVA and the thought of her prolonging in the condition she is currently would not be acceptable to her.  Mr. Castañeda was appropriately tearful as he stated he and Mrs. Castañeda have been  66 years and while it was difficult to accept, he wanted what was best for Mrs. Castañeda and would not want her to suffer.  Wendy, Demetrio and Kimberly agreed, but did ask for the attending to meet at the  bedside to further review.  Dr. Jones met with family and further explained the recommendation for redirection to comfort focused care.  Greatest concern was the discontinuation of artificial hydration and feeding in line with the [employer] wendy, which is supported when no longer beneficial for the individual.  Family expressed appreciation to the team and PM for their support during this difficult time. I discussed the philosophy of hospice, providing supportive care services to maximize quality of life and comfort care at the end of life. I also discussed the services available with hospice including but not limited to: CNA visit up to an hour a day usually every other day, RN visit usually every other day, MD to oversee care, 24hr phone number to call with questions or concerns, comfort medications, DME, access to GIP unit for acute symptom management, access to respite care, , SW, volunteer program, and bereavement support.  I reviewed in detail that comfort care would begin prior to DC and would include: stopping IV drips, removing NGT, stopping TF, discontinuing lab draws, and the administration of medications for symptom burden.  The family was in agreement to transition to comfort at this time with plan to bring pt home with the assistance of [facility].  Preference obtained and GONZALO Alexander, CM updated who will send referral.  Plan is for DC once consents have been signed and DME/meds are in the home.  Will need [facility] ambulance arranged.  Amaya with [facility] is en route to bedside for consents.  Updated CC Pulmonologist and bedside RN.      Accordingly, we have decided that the best plan to meet the patient's goals includes enrolling in hospice care.     Thank you for allowing Palliative Medicine to participate in the care of Mrs. Ana Castañeda.    GONZALO Perez, Palliative Care   [phone]

## 2022-04-07 NOTE — NURSING
[facility] arrived [facility] Belongings with family. Pt does not seem to be in distress upon discharge. Pt going home to hospice care with Jun.

## 2022-04-07 NOTE — PLAN OF CARE
[facility] (Hospital)  Discharge Final Note    Primary Care Provider: Stephen Tarango MD    Expected Discharge Date: 4/6/2022    Final Discharge Note (most recent)     Final Note - 04/07/22 0816        Final Note    Assessment Type Final Discharge Note     Anticipated Discharge Disposition Hospice/Home     Hospital Resources/Appts/Education Provided Post-Acute resouces added to AVS        Post-Acute Status    Discharge Delays None known at this time                 Important Message from Medicare           DC disposition: home with hospice   Family notified: family ay bedside   Transportation: Valorie Ramirez RN

## 2022-04-08 NOTE — DISCHARGE SUMMARY
[facility] ([facility])  [facility] Medicine  Discharge Summary      Patient Name: Ana Castañeda  MRN: [id number]  Patient Class: IP- Inpatient  Admission Date: 3/31/2022  Hospital Length of Stay: 6 days  Discharge Date and Time:  04/08/2022 12:40 PM  Attending Physician: No att. providers found   Discharging Provider: Tiffanie Jones MD  Primary Care Provider: Stephen Tarango MD      HPI:   Ana Castañeda is a 86 y.o. 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 The pain is described as crampy/spasms. No associated NVD, fever or chills. She had a UTI which was treated with oral Cipro 2 weeks ago. She denies any CP, SOB, dizziness, weakness, numbness, and all other sxs at this time. daughter states that when she has been feeling tired and not eating drinking much as well for last few days nidhi after she finished her cipro dose and her R sided weakness has got worse. In the ER, initial VSS, Afebrile, Sats 95% on RA. CXr showed Left infrahilar pneumonia. On CT Abd/Pelvis Renal Stone showed moderate left pleural effusion with infiltrate/consolidation left lung base.  Possible perihilar lung mass. Dedicated CT chest would be helpful, if clinically warranted.  2. Hypodensities within the liver most likely representing cysts.  3. Punctate gallstones or sludge within the gallbladder.  4. Very small hiatal hernia.  Mild diverticulosis.  5. Hysterectomy.    She was also found to be in A Fluttter w - she was given IV Cardizem 15 mg bolus and then started on titrating Cardizem gtt and hence admitted to ICU initially. Cards evaluated the pt and d/hira Cardizem and started her on Amiodarone gtt after bolus and also started metoprol with improved heart rate. Pt felt much better and was downgraded  to Tele. She also received IV Levaquin 750 mg in the ER.     She had a similar admission here in 2019 when she was admitted to ICU on Bipap, with acute hypoxemic resp failure sec to LLL Pneumonia and Afib w RVR and Lactic Acidosis.         * No surgery found *      Hospital Course:   The patient was monitored closely during her stay. She was kept on continuous telemetry monitoring. Patient's heart rate remained stable however she was noted to have intermittent confusion with lethargy. A Ct scan was done with areas of acute CVAs noted. Neurology was consulted. MRI of brain, bilateral carotid ultrasound, and echocardiogram was ordered. Patient was placed NPO. She was given IVF for hydration. PT, OT, and ST were consulted.   4/02 Neurology recommended to hold anticoagulation for 4 days post ischemic stroke and start ASA 81 mg daily. Etiology likely hypotension resulting in bilateral ischemic strokes. No intervention for left ICA occlusion and Target  - 160 mmHg to assist with perfusion on the left hemisphere. Ct scan of chest without contrast showed Left lower lobe mass or evidence of consolidation extending to the left hilum.  Suspect left hilar adenopathy with enlarged mediastinal lymph nodes as described.  Mildly prominent left supraclavicular node with questionable abnormal nodes in the left axilla.  Findings are concerning for underlying neoplastic process. Findings discussed with daughter, Wenyd. 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 Retrograde flow noted within the mid left ICA. Less than 50% stenosis of the right carotid bifurcation was noted. TARA noted. Continue IVF. Some urinary retention noted and stinson catheter placed. Check retroperitoneal ultrasound. Patient back into afib/aflutter with RVR. Iv cardizem given.   4/3: transferred to ICU for afib RVR. Currently on esmolol drip. Rate not controlled. Will discuss with  cardiology on plans.   4/4: still in afib, not controlled on esmolol drip and digoxin push. Cardizem drip started by cardiology. Patient still altered. NG tube for temporary tube feedings. Should condition not improve in the next 1-2 days, family may transition to comfort care. Metabolic acidosis noted. Will start bicarb drip.   4/5- B ischemic Strokes since admission noted- remains lethargic, unresponsive, on Bicarb gtt. Converted to NSR, Cardiology following. Dr. Cheatham evaluating her for Thoracentesis. WBC 17.5, H/H 9/27, Bun/Cr 48/1.9, PO2 2.0, on Unasyn. CT Chest showed possible Neoplasm. Family reportedly considering comfort care as well if she does not turn around soon.   4/6- Remains lethargic, non verbal, does not follow commands or respond to questions, appears a little more comfortable since yesterday after the Thoracentesis which revealed Parapneumonic effusion. Getting TF via NGT- her breathing appears gasping/increase work of breathing, moribund. Palliative has been consulted as pt not making any progress and they are meeting the family this afternoon. Converted to afib overnight, amiodarone gtt initiated by eICU, back in SR with HR in 60's this am. Cards recommends AC on board when ok with neurology but pt likely heading towards PVS with trach and Peg vs hospice.  Palliative team met with the family (, children) to discuss her condition and prognosis as she was not making any progress an her clinical condition was deteriorating since her B strokes. The family decided to transition to comfort with plan to bring pt home with the assistance of [facility] which was arranged the same day and family wished to her home the same evening. Pt was seen and examined and deemed appropriate for discharge home with [facility].        Goals of Care Treatment Preferences:  Code Status: DNR    Health care agent: Kimberly Lr (daughter) [phone], Elsi Schafer (), Demetrio Gilmar (son),  Wendy Hair (daughter), Xochilt Mcclure (grnad-daughter via phone)  Health care agent number: No value filed.          What is most important right now is to focus on spending time at home, comfort and QOL .  Accordingly, we have decided that the best plan to meet the patient's goals includes enrolling in hospice care.      Consults:   Consults (From admission, onward)        Status Ordering Provider     Inpatient consult to Palliative Care  Once        Provider:  Connie Lay NP    Completed NAKITA JOLLY     Inpatient consult to Registered Dietitian/Nutritionist  Once        Provider:  (Not yet assigned)    Completed BRANDON JACOBSON     IP consult to case management/social work  Once        Provider:  (Not yet assigned)    Completed BRANDON JACOBSON     Inpatient consult to Neurology  Once        Provider:  Juan Snow MD    Completed BRANDON JACOBSON     Inpatient consult to Pulmonology  Once        Provider:  Zuly Hull MD    Completed NAKITA JOLLY     Inpatient consult to Cardiology  Once        Provider:  Sundeep Molina MD    Completed RAMIREZ BEAR          No new Assessment & Plan notes have been filed under this hospital service since the last note was generated.  Service: Hospital Medicine    Final Active Diagnoses:    Diagnosis Date Noted POA    PRINCIPAL PROBLEM:  Atrial flutter with RVR [I48.92] 03/31/2022 Yes    Pneumonia due to infectious organism- Possible Mass [J18.9] 03/31/2022 Yes    Pleural effusion [J90]  Yes    Acute CVA (cerebrovascular accident)- multiple [I63.9] 04/01/2022 Yes    TARA (acute kidney injury) [N17.9] 04/01/2022 No    Hypoalbuminemia [E88.09] 04/01/2022 Yes    History of CVA in adulthood [Z86.73] 04/01/2022 Not Applicable    Liver lesion [K76.9] 04/01/2022 Yes    Left carotid artery occlusion [I65.22] 04/01/2022 Yes    Essential hypertension [I10] 11/17/2019 Yes     Chronic      Problems Resolved During this Admission:     Diagnosis Date Noted Date Resolved POA    Cystitis [N30.90] 03/31/2022 04/02/2022 Yes    Troponin level elevated [R77.8] 03/31/2022 04/06/2022 Yes    Slow transit constipation [K59.01] 03/31/2022 04/06/2022 Yes    PAF (paroxysmal atrial fibrillation) with chronic anticoagulation with apixaban [I48.0] 11/16/2019 04/03/2022 Yes    Urinary retention [R33.9] 04/02/2022 04/06/2022 Yes    Debility [R53.81] 04/01/2022 04/03/2022 Yes    Normocytic anemia [D64.9] 04/01/2022 04/04/2022 Yes    Hyperkalemia [E87.5] 04/01/2022 04/02/2022 Yes    Elevated bilirubin [R17] 04/01/2022 04/04/2022 Yes    Hiatal hernia- Small [K44.9] 04/01/2022 04/03/2022 Yes    Mass of left lung [R91.8] 04/01/2022 04/03/2022 Yes    Pleural effusion on left [J90] 04/01/2022 04/03/2022 Yes       Discharged Condition: poor    Disposition: Hospice/Home    Follow Up:    Patient Instructions:      Diet Adult Regular     Activity as tolerated       Significant Diagnostic Studies: Labs: All labs within the past 24 hours have been reviewed    Pending Diagnostic Studies:     Procedure Component Value Units Date/Time    Cytology, Fluid/Wash/Brush [[id number]] Collected: 04/05/22 1550    Order Status: Sent Lab Status: In process Updated: 04/05/22 1629         Medications:  Reconciled Home Medications:      Medication List      START taking these medications    albuterol-ipratropium 2.5 mg-0.5 mg/3 mL nebulizer solution  Commonly known as: DUO-NEB  Take 3 mLs by nebulization every 4 (four) hours as needed for Wheezing. Rescue        STOP taking these medications    albuterol 90 mcg/actuation inhaler  Commonly known as: PROVENTIL/VENTOLIN HFA     budesonide-formoterol 160-4.5 mcg 160-4.5 mcg/actuation Hfaa  Commonly known as: SYMBICORT     cetirizine 5 MG tablet  Commonly known as: ZYRTEC     gabapentin 100 MG capsule  Commonly known as: NEURONTIN     ipratropium 0.02 % nebulizer solution  Commonly known as: ATROVENT     levalbuterol 0.63 mg/3 mL  nebulizer solution  Commonly known as: XOPENEX     levothyroxine 112 MCG tablet  Commonly known as: SYNTHROID     XARELTO 20 mg Tab  Generic drug: rivaroxaban            Indwelling Lines/Drains at time of discharge:   Lines/Drains/Airways     None                 Time spent on the discharge of patient: 45 minutes      Tiffanie Jones MD  Department of Hospital Medicine  [facility] - Intensive Care ([facility])

## 2022-04-11 LAB
BACTERIA FLD AEROBE CULT: NO GROWTH
GRAM STN SPEC: NORMAL
GRAM STN SPEC: NORMAL

## 2022-04-21 LAB
COMMENT: ABNORMAL
FINAL PATHOLOGIC DIAGNOSIS: ABNORMAL
Lab: ABNORMAL

## 2022-10-04 NOTE — PLAN OF CARE
2022    Jillian Hebert DO  [facility] [address] [facility]    Patient: Yuri Ralph   YOB: 1962   Date of Visit: 10/4/2022     Encounter Diagnosis     ICD-10-CM    1  Acute back pain with sciatica, left  M54 42    2  Spinal stenosis of lumbosacral region  M48 07        Dear Dr Patrica Cobian: Thank you for your recent referral of Yuri Ralph  Please review the attached evaluation summary from Carolann's recent visit  Please verify that you agree with the plan of care by signing the attached order  If you have any questions or concerns, please do not hesitate to call  I sincerely appreciate the opportunity to share in the care of one of your patients and hope to have another opportunity to work with you in the near future  Sincerely,    Leonard Pereira, PT      Referring Provider:      I certify that I have read the below Plan of Care and certify the need for these services furnished under this plan of treatment while under my care  Jillian Hebert DO  [address] [address]  [place]          PT Evaluation     Today's date: 10/4/2022  Patient name: Yuri Ralph  : 1962  MRN: [id number]  Referring provider: Ele Valencia DO  Dx:   Encounter Diagnosis     ICD-10-CM    1  Acute back pain with sciatica, left  M54 42    2   Spinal stenosis of lumbosacral region  M48 07        Start Time: 0800  Stop Time: 0900  Total time in clinic (min): 60 minutes    Past Medical History:   Diagnosis Date    Asthma     Generalized anxiety disorder     GERD (gastroesophageal reflux disease)     Hemangioma of other sites     last assessed 16    Hypertension     Psoriatic arthritis ([facility] [address] )     Psoriatic arthritis ([facility] [address] )      Current Outpatient Medications on File Prior to Visit   Medication Sig Dispense Refill    albuterol (PROVENTIL HFA,VENTOLIN HFA) 90 mcg/act inhaler TAKE 2 PUFFS BY MOUTH EVERY 6 HOURS AS NEEDED FOR Continues with O.T. POC   WHEEZE OR FOR SHORTNESS OF BREATH 18 g 5    ALPRAZolam (XANAX) 0 25 mg tablet Take 1 tablet (0 25 mg total) by mouth 3 (three) times a day as needed for anxiety 90 tablet 0    diazepam (VALIUM) 5 mg tablet Take 1 tablet (5 mg total) by mouth every 6 (six) hours as needed for anxiety TAKE EVERY 4-6 HOURS PRN PAIN 30 tablet 0    gabapentin (NEURONTIN) 300 mg capsule Take 300 mg by mouth 3 (three) times a day      loratadine (CLARITIN) 10 mg tablet daily as needed        methocarbamol (Robaxin-750) 750 mg tablet Take 1 tablet (750 mg total) by mouth every 6 (six) hours as needed for muscle spasms 90 tablet 0    metoprolol succinate (TOPROL-XL) 25 mg 24 hr tablet TAKE 1 TABLET (25 MG TOTAL) BY MOUTH DAILY  90 tablet 1    omeprazole (PriLOSEC) 40 MG capsule TAKE 1 CAPSULE BY MOUTH EVERY DAY BEFORE BREAKFAST 90 capsule 4    predniSONE 2 5 mg tablet Take 2 5 mg by mouth as needed      traZODone (DESYREL) 50 mg tablet TAKE 1 TABLET BY MOUTH DAILY AT BEDTIME 90 tablet 3     No current facility-administered medications on file prior to visit  Allergies   Allergen Reactions    Diclofenac-Misoprostol GI Intolerance     Reaction Date: 20Jul2011;     Indomethacin GI Intolerance     Reaction Date: 20Jul2011;    Trisha Bolls Hives     Reaction Date: 20Jul2011;     Cetirizine Rash     Reaction Date: 20Jul2011;         Assessment  Assessment details: Patient reported no radicular symptoms below the L thigh throughout the evaluation today  There is noted hypermobility to the L1-4 segments with prone PA assessment  There was also noted hypomobility of the lower thoracic spine with hinging noted at the L3-4 segments during prone multifidus strength assessment  There is moderate hamstring compensation noted during prone him extension more notable to the L LE  She tolerated therapy interventions well today with reports of stiffness to the lumbar spine post therapy session today   There was good activation of the lumbar multifidus but noted substitution with lumbar extension and rotation  Instructed patient on performance of exercises for home program with verbal understanding noted  Impairments: abnormal or restricted ROM, activity intolerance, impaired physical strength, lacks appropriate home exercise program and pain with function    Symptom irritability: moderateUnderstanding of Dx/Px/POC: good   Prognosis: good    Goals  STGs:  "Patient will be independent with hep by 2-3 visits  Decrease low back pain by 25% for improved tolerance with adls and home duties by 3-4 weeks  Improve Lumbar rom to wfl for improved tolerance with adls and home duties by 3-4 weeks  "  Improve L gluteal strength to equal that of the contralateral side for improved tolerance with standing activities and adls by 3-4 weeks  LTGs:  Improve FOTO score from 42 To 59 Indicating improved tolerance with activities involving the lumbar spine by discharge  Patient will demonstrate rom and strength to the lumbar spine wfl for improved tolerance with adls and home duties by discharge  Patient will be free of radicular symptoms by discharge  "      Plan  Plan details: Patient informed that from this point forward, to ensure adherence to the aforementioned plan of care, all or some of the treatment may be performed and carried out by a Physical Therapy Assistant (PTA) with supervision from a licensed Physical Therapist (PT) in accordance with [address]  Patient will continue to benefit from skilled physical therapy to address the functional deficits that were identified during the evaluation today  We will continue to progress the therapy program to address these functional deficits and achieve the established goals            Patient would benefit from: skilled physical therapy  Planned modality interventions: cryotherapy and thermotherapy: hydrocollator packs  Planned therapy interventions: abdominal trunk stabilization, ADL retraining, body mechanics training, flexibility, functional ROM exercises, home exercise program, therapeutic exercise, therapeutic activities, stretching, strengthening, postural training, patient education, joint mobilization and manual therapy  Frequency: 2x week  Duration in weeks: 8  Plan of Care beginning date: 10/4/2022  Plan of Care expiration date: 2022  Treatment plan discussed with: patient        Subjective Evaluation    History of Present Illness  Mechanism of injury: Patient presents to out patient physical therapy with acute exacerbation of chronic LBP  She has a history of lumbar spinal fusion with recent flare up of symptoms  She was placed on a steroid which initially helped to control her symptoms but is now no longer helping  She reports that the pain she is currently experiencing is pain that she has not experienced before  She notes that the pain will travel from the L side of her back into the L groin and the L half of the pubis  She notes that bending increases her pain but her pain is present when she is sitting and laying down  Quality of life: good    Pain  Current pain ratin  At best pain ratin  At worst pain ratin  Location: Lumbar/L LE  Quality: radiating, burning, dull ache and throbbing  Relieving factors: medications and rest  Aggravating factors: lifting and standing (bending)    Social Support  Steps to enter house: yes  4  Stairs in house: yes   12  Lives in: multiple-level home  Lives with: spouse and adult children    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[employer])  Exercise history: Limited to at max 1/2 of a mile walking her dog  Treatments  Previous treatment: medication  Patient Goals  Patient goals for therapy: decreased pain and improved balance  Patient goal: Patient wishes to have less pain in her back and L hip/groin           Objective     Palpation   Left   Tenderness of the erector spinae  Neurological Testing     Sensation     Lumbar   Left   Intact: light touch    Right   Intact: light touch    Reflexes   Left   Patellar (L4): normal (2+)  Achilles (S1): normal (2+)  Clonus sign: negative    Right   Patellar (L4): normal (2+)  Achilles (S1): normal (2+)  Clonus sign: negative    Additional Neurological Details  1 beat clonus with quick stretch to the R ankle  Active Range of Motion     Lumbar   Flexion:  with pain Restriction level: minimal  Extension:  WFL  Left lateral flexion:  with pain Restriction level: moderate  Right lateral flexion:  Restriction level: minimal  Left rotation:  WFL  Right rotation:  [facility]    Joint Play     Hypermobile: L1, L2 and L3     Hypomobile: T8, T9, T10 and T11     Pain: L2, L3 and L4     Strength/Myotome Testing     Left Hip   Planes of Motion   Flexion: 3+  Extension: 4  Abduction: 4  Adduction: 4    Right Hip   Planes of Motion   Flexion: 4  Extension: 4  Abduction: 4  Adduction: 4    Left Knee   Flexion: 4  Extension: 4    Right Knee   Flexion: 4  Extension: 4    Left Ankle/Foot   Dorsiflexion: 4  Plantar flexion: 4    Right Ankle/Foot   Dorsiflexion: 4  Plantar flexion: 4    Tests     Lumbar     Left   Positive crossed SLR  Negative passive SLR  Right   Negative crossed SLR and passive SLR  Left Hip   Positive IBAN  Negative FADIR  Right Hip   Negative IBAN and FADIR                Precautions: osteopenia, Lumbar fusion surgery, psoriatic arthritis     Date 10/4       FOTO 42       Manuals        Seated thoracic extension with manual OP NV       Prone PA joint mobilizations to T9-12 NV                       Neuro Re-Ed        Standing hip extension with knee flexed NV                                                       Ther Ex        Clamshell 3" 15x       Bridge 15x       Seated ER isometric L 10" 2x5       Open book stretch NV                                       Ther Activity                        Gait Training Modalities

## 2025-01-19 NOTE — ASSESSMENT & PLAN NOTE
Mild, residual  Should be covered by Levaquin     Patient educated on the Acapella therapy. All questions answered appropriately.

## 2025-03-28 NOTE — PROGRESS NOTES
[facility]  Cardiology  Progress Note    Patient Name: Ana Castañeda  MRN: [id number]  Admission Date: 11/16/2019  Hospital Length of Stay: 3 days  Code Status: Full Code   Attending Physician: Tiffanie Jones MD   Primary Care Physician: Stephen Tarango MD  Expected Discharge Date:   Principal Problem:Pneumonia of right lower lobe due to infectious organism    Subjective:   HPI:  Ana Castañeda is a 84 year old female who presented to [facility] due to shortness of breath which progressively worsened prior to arrival. She reported associated cough with mucus, wheezing and congestion for 3 days. Per H&P, patient was seen at [facility] on 11/13 and diagnosed with acute bronchitis and received IM Steroid shot and PO ABX. Her chronic medical conditions include Asthma, HTN, H/O CVA with right sided weakness, H/O CEA, Normal pressure hydrocephalus, hypothyroidism, gout, debility (wheelchair bound), and chronic AC on Xarelto (unknown reason). ED workup revealed Cr 1.4, BUN 25, Troponin 0.095>1.983>.992,, LA 3.1, Procal 0.15. She was placed on Bipap in ED for shortness of breath and increased work of breathing. CXR revealed RLL infiltrate. She received IV lopressor 5 mg x 2 dose in ED and IV lasix 40 mg x 1 dose with some improvement in HR control to 80s. She received IV Solumedrol, Duonebs, and IVF resuscitation in ED along with IV ABX. She was admitted to ICU under the care of hospital medicine. Cardiology consulted to assist with management of AFIB/RVR, NSTEMI. Chart reviewed, Patient seen and examined in ICU. Repeat troponin, EKG, and ECHO pending. She is followed by Dr. Christiansen as OP Cardiology but has not seen him in quite some time. She does report a vague history of AFIB and was started on Xarelto at that time in 2017. Denies chest pain or anginal equivalents. She continues to have some shortness of breath and wheezing on exa    Hospital Course:   11/18/19-Patient seen and examined today,  resting in bed. Still SOB with wheezing but states she has improved. No chest pain. Converted back to afib with RVR this AM during PT/OT. Labs reviewed, creatinine 1.8.    11/19/19-Patient seen and examined today, lying in bed. Feels more SOB and congested this AM. Denies chest pain. Converted to SR overnight on amio and cardizem drips. Labs reviewed. Creatinine stable.        Review of Systems   Constitution: Positive for malaise/fatigue.   HENT: Positive for congestion.    Eyes: Negative.    Cardiovascular: Positive for dyspnea on exertion.   Respiratory: Positive for wheezing.    Endocrine: Negative.    Skin: Negative.    Musculoskeletal: Positive for arthritis and joint pain.   Genitourinary: Negative.    Neurological: Positive for weakness.   Psychiatric/Behavioral: Negative.    Allergic/Immunologic: Negative.      Objective:     Vital Signs (Most Recent):  Temp: 97.4 °F (36.3 °C) (11/19/19 0740)  Pulse: 74 (11/19/19 0740)  Resp: 18 (11/19/19 0740)  BP: (!) 168/94 (11/19/19 0740)  SpO2: 98 % (11/19/19 0755) Vital Signs (24h Range):  Temp:  [97.4 °F (36.3 °C)-98.2 °F (36.8 °C)] 97.4 °F (36.3 °C)  Pulse:  [] 74  Resp:  [18-24] 18  SpO2:  [94 %-99 %] 98 %  BP: (111-184)/(63-94) 168/94     Weight: 88.5 kg (195 lb 1.7 oz)  Body mass index is 30.56 kg/m².     SpO2: 98 %  O2 Device (Oxygen Therapy): nasal cannula      Intake/Output Summary (Last 24 hours) at 11/19/2019 0945  Last data filed at 11/19/2019 0500  Gross per 24 hour   Intake 834.47 ml   Output 500 ml   Net 334.47 ml       Lines/Drains/Airways     Peripheral Intravenous Line                 Peripheral IV - Single Lumen 11/16/19 0058 20 G Right Wrist 3 days         Peripheral IV - Single Lumen 11/18/19 1800 24 G Left;Posterior Hand less than 1 day                Physical Exam   Constitutional: She is oriented to person, place, and time. She appears well-developed and well-nourished. No distress.   +conversational dyspnea     HENT:   Head: Normocephalic  and atraumatic.   Eyes: Pupils are equal, round, and reactive to light. Right eye exhibits no discharge. Left eye exhibits no discharge.   Neck: Neck supple. No JVD present.   Cardiovascular: Normal rate, regular rhythm, S1 normal, S2 normal and normal heart sounds.   No murmur heard.  Pulmonary/Chest: Effort normal. No respiratory distress. She has wheezes. She has no rales.   Diminished BS at bases   Abdominal: Soft. She exhibits no distension.   Musculoskeletal: She exhibits no edema.   Neurological: She is alert and oriented to person, place, and time.   Skin: Skin is warm and dry. She is not diaphoretic. No erythema.   Psychiatric: She has a normal mood and affect. Her behavior is normal. Thought content normal.   Nursing note and vitals reviewed.      Significant Labs:   CMP   Recent Labs   Lab 11/18/19 0417 11/19/19  0820    144   K 3.8 4.0    109   CO2 23 23   * 142*   BUN 34* 32*   CREATININE 1.8* 1.2   CALCIUM 9.0 8.9   ANIONGAP 13 12   ESTGFRAFRICA 30* 49*   EGFRNONAA 26* 42*   , CBC   Recent Labs   Lab 11/18/19 0417   WBC 12.14   HGB 10.6*   HCT 34.4*      , Troponin No results for input(s): TROPONINI in the last 48 hours. and All pertinent lab results from the last 24 hours have been reviewed.    Significant Imaging: Echocardiogram:   2D echo with color flow doppler:   Results for orders placed or performed during the hospital encounter of 11/16/19   2D echo with color flow doppler   Result Value Ref Range    QEF 60 55 - 65    Diastolic Dysfunction No     Est. PA Systolic Pressure 34.57     Narrative    Date of Procedure: 11/17/2019        TEST DESCRIPTION   Technical Quality: This is a portable study performed at the patient's bedside. This is a technically challenging study. There is poor endocardial definition. This study was performed in conjunction with a 3ml intravenous injection of Optison contrast   agent.     Aorta: The aortic root is normal in size, measuring 2.4 cm  at sinotubular junction and 2.7 cm at Sinuses of Valsalva. The proximal ascending aorta is normal in size, measuring 2.4 cm across.     Left Atrium: The left atrial volume index is normal, measuring 15.17 cc/m2.     Left Ventricle: The left ventricle is normal in size, with an end-diastolic diameter of 3.7 cm, and an end-systolic diameter of 2.0 cm. Wall thickness is mildly increased, with the septum measuring 1.4 cm and the posterior wall measuring 1.3 cm across.   Relative wall thickness was increased at 0.70, and the LV mass index was 100.2 g/m2 consistent with concentric remodeling. There are no regional wall motion abnormalities. Left ventricular systolic function appears normal. Visually estimated ejection   fraction is 60-65%. The LV Doppler derived stroke volume equals 109.0 ccs.     Diastolic indices: E wave velocity 0.9 m/s, E/A ratio 0.8,  msec., E/e' ratio(avg) 9. Diastolic function is normal.     Right Atrium: The right atrium is normal in size.     Right Ventricle: The right ventricle is normal in size. Global right ventricular systolic function appears normal. The estimated PA systolic pressure is greater than 35 mmHg.     Aortic Valve:  Aortic valve is normal in structure with normal leaflet mobility. The peak gradient obtained across the aortic valve is 7 mmHg, with a mean gradient of 5 mmHg. Using a left ventricular outflow tract diameter of 2.1 cm, a left ventricular   outflow tract velocity time integral of 31 cm, and a peak instantaneous transvalvular velocity time integral of 32 cm, the calculated aortic valve area is 3.41 cm2(AVAi is 1.66 cm2/m2).     Mitral Valve:  Mitral valve is normal in structure with normal leaflet mobility. The pressure half time is 54 msec. The calculated mitral valve area is 4.07 cm2.     Tricuspid Valve:  Tricuspid valve is normal in structure with normal leaflet mobility.     Pulmonary Valve:  Pulmonary valve is normal in structure with normal leaflet  mobility.     Intracavitary: There is no evidence of pericardial effusion, intracavity mass, thrombi, or vegetation.         CONCLUSIONS     1 - Concentric remodeling.     2 - No wall motion abnormalities.     3 - Normal left ventricular systolic function (EF 60-65%).     4 - Normal left ventricular diastolic function.     5 - Normal right ventricular systolic function .     6 - The estimated PA systolic pressure is greater than 35 mmHg.             This document has been electronically    SIGNED BY: Rodrigo Yi MD On: 11/17/2019 12:56   , EKG: Reviewed and X-Ray: CXR: X-Ray Chest 1 View (CXR):   Results for orders placed or performed during the hospital encounter of 11/16/19   X-Ray Chest 1 View    Narrative    EXAMINATION:  XR CHEST 1 VIEW    CLINICAL HISTORY:  PNA;    FINDINGS:  Single view of the chest.   Aorta demonstrates atherosclerotic disease.    Cardiac silhouette is normal.  Significant improvement in patchy airspace opacities within the right mid lower lung zone.  No evidence of pleural effusion or pneumothorax.  Bones appear intact.      Impression    Significant improvement in patchy airspace opacities within the right mid lower lung zone.      Electronically signed by: Meng Sharp MD  Date:    11/18/2019  Time:    08:48    and X-Ray Chest PA and Lateral (CXR): No results found for this visit on 11/16/19.    Assessment and Plan:   Patient who present with PAF in setting of PNA. Converted to SR. Discussed switching drips to po meds, patient concerned about swallowing pills. Will discuss with hospital med. Continue same mgmt for time being.    * Pneumonia of right lower lobe due to infectious organism  -On ABX, per primary team    Essential hypertension  -Continue same meds  -Monitor    Paroxysmal A-fib with RVR  -No known history of AFIB/AFL but on Xarelto as OP  -Received IV Lopressor x 2 dose in ED and IV CCB with conversion to SR yesterday  -Increase Cardizem to 90 mg TID for better rate  control  -NO BB for now given wheezing on exam today  -ECHO pending    11/18/19  -Converted back to afib with RVR this AM  -Start cardizem gtt, titrate as needed  -Continue Xarelto for CVA prophylaxis     11/19/19  -Converted to SR this AM  -Discussed switching cardizem and amiodarone to po, patient unsure if she can swallow pills at this time  -Will further discuss with hospital medicine  -Continue Xarelto for CVA prophylaxis     NSTEMI  -Admitted due to shortness of breath, AFIB/RVR  -Troponin 0.95>1.983>.992  -Repeat Troponin 0.371  -Likely due to demand ischemia from AFIB/RVR and sepsis  -ECHO pending  -Increase CCB to 90 mg TID for better rate control today  -NO BB given wheezing on exam today  -Continue ASA, Statin, CCB, Xarelto  -Start Losartan for better BP control.  -Would benefit from OP MPI stress test once pulmonary status optimized after discharge.   -Reassess in AM    11/18/19  -Stable overnight, remains chest pain free  -2D echo showed normal EF  -Continue ASA, statin, CCB, ARB, Xarelto  -No BB given wheezing  -OP MPI stress test    11/19/19  -Stable overnight  -No chest pain  -Continue ASA, statin, CCB, ARB, Xarelto  -Plan on OP MPI stress test    Moderate persistent asthma with exacerbation  -Mgmt per primary team    Acute respiratory failure with hypoxia  -Secondary to PNA  -Continue mgmt per primary team, on abx  -Repeat CXR today        VTE Risk Mitigation (From admission, onward)         Ordered     rivaroxaban tablet 15 mg  With dinner      11/17/19 1054     IP VTE HIGH RISK PATIENT  Once      11/16/19 0430     Reason for No Pharmacological VTE Prophylaxis  Once     Question:  Reasons:  Answer:  Already adequately anticoagulated on oral Anticoagulants    11/16/19 0430     Place sequential compression device  Until discontinued      11/16/19 0430                Susan Alcocer PA-C  Cardiology  [facility] -    0